# Patient Record
Sex: FEMALE | Race: WHITE | NOT HISPANIC OR LATINO | Employment: UNEMPLOYED | URBAN - METROPOLITAN AREA
[De-identification: names, ages, dates, MRNs, and addresses within clinical notes are randomized per-mention and may not be internally consistent; named-entity substitution may affect disease eponyms.]

---

## 2021-08-09 ENCOUNTER — HOSPITAL ENCOUNTER (OUTPATIENT)
Facility: CLINIC | Age: 31
End: 2021-08-09
Attending: INTERNAL MEDICINE | Admitting: INTERNAL MEDICINE
Payer: MEDICAID

## 2021-08-09 DIAGNOSIS — Z11.59 ENCOUNTER FOR SCREENING FOR OTHER VIRAL DISEASES: ICD-10-CM

## 2021-08-18 ENCOUNTER — LAB (OUTPATIENT)
Dept: LAB | Facility: CLINIC | Age: 31
End: 2021-08-18
Attending: INTERNAL MEDICINE
Payer: MEDICAID

## 2021-08-18 ENCOUNTER — ANESTHESIA EVENT (OUTPATIENT)
Dept: SURGERY | Facility: CLINIC | Age: 31
End: 2021-08-18

## 2021-08-18 DIAGNOSIS — Z11.59 ENCOUNTER FOR SCREENING FOR OTHER VIRAL DISEASES: ICD-10-CM

## 2021-08-18 LAB — SARS-COV-2 RNA RESP QL NAA+PROBE: NEGATIVE

## 2021-08-18 PROCEDURE — U0003 INFECTIOUS AGENT DETECTION BY NUCLEIC ACID (DNA OR RNA); SEVERE ACUTE RESPIRATORY SYNDROME CORONAVIRUS 2 (SARS-COV-2) (CORONAVIRUS DISEASE [COVID-19]), AMPLIFIED PROBE TECHNIQUE, MAKING USE OF HIGH THROUGHPUT TECHNOLOGIES AS DESCRIBED BY CMS-2020-01-R: HCPCS

## 2021-08-18 PROCEDURE — U0005 INFEC AGEN DETEC AMPLI PROBE: HCPCS

## 2021-08-18 RX ORDER — LIDOCAINE 40 MG/G
CREAM TOPICAL
Status: CANCELLED | OUTPATIENT
Start: 2021-08-18

## 2021-08-18 RX ORDER — DEXTROAMPHETAMINE SACCHARATE, AMPHETAMINE ASPARTATE MONOHYDRATE, DEXTROAMPHETAMINE SULFATE AND AMPHETAMINE SULFATE 6.25; 6.25; 6.25; 6.25 MG/1; MG/1; MG/1; MG/1
50 CAPSULE, EXTENDED RELEASE ORAL
COMMUNITY
Start: 2021-08-10 | End: 2021-08-19 | Stop reason: HOSPADM

## 2021-08-18 RX ORDER — ACETAMINOPHEN 325 MG/1
650 TABLET ORAL
COMMUNITY
Start: 2021-05-10 | End: 2021-08-19 | Stop reason: HOSPADM

## 2021-08-18 RX ORDER — GLUCOSA SU 2KCL/CHONDROITIN SU 500-400 MG
1 CAPSULE ORAL
COMMUNITY
Start: 2020-02-04 | End: 2021-08-19 | Stop reason: HOSPADM

## 2021-08-18 RX ORDER — FERROUS SULFATE 325(65) MG
325 TABLET ORAL
COMMUNITY
End: 2021-08-19 | Stop reason: HOSPADM

## 2021-08-18 RX ORDER — GABAPENTIN 300 MG/1
300 CAPSULE ORAL 2 TIMES DAILY
COMMUNITY
Start: 2021-08-10 | End: 2021-08-19 | Stop reason: HOSPADM

## 2021-08-18 RX ORDER — BUPRENORPHINE AND NALOXONE 8; 2 MG/1; MG/1
1 FILM, SOLUBLE BUCCAL; SUBLINGUAL 2 TIMES DAILY
COMMUNITY
End: 2021-08-19 | Stop reason: HOSPADM

## 2021-08-18 RX ORDER — DEXAMETHASONE 0.5 MG/5ML
ELIXIR ORAL
COMMUNITY
Start: 2020-02-19 | End: 2021-08-19 | Stop reason: HOSPADM

## 2021-08-18 RX ORDER — ESCITALOPRAM OXALATE 10 MG/1
TABLET ORAL
COMMUNITY
Start: 2021-05-21 | End: 2021-08-19 | Stop reason: HOSPADM

## 2021-08-18 RX ORDER — IBUPROFEN 600 MG/1
600 TABLET, FILM COATED ORAL
COMMUNITY
Start: 2021-05-10 | End: 2021-08-19 | Stop reason: HOSPADM

## 2021-08-18 RX ORDER — HYDROXYZINE HYDROCHLORIDE 25 MG/1
25-50 TABLET, FILM COATED ORAL EVERY 6 HOURS PRN
COMMUNITY
End: 2021-08-19 | Stop reason: HOSPADM

## 2021-08-18 NOTE — PLAN OF CARE
Reached patient at approximately 5 PM. Notified that we do not have an H&P for her procedure tomorrow. She will call [facility] to ask if the MD will do one prior to her procedure, and if he will not, try to schedule one herself. If those two things fail, patient stated she will try to reschedule her procedure .

## 2021-08-18 NOTE — PLAN OF CARE
"Left message at [facility] we have been unable to find patient's pre-op H&P. Pt stated she went to [facility] in [place] \"About 2 weeks ago\" Attempted to get that information from clinic but was only able to leave a message. Left patient another message that we have NOT been able to retrieve that information and we absolutely need it before surgery. Have not heard from patient as of 1700. Will continue to reach patient until shift is over.  "

## 2021-08-19 ENCOUNTER — ANESTHESIA (OUTPATIENT)
Dept: SURGERY | Facility: CLINIC | Age: 31
End: 2021-08-19

## 2021-10-07 ENCOUNTER — HOSPITAL ENCOUNTER (OUTPATIENT)
Facility: HOSPITAL | Age: 31
End: 2021-10-07
Attending: INTERNAL MEDICINE | Admitting: INTERNAL MEDICINE
Payer: COMMERCIAL

## 2021-10-07 DIAGNOSIS — Z11.59 ENCOUNTER FOR SCREENING FOR OTHER VIRAL DISEASES: ICD-10-CM

## 2021-11-05 ENCOUNTER — HOSPITAL ENCOUNTER (OUTPATIENT)
Facility: CLINIC | Age: 31
End: 2021-11-05
Attending: INTERNAL MEDICINE | Admitting: INTERNAL MEDICINE
Payer: COMMERCIAL

## 2021-11-05 DIAGNOSIS — Z11.59 ENCOUNTER FOR SCREENING FOR OTHER VIRAL DISEASES: ICD-10-CM

## 2021-11-09 ENCOUNTER — LAB (OUTPATIENT)
Dept: LAB | Facility: CLINIC | Age: 31
End: 2021-11-09
Attending: INTERNAL MEDICINE
Payer: COMMERCIAL

## 2021-11-09 DIAGNOSIS — Z11.59 ENCOUNTER FOR SCREENING FOR OTHER VIRAL DISEASES: ICD-10-CM

## 2021-11-09 PROCEDURE — U0005 INFEC AGEN DETEC AMPLI PROBE: HCPCS

## 2021-11-09 PROCEDURE — U0003 INFECTIOUS AGENT DETECTION BY NUCLEIC ACID (DNA OR RNA); SEVERE ACUTE RESPIRATORY SYNDROME CORONAVIRUS 2 (SARS-COV-2) (CORONAVIRUS DISEASE [COVID-19]), AMPLIFIED PROBE TECHNIQUE, MAKING USE OF HIGH THROUGHPUT TECHNOLOGIES AS DESCRIBED BY CMS-2020-01-R: HCPCS

## 2021-11-10 LAB — SARS-COV-2 RNA RESP QL NAA+PROBE: POSITIVE

## 2021-11-21 ENCOUNTER — HEALTH MAINTENANCE LETTER (OUTPATIENT)
Age: 31
End: 2021-11-21

## 2022-01-06 ENCOUNTER — VIRTUAL VISIT (OUTPATIENT)
Dept: FAMILY MEDICINE | Facility: CLINIC | Age: 32
End: 2022-01-06
Payer: COMMERCIAL

## 2022-01-06 DIAGNOSIS — Z20.822 EXPOSURE TO 2019 NOVEL CORONAVIRUS: Primary | ICD-10-CM

## 2022-01-06 DIAGNOSIS — Z11.59 NEED FOR HEPATITIS C SCREENING TEST: ICD-10-CM

## 2022-01-06 DIAGNOSIS — Z11.4 SCREENING FOR HIV (HUMAN IMMUNODEFICIENCY VIRUS): ICD-10-CM

## 2022-01-06 DIAGNOSIS — G35 MULTIPLE SCLEROSIS (H): ICD-10-CM

## 2022-01-06 PROCEDURE — 99203 OFFICE O/P NEW LOW 30 MIN: CPT | Mod: 95 | Performed by: INTERNAL MEDICINE

## 2022-01-06 RX ORDER — FERROUS SULFATE 325(65) MG
325 TABLET ORAL DAILY
COMMUNITY
Start: 2021-09-24

## 2022-01-06 RX ORDER — ESCITALOPRAM OXALATE 10 MG/1
10 TABLET ORAL DAILY
Status: ON HOLD | COMMUNITY
Start: 2021-12-26 | End: 2024-06-10

## 2022-01-06 RX ORDER — ONDANSETRON 4 MG/1
4 TABLET, ORALLY DISINTEGRATING ORAL
COMMUNITY
Start: 2021-11-02 | End: 2022-01-21

## 2022-01-06 RX ORDER — HYDROXYZINE HYDROCHLORIDE 50 MG/1
50 TABLET, FILM COATED ORAL 4 TIMES DAILY PRN
Status: ON HOLD | COMMUNITY
Start: 2021-12-30 | End: 2024-06-10

## 2022-01-06 RX ORDER — MULTIVITAMIN
1 TABLET ORAL DAILY
COMMUNITY
End: 2024-05-15

## 2022-01-06 RX ORDER — DEXTROAMPHETAMINE SACCHARATE, AMPHETAMINE ASPARTATE, DEXTROAMPHETAMINE SULFATE AND AMPHETAMINE SULFATE 3.75; 3.75; 3.75; 3.75 MG/1; MG/1; MG/1; MG/1
TABLET ORAL
COMMUNITY
Start: 2022-01-03 | End: 2024-05-15

## 2022-01-06 RX ORDER — GABAPENTIN 300 MG/1
600 CAPSULE ORAL 3 TIMES DAILY
Status: ON HOLD | COMMUNITY
Start: 2022-01-03 | End: 2024-06-10

## 2022-01-06 RX ORDER — BUPRENORPHINE HYDROCHLORIDE, NALOXONE HYDROCHLORIDE 8; 2 MG/1; MG/1
1 FILM, SOLUBLE BUCCAL; SUBLINGUAL 2 TIMES DAILY
COMMUNITY
Start: 2022-01-03 | End: 2024-05-15

## 2022-01-06 RX ORDER — LANOLIN ALCOHOL/MO/W.PET/CERES
400 CREAM (GRAM) TOPICAL DAILY
COMMUNITY
Start: 2021-12-27 | End: 2024-05-15

## 2022-01-06 RX ORDER — BUSPIRONE HYDROCHLORIDE 10 MG/1
10 TABLET ORAL 2 TIMES DAILY
Status: ON HOLD | COMMUNITY
Start: 2021-12-27 | End: 2024-06-10

## 2022-01-06 RX ORDER — LORAZEPAM 1 MG/1
1 TABLET ORAL 2 TIMES DAILY PRN
Status: ON HOLD | COMMUNITY
Start: 2021-12-31 | End: 2024-06-10

## 2022-01-06 NOTE — PROGRESS NOTES
Irma is a 31 year old who is being evaluated via a billable video visit.      How would you like to obtain your AVS? MyChart  If the video visit is dropped, the invitation should be resent by: Text to cell phone: [phone]  Will anyone else be joining your video visit? No    Video Start Time: 11:34 AM    Assessment & Plan   Problem List Items Addressed This Visit     None      Visit Diagnoses     Exposure to 2019 novel coronavirus    -  Primary    Screening for HIV (human immunodeficiency virus)        Need for hepatitis C screening test        Multiple sclerosis (H)        Relevant Orders    REVIEW OF HEALTH MAINTENANCE PROTOCOL ORDERS (Completed)    Asymptomatic COVID-19 Virus (Coronavirus) by PCR Nose                  Work on weight loss  Regular exercise    No follow-ups on file.    Cliff Hooks MD  [facility] MARIBEL Solis is a 31 year old who presents for the following health issues   Start: 01/06/2022 11:34 am  Stop: 01/06/2022 11:45 am  HPI     Covid Concerns  Congregated living situation and recent exposure to covid   Same household  Immune system compromised   immunosupp      Review of Systems   Constitutional, HEENT, cardiovascular, pulmonary, gi and gu systems are negative, except as otherwise noted.      Objective           Vitals:  No vitals were obtained today due to virtual visit.    Physical Exam   GENERAL: Healthy, alert and no distress  EYES: Eyes grossly normal to inspection.  No discharge or erythema, or obvious scleral/conjunctival abnormalities.  RESP: No audible wheeze, cough, or visible cyanosis.  No visible retractions or increased work of breathing.    SKIN: Visible skin clear. No significant rash, abnormal pigmentation or lesions.  NEURO: Cranial nerves grossly intact.  Mentation and speech appropriate for age.  PSYCH: Mentation appears normal, affect normal/bright, judgement and insight intact, normal speech and appearance well-groomed.    No  results found for any visits on 01/06/22.            Video-Visit Details    Type of service:  Video Visit    Video End Time:11:45 AM    Originating Location (pt. Location): Home    Distant Location (provider location):  [facility]     Platform used for Video Visit: Fippex

## 2022-01-07 ENCOUNTER — LAB (OUTPATIENT)
Dept: LAB | Facility: CLINIC | Age: 32
End: 2022-01-07
Attending: INTERNAL MEDICINE
Payer: COMMERCIAL

## 2022-01-07 DIAGNOSIS — G35 MULTIPLE SCLEROSIS (H): ICD-10-CM

## 2022-01-07 PROCEDURE — U0003 INFECTIOUS AGENT DETECTION BY NUCLEIC ACID (DNA OR RNA); SEVERE ACUTE RESPIRATORY SYNDROME CORONAVIRUS 2 (SARS-COV-2) (CORONAVIRUS DISEASE [COVID-19]), AMPLIFIED PROBE TECHNIQUE, MAKING USE OF HIGH THROUGHPUT TECHNOLOGIES AS DESCRIBED BY CMS-2020-01-R: HCPCS

## 2022-01-07 PROCEDURE — U0005 INFEC AGEN DETEC AMPLI PROBE: HCPCS

## 2022-01-08 LAB — SARS-COV-2 RNA RESP QL NAA+PROBE: NEGATIVE

## 2022-01-21 ENCOUNTER — TELEPHONE (OUTPATIENT)
Dept: INTERNAL MEDICINE | Facility: CLINIC | Age: 32
End: 2022-01-21
Payer: COMMERCIAL

## 2022-01-21 ENCOUNTER — LAB (OUTPATIENT)
Dept: LAB | Facility: CLINIC | Age: 32
End: 2022-01-21
Payer: COMMERCIAL

## 2022-01-21 DIAGNOSIS — Z01.818 PREOP GENERAL PHYSICAL EXAM: ICD-10-CM

## 2022-01-21 DIAGNOSIS — Z01.818 PREOP GENERAL PHYSICAL EXAM: Primary | ICD-10-CM

## 2022-01-21 PROCEDURE — U0005 INFEC AGEN DETEC AMPLI PROBE: HCPCS

## 2022-01-21 PROCEDURE — U0003 INFECTIOUS AGENT DETECTION BY NUCLEIC ACID (DNA OR RNA); SEVERE ACUTE RESPIRATORY SYNDROME CORONAVIRUS 2 (SARS-COV-2) (CORONAVIRUS DISEASE [COVID-19]), AMPLIFIED PROBE TECHNIQUE, MAKING USE OF HIGH THROUGHPUT TECHNOLOGIES AS DESCRIBED BY CMS-2020-01-R: HCPCS

## 2022-01-21 RX ORDER — IBUPROFEN 400 MG/1
400 TABLET, FILM COATED ORAL EVERY 6 HOURS PRN
COMMUNITY
End: 2024-05-15

## 2022-01-21 RX ORDER — DEXTROAMPHETAMINE SACCHARATE, AMPHETAMINE ASPARTATE MONOHYDRATE, DEXTROAMPHETAMINE SULFATE AND AMPHETAMINE SULFATE 6.25; 6.25; 6.25; 6.25 MG/1; MG/1; MG/1; MG/1
50 CAPSULE, EXTENDED RELEASE ORAL EVERY MORNING
Status: ON HOLD | COMMUNITY
End: 2024-06-10

## 2022-01-21 NOTE — TELEPHONE ENCOUNTER
ALPESH Montalvo at [facility] calling. Pt was scheduled with an OB provider today for a physical and needed a preop. Pt has Surgery scheduled through [facility]. Is needing a pre procedural covid test. [facility] will not order this. Lab collected a covid test on pt while in clinic. They are needing Dr. Hooks to place order for pre-procedural covid test. Katia can be reached at [phone]. Please advise.    Valerie Ahumada RN  [facility]

## 2022-01-21 NOTE — TELEPHONE ENCOUNTER
FUTURE VISIT INFORMATION      SURGERY INFORMATION:    Date: 22    Location:  or    Surgeon:  Jojo Moore MD    Anesthesia Type:  general    Procedure: ENDOSCOPIC RETROGRADE CHOLANGIOPANCREATOGRAPHY WITH STENT REMOVAL    RECORDS REQUESTED FROM:       Primary Care Provider: Sravan Breen MD  - [facility]    Most recent EKG+ Tracin20- [facility]

## 2022-01-21 NOTE — PHARMACY - PREOPERATIVE ASSESSMENT CENTER
Preoperative Assessment Center Medication History Note    Medication history completed on January 21, 2022 by this writer. See Epic admission navigator for prior to admission medications. Operating room staff will still need to confirm medications and last dose information on day of surgery.     Medication history interview sources  Patient interview: Yes  Care Everywhere records: Yes  Surescripts pharmacy refill records: Yes  Other (if applicable): None    Changes made to PTA medication list  Added:   -- ibuprofen  -- adderall XR     Deleted:   -- dexamethasone  -- zofran    Changed: None    Additional medication history information (including reliability of information, actions taken by pharmacist):    -- No recent (within 30 days) course of antibiotics  -- No recent (within 30 days) course of systemic steroids  -- Patient declines being on any other prescription or over-the-counter medications    Pain Medication Quantification - Patient is currently scheduled for a same day surgery. If this plan changes and patient is admitted, the inpatient pain management service could be consulted for specific pain management recommendations (available at pager [phone] from 8 AM - 3 PM Mon - Fri and available via phone answering service 24/7 at [phone]).     - OUTPATIENT MEDICATIONS (related to pain management):  -- Long-acting opioid: None  -- Short-acting opioid: None  -- Intrathecal pump: None  -- Other: Suboxone 8-1mg, 1 film SL twice daily - patient to continue preoperatively.       Prior to Admission medications    Medication Sig Last Dose Taking? Auth Provider   amphetamine-dextroamphetamine (ADDERALL XR) 25 MG 24 hr capsule Take 50 mg by mouth every morning Taking Yes Unknown, Entered By History   amphetamine-dextroamphetamine (ADDERALL) 15 MG tablet Take 1 tablet in afternoon (in addition to XR) Taking Yes Reported, Patient   busPIRone (BUSPAR) 10 MG tablet Take 10 mg by mouth 2 times daily  Taking Yes  Reported, Patient   cholecalciferol 50 MCG (2000 UT) tablet Take 1 tablet by mouth daily Taking Yes Reported, Patient   escitalopram (LEXAPRO) 10 MG tablet Take 10 mg by mouth daily  Taking Yes Reported, Patient   FEROSUL 325 (65 Fe) MG tablet Take 325 mg by mouth daily Taking Yes Reported, Patient   folic acid (FOLVITE) 400 MCG tablet Take 400 mcg by mouth daily  Taking Yes Reported, Patient   gabapentin (NEURONTIN) 300 MG capsule TAKE TWO CAPSULES BY MOUTH TWICE DAILY Taking Yes Reported, Patient   hydrOXYzine (ATARAX) 50 MG tablet Take 50 mg by mouth nightly as needed  Taking Yes Reported, Patient   ibuprofen (ADVIL/MOTRIN) 400 MG tablet Take 400 mg by mouth every 6 hours as needed for moderate pain Taking Yes Unknown, Entered By History   LORazepam (ATIVAN) 1 MG tablet TAKE 1 TABLET BY MOUTH EVERY DAY AS NEEDED FOR ANXIETY Taking Yes Reported, Patient   Specialty Vitamins Products (VITAMINS FOR HAIR) TABS Take 1 tablet by mouth daily Taking Yes Reported, Patient   SUBOXONE 8-2 MG per film Place 1 Film under the tongue 2 times daily  Taking Yes Reported, Patient   dexamethasone (DECADRON) 0.5 MG/5ML elixir For MS Flares   Reported, Patient       Medication history completed by: Samuel Buckley RPH

## 2022-01-22 LAB — SARS-COV-2 RNA RESP QL NAA+PROBE: NEGATIVE

## 2022-01-24 ENCOUNTER — PRE VISIT (OUTPATIENT)
Dept: SURGERY | Facility: CLINIC | Age: 32
End: 2022-01-24

## 2022-01-24 ENCOUNTER — ANESTHESIA EVENT (OUTPATIENT)
Dept: SURGERY | Facility: CLINIC | Age: 32
End: 2022-01-24
Payer: COMMERCIAL

## 2022-01-24 ENCOUNTER — OFFICE VISIT (OUTPATIENT)
Dept: SURGERY | Facility: CLINIC | Age: 32
End: 2022-01-24
Payer: COMMERCIAL

## 2022-01-24 VITALS
SYSTOLIC BLOOD PRESSURE: 114 MMHG | TEMPERATURE: 98.4 F | BODY MASS INDEX: 50.02 KG/M2 | HEIGHT: 64 IN | HEART RATE: 94 BPM | OXYGEN SATURATION: 97 % | DIASTOLIC BLOOD PRESSURE: 81 MMHG | WEIGHT: 293 LBS

## 2022-01-24 DIAGNOSIS — Z01.818 PREOP EXAMINATION: Primary | ICD-10-CM

## 2022-01-24 DIAGNOSIS — Q44.5 CHOLEDOCHOCELE: ICD-10-CM

## 2022-01-24 PROCEDURE — 99203 OFFICE O/P NEW LOW 30 MIN: CPT | Performed by: PHYSICIAN ASSISTANT

## 2022-01-24 ASSESSMENT — MIFFLIN-ST. JEOR: SCORE: 2205.01

## 2022-01-24 ASSESSMENT — ENCOUNTER SYMPTOMS: SEIZURES: 0

## 2022-01-24 ASSESSMENT — PAIN SCALES - GENERAL: PAINLEVEL: EXTREME PAIN (8)

## 2022-01-24 ASSESSMENT — LIFESTYLE VARIABLES: TOBACCO_USE: 1

## 2022-01-24 NOTE — PATIENT INSTRUCTIONS
You were seen today in the PAC Clinic   (Pre operative Anesthesia Assessment Center)  [address]  [address]   phone [phone]    You had a pre operative assessment done:    Anesthesia recommendations for medications:    Hold aspirin for 7 days before surgery  Hold multivitamins for 7 days before surgery  Hold herbal medications and supplements for 7 days before surgery  Hold ibuprofen for at least 24 hours before surgery   Hold Naproxen for at least 4 days before surgery      Special instructions for anticoagulation medications:        Please hold the following medications the day of surgery:  Adderall  Vitamin D  Iron supplement   Folate             Please take these medications the day of surgery:  Lorazepam if needed   Suboxone per your routine  Escitalopram  Gabapentin                  For information regarding Surgery/Procedure date, time, and fasting instructions, please contact the facility that you are scheduled     [facility]  Procedure time 2 pm    Most likely arrival time 12:00 noon    No solid foods after 6 am  Clear liquids are ok until 12:00 noon

## 2022-01-24 NOTE — H&P
Pre-Operative H & P     CC:  Preoperative exam to assess for increased cardiopulmonary risk while undergoing surgery and anesthesia.    Date of Encounter: 1/24/2022  Primary Care Physician:  No Ref-Primary, Physician     Reason for visit:   Encounter Diagnoses   Name Primary?     Preop examination Yes     Choledochocele        HPI  Irma Heller is a 31 year old female who presents for pre-operative H & P in preparation for ERCP with Dr. Moore on 1/25/22 at [facility].    This is a 31-year-old female patient with past medical history significant for positive Covid November 2021, former smoker, Chiari I malformation in childhood status post decompression and shunt, multiple sclerosis, GERD, depression, anxiety.  Patient is status post cholecystectomy.  In May 2021 patient underwent ERCP which revealed dilated intrahepatic ducts and stricture with stone.  Patient underwent successful sphincterotomy with balloon extraction of cystic duct stone and stent placement.  She is now scheduled for the above procedure to remove stent.    History is obtained from the patient and chart review      Hx of abnormal bleeding or anti-platelet use: denies    Menstrual history: No LMP recorded.:     Prior to Admission Medications  Current Outpatient Medications   Medication Sig Dispense Refill     amphetamine-dextroamphetamine (ADDERALL XR) 25 MG 24 hr capsule Take 50 mg by mouth every morning       amphetamine-dextroamphetamine (ADDERALL) 15 MG tablet Take 1 tablet in afternoon (in addition to XR)       busPIRone (BUSPAR) 10 MG tablet Take 10 mg by mouth 2 times daily        cholecalciferol 50 MCG (2000 UT) tablet Take 1 tablet by mouth daily       escitalopram (LEXAPRO) 10 MG tablet Take 10 mg by mouth daily        FEROSUL 325 (65 Fe) MG tablet Take 325 mg by mouth daily       folic acid (FOLVITE) 400 MCG tablet Take 400 mcg by mouth daily        gabapentin (NEURONTIN) 300 MG capsule TAKE TWO CAPSULES BY MOUTH  TWICE DAILY       hydrOXYzine (ATARAX) 50 MG tablet Take 50 mg by mouth nightly as needed        ibuprofen (ADVIL/MOTRIN) 400 MG tablet Take 400 mg by mouth every 6 hours as needed for moderate pain       LORazepam (ATIVAN) 1 MG tablet TAKE 1 TABLET BY MOUTH EVERY DAY AS NEEDED FOR ANXIETY       Specialty Vitamins Products (VITAMINS FOR HAIR) TABS Take 1 tablet by mouth daily       SUBOXONE 8-2 MG per film Place 1 Film under the tongue 2 times daily          Family History  Family History   Problem Relation Age of Onset     Heart Disease Brother            Anesthesia Pre-Procedure Evaluation    Patient: Irma Heller   MRN: [id number] : 1990        Preoperative Diagnosis: * No surgery found *    Procedure :   PAC EVALUATION       Past Medical History:   Diagnosis Date     Anxiety      Chiari I malformation (H)     Hx- was a child     Depression      Gastroesophageal reflux disease      Multiple sclerosis (H)       Past Surgical History:   Procedure Laterality Date     AS INSTALL SPINAL SHUNT,LAMINECTOMY      Related to Chiari Malformation surgery     CHOLECYSTECTOMY       DECOMPRESSION CHIARI        Allergies   Allergen Reactions     Penicillins Anaphylaxis and Hives     Shellfish Allergy Anaphylaxis and Hives     Copaxone [Glatiramer-Mannitol] Swelling      Social History     Tobacco Use     Smoking status: Former Smoker     Quit date: 2021     Years since quittin.0     Smokeless tobacco: Never Used   Substance Use Topics     Alcohol use: Not Currently      Wt Readings from Last 1 Encounters:   No data found for Wt        Anesthesia Evaluation   Pt has had prior anesthetic.     No history of anesthetic complications     The complete review of systems is negative other than noted in the HPI or here.     ROS/MED HX  ENT/Pulmonary:     (+) VALENTIN risk factors, snores loudly, obese, tobacco use, Past use,     Neurologic: Comment: H/o chiari 1 malformation s/p decompression/shunt    (+) Multiple  "Sclerosis,  (-) no seizures and no CVA   Cardiovascular:     (+) -----Previous cardiac testing   Echo: Date: Results:    Stress Test: Date: Results:    ECG Reviewed: Date: 4/14/2020 Results:  SR  Cath: Date: Results:      METS/Exercise Tolerance: >4 METS    Hematologic:  - neg hematologic  ROS  (-) history of blood clots and history of blood transfusion   Musculoskeletal:  - neg musculoskeletal ROS     GI/Hepatic:     (+) GERD,     Renal/Genitourinary:  - neg Renal ROS     Endo:     (+) Obesity,     Psychiatric/Substance Use:     (+) psychiatric history anxiety and depression     Infectious Disease:  - neg infectious disease ROS     Malignancy:  - neg malignancy ROS     Other:  - neg other ROS          Physical Exam    Airway        Mallampati: II   TM distance: > 3 FB   Neck ROM: full   Mouth opening: > 3 cm    Respiratory Devices and Support         Dental       (+) upper dentures      Cardiovascular   cardiovascular exam normal       Rhythm and rate: regular and normal     Pulmonary   pulmonary exam normal        breath sounds clear to auscultation         /81 (BP Location: Right arm, Patient Position: Sitting, Cuff Size: Thigh)   Pulse 94   Temp 98.4  F (36.9  C) (Oral)   Ht 1.626 m (5' 4\")   Wt (!) 151.5 kg (334 lb)   SpO2 97%   Breastfeeding No   BMI 57.33 kg/m           Physical Exam  Constitutional: Awake, alert, cooperative, no apparent distress, and appears stated age.  Eyes: Pupils equal, round and reactive to light, extra ocular muscles intact, sclera clear, conjunctiva normal.  HENT: Normocephalic, oral pharynx with moist mucus membranes, upper denture. No goiter appreciated.   Respiratory: Clear to auscultation bilaterally, no crackles or wheezing.  Cardiovascular: Regular rate and rhythm, normal S1 and S2, and no murmur noted.  Carotids +2, no bruits. No edema. Palpable pulses to radial and PT arteries.   GI: Normal bowel sounds, soft, obese abdomen.  Mildly tender to palpation of " RUQ.  Lymph/Hematologic: No cervical lymphadenopathy and no supraclavicular lymphadenopathy.  Genitourinary:  deferred  Skin: Warm and dry.    Musculoskeletal: Full ROM of neck. There is no redness, warmth, or swelling of the joints. Gross motor strength is normal.    Neurologic: Awake, alert, oriented to name, place and time. Cranial nerves II-XII are grossly intact. Gait is normal.   Neuropsychiatric: Calm, cooperative. Normal affect.     PRIOR LABS/DIAGNOSTIC STUDIES:   All labs and imaging personally reviewed     9/5/21  SODIUM 136 - 145 mmol/L 138    POTASSIUM 3.5 - 5.1 mmol/L 4.0    CHLORIDE 98 - 112 mmol/L 104    CARBON DIOXIDE 21 - 32 mmol/L 28    BUN (UREA NITRO) 7 - 24 mg/dL 14    CREATININE 0.55 - 1.02 mg/dL 0.71    EST GFR (CKD-EPI) >60 mL/min >60    EST GFR IF AFRICAN AM >60 mL/min >60    GLUCOSE 74 - 106 mg/dL 107 High     CALCIUM, SERUM 8.5 - 10.1 mg/dL 9.2    ANION GAP 0.0 - 15.0 mmol/L 6.0      WBC 4.3 - 10.8 K/uL 12.0 High     RBC 4.20 - 5.40 M/uL 4.70    HEMOGLOBIN 12.0 - 16.0 gm/dL 12.8    HEMATOCRIT 36.0 - 48.0 % 39.7    MCV 80 - 100 fl 85    MCH 27 - 33 pg 27    MCHC 33 - 36 gm/dL 32 Low     RDW 11.5 - 14.5 % 13.6    PLATELET COUNT 150 - 400 K/    MPV 6.5 - 12 10.3    PMN % % 54.0    IG% <=1.0 % 0.3    LYMPH % % 32.8    MONO % % 6.4    EOS % % 5.6    BASO % % 0.9    PMN ABSOLUTE 1.80 - 7.80 K/uL 6.44    IG ABSOLUTE K/uL 0.04    LYMPH ABSOLUTE 1.00 - 4.00 K/uL 3.92    MONO ABSOLUTE 0.00 - 1.00 K/uL 0.77    EOS ABSOLUTE 0.00 - 0.45 K/uL 0.67 High     BASO ABSOLUTE 0.00 - 0.20 K/uL 0.11    NUCL RBC % 0.0 - 0.0 /100 WBC 0.0    NUCL RBC ABSOLUTE 0.00 - 0.00 K/uL 0.00          EKG/ stress test - if available please see in ROS above     CT a/p w/o oral w IV con 9/5/21:  IMPRESSION:     1.  Mild fatty infiltration of the liver.   2.  Cholecystectomy.   3.  Common bile duct stent without biliary ductal dilatation.   4.  Small umbilical hernia containing fat.   5.  No acute findings.          Outside records reviewed from: care everywhere          ASSESSMENT and PLAN  Irma Heller is a 31 year old female scheduled for ERCP on 1/25/22 by Dr. Moore in treatment of choledochocele at [facility].  PAC referral for risk assessment and optimization for anesthesia with comorbid conditions of h/o chiari 1 malformation, MS, GERD, depression, anxiety:     Pre-operative considerations:   1.  Cardiac:  Functional status- METS >4.  Good activity tolerance. No exertional symptoms with walking, ascending stairs.  Low risk surgery with 0.4% (RCRI #) risk of major adverse cardiac event.   --denies chest pain, SOB, GREEN, palpitations, orthopnea  --no cardiac history personally but reports there is a family history of 'enlarged heart' and her brother passed away at 24 from this.     2.  Pulm:  Airway feasible.  VALENTIN risk: Low risk, 2/8 STOPBANG risk factors  --former smoker, quit 1/2021     3.  GI:  Risk of PONV score = 2.  If > 2, anti-emetic intervention recommended.   --h/o GERD   --h/o cholecystectomy   --s/p endoscopy/sphincterotomy 5/2021 with stent placement     4.  Endo:   --obese, BMI ~ 57    5.  Neuro:   --h/o MS.  Last flare October 2021 with photophobia and phonophobia.  Pt has been out of her daily betaseron injections for last 3 weeks.  Working on insurance coverage.   --h/o chiari 1 malformation as a child. S/p spinal shunt   --continue gabapentin  --hold Adderall DOS     6.  Psych/Pain   --depression and anxiety, continue Buspar, Lexapro, Atarax and Ativan as directed   --continue Suboxone perioperatively     7.  Pt asked for instructions regarding what to do with NPO as well as medications. I explained that [facility] nursing should give her that information but she hadn't received it yet.  PAC RN gave instructions to hold ibuprofen and Adderall DOS and no solids for 8 hours prior to surgery, clear liquids up until 2 hours prior.  I also explained that if she does receive instructions  from [facility] and they are different, she should follow their instructions.    VTE risk:  0.5%    Patient is optimized and is acceptable candidate for the proposed procedure.  No further diagnostic evaluation is needed.           On the day of service:     Prep time: 10 minutes  Visit time: 12 minutes  Documentation time: 15 minutes  ------------------------------------------  Total time: 37 minutes      Lisa Diaz PA-C  Preoperative Assessment Center  [facility]  Clinic and Surgery Center  Phone: [phone]  Fax: [phone]

## 2022-01-24 NOTE — ANESTHESIA PREPROCEDURE EVALUATION
Anesthesia Pre-Procedure Evaluation    Patient: Irma Heller   MRN: [id number] : 1990        Preoperative Diagnosis: * No surgery found *    Procedure :   PAC EVALUATION       Past Medical History:   Diagnosis Date     Anxiety      Chiari I malformation (H)     Hx- was a child     Depression      Gastroesophageal reflux disease      Multiple sclerosis (H)       Past Surgical History:   Procedure Laterality Date     AS INSTALL SPINAL SHUNT,LAMINECTOMY      Related to Chiari Malformation surgery     CHOLECYSTECTOMY       DECOMPRESSION CHIARI        Allergies   Allergen Reactions     Penicillins Anaphylaxis and Hives     Shellfish Allergy Anaphylaxis and Hives     Copaxone [Glatiramer-Mannitol] Swelling      Social History     Tobacco Use     Smoking status: Former Smoker     Quit date: 2021     Years since quittin.0     Smokeless tobacco: Never Used   Substance Use Topics     Alcohol use: Not Currently      Wt Readings from Last 1 Encounters:   No data found for Wt        Anesthesia Evaluation   Pt has had prior anesthetic.     No history of anesthetic complications       ROS/MED HX  ENT/Pulmonary:     (+) VALENTIN risk factors, snores loudly, obese, tobacco use, Past use,     Neurologic: Comment: H/o chiari 1 malformation s/p decompression/shunt    (+) Multiple Sclerosis,  (-) no seizures and no CVA   Cardiovascular:     (+) -----Previous cardiac testing   Echo: Date: Results:    Stress Test: Date: Results:    ECG Reviewed: Date: 2020 Results:  SR  Cath: Date: Results:      METS/Exercise Tolerance: >4 METS    Hematologic:  - neg hematologic  ROS  (-) history of blood clots and history of blood transfusion   Musculoskeletal:  - neg musculoskeletal ROS     GI/Hepatic:     (+) GERD,     Renal/Genitourinary:  - neg Renal ROS     Endo:     (+) Obesity,     Psychiatric/Substance Use:     (+) psychiatric history anxiety and depression     Infectious Disease:  - neg infectious disease ROS      Malignancy:  - neg malignancy ROS     Other:  - neg other ROS          Physical Exam    Airway        Mallampati: II   TM distance: > 3 FB   Neck ROM: full   Mouth opening: > 3 cm    Respiratory Devices and Support         Dental       (+) upper dentures      Cardiovascular   cardiovascular exam normal       Rhythm and rate: regular and normal     Pulmonary   pulmonary exam normal        breath sounds clear to auscultation           OUTSIDE LABS:  CBC: No results found for: WBC, HGB, HCT, PLT  BMP: No results found for: NA, POTASSIUM, CHLORIDE, CO2, BUN, CR, GLC  COAGS: No results found for: PTT, INR, FIBR  POC: No results found for: BGM, HCG, HCGS  HEPATIC: No results found for: ALBUMIN, PROTTOTAL, ALT, AST, GGT, ALKPHOS, BILITOTAL, BILIDIRECT, MARTI  OTHER: No results found for: PH, LACT, A1C, YUMIKO, PHOS, MAG, LIPASE, AMYLASE, TSH, T4, T3, CRP, SED    Anesthesia Plan    ASA Status:  2   NPO Status:  NPO Appropriate    Anesthesia Type: General.     - Airway: ETT   Induction: Intravenous.   Maintenance: Balanced.        Consents    Anesthesia Plan(s) and associated risks, benefits, and realistic alternatives discussed. Questions answered and patient/representative(s) expressed understanding.    - Discussed:     - Discussed with:  Patient         Postoperative Care    Pain management: IV analgesics, Oral pain medications.   PONV prophylaxis: Ondansetron (or other 5HT-3), Dexamethasone or Solumedrol, Background Propofol Infusion     Comments:    Other Comments: Pt notes new sore throat and possible covid exposure over the weekend. Tested negative last Friday. Will rapid re-test prior to surgery.           PAC Discussion and Assessment    ASA Classification: 2  ASC OK for Surgery: [place].  Anesthetic techniques and relevant risks discussed: GA  Invasive monitoring and risk discussed: No    Possibility and Risk of blood transfusion discussed: No            PAC Resident/NP Anesthesia Assessment: Irma white  a 31 year old female scheduled for ERCP on 1/25/22 by Dr. Moore in treatment of choledochocele at [facility].  PAC referral for risk assessment and optimization for anesthesia with comorbid conditions of h/o chiari 1 malformation, MS, GERD, depression, anxiety:     Pre-operative considerations:   1.  Cardiac:  Functional status- METS >4.  Good activity tolerance. No exertional symptoms with walking, ascending stairs.  Low risk surgery with 0.4% (RCRI #) risk of major adverse cardiac event.   --denies chest pain, SOB, GREEN, palpitations, orthopnea  --no cardiac history personally but reports there is a family history of 'enlarged heart' and her brother passed away at 24 from this.     2.  Pulm:  Airway feasible.  VALENTIN risk: Low risk, 2/8 STOPBANG risk factors  --former smoker, quit 1/2021     3.  GI:  Risk of PONV score = 2.  If > 2, anti-emetic intervention recommended.   --h/o GERD   --h/o cholecystectomy   --s/p endoscopy/sphincterotomy 5/2021 with stent placement     4.  Endo:   --obese, BMI ~ 57    5.  Neuro:   --h/o MS.  Last flare October 2021 with photophobia and phonophobia.  Pt has been out of her daily betaseron injections for last 3 weeks.  Working on insurance coverage.   --h/o chiari 1 malformation as a child. S/p spinal shunt   --continue gabapentin  --hold Adderall DOS     6.  Psych/Pain   --depression and anxiety, continue Buspar, Lexapro, Atarax and Ativan as directed   --continue Suboxone perioperatively     7.  Pt asked for instructions regarding what to do with NPO as well as medications. I explained that [facility] nursing should give her that information but she hadn't received it yet.  PAC RN gave instructions to hold ibuprofen and Adderall DOS and no solids for 8 hours prior to surgery, clear liquids up until 2 hours prior.  I also explained that if she does receive instructions from [facility] and they are different, she should follow their instructions.    VTE risk:  0.5%    Patient  is optimized and is acceptable candidate for the proposed procedure.  No further diagnostic evaluation is needed.         **For further details of assessment, testing, and physical exam please see H and P completed on same date.           Lisa Diaz PA-C, [facility]     Reviewed and Signed by PAC Mid-Level Provider/Resident  Mid-Level Provider/Resident: Lisa Diaz  Date: 1/24/22                                 Lisa Diaz PA-C

## 2022-01-24 NOTE — H&P (VIEW-ONLY)
Pre-Operative H & P     CC:  Preoperative exam to assess for increased cardiopulmonary risk while undergoing surgery and anesthesia.    Date of Encounter: 1/24/2022  Primary Care Physician:  No Ref-Primary, Physician     Reason for visit:   Encounter Diagnoses   Name Primary?     Preop examination Yes     Choledochocele        HPI  Irma Heller is a 31 year old female who presents for pre-operative H & P in preparation for ERCP with Dr. Moore on 1/25/22 at [facility].    This is a 31-year-old female patient with past medical history significant for positive Covid November 2021, former smoker, Chiari I malformation in childhood status post decompression and shunt, multiple sclerosis, GERD, depression, anxiety.  Patient is status post cholecystectomy.  In May 2021 patient underwent ERCP which revealed dilated intrahepatic ducts and stricture with stone.  Patient underwent successful sphincterotomy with balloon extraction of cystic duct stone and stent placement.  She is now scheduled for the above procedure to remove stent.    History is obtained from the patient and chart review      Hx of abnormal bleeding or anti-platelet use: denies    Menstrual history: No LMP recorded.:     Prior to Admission Medications  Current Outpatient Medications   Medication Sig Dispense Refill     amphetamine-dextroamphetamine (ADDERALL XR) 25 MG 24 hr capsule Take 50 mg by mouth every morning       amphetamine-dextroamphetamine (ADDERALL) 15 MG tablet Take 1 tablet in afternoon (in addition to XR)       busPIRone (BUSPAR) 10 MG tablet Take 10 mg by mouth 2 times daily        cholecalciferol 50 MCG (2000 UT) tablet Take 1 tablet by mouth daily       escitalopram (LEXAPRO) 10 MG tablet Take 10 mg by mouth daily        FEROSUL 325 (65 Fe) MG tablet Take 325 mg by mouth daily       folic acid (FOLVITE) 400 MCG tablet Take 400 mcg by mouth daily        gabapentin (NEURONTIN) 300 MG capsule TAKE TWO CAPSULES BY MOUTH  TWICE DAILY       hydrOXYzine (ATARAX) 50 MG tablet Take 50 mg by mouth nightly as needed        ibuprofen (ADVIL/MOTRIN) 400 MG tablet Take 400 mg by mouth every 6 hours as needed for moderate pain       LORazepam (ATIVAN) 1 MG tablet TAKE 1 TABLET BY MOUTH EVERY DAY AS NEEDED FOR ANXIETY       Specialty Vitamins Products (VITAMINS FOR HAIR) TABS Take 1 tablet by mouth daily       SUBOXONE 8-2 MG per film Place 1 Film under the tongue 2 times daily          Family History  Family History   Problem Relation Age of Onset     Heart Disease Brother            Anesthesia Pre-Procedure Evaluation    Patient: Irma Heller   MRN: [id number] : 1990        Preoperative Diagnosis: * No surgery found *    Procedure :   PAC EVALUATION       Past Medical History:   Diagnosis Date     Anxiety      Chiari I malformation (H)     Hx- was a child     Depression      Gastroesophageal reflux disease      Multiple sclerosis (H)       Past Surgical History:   Procedure Laterality Date     AS INSTALL SPINAL SHUNT,LAMINECTOMY      Related to Chiari Malformation surgery     CHOLECYSTECTOMY       DECOMPRESSION CHIARI        Allergies   Allergen Reactions     Penicillins Anaphylaxis and Hives     Shellfish Allergy Anaphylaxis and Hives     Copaxone [Glatiramer-Mannitol] Swelling      Social History     Tobacco Use     Smoking status: Former Smoker     Quit date: 2021     Years since quittin.0     Smokeless tobacco: Never Used   Substance Use Topics     Alcohol use: Not Currently      Wt Readings from Last 1 Encounters:   No data found for Wt        Anesthesia Evaluation   Pt has had prior anesthetic.     No history of anesthetic complications     The complete review of systems is negative other than noted in the HPI or here.     ROS/MED HX  ENT/Pulmonary:     (+) VALENTIN risk factors, snores loudly, obese, tobacco use, Past use,     Neurologic: Comment: H/o chiari 1 malformation s/p decompression/shunt    (+) Multiple  "Sclerosis,  (-) no seizures and no CVA   Cardiovascular:     (+) -----Previous cardiac testing   Echo: Date: Results:    Stress Test: Date: Results:    ECG Reviewed: Date: 4/14/2020 Results:  SR  Cath: Date: Results:      METS/Exercise Tolerance: >4 METS    Hematologic:  - neg hematologic  ROS  (-) history of blood clots and history of blood transfusion   Musculoskeletal:  - neg musculoskeletal ROS     GI/Hepatic:     (+) GERD,     Renal/Genitourinary:  - neg Renal ROS     Endo:     (+) Obesity,     Psychiatric/Substance Use:     (+) psychiatric history anxiety and depression     Infectious Disease:  - neg infectious disease ROS     Malignancy:  - neg malignancy ROS     Other:  - neg other ROS          Physical Exam    Airway        Mallampati: II   TM distance: > 3 FB   Neck ROM: full   Mouth opening: > 3 cm    Respiratory Devices and Support         Dental       (+) upper dentures      Cardiovascular   cardiovascular exam normal       Rhythm and rate: regular and normal     Pulmonary   pulmonary exam normal        breath sounds clear to auscultation         /81 (BP Location: Right arm, Patient Position: Sitting, Cuff Size: Thigh)   Pulse 94   Temp 98.4  F (36.9  C) (Oral)   Ht 1.626 m (5' 4\")   Wt (!) 151.5 kg (334 lb)   SpO2 97%   Breastfeeding No   BMI 57.33 kg/m           Physical Exam  Constitutional: Awake, alert, cooperative, no apparent distress, and appears stated age.  Eyes: Pupils equal, round and reactive to light, extra ocular muscles intact, sclera clear, conjunctiva normal.  HENT: Normocephalic, oral pharynx with moist mucus membranes, upper denture. No goiter appreciated.   Respiratory: Clear to auscultation bilaterally, no crackles or wheezing.  Cardiovascular: Regular rate and rhythm, normal S1 and S2, and no murmur noted.  Carotids +2, no bruits. No edema. Palpable pulses to radial and PT arteries.   GI: Normal bowel sounds, soft, obese abdomen.  Mildly tender to palpation of " RUQ.  Lymph/Hematologic: No cervical lymphadenopathy and no supraclavicular lymphadenopathy.  Genitourinary:  deferred  Skin: Warm and dry.    Musculoskeletal: Full ROM of neck. There is no redness, warmth, or swelling of the joints. Gross motor strength is normal.    Neurologic: Awake, alert, oriented to name, place and time. Cranial nerves II-XII are grossly intact. Gait is normal.   Neuropsychiatric: Calm, cooperative. Normal affect.     PRIOR LABS/DIAGNOSTIC STUDIES:   All labs and imaging personally reviewed     9/5/21  SODIUM 136 - 145 mmol/L 138    POTASSIUM 3.5 - 5.1 mmol/L 4.0    CHLORIDE 98 - 112 mmol/L 104    CARBON DIOXIDE 21 - 32 mmol/L 28    BUN (UREA NITRO) 7 - 24 mg/dL 14    CREATININE 0.55 - 1.02 mg/dL 0.71    EST GFR (CKD-EPI) >60 mL/min >60    EST GFR IF AFRICAN AM >60 mL/min >60    GLUCOSE 74 - 106 mg/dL 107 High     CALCIUM, SERUM 8.5 - 10.1 mg/dL 9.2    ANION GAP 0.0 - 15.0 mmol/L 6.0      WBC 4.3 - 10.8 K/uL 12.0 High     RBC 4.20 - 5.40 M/uL 4.70    HEMOGLOBIN 12.0 - 16.0 gm/dL 12.8    HEMATOCRIT 36.0 - 48.0 % 39.7    MCV 80 - 100 fl 85    MCH 27 - 33 pg 27    MCHC 33 - 36 gm/dL 32 Low     RDW 11.5 - 14.5 % 13.6    PLATELET COUNT 150 - 400 K/    MPV 6.5 - 12 10.3    PMN % % 54.0    IG% <=1.0 % 0.3    LYMPH % % 32.8    MONO % % 6.4    EOS % % 5.6    BASO % % 0.9    PMN ABSOLUTE 1.80 - 7.80 K/uL 6.44    IG ABSOLUTE K/uL 0.04    LYMPH ABSOLUTE 1.00 - 4.00 K/uL 3.92    MONO ABSOLUTE 0.00 - 1.00 K/uL 0.77    EOS ABSOLUTE 0.00 - 0.45 K/uL 0.67 High     BASO ABSOLUTE 0.00 - 0.20 K/uL 0.11    NUCL RBC % 0.0 - 0.0 /100 WBC 0.0    NUCL RBC ABSOLUTE 0.00 - 0.00 K/uL 0.00          EKG/ stress test - if available please see in ROS above     CT a/p w/o oral w IV con 9/5/21:  IMPRESSION:     1.  Mild fatty infiltration of the liver.   2.  Cholecystectomy.   3.  Common bile duct stent without biliary ductal dilatation.   4.  Small umbilical hernia containing fat.   5.  No acute findings.          Outside records reviewed from: care everywhere          ASSESSMENT and PLAN  Irma Heller is a 31 year old female scheduled for ERCP on 1/25/22 by Dr. Moore in treatment of choledochocele at [facility].  PAC referral for risk assessment and optimization for anesthesia with comorbid conditions of h/o chiari 1 malformation, MS, GERD, depression, anxiety:     Pre-operative considerations:   1.  Cardiac:  Functional status- METS >4.  Good activity tolerance. No exertional symptoms with walking, ascending stairs.  Low risk surgery with 0.4% (RCRI #) risk of major adverse cardiac event.   --denies chest pain, SOB, GREEN, palpitations, orthopnea  --no cardiac history personally but reports there is a family history of 'enlarged heart' and her brother passed away at 24 from this.     2.  Pulm:  Airway feasible.  VALENTIN risk: Low risk, 2/8 STOPBANG risk factors  --former smoker, quit 1/2021     3.  GI:  Risk of PONV score = 2.  If > 2, anti-emetic intervention recommended.   --h/o GERD   --h/o cholecystectomy   --s/p endoscopy/sphincterotomy 5/2021 with stent placement     4.  Endo:   --obese, BMI ~ 57    5.  Neuro:   --h/o MS.  Last flare October 2021 with photophobia and phonophobia.  Pt has been out of her daily betaseron injections for last 3 weeks.  Working on insurance coverage.   --h/o chiari 1 malformation as a child. S/p spinal shunt   --continue gabapentin  --hold Adderall DOS     6.  Psych/Pain   --depression and anxiety, continue Buspar, Lexapro, Atarax and Ativan as directed   --continue Suboxone perioperatively     7.  Pt asked for instructions regarding what to do with NPO as well as medications. I explained that [facility] nursing should give her that information but she hadn't received it yet.  PAC RN gave instructions to hold ibuprofen and Adderall DOS and no solids for 8 hours prior to surgery, clear liquids up until 2 hours prior.  I also explained that if she does receive instructions  from [facility] and they are different, she should follow their instructions.    VTE risk:  0.5%    Patient is optimized and is acceptable candidate for the proposed procedure.  No further diagnostic evaluation is needed.           On the day of service:     Prep time: 10 minutes  Visit time: 12 minutes  Documentation time: 15 minutes  ------------------------------------------  Total time: 37 minutes      Lisa Diaz PA-C  Preoperative Assessment Center  [facility]  [facility]  Phone: [phone]  Fax: [phone]

## 2022-01-25 ENCOUNTER — HOSPITAL ENCOUNTER (OUTPATIENT)
Facility: CLINIC | Age: 32
Discharge: HOME | End: 2022-01-25
Attending: INTERNAL MEDICINE | Admitting: INTERNAL MEDICINE
Payer: COMMERCIAL

## 2022-01-25 ENCOUNTER — ANESTHESIA (OUTPATIENT)
Dept: SURGERY | Facility: CLINIC | Age: 32
End: 2022-01-25
Payer: COMMERCIAL

## 2022-01-25 VITALS
DIASTOLIC BLOOD PRESSURE: 61 MMHG | TEMPERATURE: 98 F | BODY MASS INDEX: 50.02 KG/M2 | SYSTOLIC BLOOD PRESSURE: 131 MMHG | HEIGHT: 64 IN | RESPIRATION RATE: 16 BRPM | OXYGEN SATURATION: 94 % | WEIGHT: 293 LBS

## 2022-01-25 LAB
HCG UR QL: NEGATIVE
SARS-COV-2 RNA RESP QL NAA+PROBE: POSITIVE

## 2022-01-25 PROCEDURE — 87635 SARS-COV-2 COVID-19 AMP PRB: CPT | Performed by: ANESTHESIOLOGY

## 2022-01-25 PROCEDURE — 81025 URINE PREGNANCY TEST: CPT | Performed by: ANESTHESIOLOGY

## 2022-01-25 PROCEDURE — 999N000141 HC STATISTIC PRE-PROCEDURE NURSING ASSESSMENT: Performed by: INTERNAL MEDICINE

## 2022-01-25 RX ORDER — FLUMAZENIL 0.1 MG/ML
0.2 INJECTION, SOLUTION INTRAVENOUS
Status: CANCELLED | OUTPATIENT
Start: 2022-01-25 | End: 2022-01-25

## 2022-01-25 RX ORDER — ONDANSETRON 4 MG/1
4 TABLET, ORALLY DISINTEGRATING ORAL EVERY 6 HOURS PRN
Status: CANCELLED | OUTPATIENT
Start: 2022-01-25

## 2022-01-25 RX ORDER — NALOXONE HYDROCHLORIDE 0.4 MG/ML
0.2 INJECTION, SOLUTION INTRAMUSCULAR; INTRAVENOUS; SUBCUTANEOUS
Status: CANCELLED | OUTPATIENT
Start: 2022-01-25

## 2022-01-25 RX ORDER — SODIUM CHLORIDE, SODIUM LACTATE, POTASSIUM CHLORIDE, CALCIUM CHLORIDE 600; 310; 30; 20 MG/100ML; MG/100ML; MG/100ML; MG/100ML
INJECTION, SOLUTION INTRAVENOUS CONTINUOUS
Status: DISCONTINUED | OUTPATIENT
Start: 2022-01-25 | End: 2022-01-25 | Stop reason: HOSPADM

## 2022-01-25 RX ORDER — SODIUM CHLORIDE, SODIUM LACTATE, POTASSIUM CHLORIDE, CALCIUM CHLORIDE 600; 310; 30; 20 MG/100ML; MG/100ML; MG/100ML; MG/100ML
INJECTION, SOLUTION INTRAVENOUS CONTINUOUS
Status: CANCELLED | OUTPATIENT
Start: 2022-01-25

## 2022-01-25 RX ORDER — HYDROMORPHONE HCL IN WATER/PF 6 MG/30 ML
0.4 PATIENT CONTROLLED ANALGESIA SYRINGE INTRAVENOUS EVERY 5 MIN PRN
Status: CANCELLED | OUTPATIENT
Start: 2022-01-25

## 2022-01-25 RX ORDER — LIDOCAINE 40 MG/G
CREAM TOPICAL
Status: DISCONTINUED | OUTPATIENT
Start: 2022-01-25 | End: 2022-01-25 | Stop reason: HOSPADM

## 2022-01-25 RX ORDER — FENTANYL CITRATE 0.05 MG/ML
25 INJECTION, SOLUTION INTRAMUSCULAR; INTRAVENOUS
Status: CANCELLED | OUTPATIENT
Start: 2022-01-25

## 2022-01-25 RX ORDER — ONDANSETRON 2 MG/ML
4 INJECTION INTRAMUSCULAR; INTRAVENOUS EVERY 6 HOURS PRN
Status: CANCELLED | OUTPATIENT
Start: 2022-01-25

## 2022-01-25 RX ORDER — ONDANSETRON 2 MG/ML
4 INJECTION INTRAMUSCULAR; INTRAVENOUS EVERY 30 MIN PRN
Status: CANCELLED | OUTPATIENT
Start: 2022-01-25

## 2022-01-25 RX ORDER — OXYCODONE HYDROCHLORIDE 5 MG/1
5 TABLET ORAL EVERY 4 HOURS PRN
Status: CANCELLED | OUTPATIENT
Start: 2022-01-25

## 2022-01-25 RX ORDER — NALOXONE HYDROCHLORIDE 0.4 MG/ML
0.4 INJECTION, SOLUTION INTRAMUSCULAR; INTRAVENOUS; SUBCUTANEOUS
Status: CANCELLED | OUTPATIENT
Start: 2022-01-25

## 2022-01-25 RX ORDER — FENTANYL CITRATE 0.05 MG/ML
50 INJECTION, SOLUTION INTRAMUSCULAR; INTRAVENOUS EVERY 5 MIN PRN
Status: CANCELLED | OUTPATIENT
Start: 2022-01-25

## 2022-01-25 RX ORDER — ONDANSETRON 4 MG/1
4 TABLET, ORALLY DISINTEGRATING ORAL EVERY 30 MIN PRN
Status: CANCELLED | OUTPATIENT
Start: 2022-01-25

## 2022-01-25 RX ORDER — MEPERIDINE HYDROCHLORIDE 25 MG/ML
12.5 INJECTION INTRAMUSCULAR; INTRAVENOUS; SUBCUTANEOUS
Status: CANCELLED | OUTPATIENT
Start: 2022-01-25

## 2022-01-25 ASSESSMENT — MIFFLIN-ST. JEOR: SCORE: 2181.43

## 2022-02-21 ENCOUNTER — TELEPHONE (OUTPATIENT)
Dept: NEUROLOGY | Facility: CLINIC | Age: 32
End: 2022-02-21
Payer: COMMERCIAL

## 2022-02-21 NOTE — TELEPHONE ENCOUNTER
Called patient to reschedule appointment from 4-4-22.  We were able make a new patient appointment for tomorrow and the patient states she can't get transportation for tomorrow and has other appointments.    Patient not willing to move appointment into May.  The appointment for tomorrow is being held until physician or assistant can speak with patient as being requested.

## 2022-02-21 NOTE — TELEPHONE ENCOUNTER
LM to adv that Dr Laird will not be in clinic on 4/4 so we do have to reschedule adv I would like to do so soon so the appointment don't all get booked. Adv I could put her on the waiting list I keep of Sisi's patient and call her if we are able to fit her in earlier.

## 2022-02-21 NOTE — TELEPHONE ENCOUNTER
LM to please call back to discuss appointment. Dr Laird will not be in clinic on 4/4/2022     Patient recently saw Neurology through [employer] and maybe they can get her in faster or we could look at another provider

## 2022-02-22 NOTE — TELEPHONE ENCOUNTER
Left another message that due to Dr Laird not being in the clinic on 4/4 I would gene to help get her rescheduled and also put her on the cancellation list. Will also reach out to manager if still unable to reach patient tomorrow

## 2022-02-28 DIAGNOSIS — Z11.59 ENCOUNTER FOR SCREENING FOR OTHER VIRAL DISEASES: Primary | ICD-10-CM

## 2022-03-14 PROBLEM — K83.09 ACUTE CHOLANGITIS (H): Status: ACTIVE | Noted: 2021-05-13

## 2022-03-14 PROBLEM — K80.50 CALCULUS OF BILE DUCT WITHOUT OBSTRUCTION: Status: ACTIVE | Noted: 2021-05-14

## 2022-03-14 PROBLEM — R79.89 ABNORMAL LIVER FUNCTION TEST: Status: ACTIVE | Noted: 2021-05-12

## 2022-03-15 ENCOUNTER — OFFICE VISIT (OUTPATIENT)
Dept: FAMILY MEDICINE | Facility: CLINIC | Age: 32
End: 2022-03-15
Payer: COMMERCIAL

## 2022-03-15 VITALS
HEART RATE: 93 BPM | RESPIRATION RATE: 14 BRPM | DIASTOLIC BLOOD PRESSURE: 68 MMHG | TEMPERATURE: 98.4 F | HEIGHT: 64 IN | WEIGHT: 293 LBS | SYSTOLIC BLOOD PRESSURE: 126 MMHG | OXYGEN SATURATION: 98 % | BODY MASS INDEX: 50.02 KG/M2

## 2022-03-15 DIAGNOSIS — Q44.5 CHOLEDOCHOCELE: ICD-10-CM

## 2022-03-15 DIAGNOSIS — Z01.818 PREOP GENERAL PHYSICAL EXAM: Primary | ICD-10-CM

## 2022-03-15 DIAGNOSIS — G35 MULTIPLE SCLEROSIS (H): ICD-10-CM

## 2022-03-15 LAB
ANION GAP SERPL CALCULATED.3IONS-SCNC: 7 MMOL/L (ref 3–14)
BUN SERPL-MCNC: 14 MG/DL (ref 7–30)
CALCIUM SERPL-MCNC: 9.5 MG/DL (ref 8.5–10.1)
CHLORIDE BLD-SCNC: 101 MMOL/L (ref 94–109)
CO2 SERPL-SCNC: 28 MMOL/L (ref 20–32)
CREAT SERPL-MCNC: 0.68 MG/DL (ref 0.52–1.04)
ERYTHROCYTE [DISTWIDTH] IN BLOOD BY AUTOMATED COUNT: 12.4 % (ref 10–15)
GFR SERPL CREATININE-BSD FRML MDRD: >90 ML/MIN/1.73M2
GLUCOSE BLD-MCNC: 101 MG/DL (ref 70–99)
HCT VFR BLD AUTO: 38.7 % (ref 35–47)
HGB BLD-MCNC: 12.2 G/DL (ref 11.7–15.7)
MCH RBC QN AUTO: 26.5 PG (ref 26.5–33)
MCHC RBC AUTO-ENTMCNC: 31.5 G/DL (ref 31.5–36.5)
MCV RBC AUTO: 84 FL (ref 78–100)
PLATELET # BLD AUTO: 357 10E3/UL (ref 150–450)
POTASSIUM BLD-SCNC: 4 MMOL/L (ref 3.4–5.3)
RBC # BLD AUTO: 4.61 10E6/UL (ref 3.8–5.2)
SODIUM SERPL-SCNC: 136 MMOL/L (ref 133–144)
WBC # BLD AUTO: 10.2 10E3/UL (ref 4–11)

## 2022-03-15 PROCEDURE — 85027 COMPLETE CBC AUTOMATED: CPT | Performed by: PHYSICIAN ASSISTANT

## 2022-03-15 PROCEDURE — 99214 OFFICE O/P EST MOD 30 MIN: CPT | Performed by: PHYSICIAN ASSISTANT

## 2022-03-15 PROCEDURE — 80048 BASIC METABOLIC PNL TOTAL CA: CPT | Performed by: PHYSICIAN ASSISTANT

## 2022-03-15 PROCEDURE — 36415 COLL VENOUS BLD VENIPUNCTURE: CPT | Performed by: PHYSICIAN ASSISTANT

## 2022-03-15 ASSESSMENT — PAIN SCALES - GENERAL: PAINLEVEL: NO PAIN (0)

## 2022-03-15 NOTE — H&P (VIEW-ONLY)
[facility]  [address]  [place] [address]  Phone: [phone]  Primary Provider: Paul Franco  Pre-op Performing Provider: CAROLA GHOTRA      PREOPERATIVE EVALUATION:  Today's date: 3/15/2022    Irma Heller is a 31 year old female who presents for a preoperative evaluation.    Surgical Information:  Surgery/Procedure: Endoscopic retrograde cholangiopancreatography  Surgery Location: [facility]  Surgeon: Teresa  Surgery Date: 3/16/2022  Time of Surgery: 2:00PM  Where patient plans to recover: At home with family  Fax number for surgical facility: Note does not need to be faxed, will be available electronically in Epic.    Type of Anesthesia Anticipated: General    Assessment & Plan     The proposed surgical procedure is considered INTERMEDIATE risk.    Preop general physical exam  Choledochocele  Patient is a 31-year-old female who presents to clinic for preoperative evaluation.  Patient has scheduled Endoscopic retrograde cholangiopancreatography 3/16/22.  Vital signs normal.  Physical exam significant for right upper quadrant tenderness to palpation consistent with history of choledochocele.  -CBC  -BMP-pending    Multiple sclerosis (H)  Patient compliant with every other day Betaseron (interferon beta) for treatment.  Last injection was yesterday.      Risks and Recommendations:  The patient has the following additional risks and recommendations for perioperative complications:   - No identified additional risk factors other than previously addressed    Medication Instructions:  Patient is to take all scheduled medications on the day of surgery       RECOMMENDATION:  APPROVAL GIVEN to proceed with proposed procedure, without further diagnostic evaluation.  Note-due to timing of preoperative evaluation and procedure tomorrow, BMP will not be available for review prior to signing this note.    Subjective     HPI related to upcoming procedure: Patient is s/p  cholecystectomy.  Patient underwent ERCP in May 2021 which revealed dilated intrahepatic ducts and stricture with stone.  Patient then underwent sphincterotomy with balloon extraction of cystic duct stone and stent placement.  She now has scheduled Endoscopic retrograde cholangiopancreatography 3/16/22.         Preop Questions 3/15/2022   1. Have you ever had a heart attack or stroke? No   2. Have you ever had surgery on your heart or blood vessels, such as a stent placement, a coronary artery bypass, or surgery on an artery in your head, neck, heart, or legs? No   3. Do you have chest pain with activity? No   4. Do you have a history of  heart failure? No   5. Do you currently have a cold, bronchitis or symptoms of other infection? No   6. Do you have a cough, shortness of breath, or wheezing? No   7. Do you or anyone in your family have previous history of blood clots? No   8. Do you or does anyone in your family have a serious bleeding problem such as prolonged bleeding following surgeries or cuts? No   9. Have you ever had problems with anemia or been told to take iron pills? YES -history of anemia. Patient takes Iron for this.    10. Have you had any abnormal blood loss such as black, tarry or bloody stools, or abnormal vaginal bleeding? No   11. Have you ever had a blood transfusion? No   12. Are you willing to have a blood transfusion if it is medically needed before, during, or after your surgery? Yes   13. Have you or any of your relatives ever had problems with anesthesia? No   14. Do you have sleep apnea, excessive snoring or daytime drowsiness? No   15. Do you have any artifical heart valves or other implanted medical devices like a pacemaker, defibrillator, or continuous glucose monitor? No   16. Do you have artificial joints? No   17. Are you allergic to latex? No   18. Is there any chance that you may be pregnant? No     Health Care Directive:  Patient does not have a Health Care Directive or Living  Will: Discussed advance care planning with patient; however, patient declined at this time.    Preoperative Review of : Patient is compliant with Suboxone prescribed by PCP     Status of Chronic Conditions:  See problem list for active medical problems.  Problems all longstanding and stable, except as noted/documented.  See ROS for pertinent symptoms related to these conditions.    DEPRESSION - Patient has a long history of Depression of moderate severity requiring medication for control with recent symptoms being stable. Current symptoms of depression include none.     Multiple Sclerosis: Patient compliant with Betaseron (interferon beta)-Last injection yesterday. Typically injects E/O day.       Review of Systems  CONSTITUTIONAL: NEGATIVE for fever, chills, change in weight  INTEGUMENTARY/SKIN: NEGATIVE for worrisome rashes, moles or lesions  EYES: NEGATIVE for vision changes or irritation  ENT/MOUTH: NEGATIVE for ear, mouth and throat problems  RESP: NEGATIVE for significant cough or SOB  CV: NEGATIVE for chest pain, palpitations or peripheral edema  GI: POSITIVE for abdominal pain RUQ and NEGATIVE for diarrhea and vomiting  : NEGATIVE for frequency, dysuria, or hematuria  MUSCULOSKELETAL: NEGATIVE for significant arthralgias or myalgia  NEURO: NEGATIVE for weakness, dizziness or paresthesias  ENDOCRINE: NEGATIVE for temperature intolerance, skin/hair changes  HEME: NEGATIVE for bleeding problems  PSYCHIATRIC: NEGATIVE for changes in mood or affect    Patient Active Problem List    Diagnosis Date Noted     Calculus of bile duct without obstruction 05/14/2021     Priority: Medium     Acute cholangitis 05/13/2021     Priority: Medium     Abnormal liver function test 05/12/2021     Priority: Medium     Multiple sclerosis (H) 01/06/2016     Priority: Medium     Anxiety disorder 11/27/2012     Priority: Medium     Tobacco use disorder 07/12/2012     Priority: Medium     Arnold-Chiari malformation (H) 03/21/2012      Priority: Medium     Syringomyelia (H) 03/21/2012     Priority: Medium     Depression 05/11/2011     Priority: Medium      Past Medical History:   Diagnosis Date     Anxiety      Chiari I malformation (H)     Hx- was a child     Depression      Gastroesophageal reflux disease      Multiple sclerosis (H)      Obese      Past Surgical History:   Procedure Laterality Date     AS INSTALL SPINAL SHUNT,LAMINECTOMY      Related to Chiari Malformation surgery     BACK SURGERY       CHOLECYSTECTOMY       DECOMPRESSION CHIARI       Current Outpatient Medications   Medication Sig Dispense Refill     amphetamine-dextroamphetamine (ADDERALL XR) 25 MG 24 hr capsule Take 50 mg by mouth every morning       amphetamine-dextroamphetamine (ADDERALL) 15 MG tablet Take 1 tablet in afternoon (in addition to XR)       busPIRone (BUSPAR) 10 MG tablet Take 10 mg by mouth 2 times daily        cholecalciferol 50 MCG (2000 UT) tablet Take 1 tablet by mouth daily       escitalopram (LEXAPRO) 10 MG tablet Take 10 mg by mouth daily        FEROSUL 325 (65 Fe) MG tablet Take 325 mg by mouth daily       folic acid (FOLVITE) 400 MCG tablet Take 400 mcg by mouth daily        gabapentin (NEURONTIN) 300 MG capsule TAKE TWO CAPSULES BY MOUTH TWICE DAILY       hydrOXYzine (ATARAX) 50 MG tablet Take 50 mg by mouth nightly as needed        ibuprofen (ADVIL/MOTRIN) 400 MG tablet Take 400 mg by mouth every 6 hours as needed for moderate pain       LORazepam (ATIVAN) 1 MG tablet TAKE 1 TABLET BY MOUTH EVERY DAY AS NEEDED FOR ANXIETY       Specialty Vitamins Products (VITAMINS FOR HAIR) TABS Take 1 tablet by mouth daily       SUBOXONE 8-2 MG per film Place 1 Film under the tongue 2 times daily          Allergies   Allergen Reactions     Penicillins Anaphylaxis and Hives     Shellfish Allergy Anaphylaxis and Hives     Copaxone [Glatiramer-Mannitol] Swelling        Social History     Tobacco Use     Smoking status: Former Smoker     Quit date: 1/1/2021      "Years since quittin.2     Smokeless tobacco: Never Used   Substance Use Topics     Alcohol use: Not Currently     Family History   Problem Relation Age of Onset     Heart Disease Brother      History   Drug Use Unknown         Objective     /68   Pulse 93   Temp 98.4  F (36.9  C) (Tympanic)   Resp 14   Ht 1.626 m (5' 4\")   Wt (!) 150.6 kg (332 lb)   SpO2 98%   BMI 56.99 kg/m      Physical Exam    GENERAL APPEARANCE: healthy, alert and no distress     EYES: EOMI, PERRL     HENT: ear canals and TM's normal and nose and mouth without ulcers or lesions     NECK: no adenopathy, no asymmetry, masses, or scars and thyroid normal to palpation     RESP: lungs clear to auscultation - no rales, rhonchi or wheezes     CV: regular rates and rhythm, normal S1 S2, no S3 or S4 and no murmur, click or rub     ABDOMEN: RUQ tenderness to palpation-baseline for patient, soft, no HSM or masses and bowel sounds normal     MS: extremities normal- no gross deformities noted, no evidence of inflammation in joints, FROM in all extremities.     SKIN: no suspicious lesions or rashes     NEURO: Normal strength and tone, sensory exam grossly normal, mentation intact and speech normal     PSYCH: mentation appears normal. and affect normal/bright     LYMPHATICS: No cervical adenopathy    No results for input(s): HGB, PLT, INR, NA, POTASSIUM, CR, A1C in the last 38843 hours.     Diagnostics:  Labs pending at this time.  Results will be reviewed when available.  Recent Results (from the past 48 hour(s))   CBC with platelets    Collection Time: 03/15/22  3:34 PM   Result Value Ref Range    WBC Count 10.2 4.0 - 11.0 10e3/uL    RBC Count 4.61 3.80 - 5.20 10e6/uL    Hemoglobin 12.2 11.7 - 15.7 g/dL    Hematocrit 38.7 35.0 - 47.0 %    MCV 84 78 - 100 fL    MCH 26.5 26.5 - 33.0 pg    MCHC 31.5 31.5 - 36.5 g/dL    RDW 12.4 10.0 - 15.0 %    Platelet Count 357 150 - 450 10e3/uL      No EKG required, no history of coronary heart disease, " significant arrhythmia, peripheral arterial disease or other structural heart disease.    Revised Cardiac Risk Index (RCRI):  The patient has the following serious cardiovascular risks for perioperative complications:   - No serious cardiac risks = 0 points     RCRI Interpretation: 0 points: Class I (very low risk - 0.4% complication rate)         Signed Electronically by: Loretta Pineda PA-C  Copy of this evaluation report is provided to requesting physician.

## 2022-03-15 NOTE — PROGRESS NOTES
[facility]  [address]  [place] [address]  Phone: [phone]  Primary Provider: Paul Franco  Pre-op Performing Provider: CAROLA GHOTRA      PREOPERATIVE EVALUATION:  Today's date: 3/15/2022    Irma Heller is a 31 year old female who presents for a preoperative evaluation.    Surgical Information:  Surgery/Procedure: Endoscopic retrograde cholangiopancreatography  Surgery Location: [facility]  Surgeon: Teresa  Surgery Date: 3/16/2022  Time of Surgery: 2:00PM  Where patient plans to recover: At home with family  Fax number for surgical facility: Note does not need to be faxed, will be available electronically in Epic.    Type of Anesthesia Anticipated: General    Assessment & Plan     The proposed surgical procedure is considered INTERMEDIATE risk.    Preop general physical exam  Choledochocele  Patient is a 31-year-old female who presents to clinic for preoperative evaluation.  Patient has scheduled Endoscopic retrograde cholangiopancreatography 3/16/22.  Vital signs normal.  Physical exam significant for right upper quadrant tenderness to palpation consistent with history of choledochocele.  -CBC  -BMP-pending    Multiple sclerosis (H)  Patient compliant with every other day Betaseron (interferon beta) for treatment.  Last injection was yesterday.      Risks and Recommendations:  The patient has the following additional risks and recommendations for perioperative complications:   - No identified additional risk factors other than previously addressed    Medication Instructions:  Patient is to take all scheduled medications on the day of surgery       RECOMMENDATION:  APPROVAL GIVEN to proceed with proposed procedure, without further diagnostic evaluation.  Note-due to timing of preoperative evaluation and procedure tomorrow, BMP will not be available for review prior to signing this note.    Subjective     HPI related to upcoming procedure: Patient is s/p  cholecystectomy.  Patient underwent ERCP in May 2021 which revealed dilated intrahepatic ducts and stricture with stone.  Patient then underwent sphincterotomy with balloon extraction of cystic duct stone and stent placement.  She now has scheduled Endoscopic retrograde cholangiopancreatography 3/16/22.         Preop Questions 3/15/2022   1. Have you ever had a heart attack or stroke? No   2. Have you ever had surgery on your heart or blood vessels, such as a stent placement, a coronary artery bypass, or surgery on an artery in your head, neck, heart, or legs? No   3. Do you have chest pain with activity? No   4. Do you have a history of  heart failure? No   5. Do you currently have a cold, bronchitis or symptoms of other infection? No   6. Do you have a cough, shortness of breath, or wheezing? No   7. Do you or anyone in your family have previous history of blood clots? No   8. Do you or does anyone in your family have a serious bleeding problem such as prolonged bleeding following surgeries or cuts? No   9. Have you ever had problems with anemia or been told to take iron pills? YES -history of anemia. Patient takes Iron for this.    10. Have you had any abnormal blood loss such as black, tarry or bloody stools, or abnormal vaginal bleeding? No   11. Have you ever had a blood transfusion? No   12. Are you willing to have a blood transfusion if it is medically needed before, during, or after your surgery? Yes   13. Have you or any of your relatives ever had problems with anesthesia? No   14. Do you have sleep apnea, excessive snoring or daytime drowsiness? No   15. Do you have any artifical heart valves or other implanted medical devices like a pacemaker, defibrillator, or continuous glucose monitor? No   16. Do you have artificial joints? No   17. Are you allergic to latex? No   18. Is there any chance that you may be pregnant? No     Health Care Directive:  Patient does not have a Health Care Directive or Living  Will: Discussed advance care planning with patient; however, patient declined at this time.    Preoperative Review of : Patient is compliant with Suboxone prescribed by PCP     Status of Chronic Conditions:  See problem list for active medical problems.  Problems all longstanding and stable, except as noted/documented.  See ROS for pertinent symptoms related to these conditions.    DEPRESSION - Patient has a long history of Depression of moderate severity requiring medication for control with recent symptoms being stable. Current symptoms of depression include none.     Multiple Sclerosis: Patient compliant with Betaseron (interferon beta)-Last injection yesterday. Typically injects E/O day.       Review of Systems  CONSTITUTIONAL: NEGATIVE for fever, chills, change in weight  INTEGUMENTARY/SKIN: NEGATIVE for worrisome rashes, moles or lesions  EYES: NEGATIVE for vision changes or irritation  ENT/MOUTH: NEGATIVE for ear, mouth and throat problems  RESP: NEGATIVE for significant cough or SOB  CV: NEGATIVE for chest pain, palpitations or peripheral edema  GI: POSITIVE for abdominal pain RUQ and NEGATIVE for diarrhea and vomiting  : NEGATIVE for frequency, dysuria, or hematuria  MUSCULOSKELETAL: NEGATIVE for significant arthralgias or myalgia  NEURO: NEGATIVE for weakness, dizziness or paresthesias  ENDOCRINE: NEGATIVE for temperature intolerance, skin/hair changes  HEME: NEGATIVE for bleeding problems  PSYCHIATRIC: NEGATIVE for changes in mood or affect    Patient Active Problem List    Diagnosis Date Noted     Calculus of bile duct without obstruction 05/14/2021     Priority: Medium     Acute cholangitis 05/13/2021     Priority: Medium     Abnormal liver function test 05/12/2021     Priority: Medium     Multiple sclerosis (H) 01/06/2016     Priority: Medium     Anxiety disorder 11/27/2012     Priority: Medium     Tobacco use disorder 07/12/2012     Priority: Medium     Arnold-Chiari malformation (H) 03/21/2012      Priority: Medium     Syringomyelia (H) 03/21/2012     Priority: Medium     Depression 05/11/2011     Priority: Medium      Past Medical History:   Diagnosis Date     Anxiety      Chiari I malformation (H)     Hx- was a child     Depression      Gastroesophageal reflux disease      Multiple sclerosis (H)      Obese      Past Surgical History:   Procedure Laterality Date     AS INSTALL SPINAL SHUNT,LAMINECTOMY      Related to Chiari Malformation surgery     BACK SURGERY       CHOLECYSTECTOMY       DECOMPRESSION CHIARI       Current Outpatient Medications   Medication Sig Dispense Refill     amphetamine-dextroamphetamine (ADDERALL XR) 25 MG 24 hr capsule Take 50 mg by mouth every morning       amphetamine-dextroamphetamine (ADDERALL) 15 MG tablet Take 1 tablet in afternoon (in addition to XR)       busPIRone (BUSPAR) 10 MG tablet Take 10 mg by mouth 2 times daily        cholecalciferol 50 MCG (2000 UT) tablet Take 1 tablet by mouth daily       escitalopram (LEXAPRO) 10 MG tablet Take 10 mg by mouth daily        FEROSUL 325 (65 Fe) MG tablet Take 325 mg by mouth daily       folic acid (FOLVITE) 400 MCG tablet Take 400 mcg by mouth daily        gabapentin (NEURONTIN) 300 MG capsule TAKE TWO CAPSULES BY MOUTH TWICE DAILY       hydrOXYzine (ATARAX) 50 MG tablet Take 50 mg by mouth nightly as needed        ibuprofen (ADVIL/MOTRIN) 400 MG tablet Take 400 mg by mouth every 6 hours as needed for moderate pain       LORazepam (ATIVAN) 1 MG tablet TAKE 1 TABLET BY MOUTH EVERY DAY AS NEEDED FOR ANXIETY       Specialty Vitamins Products (VITAMINS FOR HAIR) TABS Take 1 tablet by mouth daily       SUBOXONE 8-2 MG per film Place 1 Film under the tongue 2 times daily          Allergies   Allergen Reactions     Penicillins Anaphylaxis and Hives     Shellfish Allergy Anaphylaxis and Hives     Copaxone [Glatiramer-Mannitol] Swelling        Social History     Tobacco Use     Smoking status: Former Smoker     Quit date: 1/1/2021      "Years since quittin.2     Smokeless tobacco: Never Used   Substance Use Topics     Alcohol use: Not Currently     Family History   Problem Relation Age of Onset     Heart Disease Brother      History   Drug Use Unknown         Objective     /68   Pulse 93   Temp 98.4  F (36.9  C) (Tympanic)   Resp 14   Ht 1.626 m (5' 4\")   Wt (!) 150.6 kg (332 lb)   SpO2 98%   BMI 56.99 kg/m      Physical Exam    GENERAL APPEARANCE: healthy, alert and no distress     EYES: EOMI, PERRL     HENT: ear canals and TM's normal and nose and mouth without ulcers or lesions     NECK: no adenopathy, no asymmetry, masses, or scars and thyroid normal to palpation     RESP: lungs clear to auscultation - no rales, rhonchi or wheezes     CV: regular rates and rhythm, normal S1 S2, no S3 or S4 and no murmur, click or rub     ABDOMEN: RUQ tenderness to palpation-baseline for patient, soft, no HSM or masses and bowel sounds normal     MS: extremities normal- no gross deformities noted, no evidence of inflammation in joints, FROM in all extremities.     SKIN: no suspicious lesions or rashes     NEURO: Normal strength and tone, sensory exam grossly normal, mentation intact and speech normal     PSYCH: mentation appears normal. and affect normal/bright     LYMPHATICS: No cervical adenopathy    No results for input(s): HGB, PLT, INR, NA, POTASSIUM, CR, A1C in the last 39730 hours.     Diagnostics:  Labs pending at this time.  Results will be reviewed when available.  Recent Results (from the past 48 hour(s))   CBC with platelets    Collection Time: 03/15/22  3:34 PM   Result Value Ref Range    WBC Count 10.2 4.0 - 11.0 10e3/uL    RBC Count 4.61 3.80 - 5.20 10e6/uL    Hemoglobin 12.2 11.7 - 15.7 g/dL    Hematocrit 38.7 35.0 - 47.0 %    MCV 84 78 - 100 fL    MCH 26.5 26.5 - 33.0 pg    MCHC 31.5 31.5 - 36.5 g/dL    RDW 12.4 10.0 - 15.0 %    Platelet Count 357 150 - 450 10e3/uL      No EKG required, no history of coronary heart disease, " significant arrhythmia, peripheral arterial disease or other structural heart disease.    Revised Cardiac Risk Index (RCRI):  The patient has the following serious cardiovascular risks for perioperative complications:   - No serious cardiac risks = 0 points     RCRI Interpretation: 0 points: Class I (very low risk - 0.4% complication rate)         Signed Electronically by: Loretta Pineda PA-C  Copy of this evaluation report is provided to requesting physician.

## 2022-03-15 NOTE — PATIENT INSTRUCTIONS
"You may continue with your procedure as scheduled.  We are completing lab work today we will contact you with results through [employer].  Continue all medications as prescribed.  Please reach out any questions or concerns.  Before Your Procedure or Hospital Admission  Testing for COVID-19 (Coronavirus)  Thank you for choosing [facility] for your health care needs. The COVID-19 pandemic is a very challenging time for everyone.   Our goal is to keep you and our team here at [facility] safe and healthy. We've taken many steps to make this happen. For example:    We test and screen our staff, care teams and patients for COVID-19.    Everyone at [facility] must wear a mask and stay 6 feet apart.    We are limiting hospital and clinic visitors.  Before you come in  If you test COVID-19 positive with any kind of test before your surgery date, call your surgeon's office. We need to know the date of your positive test. We may need to re-schedule your surgery.  Unless you've had a positive COVID-19 test within the past 90 days, you must get tested for COVID-19 even if you've been vaccinated. Your test needs to happen 2 to 4 days before you check in to the hospital or surgery site.  A clinic scheduler will call you about a week in advance to set up a testing time at one of our labs.  Note: If you have a test anywhere but [facility], be sure you get an RT-PCR or an NAAT (Nucleic Acid Amplification Test) test.  Do NOT get a \"rapid antigen\" test. Negative results from \"rapid antigen\" tests are NOT accepted before your surgery.  After the test, please stay at home and out of contact with other people. This will help prevent possible COVID-19 exposure before your treatment. Please follow all current safety guidelines, including:    Limit trips outside your home.    Limit the number of people you see.    Always wear a mask outside your home.    Use social distancing. Stay 6 feet away from others whenever " you can.    Wash your hands often.  If your test shows you have COVID-19  If your test is positive, we'll let you know. A positive test means that you have the virus.   We'll probably have to postpone your admission, surgery or procedure. Your doctor will discuss this with you. After that, we'll let you know what to do and when you can re-schedule.   We may need to cancel your treatment on short notice for other reasons, too.  If your test shows you DON'T have COVID-19  Even if your test is negative, you can still get COVID-19. It's rare but, sometimes, the test result is wrong. You could also catch the virus after taking the test.   There's a very small chance that you could catch COVID-19 in the hospital or surgery center. [facility] has taken many steps to prevent this from happening.   Day of your surgery or procedure    Please come wearing a face covering that covers both your nose and mouth.    When you arrive, we'll ask you some questions to find out if you've had any exposures to COVID-19, or have any signs of COVID-19.    Ask your care team if you can have visitors. All visitors must wear face coverings and will be screened for exposure to, or signs of, COVID-19.  ? Even if no visitors are allowed, you can still have with you:    Your legal guardian or legal decision maker    Someone to help you, if you are disabled    A parent and one other visitor, if you are younger than 18 years old    A partner and a , if you are in labor  ? We might need to teach you about taking care of yourself after surgery. If so, a visitor can come into the hospital to learn about it, too.  ? The rules for visitors change often, depending on how much the virus is spreading. To learn more, see Visiting a Loved One in the Hospital during the COVID-19 Outbreak.  Please call your care team, hospital or surgery center if you have any questions. We thank you for your understanding and for choosing [facility] for your  "care.   Possible surgery delay    Like you, we want your surgery to happen when it's scheduled. But sometimes the hospital is so full that it's not safe for you to have your surgery. This is especially true during the pandemic. Your surgery may need to be re-scheduled at a later date. If this happens, we will call and tell you.  Questions and answers  Does it matter where I get tested for COVID-19?  Yes. We urge you to get tested at one of our [facility] COVID-19 testing sites. These tests will be either RT-PCR or NAAT, and are accepted. We process these tests in our lab and can get the results quickly. Your [facility] care team needs to get your results before you check in.  What should I do if I can't get tested at [facility]?  You can get tested somewhere else, but you'll need to take these extra steps:   1. Contact your family doctor or clinic to arrange your test.  2. Take the test within 4 days of your surgery or procedure. We can't accept tests older than 4 days.  3. Make sure you're getting an RT-PCR test, or a NAAT. Some places use \"rapid antigen\" tests, but we do NOT accept negative results from those tests before your surgery.  4. Make sure your doctor or clinic faxes your results to [facility] at [phone]. Or take a photo of the results and upload it into [employer].  If we don't get your results in time, we may have to delay or cancel your treatment.  For informational purposes only. Not to replace the advice of your health care provider. Copyright   2020 [facility]. All rights reserved. Clinically reviewed by Infection Prevention and the [facility] COVID-19 Clinical Team. SMARTworks 961715 - Rev 02/01/22.    "

## 2022-03-16 ENCOUNTER — ANESTHESIA (OUTPATIENT)
Dept: SURGERY | Facility: CLINIC | Age: 32
End: 2022-03-16
Payer: COMMERCIAL

## 2022-03-16 ENCOUNTER — APPOINTMENT (OUTPATIENT)
Dept: GENERAL RADIOLOGY | Facility: CLINIC | Age: 32
End: 2022-03-16
Attending: INTERNAL MEDICINE
Payer: COMMERCIAL

## 2022-03-16 ENCOUNTER — ANESTHESIA EVENT (OUTPATIENT)
Dept: SURGERY | Facility: CLINIC | Age: 32
End: 2022-03-16
Payer: COMMERCIAL

## 2022-03-16 ENCOUNTER — HOSPITAL ENCOUNTER (OUTPATIENT)
Facility: CLINIC | Age: 32
Discharge: HOME | End: 2022-03-16
Attending: INTERNAL MEDICINE | Admitting: INTERNAL MEDICINE
Payer: COMMERCIAL

## 2022-03-16 VITALS
TEMPERATURE: 98 F | DIASTOLIC BLOOD PRESSURE: 60 MMHG | OXYGEN SATURATION: 95 % | HEART RATE: 80 BPM | RESPIRATION RATE: 14 BRPM | SYSTOLIC BLOOD PRESSURE: 110 MMHG | WEIGHT: 293 LBS | BODY MASS INDEX: 48.82 KG/M2 | HEIGHT: 65 IN

## 2022-03-16 LAB
ERCP: NORMAL
HCG UR QL: NEGATIVE

## 2022-03-16 PROCEDURE — 710N000009 HC RECOVERY PHASE 1, LEVEL 1, PER MIN: Performed by: INTERNAL MEDICINE

## 2022-03-16 PROCEDURE — C1726 CATH, BAL DIL, NON-VASCULAR: HCPCS | Performed by: INTERNAL MEDICINE

## 2022-03-16 PROCEDURE — 360N000083 HC SURGERY LEVEL 3 W/ FLUORO, PER MIN: Performed by: INTERNAL MEDICINE

## 2022-03-16 PROCEDURE — 710N000012 HC RECOVERY PHASE 2, PER MINUTE: Performed by: INTERNAL MEDICINE

## 2022-03-16 PROCEDURE — 74330 X-RAY BILE/PANC ENDOSCOPY: CPT

## 2022-03-16 PROCEDURE — 272N000001 HC OR GENERAL SUPPLY STERILE: Performed by: INTERNAL MEDICINE

## 2022-03-16 PROCEDURE — 258N000003 HC RX IP 258 OP 636: Performed by: NURSE ANESTHETIST, CERTIFIED REGISTERED

## 2022-03-16 PROCEDURE — 370N000017 HC ANESTHESIA TECHNICAL FEE, PER MIN: Performed by: INTERNAL MEDICINE

## 2022-03-16 PROCEDURE — 250N000011 HC RX IP 250 OP 636: Performed by: NURSE ANESTHETIST, CERTIFIED REGISTERED

## 2022-03-16 PROCEDURE — 81025 URINE PREGNANCY TEST: CPT | Performed by: ANESTHESIOLOGY

## 2022-03-16 PROCEDURE — 250N000009 HC RX 250: Performed by: INTERNAL MEDICINE

## 2022-03-16 PROCEDURE — C1769 GUIDE WIRE: HCPCS | Performed by: INTERNAL MEDICINE

## 2022-03-16 PROCEDURE — 999N000141 HC STATISTIC PRE-PROCEDURE NURSING ASSESSMENT: Performed by: INTERNAL MEDICINE

## 2022-03-16 RX ORDER — INDOMETHACIN 50 MG/1
SUPPOSITORY RECTAL PRN
Status: DISCONTINUED | OUTPATIENT
Start: 2022-03-16 | End: 2022-03-16 | Stop reason: HOSPADM

## 2022-03-16 RX ORDER — FLUMAZENIL 0.1 MG/ML
0.2 INJECTION, SOLUTION INTRAVENOUS
Status: DISCONTINUED | OUTPATIENT
Start: 2022-03-16 | End: 2022-03-16 | Stop reason: HOSPADM

## 2022-03-16 RX ORDER — SODIUM CHLORIDE, SODIUM LACTATE, POTASSIUM CHLORIDE, CALCIUM CHLORIDE 600; 310; 30; 20 MG/100ML; MG/100ML; MG/100ML; MG/100ML
INJECTION, SOLUTION INTRAVENOUS CONTINUOUS
Status: DISCONTINUED | OUTPATIENT
Start: 2022-03-16 | End: 2022-03-16 | Stop reason: HOSPADM

## 2022-03-16 RX ORDER — ONDANSETRON 2 MG/ML
INJECTION INTRAMUSCULAR; INTRAVENOUS PRN
Status: DISCONTINUED | OUTPATIENT
Start: 2022-03-16 | End: 2022-03-16

## 2022-03-16 RX ORDER — FENTANYL CITRATE 0.05 MG/ML
25 INJECTION, SOLUTION INTRAMUSCULAR; INTRAVENOUS
Status: DISCONTINUED | OUTPATIENT
Start: 2022-03-16 | End: 2022-03-16 | Stop reason: HOSPADM

## 2022-03-16 RX ORDER — SODIUM CHLORIDE, SODIUM LACTATE, POTASSIUM CHLORIDE, CALCIUM CHLORIDE 600; 310; 30; 20 MG/100ML; MG/100ML; MG/100ML; MG/100ML
INJECTION, SOLUTION INTRAVENOUS CONTINUOUS PRN
Status: DISCONTINUED | OUTPATIENT
Start: 2022-03-16 | End: 2022-03-16

## 2022-03-16 RX ORDER — NALOXONE HYDROCHLORIDE 0.4 MG/ML
0.4 INJECTION, SOLUTION INTRAMUSCULAR; INTRAVENOUS; SUBCUTANEOUS
Status: DISCONTINUED | OUTPATIENT
Start: 2022-03-16 | End: 2022-03-16 | Stop reason: HOSPADM

## 2022-03-16 RX ORDER — LIDOCAINE 40 MG/G
CREAM TOPICAL
Status: DISCONTINUED | OUTPATIENT
Start: 2022-03-16 | End: 2022-03-16 | Stop reason: HOSPADM

## 2022-03-16 RX ORDER — NALOXONE HYDROCHLORIDE 0.4 MG/ML
0.2 INJECTION, SOLUTION INTRAMUSCULAR; INTRAVENOUS; SUBCUTANEOUS
Status: DISCONTINUED | OUTPATIENT
Start: 2022-03-16 | End: 2022-03-16 | Stop reason: HOSPADM

## 2022-03-16 RX ORDER — ONDANSETRON 4 MG/1
4 TABLET, ORALLY DISINTEGRATING ORAL EVERY 6 HOURS PRN
Status: DISCONTINUED | OUTPATIENT
Start: 2022-03-16 | End: 2022-03-16 | Stop reason: HOSPADM

## 2022-03-16 RX ORDER — HYDROMORPHONE HCL IN WATER/PF 6 MG/30 ML
0.4 PATIENT CONTROLLED ANALGESIA SYRINGE INTRAVENOUS EVERY 5 MIN PRN
Status: DISCONTINUED | OUTPATIENT
Start: 2022-03-16 | End: 2022-03-16 | Stop reason: HOSPADM

## 2022-03-16 RX ORDER — FENTANYL CITRATE 0.05 MG/ML
50 INJECTION, SOLUTION INTRAMUSCULAR; INTRAVENOUS EVERY 5 MIN PRN
Status: DISCONTINUED | OUTPATIENT
Start: 2022-03-16 | End: 2022-03-16 | Stop reason: HOSPADM

## 2022-03-16 RX ORDER — ONDANSETRON 2 MG/ML
4 INJECTION INTRAMUSCULAR; INTRAVENOUS EVERY 6 HOURS PRN
Status: DISCONTINUED | OUTPATIENT
Start: 2022-03-16 | End: 2022-03-16 | Stop reason: HOSPADM

## 2022-03-16 RX ORDER — MEPERIDINE HYDROCHLORIDE 25 MG/ML
12.5 INJECTION INTRAMUSCULAR; INTRAVENOUS; SUBCUTANEOUS
Status: DISCONTINUED | OUTPATIENT
Start: 2022-03-16 | End: 2022-03-16 | Stop reason: HOSPADM

## 2022-03-16 RX ORDER — DEXAMETHASONE SODIUM PHOSPHATE 4 MG/ML
INJECTION, SOLUTION INTRA-ARTICULAR; INTRALESIONAL; INTRAMUSCULAR; INTRAVENOUS; SOFT TISSUE PRN
Status: DISCONTINUED | OUTPATIENT
Start: 2022-03-16 | End: 2022-03-16

## 2022-03-16 RX ORDER — PROPOFOL 10 MG/ML
INJECTION, EMULSION INTRAVENOUS PRN
Status: DISCONTINUED | OUTPATIENT
Start: 2022-03-16 | End: 2022-03-16

## 2022-03-16 RX ORDER — OXYCODONE HYDROCHLORIDE 5 MG/1
5 TABLET ORAL EVERY 4 HOURS PRN
Status: DISCONTINUED | OUTPATIENT
Start: 2022-03-16 | End: 2022-03-16 | Stop reason: HOSPADM

## 2022-03-16 RX ORDER — FENTANYL CITRATE 0.05 MG/ML
25 INJECTION, SOLUTION INTRAMUSCULAR; INTRAVENOUS EVERY 5 MIN PRN
Status: DISCONTINUED | OUTPATIENT
Start: 2022-03-16 | End: 2022-03-16 | Stop reason: HOSPADM

## 2022-03-16 RX ORDER — ONDANSETRON 2 MG/ML
4 INJECTION INTRAMUSCULAR; INTRAVENOUS EVERY 30 MIN PRN
Status: DISCONTINUED | OUTPATIENT
Start: 2022-03-16 | End: 2022-03-16 | Stop reason: HOSPADM

## 2022-03-16 RX ORDER — ONDANSETRON 4 MG/1
4 TABLET, ORALLY DISINTEGRATING ORAL EVERY 30 MIN PRN
Status: DISCONTINUED | OUTPATIENT
Start: 2022-03-16 | End: 2022-03-16 | Stop reason: HOSPADM

## 2022-03-16 RX ORDER — FENTANYL CITRATE 50 UG/ML
INJECTION, SOLUTION INTRAMUSCULAR; INTRAVENOUS PRN
Status: DISCONTINUED | OUTPATIENT
Start: 2022-03-16 | End: 2022-03-16

## 2022-03-16 RX ORDER — PROPOFOL 10 MG/ML
INJECTION, EMULSION INTRAVENOUS CONTINUOUS PRN
Status: DISCONTINUED | OUTPATIENT
Start: 2022-03-16 | End: 2022-03-16

## 2022-03-16 RX ORDER — HYDROMORPHONE HCL IN WATER/PF 6 MG/30 ML
0.2 PATIENT CONTROLLED ANALGESIA SYRINGE INTRAVENOUS EVERY 5 MIN PRN
Status: DISCONTINUED | OUTPATIENT
Start: 2022-03-16 | End: 2022-03-16 | Stop reason: HOSPADM

## 2022-03-16 RX ADMIN — SODIUM CHLORIDE, POTASSIUM CHLORIDE, SODIUM LACTATE AND CALCIUM CHLORIDE: 600; 310; 30; 20 INJECTION, SOLUTION INTRAVENOUS at 14:27

## 2022-03-16 RX ADMIN — PROPOFOL 150 MCG/KG/MIN: 10 INJECTION, EMULSION INTRAVENOUS at 14:34

## 2022-03-16 RX ADMIN — DEXAMETHASONE SODIUM PHOSPHATE 4 MG: 4 INJECTION, SOLUTION INTRA-ARTICULAR; INTRALESIONAL; INTRAMUSCULAR; INTRAVENOUS; SOFT TISSUE at 14:48

## 2022-03-16 RX ADMIN — MIDAZOLAM 2 MG: 1 INJECTION INTRAMUSCULAR; INTRAVENOUS at 14:29

## 2022-03-16 RX ADMIN — FENTANYL CITRATE 25 MCG: 50 INJECTION, SOLUTION INTRAMUSCULAR; INTRAVENOUS at 14:41

## 2022-03-16 RX ADMIN — ONDANSETRON 4 MG: 2 INJECTION INTRAMUSCULAR; INTRAVENOUS at 14:48

## 2022-03-16 RX ADMIN — PROPOFOL 20 MG: 10 INJECTION, EMULSION INTRAVENOUS at 14:32

## 2022-03-16 RX ADMIN — FENTANYL CITRATE 25 MCG: 50 INJECTION, SOLUTION INTRAMUSCULAR; INTRAVENOUS at 14:32

## 2022-03-16 NOTE — ANESTHESIA PREPROCEDURE EVALUATION
Anesthesia Pre-Procedure Evaluation    Patient: Irma Heller   MRN: [id number] : 1990        Procedure : Procedure(s):  ENDOSCOPIC RETROGRADE CHOLANGIOPANCREATOGRAPHY          Past Medical History:   Diagnosis Date     Anxiety      Chiari I malformation (H)     Hx- was a child     Depression      Gastroesophageal reflux disease      Multiple sclerosis (H)      Obese       Past Surgical History:   Procedure Laterality Date     AS INSTALL SPINAL SHUNT,LAMINECTOMY      Related to Chiari Malformation surgery     BACK SURGERY       CHOLECYSTECTOMY       DECOMPRESSION CHIARI        Allergies   Allergen Reactions     Penicillins Anaphylaxis and Hives     Shellfish Allergy Anaphylaxis and Hives     Copaxone [Glatiramer-Mannitol] Swelling      Social History     Tobacco Use     Smoking status: Former Smoker     Quit date: 2021     Years since quittin.2     Smokeless tobacco: Never Used   Substance Use Topics     Alcohol use: Not Currently      Wt Readings from Last 1 Encounters:   22 (!) 159.3 kg (351 lb 3.2 oz)        Anesthesia Evaluation            ROS/MED HX  ENT/Pulmonary:       Neurologic: Comment: Arnold chiari malformation -     (+) Multiple Sclerosis,     Cardiovascular:  - neg cardiovascular ROS     METS/Exercise Tolerance:     Hematologic:     (+) anemia,     Musculoskeletal:  - neg musculoskeletal ROS     GI/Hepatic: Comment: choledochocele    (+) GERD,     Renal/Genitourinary:  - neg Renal ROS     Endo:     (+) Obesity,     Psychiatric/Substance Use:     (+) psychiatric history anxiety and depression     Infectious Disease:       Malignancy:       Other:            Physical Exam    Airway        Mallampati: II   TM distance: > 3 FB   Neck ROM: full   Mouth opening: > 3 cm    Respiratory Devices and Support         Dental  no notable dental history         Cardiovascular   cardiovascular exam normal          Pulmonary   pulmonary exam normal                OUTSIDE LABS:  CBC:   Lab  Results   Component Value Date    WBC 10.2 03/15/2022    HGB 12.2 03/15/2022    HCT 38.7 03/15/2022     03/15/2022     BMP:   Lab Results   Component Value Date     03/15/2022    POTASSIUM 4.0 03/15/2022    CHLORIDE 101 03/15/2022    CO2 28 03/15/2022    BUN 14 03/15/2022    CR 0.68 03/15/2022     (H) 03/15/2022     COAGS: No results found for: PTT, INR, FIBR  POC:   Lab Results   Component Value Date    HCG Negative 03/16/2022     HEPATIC: No results found for: ALBUMIN, PROTTOTAL, ALT, AST, GGT, ALKPHOS, BILITOTAL, BILIDIRECT, MARTI  OTHER:   Lab Results   Component Value Date    YUMIKO 9.5 03/15/2022       Anesthesia Plan    ASA Status:  3   NPO Status:  NPO Appropriate    Anesthesia Type: MAC.     - Reason for MAC: straight local not clinically adequate              Consents    Anesthesia Plan(s) and associated risks, benefits, and realistic alternatives discussed. Questions answered and patient/representative(s) expressed understanding.    - Discussed:     - Discussed with:  Patient         Postoperative Care    Pain management: IV analgesics.   PONV prophylaxis: Ondansetron (or other 5HT-3), Dexamethasone or Solumedrol     Comments:                Anirudh Fernandes DO, DO

## 2022-03-16 NOTE — ANESTHESIA CARE TRANSFER NOTE
Patient: Irma Heller    Procedure: Procedure(s):  ENDOSCOPIC RETROGRADE CHOLANGIOPANCREATOGRAPHY       Diagnosis: Choledocholithiasis [K80.50]  Diagnosis Additional Information: No value filed.    Anesthesia Type:   MAC     Note:    Oropharynx: oropharynx clear of all foreign objects and ventilatory support  Level of Consciousness: awake  Oxygen Supplementation: nasal cannula  Level of Supplemental Oxygen (L/min / FiO2): 3  Independent Airway: airway patency satisfactory and stable  Dentition: dentition unchanged  Vital Signs Stable: post-procedure vital signs reviewed and stable  Report to RN Given: handoff report given  Patient transferred to: PACU  Comments:   At end of procedure, spontaneous respirations, patient alert to voice, able to follow commands. Oxygen via nasal cannula at 3 liters per minute to PACU. Oxygen tubing connected to wall O2 in PACU, SpO2, NiBP, and EKG monitors and alarms on and functioning, report on patient's clinical status given to PACU RN, RN questions answered.      Handoff Report: Identifed the Patient, Identified the Reponsible Provider, Reviewed the pertinent medical history, Discussed the surgical course, Reviewed Intra-OP anesthesia mangement and issues during anesthesia, Set expectations for post-procedure period and Allowed opportunity for questions and acknowledgement of understanding      Vitals:  Vitals Value Taken Time   /65 03/16/22 1501   Temp     Pulse 86 03/16/22 1503   Resp 25 03/16/22 1503   SpO2 94 % 03/16/22 1503   Vitals shown include unvalidated device data.    Electronically Signed By: LEELA Goel CRNA  March 16, 2022  3:04 PM

## 2022-03-16 NOTE — OR NURSING
Both GI MD Moore and TAMY Fernandes were talking to the patient about doing the procedure under general anesthesia.  Patient is very admanant that she does not want general anesthesia and expressed desire to cancel procedure if it is not done under sedation only.  MD Fernandes came back to room and explained that procedure will procedure as planned under monitored anesthesia care.  TAMY explained to patient that if further anesthesia will be need aside as from planned, that they not proceed to complete the procedure.

## 2022-03-16 NOTE — OR NURSING
Pt is refusing to sit up in bed.  Body is scrunched.    Pt is mouth breather.  O2 sats 89-91%.  Would like to get patient up to a recliner to evaluate O2 sats.  Pt refusing to get up at this time.  Telling me she will get up in 3 minutes

## 2022-03-16 NOTE — INTERVAL H&P NOTE
"I have reviewed the surgical (or preoperative) H&P that is linked to this encounter, and examined the patient. There are no significant changes    Clinical Conditions Present on Arrival:  SECTIONPRESENTONADMISSIONBEGIN@                   # Severe Obesity: Estimated body mass index is 58.44 kg/m  as calculated from the following:    Height as of this encounter: 1.651 m (5' 5\").    Weight as of this encounter: 159.3 kg (351 lb 3.2 oz).       "

## 2022-03-16 NOTE — OR NURSING
AOX3-VSS-O2 sats >92% RA- Good PO intake, Denies c/o- Discharge instructions by phone per Hospital COVID Protocol w Nurse Franco-  Pt and responsible adult verbalize understanding of discharge instructions, denies questions. Up in W/C - transported to door for discharge to home.

## 2022-03-16 NOTE — ANESTHESIA POSTPROCEDURE EVALUATION
Patient: Irma Heller    Procedure: Procedure(s):  ENDOSCOPIC RETROGRADE CHOLANGIOPANCREATOGRAPHY       Anesthesia Type:  MAC    Note:  Disposition: Outpatient   Postop Pain Control: Uneventful            Sign Out: Well controlled pain   PONV: No   Neuro/Psych: Uneventful            Sign Out: Acceptable/Baseline neuro status   Airway/Respiratory: Uneventful            Sign Out: Acceptable/Baseline resp. status   CV/Hemodynamics: Uneventful            Sign Out: Acceptable CV status; No obvious hypovolemia; No obvious fluid overload   Other NRE: NONE   DID A NON-ROUTINE EVENT OCCUR? No           Last vitals:  Vitals Value Taken Time   /65 03/16/22 1501   Temp     Pulse 79 03/16/22 1511   Resp 8 03/16/22 1511   SpO2 95 % 03/16/22 1511   Vitals shown include unvalidated device data.    Electronically Signed By: Anirudh Fernandes DO, DO  March 16, 2022  3:12 PM

## 2022-03-16 NOTE — PROGRESS NOTES
Discussed anesthetic plan with Dr. Fernandes, Dr. Moore, and patient prior to OR start time.     Patient was adamant she preferred monitored anesthesia care for her anesthetic plan, and stated that she preferred not to complete the ERCP procedure rather than be intubated to complete the procedure.

## 2022-03-16 NOTE — DISCHARGE INSTRUCTIONS
Same Day Surgery Discharge Instructions for  Sedation and General Anesthesia       It's not unusual to feel dizzy, light-headed or faint for up to 24 hours after surgery or while taking pain medication.  If you have these symptoms: sit for a few minutes before standing and have someone assist you when you get up to walk or use the bathroom.      You should rest and relax for the next 24 hours. We recommend you make arrangements to have an adult stay with you for at least 24 hours after your discharge.  Avoid hazardous and strenuous activity.      DO NOT DRIVE any vehicle or operate mechanical equipment for 24 hours following the end of your surgery.  Even though you may feel normal, your reactions may be affected by the medication you have received.      Do not drink alcoholic beverages for 24 hours following surgery.       Slowly progress to your regular diet as you feel able. It's not unusual to feel nauseated and/or vomit after receiving anesthesia.  If you develop these symptoms, drink clear liquids (apple juice, ginger ale, broth, 7-up, etc. ) until you feel better.  If your nausea and vomiting persists for 24 hours, please notify your surgeon.        All narcotic pain medications, along with inactivity and anesthesia, can cause constipation. Drinking plenty of liquids and increasing fiber intake will help.      For any questions of a medical nature, call your surgeon.      Do not make important decisions for 24 hours.      If you had general anesthesia, you may have a sore throat for a couple of days related to the breathing tube used during surgery.  You may use Cepacol lozenges to help with this discomfort.  If it worsens or if you develop a fever, contact your surgeon.       If you feel your pain is not well managed with the pain medications prescribed by your surgeon, please contact your surgeon's office to let them know so they can address your concerns.       CoVid 19 Information    We want to give  you information regarding Covid. Please consult your primary care provider with any questions you might have.     Patient who have symptoms (cough, fever, or shortness of breath), need to isolate for 7 days from when symptoms started OR 72 hours after fever resolves (without fever reducing medications) AND improvement of respiratory symptoms (whichever is longer).      Isolate yourself at home (in own room/own bathroom if possible)    Do Not allow any visitors    Do Not go to work or school    Do Not go to [employer],  centers, shopping, or other public places.    Do Not shake hands.    Avoid close and intimate contact with others (hugging, kissing).    Follow CDC recommendations for household cleaning of frequently touched services.     After the initial 7 days, continue to isolate yourself from household members as much as possible. To continue decrease the risk of community spread and exposure, you and any members of your household should limit activities in public for 14 days after starting home isolation.     You can reference the following CDC link for helpful home isolation/care tips:  https://www.cdc.gov/coronavirus/2019-ncov/downloads/10Things.pdf    Protect Others:    Cover Your Mouth and Nose with a mask, disposable tissue or wash cloth to avoid spreading germs to others.    Wash your hands and face frequently with soap and water    Call Your Primary Doctor If: Breathing difficulty develops or you become worse.    For more information about COVID19 and options for caring for yourself at home, please visit the CDC website at https://www.cdc.gov/coronavirus/2019-ncov/about/steps-when-sick.html  For more options for care at [facility], please visit our website at [email or web address]    **If you have questions or concerns about your procedure,   call Dr. Moore at [phone]**

## 2022-05-25 ENCOUNTER — OFFICE VISIT (OUTPATIENT)
Dept: NEUROLOGY | Facility: CLINIC | Age: 32
End: 2022-05-25
Payer: COMMERCIAL

## 2022-05-25 VITALS
WEIGHT: 293 LBS | BODY MASS INDEX: 48.82 KG/M2 | HEIGHT: 65 IN | DIASTOLIC BLOOD PRESSURE: 86 MMHG | HEART RATE: 87 BPM | OXYGEN SATURATION: 99 % | SYSTOLIC BLOOD PRESSURE: 131 MMHG

## 2022-05-25 DIAGNOSIS — G35 MULTIPLE SCLEROSIS (H): Primary | ICD-10-CM

## 2022-05-25 PROCEDURE — 99205 OFFICE O/P NEW HI 60 MIN: CPT | Performed by: PSYCHIATRY & NEUROLOGY

## 2022-05-25 RX ORDER — MULTIPLE VITAMINS W/ MINERALS TAB 9MG-400MCG
1 TAB ORAL DAILY
COMMUNITY
End: 2024-05-15

## 2022-05-25 RX ORDER — DOCUSATE SODIUM 100 MG/1
100 CAPSULE, LIQUID FILLED ORAL DAILY
Status: ON HOLD | COMMUNITY
End: 2024-06-10

## 2022-05-25 RX ORDER — CLOTRIMAZOLE 1 %
CREAM (GRAM) TOPICAL 2 TIMES DAILY
COMMUNITY
End: 2024-05-15

## 2022-05-25 RX ORDER — POLYETHYLENE GLYCOL 3350 17 G/17G
1 POWDER, FOR SOLUTION ORAL DAILY PRN
COMMUNITY
End: 2024-05-15

## 2022-05-25 RX ORDER — ACETAMINOPHEN 325 MG/1
325 TABLET ORAL EVERY 4 HOURS PRN
COMMUNITY
End: 2024-05-15

## 2022-05-25 NOTE — PROGRESS NOTES
"Irma Heller is a 31 year old female who presents for:  Chief Complaint   Patient presents with     MS     Patient has been diagnosed with MS, was seen by [facility] and then [facility], was referred to us by her old provider at [facility]           Initial Vitals:  /86   Pulse 87   Ht 1.651 m (5' 5\")   Wt (!) 156.6 kg (345 lb 3.2 oz)   SpO2 99%   BMI 57.44 kg/m   Estimated body mass index is 57.44 kg/m  as calculated from the following:    Height as of this encounter: 1.651 m (5' 5\").    Weight as of this encounter: 156.6 kg (345 lb 3.2 oz).. Body surface area is 2.68 meters squared. BP completed using cuff size: large    Nursing Comments: Patient will pass on contact info to her group home staff so they can reach out if they have questions about MRI     Baylee Mejía  "

## 2022-05-25 NOTE — Clinical Note
5/25/2022         RE: Irma Heller  [id number]  [address] [id number]  [place] [address]        Dear Colleague,    Thank you for referring your patient, Irma Heller, to the [facility]UROLOG[facility]. Please see a copy of my visit note below.    INITIAL NEUROLOGY CONSULTATION    DATE OF VISIT: 5/25/2022  CLINIC LOCATION: [facility]  MRN: [id number]  PATIENT NAME: Irma Heller  YOB: 1990    REASON FOR VISIT:   Chief Complaint   Patient presents with     MS     Patient has been diagnosed with MS, was seen by [facility] and then [facility], was referred to us by her old provider at [facility]        HISTORY OF PRESENT ILLNESS:                                                    Ms. Irma Heller is 31 year old right handed female patient with past medical history of Arnold-Chiari malformation, depression, syringomyelia, and multiple sclerosis, who was seen today for multiple sclerosis management.  She was followed by Dr. Peck ([facility], last visit on 5/21/2021), but would like to switch her care to [facility].    Per patient's report and review of past medical records, she was diagnosed with multiple sclerosis in 2016 after an episode of left optic neuritis and left-sided weakness.  Her brain/cervical spine MRI and spinal tap (28 oligoclonal bands) felt consistent with multiple sclerosis.  She was treated with IV steroids and eventually started on Copaxone, but was not able to tolerate it (currently listed as an allergy).  Adderall was prescribed for attention/concentration and memory.  Gabapentin was added for pain.  After her pregnancy was started on Betaseron.  No notable side effects.    Laboratory evaluation from March 2022 includes negative beta hCG, unremarkable BMP (except slightly elevated glucose of 101) and normal CBC.    According to Care Everywhere, brain MRI without contrast  "from 8/11/2016 demonstrated stable findings following Chiari decompression along the posterior aspect of foramen magnum with several small nonspecific areas of T2 hyperintensities in subcortical white matter, most notably in the posterior left frontal lobe that were not seen on previous exam from 2013.  Cervical spine MRI from the same date demonstrated interval development of intramedullary T2 hyperintense lesions with minimal cord swelling felt to represent demyelination.    Brain MRI with and without contrast from 8/18/2015 ([facility]) demonstrated 8 mm nonenhancing subcortical white matter lesion in posterior right temporal lobe.  Head MRA was unrevealing.    At the present time, the patient reports ongoing cognitive difficulties along with diffuse pain and numbness of the left lower extremity.  She states that in November she had relapse in form of left-sided weakness and visual changes.  Now feels better.  Compliant with Betaseron and gabapentin.  She is not on vitamin D replacement currently, although used to take it in the past.  PAST MEDICAL/SURGICAL HISTORY:                                                    I personally reviewed patient's past medical and surgical history with the patient at today's visit.  MEDICATIONS:                                                    I personally reviewed patient's medications and allergies with the patient at today's visit.  ALLERGIES:                                                      Allergies   Allergen Reactions     Glatiramer Hives     Penicillins Anaphylaxis and Hives     Shellfish Allergy Anaphylaxis and Hives     Copaxone [Glatiramer-Mannitol] Swelling     EXAM:                                                    VITAL SIGNS:   /86   Pulse 87   Ht 1.651 m (5' 5\")   Wt (!) 156.6 kg (345 lb 3.2 oz)   SpO2 99%   BMI 57.44 kg/m    Mini-Cog Assessment:  Mini Cog Assessment  Clock Draw Score: 0 Abnormal  3 Item Recall: 2 objects " recalled  Mini Cog Total Score: 2  Administered by: : Baylee KAPOOR    General: pt is in NAD, cooperative.  Skin: normal turgor, moist mucous membranes, no lesions/rashes noticed.  HEENT: ATNC, EOMI, PERRL, white sclera, normal conjunctiva, no nystagmus or ptosis. No carotid bruits bilaterally.  Respiratory: lung sounds clear to auscultation bilaterally, no crackles, wheezes, rhonchi. Symmetric lung excursion, no accessory respiratory muscle use.  Cardiovascular: normal S1/S2, no murmurs/rubs/gallops.   Abdomen: Not distended.  : deferred.    Neurological:  Mental: alert, follows commands, Mini Cog Total Score: 2/5 with 2/3 on memory recall, no aphasia or dysarthria. Fund of knowledge is diminished for age.  Cranial Nerves:  CN II: visual acuity - able to accurately count fingers with each eye. Visual fields intact, fundi: discs sharp, no papilledema and normal vessels bilaterally.  CN III, IV, VI: EOM intact, pupils equal and reactive  CN V: facial sensation nl  CN VII: face symmetric, no facial droop  CN VIII: hearing normal  CN IX: palate elevation symmetric, uvula at midline  CN XI SCM normal, shoulder shrug nl  CN XII: tongue midline  Motor: Strength: 5/5 in all major groups of all extremities with encouragement, though initially has left lower extremity giveaway weakness. Normal tone. No abnormal movements. No pronator drift b/l.  Reflexes: Triceps, biceps, brachioradialis, patellar, and achilles reflexes normal and symmetric. No clonus noted. Toes are down-going b/l.   Sensory: light touch, pinprick, and vibration intact, except small area of reduced pinprick sensation on the medial surface of the right ankle. Romberg: negative.  Coordination: FNF and heel-shin tests intact b/l.   Gait:  Normal, able to tandem walk with mild difficulty.  DATA:   LABS/EEG/IMAGING/OTHER STUDIES: I reviewed pertinent medical records, as detailed in the history of present illness.  ASSESSMENT AND PLAN:      ASSESSMENT: Irma JOYA  Arron is a 31 year old female patient with listed above past medical history, who presents with multiple sclerosis to establish care.    We had a detailed discussion with the patient regarding her presenting complaints.  The neurological exam today is noticeable for reduced pinprick sensation in left foot.  It appears that patient's last MRI's were from 2016.  Given her reported recent relapse and the fact that she was switched to a different DMT after her pregnancy, I would like to obtain repeat brain, cervical, and thoracic spine MRI with and without contrast.  I would like to also check her vitamin D level.  She should stay on Betaseron at the present time, but we might consider switching her to a different DMT.  We will discuss options based on results.    DIAGNOSES:    ICD-10-CM    1. Multiple sclerosis (H)  G35      PLAN: At today's visit we thoroughly discussed current symptoms, necessary evaluation, and the plan, which includes:  Orders Placed This Encounter   Procedures     MR Brain w/o & w Contrast     MR Cervical Spine w/o & w Contrast     MR Thoracic Spine w/o & w Contrast     Vitamin D Deficiency     No medication changes for now.     Additional recommendations after the work-up.    Next follow-up appointment is in the next 6 weeks or earlier if needed.    Please do not hesitate to call me with any questions or concerns.    Total Time: 62 minutes spent on the date of the encounter doing chart review, history and exam, documentation and further activities per the note.    Alberto Chahal MD  [facility] Neurology  (Chart documentation was completed in part with Dragon voice-recognition software. Even though reviewed, some grammatical, spelling, and word errors may remain.)              Again, thank you for allowing me to participate in the care of your patient.        Sincerely,        Alberto Chahal MD

## 2022-05-25 NOTE — PROGRESS NOTES
INITIAL NEUROLOGY CONSULTATION    DATE OF VISIT: 5/25/2022  CLINIC LOCATION: [facility]  MRN: [id number]  PATIENT NAME: Irma Heller  YOB: 1990    REASON FOR VISIT:   Chief Complaint   Patient presents with     MS     Patient has been diagnosed with MS, was seen by [facility] and then formerly [facility], was referred to us by her old provider at [facility]        HISTORY OF PRESENT ILLNESS:                                                    Ms. Irma Heller is 31 year old right handed female patient with past medical history of Arnold-Chiari malformation, depression, syringomyelia, and multiple sclerosis, who was seen today for multiple sclerosis management.  She was followed by Dr. Peck (formerly [facility], last visit on 5/21/2021), but would like to switch her care to [facility].    Per patient's report and review of past medical records, she was diagnosed with multiple sclerosis in 2016 after an episode of left optic neuritis and left-sided weakness.  Her brain/cervical spine MRI and spinal tap (28 oligoclonal bands) felt consistent with multiple sclerosis.  She was treated with IV steroids and eventually started on Copaxone, but was not able to tolerate it (currently listed as an allergy).  Adderall was prescribed for attention/concentration and memory.  Gabapentin was added for pain.  After her pregnancy was started on Betaseron.  No notable side effects.    Laboratory evaluation from March 2022 includes negative beta hCG, unremarkable BMP (except slightly elevated glucose of 101) and normal CBC.    According to Care Everywhere, brain MRI without contrast from 8/11/2016 demonstrated stable findings following Chiari decompression along the posterior aspect of foramen magnum with several small nonspecific areas of T2 hyperintensities in subcortical white matter, most notably in the posterior left frontal lobe that were not seen  "on previous exam from 2013.  Cervical spine MRI from the same date demonstrated interval development of intramedullary T2 hyperintense lesions with minimal cord swelling felt to represent demyelination.    Brain MRI with and without contrast from 8/18/2015 ([facility]) demonstrated 8 mm nonenhancing subcortical white matter lesion in posterior right temporal lobe.  Head MRA was unrevealing.    At the present time, the patient reports ongoing cognitive difficulties along with diffuse pain and numbness of the left lower extremity.  She states that in November she had relapse in form of left-sided weakness and visual changes.  Now feels better.  Compliant with Betaseron and gabapentin.  She is not on vitamin D replacement currently, although used to take it in the past.  PAST MEDICAL/SURGICAL HISTORY:                                                    I personally reviewed patient's past medical and surgical history with the patient at today's visit.  MEDICATIONS:                                                    I personally reviewed patient's medications and allergies with the patient at today's visit.  ALLERGIES:                                                      Allergies   Allergen Reactions     Glatiramer Hives     Penicillins Anaphylaxis and Hives     Shellfish Allergy Anaphylaxis and Hives     Copaxone [Glatiramer-Mannitol] Swelling     EXAM:                                                    VITAL SIGNS:   /86   Pulse 87   Ht 1.651 m (5' 5\")   Wt (!) 156.6 kg (345 lb 3.2 oz)   SpO2 99%   BMI 57.44 kg/m    Mini-Cog Assessment:  Mini Cog Assessment  Clock Draw Score: 0 Abnormal  3 Item Recall: 2 objects recalled  Mini Cog Total Score: 2  Administered by: : Baylee KAPOOR    General: pt is in NAD, cooperative.  Skin: normal turgor, moist mucous membranes, no lesions/rashes noticed.  HEENT: ATNC, EOMI, PERRL, white sclera, normal conjunctiva, no nystagmus or ptosis. No carotid bruits " bilaterally.  Respiratory: lung sounds clear to auscultation bilaterally, no crackles, wheezes, rhonchi. Symmetric lung excursion, no accessory respiratory muscle use.  Cardiovascular: normal S1/S2, no murmurs/rubs/gallops.   Abdomen: Not distended.  : deferred.    Neurological:  Mental: alert, follows commands, Mini Cog Total Score: 2/5 with 2/3 on memory recall, no aphasia or dysarthria. Fund of knowledge is diminished for age.  Cranial Nerves:  CN II: visual acuity - able to accurately count fingers with each eye. Visual fields intact, fundi: discs sharp, no papilledema and normal vessels bilaterally.  CN III, IV, VI: EOM intact, pupils equal and reactive  CN V: facial sensation nl  CN VII: face symmetric, no facial droop  CN VIII: hearing normal  CN IX: palate elevation symmetric, uvula at midline  CN XI SCM normal, shoulder shrug nl  CN XII: tongue midline  Motor: Strength: 5/5 in all major groups of all extremities with encouragement, though initially has left lower extremity giveaway weakness. Normal tone. No abnormal movements. No pronator drift b/l.  Reflexes: Triceps, biceps, brachioradialis, patellar, and achilles reflexes normal and symmetric. No clonus noted. Toes are down-going b/l.   Sensory: light touch, pinprick, and vibration intact, except small area of reduced pinprick sensation on the medial surface of the right ankle. Romberg: negative.  Coordination: FNF and heel-shin tests intact b/l.   Gait:  Normal, able to tandem walk with mild difficulty.  DATA:   LABS/EEG/IMAGING/OTHER STUDIES: I reviewed pertinent medical records, as detailed in the history of present illness.  ASSESSMENT AND PLAN:      ASSESSMENT: Irma Heller is a 31 year old female patient with listed above past medical history, who presents with multiple sclerosis to establish care.    We had a detailed discussion with the patient regarding her presenting complaints.  The neurological exam today is noticeable for reduced  pinprick sensation in left foot.  It appears that patient's last MRI's were from 2016.  Given her reported recent relapse and the fact that she was switched to a different DMT after her pregnancy, I would like to obtain repeat brain, cervical, and thoracic spine MRI with and without contrast.  I would like to also check her vitamin D level.  She should stay on Betaseron at the present time, but we might consider switching her to a different DMT.  We will discuss options based on results.    DIAGNOSES:    ICD-10-CM    1. Multiple sclerosis (H)  G35      PLAN: At today's visit we thoroughly discussed current symptoms, necessary evaluation, and the plan, which includes:  Orders Placed This Encounter   Procedures     MR Brain w/o & w Contrast     MR Cervical Spine w/o & w Contrast     MR Thoracic Spine w/o & w Contrast     Vitamin D Deficiency     No medication changes for now.     Additional recommendations after the work-up.    Next follow-up appointment is in the next 6 weeks or earlier if needed.    Please do not hesitate to call me with any questions or concerns.    Total Time: 62 minutes spent on the date of the encounter doing chart review, history and exam, documentation and further activities per the note.    Alberto Chahal MD  [facility] Neurology  (Chart documentation was completed in part with Dragon voice-recognition software. Even though reviewed, some grammatical, spelling, and word errors may remain.)

## 2022-05-25 NOTE — PATIENT INSTRUCTIONS
AFTER VISIT SUMMARY (AVS):    At today's visit we thoroughly discussed current symptoms, necessary evaluation, and the plan, which includes:  Orders Placed This Encounter   Procedures    MR Brain w/o & w Contrast    MR Cervical Spine w/o & w Contrast    MR Thoracic Spine w/o & w Contrast    Vitamin D Deficiency     No medication changes for now.     Additional recommendations after the work-up.    Next follow-up appointment is in the next 6 weeks or earlier if needed.    Please do not hesitate to call me with any questions or concerns.    Thanks.

## 2022-06-27 ENCOUNTER — TELEPHONE (OUTPATIENT)
Dept: NEUROLOGY | Facility: CLINIC | Age: 32
End: 2022-06-27

## 2022-06-30 ENCOUNTER — TELEPHONE (OUTPATIENT)
Dept: NEUROLOGY | Facility: CLINIC | Age: 32
End: 2022-06-30

## 2022-06-30 NOTE — TELEPHONE ENCOUNTER
Patient returned call from message left to change her appointment to 1pm on 7/14 to clear out  Part of Dr Chahal's lunch scheduled as he is booked for his whole lunch out. She is also concerned as she needs refills on her MS injection. She did not know the name of it off the top of her head and said she would call the pharmacy and have a request sent to us.I advised patient I would call them back as my computer crashed and I could not access the schedule at that time. It appears the spot has been filled on 7/14 but I will look to see if we have any other options. It appears that the 12p slot is open on Friday 7/15 and I will follow up to see if she is willing to move to that spot as well as follow up on her injectable meds as well.    Outbound call to Irma [phone].  Left message asking her to call back so I can talk with her about the MS meds and that the 14th 1 pm spot was actually scheduled but I do have some other options to offer (could be over booked on 7/15 at 12p 0r 12:30p

## 2022-07-12 ENCOUNTER — TELEPHONE (OUTPATIENT)
Dept: NEUROLOGY | Facility: CLINIC | Age: 32
End: 2022-07-12

## 2022-07-12 NOTE — TELEPHONE ENCOUNTER
Received voice mail from Irma saying she would like to have MRI orders sent to [facility] in [place] and wonder if she should change her 7/14 appointment with Dr Chahal.    Outbound call to Irma. Advised her I will fax orders over now and once she has the appointment schedule with them she can contact us and we will reschedule her F/U with Dr Chahal to after her imaging is completed

## 2022-07-12 NOTE — TELEPHONE ENCOUNTER
Received voice mail from Irma that she had changed her MRI to [facility] but the MRI machine was too small asked for a call as she needs to have orders sent somewhere else.    Received a message today to please reach out to patient and see where they would like the order sent,    Outbound call to Irma [phone]. Left message to call me back so I can find out where she would like the order sent to so we can get her set up (also will need ot reschedule f/u that is set for this Friday July 14th at noon

## 2022-07-15 NOTE — TELEPHONE ENCOUNTER
Outbound call to Irma [phone]. She is getting her MRI at 6pm on Monday August 1st, I was able to  schedule her for follow up on Monday August 8th at 12:30p

## 2022-08-03 ENCOUNTER — TELEPHONE (OUTPATIENT)
Dept: NEUROLOGY | Facility: CLINIC | Age: 32
End: 2022-08-03

## 2022-08-03 NOTE — TELEPHONE ENCOUNTER
Inbound call from Irma and she is have increased migraines she is concerned as she has been taking Excedrine and it helps but she is worried about an MS Flair, she also wanted to to make sure that her MRI were received and I do not see they were told her I will follow up also moved her f/u to Next Thursday 8/11 at 12:30 as her [facility] worker made appointment for dentist

## 2022-08-04 NOTE — TELEPHONE ENCOUNTER
Received a voicemail from Irma to report she had forgotten to let me know she had a new phone number of [phone] update phone number and looked to see if Imaging reports had been received yet. Images are in PACS and will need to be saved but I did not see imaging report

## 2022-08-10 ENCOUNTER — MYC MEDICAL ADVICE (OUTPATIENT)
Dept: NEUROLOGY | Facility: CLINIC | Age: 32
End: 2022-08-10

## 2022-08-11 ENCOUNTER — OFFICE VISIT (OUTPATIENT)
Dept: NEUROLOGY | Facility: CLINIC | Age: 32
End: 2022-08-11
Payer: COMMERCIAL

## 2022-08-11 VITALS — HEART RATE: 102 BPM | SYSTOLIC BLOOD PRESSURE: 134 MMHG | DIASTOLIC BLOOD PRESSURE: 83 MMHG | OXYGEN SATURATION: 97 %

## 2022-08-11 DIAGNOSIS — G35 MULTIPLE SCLEROSIS (H): Primary | ICD-10-CM

## 2022-08-11 DIAGNOSIS — G43.009 MIGRAINE WITHOUT AURA AND WITHOUT STATUS MIGRAINOSUS, NOT INTRACTABLE: ICD-10-CM

## 2022-08-11 DIAGNOSIS — R39.15 URINARY URGENCY: ICD-10-CM

## 2022-08-11 PROCEDURE — 99215 OFFICE O/P EST HI 40 MIN: CPT | Performed by: PSYCHIATRY & NEUROLOGY

## 2022-08-11 RX ORDER — SOLIFENACIN SUCCINATE 5 MG/1
5 TABLET, FILM COATED ORAL DAILY
Qty: 31 TABLET | Refills: 5 | Status: SHIPPED | OUTPATIENT
Start: 2022-08-11 | End: 2023-01-26

## 2022-08-11 RX ORDER — SUMATRIPTAN 50 MG/1
50 TABLET, FILM COATED ORAL
Qty: 9 TABLET | Refills: 3 | Status: SHIPPED | OUTPATIENT
Start: 2022-08-11 | End: 2023-08-28

## 2022-08-11 RX ORDER — INCONTINENCE PAD,LINER,DISP
1 EACH MISCELLANEOUS 3 TIMES DAILY PRN
Qty: 136 EACH | Refills: 11 | Status: SHIPPED | OUTPATIENT
Start: 2022-08-11 | End: 2023-08-01

## 2022-08-11 NOTE — PROGRESS NOTES
ESTABLISHED PATIENT NEUROLOGY NOTE    DATE OF VISIT: 8/11/2022  CLINIC LOCATION: [facility]  MRN: [id number]  PATIENT NAME: Irma Heller  YOB: 1990    REASON FOR VISIT:   Chief Complaint   Patient presents with     Follow Up     Review Imaging- MS and Migraine      SUBJECTIVE:                                                      HISTORY OF PRESENT ILLNESS: Patient is here to follow up regarding multiple sclerosis.  She also reports worsening headaches.  Please refer to my initial note 5/25/2022 for further information.    Since the last visit, the patient reports that her MS symptoms are stable, but she experiences a flare of her migraines.  She used to take sumatriptan and would like a refill.  She also reports ongoing urinary urgency/incontinence.  Uses incontinence pads and also asks for Vesicare that used to work well in the past.  She denies interval development of new focal neurological symptoms.    She did not complete recommended vitamin D level.    Brain MRI with and without contrast from 8/2/2022 demonstrated mild burden of intracranial white matter lesions without evidence of active demyelination, increased compared to 2016.  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  Cervical spine MRI demonstrated small 2 x 6 mm syrinx along with old spinal cord lesion without evidence of active demyelination.  Thoracic spine MRI demonstrated old presumed demyelinating changes at T11-T12 level without evidence of active demyelination.  All images were personally reviewed and independently interpreted.  EXAM:                                                    Physical Exam:   Vitals: /83 (BP Location: Left arm, Patient Position: Sitting, Cuff Size: Adult Large)   Pulse 102   SpO2 97%     General: pt is in NAD, cooperative.  Skin: normal turgor, moist mucous membranes, no lesions/rashes noticed.  HEENT: ATNC, white sclera, normal  conjunctiva.  Respiratory: Symmetric lung excursion, no accessory respiratory muscle use.  Abdomen: Non distended.  Neurological: awake, cooperative, follows commands, no exam changes compared to the initial visit.  ASSESSMENT AND PLAN:                                                    Assessment: 31 year old female patient presents for follow-up of multiple sclerosis after the completion of recommended work-up.  Her brain, cervical, and thoracic spine MRI did not demonstrate evidence of active demyelination.  She should stay on Betaseron for now.  She did not complete vitamin D level, as recommended.  I reminded her to do so along with CBC/CMP.  I provided prescription for sumatriptan and Vesicare.  No other new recommendations.  We will plan to repeat her imaging in 1 to 2 years unless she has a flare prior to it.    Diagnoses:    ICD-10-CM    1. Multiple sclerosis (H)  G35      Plan: At today's visit we thoroughly discussed current symptoms, evaluation results (stable), and the plan.    We decided to continue Betaseron, but I also added Sumatriptan for migraines and Vesicare for urinary urgency.    Encouraged the patient to obtain vitamin D level along with CBC/CMP labs at her earliest convenience.    Next follow-up appointment is in the next 6 months or earlier if needed.    Total Time: 43 minutes spent on the date of the encounter doing chart review, history and exam, documentation and further activities per the note.    Alberto Chahal MD  [facility] Neurology  (Chart documentation was completed in part with Dragon voice-recognition software. Even though reviewed, some grammatical, spelling, and word errors may remain.)

## 2022-08-11 NOTE — PATIENT INSTRUCTIONS
AFTER VISIT SUMMARY (AVS):    At today's visit we thoroughly discussed current symptoms, evaluation results (stable), and the plan.    We decided to continue Betaseron, but I also added Sumatriptan for migraines and Vesicare for urinary urgency.    Please complete the recommended labs, including vitamin D level along with CBC/CMP labs at your earliest convenience.    Next follow-up appointment is in the next 6 months or earlier if needed.    Please do not hesitate to call me with any questions or concerns.    Thanks.

## 2022-08-11 NOTE — PROGRESS NOTES
"Irma Heller is a 31 year old female who presents for:  Chief Complaint   Patient presents with     Follow Up     Review Imaging- MS and Migraine         Initial Vitals:  /83 (BP Location: Left arm, Patient Position: Sitting, Cuff Size: Adult Large)   Pulse 102   SpO2 97%  Estimated body mass index is 57.44 kg/m  as calculated from the following:    Height as of 5/25/22: 1.651 m (5' 5\").    Weight as of 5/25/22: 156.6 kg (345 lb 3.2 oz).. There is no height or weight on file to calculate BSA. BP completed using cuff size: ramiro Mejía  "

## 2022-08-11 NOTE — LETTER
8/11/2022         RE: Irma Heller  [id number]  [address] [address]  [place] [address]        Dear Colleague,    Thank you for referring your patient, Irma Hellre, to the [facility] NEUROLOGY CLINICS [facility]. Please see a copy of my visit note below.    ESTABLISHED PATIENT NEUROLOGY NOTE    DATE OF VISIT: 8/11/2022  CLINIC LOCATION: [facility]  MRN: [id number]  PATIENT NAME: Irma Heller  YOB: 1990    REASON FOR VISIT:   Chief Complaint   Patient presents with     Follow Up     Review Imaging- MS and Migraine      SUBJECTIVE:                                                      HISTORY OF PRESENT ILLNESS: Patient is here to follow up regarding multiple sclerosis.  She also reports worsening headaches.  Please refer to my initial note 5/25/2022 for further information.    Since the last visit, the patient reports that her MS symptoms are stable, but she experiences a flare of her migraines.  She used to take sumatriptan and would like a refill.  She also reports ongoing urinary urgency/incontinence.  Uses incontinence pads and also asks for Vesicare that used to work well in the past.  She denies interval development of new focal neurological symptoms.    She did not complete recommended vitamin D level.    Brain MRI with and without contrast from 8/2/2022 demonstrated mild burden of intracranial white matter lesions without evidence of active demyelination, increased compared to 2016.  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  Cervical spine MRI demonstrated small 2 x 6 mm syrinx along with old spinal cord lesion without evidence of active demyelination.  Thoracic spine MRI demonstrated old presumed demyelinating changes at T11-T12 level without evidence of active demyelination.  All images were personally reviewed and independently interpreted.  EXAM:                                                    Physical Exam:   Vitals: BP  134/83 (BP Location: Left arm, Patient Position: Sitting, Cuff Size: Adult Large)   Pulse 102   SpO2 97%     General: pt is in NAD, cooperative.  Skin: normal turgor, moist mucous membranes, no lesions/rashes noticed.  HEENT: ATNC, white sclera, normal conjunctiva.  Respiratory: Symmetric lung excursion, no accessory respiratory muscle use.  Abdomen: Non distended.  Neurological: awake, cooperative, follows commands, no exam changes compared to the initial visit.  ASSESSMENT AND PLAN:                                                    Assessment: 31 year old female patient presents for follow-up of multiple sclerosis after the completion of recommended work-up.  Her brain, cervical, and thoracic spine MRI did not demonstrate evidence of active demyelination.  She should stay on Betaseron for now.  She did not complete vitamin D level, as recommended.  I reminded her to do so along with CBC/CMP.  I provided prescription for sumatriptan and Vesicare.  No other new recommendations.  We will plan to repeat her imaging in 1 to 2 years unless she has a flare prior to it.    Diagnoses:    ICD-10-CM    1. Multiple sclerosis (H)  G35      Plan: At today's visit we thoroughly discussed current symptoms, evaluation results (stable), and the plan.    We decided to continue Betaseron, but I also added Sumatriptan for migraines and Vesicare for urinary urgency.    Encouraged the patient to obtain vitamin D level along with CBC/CMP labs at her earliest convenience.    Next follow-up appointment is in the next 6 months or earlier if needed.    Total Time: 43 minutes spent on the date of the encounter doing chart review, history and exam, documentation and further activities per the note.    Alberto Chahal MD  [facility] Neurology  (Chart documentation was completed in part with Dragon voice-recognition software. Even though reviewed, some grammatical, spelling, and word errors may remain.)      Irma JOYA  "Arron is a 31 year old female who presents for:  Chief Complaint   Patient presents with     Follow Up     Review Imaging- MS and Migraine         Initial Vitals:  /83 (BP Location: Left arm, Patient Position: Sitting, Cuff Size: Adult Large)   Pulse 102   SpO2 97%  Estimated body mass index is 57.44 kg/m  as calculated from the following:    Height as of 5/25/22: 1.651 m (5' 5\").    Weight as of 5/25/22: 156.6 kg (345 lb 3.2 oz).. There is no height or weight on file to calculate BSA. BP completed using cuff size: large        Baylee Mejía      Again, thank you for allowing me to participate in the care of your patient.        Sincerely,        Alberto Chahal MD    "

## 2022-09-23 ENCOUNTER — TELEPHONE (OUTPATIENT)
Dept: NEUROLOGY | Facility: CLINIC | Age: 32
End: 2022-09-23

## 2022-09-23 NOTE — TELEPHONE ENCOUNTER
Prior Authorization Approval    Authorization Effective Date: 8/24/2022  Authorization Expiration Date: 9/23/2023  Medication: MEDICATION- BETASERON- APPROVED  Approved Dose/Quantity: 14 PER 28 DAYS  Reference #: [id number]   Insurance Company: [employer] - Phone [phone] Fax [phone]  Expected CoPay:       CoPay Card Available:      Foundation Assistance Needed:    Which Pharmacy is filling the prescription (Not needed for infusion/clinic administered): [facility] - [place], [place] - [address]  Pharmacy Notified: Yes  Patient Notified: Yes

## 2023-01-26 DIAGNOSIS — R39.15 URINARY URGENCY: ICD-10-CM

## 2023-01-26 RX ORDER — SOLIFENACIN SUCCINATE 5 MG/1
5 TABLET, FILM COATED ORAL DAILY
Qty: 30 TABLET | Refills: 0 | Status: SHIPPED | OUTPATIENT
Start: 2023-01-26 | End: 2023-02-22

## 2023-01-26 NOTE — TELEPHONE ENCOUNTER
Prescription approved per [facility] Refill Protocol.  Sending in 30 day, pt has appt 2/16/23.     Shahla BLACKWOOD RN, BSN  [facility]

## 2023-01-26 NOTE — TELEPHONE ENCOUNTER
[facility] Call Center    Phone Message    May a detailed message be left on voicemail: yes     Reason for Call: Medication Refill Request    Has the patient contacted the pharmacy for the refill? Yes   Name of medication being requested: Solifenacin 5 mg.   Provider who prescribed the medication: DAVID Chahal  Pharmacy: [facility]    Date medication is needed: ASAP         Action Taken: Message routed to:  [facility]    Travel Screening: Not Applicable

## 2023-02-21 DIAGNOSIS — R39.15 URINARY URGENCY: ICD-10-CM

## 2023-02-22 RX ORDER — SOLIFENACIN SUCCINATE 5 MG/1
5 TABLET, FILM COATED ORAL DAILY
Qty: 30 TABLET | Refills: 0 | Status: SHIPPED | OUTPATIENT
Start: 2023-02-22 | End: 2024-05-15

## 2023-02-22 NOTE — TELEPHONE ENCOUNTER
Medication is being filled for 1 time refill only due to:  Patient needs to be seen because Overdue for 6 month follow up..

## 2023-04-21 DIAGNOSIS — R39.15 URINARY URGENCY: ICD-10-CM

## 2023-04-21 RX ORDER — SOLIFENACIN SUCCINATE 5 MG/1
5 TABLET, FILM COATED ORAL DAILY
Qty: 30 TABLET | Refills: 4 | OUTPATIENT
Start: 2023-04-21

## 2023-04-21 NOTE — TELEPHONE ENCOUNTER
Provider requests pt have appt before further refills.   Shahla BLACKWOOD RN, BSN  [facility] Neurology [facility]

## 2023-04-23 ENCOUNTER — HEALTH MAINTENANCE LETTER (OUTPATIENT)
Age: 33
End: 2023-04-23

## 2023-07-27 ENCOUNTER — TELEPHONE (OUTPATIENT)
Dept: NEUROLOGY | Facility: CLINIC | Age: 33
End: 2023-07-27
Payer: COMMERCIAL

## 2023-07-27 NOTE — TELEPHONE ENCOUNTER
Received refill request from [facility] to refill Betaseron 0.3 mg kit- inject 0.3 mg under the skin every other day. Patients last appt was in Aug 2022 and she no showed for appt in Feb. She was provided with a one month refill in March and advised she will need to scheduled a follow up with Dr Chahal prior to further refills. It appears there have been several attempts to contact patient unsuccessfully.   Called the phone number on file for patient and it is disconnected. Will r\try to reach out to patients emergency contact and advise we are trying to reach he.    Called emergency contact on file Elisa, and left message with my phone number and advised the neurology clinic is trying to reach her if they can have her call us.

## 2023-07-31 ENCOUNTER — TELEPHONE (OUTPATIENT)
Dept: NEUROLOGY | Facility: CLINIC | Age: 33
End: 2023-07-31
Payer: COMMERCIAL

## 2023-07-31 DIAGNOSIS — G35 MULTIPLE SCLEROSIS (H): Primary | ICD-10-CM

## 2023-07-31 DIAGNOSIS — R39.15 URINARY URGENCY: ICD-10-CM

## 2023-07-31 NOTE — TELEPHONE ENCOUNTER
Spoke with fco KAPOOR. She advised to send out a message on this patient. Patient was calling to get a refill on her medication, but was told she needed to follow up first. Dr. Chahal's first office visit is not until December 18th. She did mention that someone can help get her in one of my hold spots. Also, to possibly get her refill submitted. Patient is wanting a return call.    FYI: I updated patients number on file.

## 2023-08-01 ENCOUNTER — TELEPHONE (OUTPATIENT)
Dept: NEUROLOGY | Facility: CLINIC | Age: 33
End: 2023-08-01
Payer: COMMERCIAL

## 2023-08-01 RX ORDER — INCONTINENCE PAD,LINER,DISP
1 EACH MISCELLANEOUS 3 TIMES DAILY PRN
Qty: 136 EACH | Refills: 11 | Status: SHIPPED | OUTPATIENT
Start: 2023-08-01 | End: 2024-05-15

## 2023-08-01 NOTE — TELEPHONE ENCOUNTER
06/22/2023 BETASERON 0.3 MG KIT [facility] - [place], [place] - [facility] Henrik Patrick -[phone] 14 kit     Days Supply: 28Source: Surescripts (Claim History, Ambulatory)Unit strength: 0.3 MGPrior auth: [id number] by: ZAIRE WAGNER        Called pt back to verify what medication she needed. RN pended betaseron and incontinence supply.    Per med rec pt last picked up betaseron 6/22 from [facility].     Pt has appt 8/28/23. Will route to provider to review and refill if appropriate.     Shahla BLACKWOOD RN, BSN  [facility] [place]

## 2023-08-01 NOTE — TELEPHONE ENCOUNTER
Left message for patient to call scheduling. There are held slots with dr. Chahal during the Month of August.  Schedule patient and then send neurology nursing a message so that patient can receive medication refills.

## 2023-08-01 NOTE — TELEPHONE ENCOUNTER
Please see 7/31 encounter.     Will close this encounter to reduce confusion.     Shahla BLACKWOOD RN, BSN  [facility] Neurology [facility]

## 2023-08-28 ENCOUNTER — LAB (OUTPATIENT)
Dept: LAB | Facility: CLINIC | Age: 33
End: 2023-08-28
Payer: COMMERCIAL

## 2023-08-28 ENCOUNTER — OFFICE VISIT (OUTPATIENT)
Dept: NEUROLOGY | Facility: CLINIC | Age: 33
End: 2023-08-28
Payer: COMMERCIAL

## 2023-08-28 VITALS — SYSTOLIC BLOOD PRESSURE: 117 MMHG | OXYGEN SATURATION: 92 % | HEART RATE: 64 BPM | DIASTOLIC BLOOD PRESSURE: 71 MMHG

## 2023-08-28 DIAGNOSIS — G35 MULTIPLE SCLEROSIS (H): Primary | ICD-10-CM

## 2023-08-28 DIAGNOSIS — G43.009 MIGRAINE WITHOUT AURA AND WITHOUT STATUS MIGRAINOSUS, NOT INTRACTABLE: ICD-10-CM

## 2023-08-28 DIAGNOSIS — G35 MULTIPLE SCLEROSIS (H): ICD-10-CM

## 2023-08-28 DIAGNOSIS — F40.240 CLAUSTROPHOBIA: ICD-10-CM

## 2023-08-28 LAB
ALBUMIN SERPL BCG-MCNC: 4.2 G/DL (ref 3.5–5.2)
ALP SERPL-CCNC: 114 U/L (ref 35–104)
ALT SERPL W P-5'-P-CCNC: 31 U/L (ref 0–50)
ANION GAP SERPL CALCULATED.3IONS-SCNC: 14 MMOL/L (ref 7–15)
AST SERPL W P-5'-P-CCNC: 30 U/L (ref 0–45)
BASOPHILS # BLD AUTO: 0.1 10E3/UL (ref 0–0.2)
BASOPHILS NFR BLD AUTO: 0 %
BILIRUB SERPL-MCNC: 0.4 MG/DL
BUN SERPL-MCNC: 13.5 MG/DL (ref 6–20)
CALCIUM SERPL-MCNC: 9.6 MG/DL (ref 8.6–10)
CHLORIDE SERPL-SCNC: 103 MMOL/L (ref 98–107)
CREAT SERPL-MCNC: 0.86 MG/DL (ref 0.51–0.95)
DEPRECATED HCO3 PLAS-SCNC: 23 MMOL/L (ref 22–29)
EOSINOPHIL # BLD AUTO: 0.2 10E3/UL (ref 0–0.7)
EOSINOPHIL NFR BLD AUTO: 2 %
ERYTHROCYTE [DISTWIDTH] IN BLOOD BY AUTOMATED COUNT: 12.8 % (ref 10–15)
GFR SERPL CREATININE-BSD FRML MDRD: 90 ML/MIN/1.73M2
GLUCOSE SERPL-MCNC: 104 MG/DL (ref 70–99)
HCT VFR BLD AUTO: 43.5 % (ref 35–47)
HGB BLD-MCNC: 13.8 G/DL (ref 11.7–15.7)
IMM GRANULOCYTES # BLD: 0 10E3/UL
IMM GRANULOCYTES NFR BLD: 0 %
LYMPHOCYTES # BLD AUTO: 4.1 10E3/UL (ref 0.8–5.3)
LYMPHOCYTES NFR BLD AUTO: 34 %
MCH RBC QN AUTO: 26.5 PG (ref 26.5–33)
MCHC RBC AUTO-ENTMCNC: 31.7 G/DL (ref 31.5–36.5)
MCV RBC AUTO: 84 FL (ref 78–100)
MONOCYTES # BLD AUTO: 0.6 10E3/UL (ref 0–1.3)
MONOCYTES NFR BLD AUTO: 5 %
NEUTROPHILS # BLD AUTO: 6.9 10E3/UL (ref 1.6–8.3)
NEUTROPHILS NFR BLD AUTO: 58 %
PLATELET # BLD AUTO: 373 10E3/UL (ref 150–450)
POTASSIUM SERPL-SCNC: 4.2 MMOL/L (ref 3.4–5.3)
PROT SERPL-MCNC: 8.1 G/DL (ref 6.4–8.3)
RBC # BLD AUTO: 5.21 10E6/UL (ref 3.8–5.2)
SODIUM SERPL-SCNC: 140 MMOL/L (ref 136–145)
WBC # BLD AUTO: 11.9 10E3/UL (ref 4–11)

## 2023-08-28 PROCEDURE — 36415 COLL VENOUS BLD VENIPUNCTURE: CPT

## 2023-08-28 PROCEDURE — 85025 COMPLETE CBC W/AUTO DIFF WBC: CPT

## 2023-08-28 PROCEDURE — 80053 COMPREHEN METABOLIC PANEL: CPT

## 2023-08-28 PROCEDURE — 82306 VITAMIN D 25 HYDROXY: CPT

## 2023-08-28 PROCEDURE — 99214 OFFICE O/P EST MOD 30 MIN: CPT | Performed by: PSYCHIATRY & NEUROLOGY

## 2023-08-28 RX ORDER — LORAZEPAM 1 MG/1
1 TABLET ORAL PRN
Qty: 1 TABLET | Refills: 0 | Status: SHIPPED | OUTPATIENT
Start: 2023-08-28 | End: 2024-05-15

## 2023-08-28 RX ORDER — SUMATRIPTAN 50 MG/1
50 TABLET, FILM COATED ORAL
Qty: 9 TABLET | Refills: 5 | Status: SHIPPED | OUTPATIENT
Start: 2023-08-28

## 2023-08-28 RX ORDER — SUMATRIPTAN 50 MG/1
50 TABLET, FILM COATED ORAL
Qty: 9 TABLET | Refills: 11 | Status: CANCELLED | OUTPATIENT
Start: 2023-08-28

## 2023-08-28 NOTE — LETTER
8/28/2023         RE: Irma Heller  [id number]  [address] [address]  [place] [address]        Dear Colleague,    Thank you for referring your patient, Irma Heller, to the [facility] NEUROLOGY CLINICS [facility]. Please see a copy of my visit note below.    ESTABLISHED PATIENT NEUROLOGY NOTE    DATE OF VISIT: 8/28/2023  CLINIC LOCATION: [facility]  MRN: [id number]  PATIENT NAME: Irma Heller  YOB: 1990    REASON FOR VISIT:   Chief Complaint   Patient presents with     RECHECK     Migraine     SUBJECTIVE:                                                      HISTORY OF PRESENT ILLNESS: Patient is here to follow up regarding multiple sclerosis and headaches.  The last visit was on 8/11/2022.  The plan was to follow-up in 6 months, but the patient did not come until now.  Please refer to my initial/other prior notes for further information.    Since the last visit, the patient reports that she had possible flare of MS in July 2023.  For 2 to 3 weeks she experienced right upper eyelid droop and numbness on the left side of her body.  She denies interval development of new focal neurological symptoms.  Now she feels that her symptoms resolved.  She reports that Excedrin Migraine does not help for acute therapy, but Imitrex worked better.  She needs a refill.  She did not complete the recommended labs.  EXAM:                                                    Physical Exam:   Vitals: /71   Pulse 64   SpO2 92%     General: pt is in NAD, cooperative.  Skin: normal turgor, moist mucous membranes, no lesions/rashes noticed.  HEENT: ATNC, white sclera, normal conjunctiva.  Respiratory: Symmetric lung excursion, no accessory respiratory muscle use.  Abdomen: Non distended.  Neurological: awake, cooperative, follows commands, no aphasia or dysarthria noted, cranial nerves II-XII: no ptosis, face is symmetric, equally moves all extremities, no dysmetria  bilaterally, casual gait is normal.  ASSESSMENT AND PLAN:                                                    Assessment: 32 year old female patient presents for follow-up of multiple sclerosis and headache.  She is on Betaseron, which is tolerated well.  She is past due for her lab work, including CBC, CMP, and vitamin D level.  Last set of MRI's from August 2022 was stable, but she reports possible clinical flare last July.  For that reason I would like to repeat her brain, cervical and thoracic spine MRI's now.  I also reminded her about necessary lab work that she is willing to do today after this appointment.  I refilled Betaseron for another year.  I will see her after she completes her work-up.    Her headaches seem to respond to Imitrex.  I will refill her prescription.    Diagnoses:    ICD-10-CM    1. Multiple sclerosis (H)  G35 interferon beta-1b (BETASERON) 0.3 MG injection     CBC with Platelets & Differential     Comprehensive metabolic panel     Vitamin D Deficiency     MR Thoracic Spine w/o & w Contrast     MR Cervical Spine w/o & w Contrast     MR Brain w/o & w Contrast      2. Migraine without aura and without status migrainosus, not intractable  G43.009 SUMAtriptan (IMITREX) 50 MG tablet      3. Claustrophobia  F40.240 LORazepam (ATIVAN) 1 MG tablet        Plan: At today's visit we thoroughly discussed various current symptoms, necessary evaluation, and the plan, which includes:  Orders Placed This Encounter   Procedures     MR Thoracic Spine w/o & w Contrast     MR Cervical Spine w/o & w Contrast     MR Brain w/o & w Contrast     Comprehensive metabolic panel     Vitamin D Deficiency     CBC with Platelets & Differential     I refilled prescriptions for Imitrex (sumatriptan, migraine treatment) and Betaseron (MS).    For claustrophobia, I gave a prescription for lorazepam.    Next follow-up appointment is in the next 2 to 4 weeks or earlier if needed.    Total Time: 31 minutes spent on the date of  the encounter doing chart review, history and exam, documentation and further activities per the note.    Alberto Chahal MD  [facility] Neurology  (Chart documentation was completed in part with Dragon voice-recognition software. Even though reviewed, some grammatical, spelling, and word errors may remain.)      Again, thank you for allowing me to participate in the care of your patient.        Sincerely,        Alberto Chahal MD

## 2023-08-28 NOTE — PROGRESS NOTES
ESTABLISHED PATIENT NEUROLOGY NOTE    DATE OF VISIT: 8/28/2023  CLINIC LOCATION: [facility]  MRN: [id number]  PATIENT NAME: Irma Heller  YOB: 1990    REASON FOR VISIT:   Chief Complaint   Patient presents with    RECHECK     Migraine     SUBJECTIVE:                                                      HISTORY OF PRESENT ILLNESS: Patient is here to follow up regarding multiple sclerosis and headaches.  The last visit was on 8/11/2022.  The plan was to follow-up in 6 months, but the patient did not come until now.  Please refer to my initial/other prior notes for further information.    Since the last visit, the patient reports that she had possible flare of MS in July 2023.  For 2 to 3 weeks she experienced right upper eyelid droop and numbness on the left side of her body.  She denies interval development of new focal neurological symptoms.  Now she feels that her symptoms resolved.  She reports that Excedrin Migraine does not help for acute therapy, but Imitrex worked better.  She needs a refill.  She did not complete the recommended labs.  EXAM:                                                    Physical Exam:   Vitals: /71   Pulse 64   SpO2 92%     General: pt is in NAD, cooperative.  Skin: normal turgor, moist mucous membranes, no lesions/rashes noticed.  HEENT: ATNC, white sclera, normal conjunctiva.  Respiratory: Symmetric lung excursion, no accessory respiratory muscle use.  Abdomen: Non distended.  Neurological: awake, cooperative, follows commands, no aphasia or dysarthria noted, cranial nerves II-XII: no ptosis, face is symmetric, equally moves all extremities, no dysmetria bilaterally, casual gait is normal.  ASSESSMENT AND PLAN:                                                    Assessment: 32 year old female patient presents for follow-up of multiple sclerosis and headache.  She is on Betaseron, which is tolerated well.  She is past due for her lab  work, including CBC, CMP, and vitamin D level.  Last set of MRI's from August 2022 was stable, but she reports possible clinical flare last July.  For that reason I would like to repeat her brain, cervical and thoracic spine MRI's now.  I also reminded her about necessary lab work that she is willing to do today after this appointment.  I refilled Betaseron for another year.  I will see her after she completes her work-up.    Her headaches seem to respond to Imitrex.  I will refill her prescription.    Diagnoses:    ICD-10-CM    1. Multiple sclerosis (H)  G35 interferon beta-1b (BETASERON) 0.3 MG injection     CBC with Platelets & Differential     Comprehensive metabolic panel     Vitamin D Deficiency     MR Thoracic Spine w/o & w Contrast     MR Cervical Spine w/o & w Contrast     MR Brain w/o & w Contrast      2. Migraine without aura and without status migrainosus, not intractable  G43.009 SUMAtriptan (IMITREX) 50 MG tablet      3. Claustrophobia  F40.240 LORazepam (ATIVAN) 1 MG tablet        Plan: At today's visit we thoroughly discussed various current symptoms, necessary evaluation, and the plan, which includes:  Orders Placed This Encounter   Procedures    MR Thoracic Spine w/o & w Contrast    MR Cervical Spine w/o & w Contrast    MR Brain w/o & w Contrast    Comprehensive metabolic panel    Vitamin D Deficiency    CBC with Platelets & Differential     I refilled prescriptions for Imitrex (sumatriptan, migraine treatment) and Betaseron (MS).    For claustrophobia, I gave a prescription for lorazepam.    Next follow-up appointment is in the next 2 to 4 weeks or earlier if needed.    Total Time: 31 minutes spent on the date of the encounter doing chart review, history and exam, documentation and further activities per the note.    Alberto Chahal MD  [facility] Neurology  (Chart documentation was completed in part with Dragon voice-recognition software. Even though reviewed, some grammatical,  spelling, and word errors may remain.)

## 2023-08-28 NOTE — NURSING NOTE
"Irma Heller is a 32 year old female who presents for:  Chief Complaint   Patient presents with    RECHECK     Migraine        Initial Vitals:  /71   Pulse 64   SpO2 92%  Estimated body mass index is 57.44 kg/m  as calculated from the following:    Height as of 5/25/22: 1.651 m (5' 5\").    Weight as of 5/25/22: 156.6 kg (345 lb 3.2 oz).. There is no height or weight on file to calculate BSA. BP completed using cuff size: wrist cuff    Rob Daley    "

## 2023-08-28 NOTE — PATIENT INSTRUCTIONS
AFTER VISIT SUMMARY (AVS):    At today's visit we thoroughly discussed various current symptoms, necessary evaluation, and the plan, which includes:  Orders Placed This Encounter   Procedures    MR Thoracic Spine w/o & w Contrast    MR Cervical Spine w/o & w Contrast    MR Brain w/o & w Contrast    Comprehensive metabolic panel    Vitamin D Deficiency    CBC with Platelets & Differential     I refilled your prescriptions for Imitrex (sumatriptan, migraine treatment) and Betaseron (MS).    For claustrophobia, I gave a prescription for lorazepam.    Next follow-up appointment is in the next 2 to 4 weeks or earlier if needed.    Please do not hesitate to call me with any questions or concerns.    Thanks.

## 2023-08-29 LAB — DEPRECATED CALCIDIOL+CALCIFEROL SERPL-MC: 27 UG/L (ref 20–75)

## 2023-09-25 ENCOUNTER — TELEPHONE (OUTPATIENT)
Dept: NEUROLOGY | Facility: CLINIC | Age: 33
End: 2023-09-25
Payer: COMMERCIAL

## 2023-09-28 ENCOUNTER — TELEPHONE (OUTPATIENT)
Dept: NEUROLOGY | Facility: CLINIC | Age: 33
End: 2023-09-28
Payer: COMMERCIAL

## 2023-10-24 ENCOUNTER — TELEPHONE (OUTPATIENT)
Dept: NEUROLOGY | Facility: CLINIC | Age: 33
End: 2023-10-24
Payer: COMMERCIAL

## 2023-10-24 NOTE — TELEPHONE ENCOUNTER
PA Needed    Medication: Betaseron PA Renewal  QTY/DS: 14 per 28 days  NEW INS: no  Insurance Company: [facility]    Pharmacy Filling the Rx:  [facility]  PA :  23  Date of last fill: 23

## 2023-10-24 NOTE — TELEPHONE ENCOUNTER
Prior Authorization Approval    Medication: BETASERON 0.3 MG SC KIT  Authorization Effective Date: 9/24/2023  Authorization Expiration Date: 10/23/2024  Approved Dose/Quantity: 28 days  Reference #:     Insurance Company: [employer] - Phone [phone] Fax [phone]  Expected CoPay: $    CoPay Card Available: No    Financial Assistance Needed: N/A  Which Pharmacy is filling the prescription: [facility] - [place], [place] - [address]  Pharmacy Notified: Yes  Patient Notified: Yes            Thank you,    Jackie Willis h-T  Specialty Pharmacy Clinic Liaison - [facility] [address]  [address] [place] [address]  Ph: [phone] Fax: [phone]  [email or web address]

## 2024-05-14 ENCOUNTER — HOSPITAL ENCOUNTER (EMERGENCY)
Facility: CLINIC | Age: 34
Discharge: OTHER INSTITUTION | End: 2024-05-16
Attending: EMERGENCY MEDICINE | Admitting: EMERGENCY MEDICINE
Payer: COMMERCIAL

## 2024-05-14 DIAGNOSIS — F23 ACUTE PSYCHOSIS (H): ICD-10-CM

## 2024-05-14 DIAGNOSIS — F30.9 MANIA (H): ICD-10-CM

## 2024-05-14 PROCEDURE — 99285 EMERGENCY DEPT VISIT HI MDM: CPT

## 2024-05-14 PROCEDURE — 99291 CRITICAL CARE FIRST HOUR: CPT

## 2024-05-15 ENCOUNTER — TELEPHONE (OUTPATIENT)
Dept: BEHAVIORAL HEALTH | Facility: CLINIC | Age: 34
End: 2024-05-15
Payer: COMMERCIAL

## 2024-05-15 PROBLEM — F29 SCHIZOPHRENIA SPECTRUM DISORDER WITH PSYCHOTIC DISORDER TYPE NOT YET DETERMINED (H): Status: ACTIVE | Noted: 2024-05-15

## 2024-05-15 LAB
AMPHETAMINES UR QL SCN: ABNORMAL
BARBITURATES UR QL SCN: ABNORMAL
BENZODIAZ UR QL SCN: ABNORMAL
BZE UR QL SCN: ABNORMAL
CANNABINOIDS UR QL SCN: ABNORMAL
FENTANYL UR QL: ABNORMAL
HCG UR QL: NEGATIVE
OPIATES UR QL SCN: ABNORMAL
PCP QUAL URINE (ROCHE): ABNORMAL

## 2024-05-15 PROCEDURE — 80307 DRUG TEST PRSMV CHEM ANLYZR: CPT | Performed by: EMERGENCY MEDICINE

## 2024-05-15 PROCEDURE — 81025 URINE PREGNANCY TEST: CPT | Performed by: EMERGENCY MEDICINE

## 2024-05-15 PROCEDURE — 250N000013 HC RX MED GY IP 250 OP 250 PS 637: Performed by: EMERGENCY MEDICINE

## 2024-05-15 PROCEDURE — 250N000011 HC RX IP 250 OP 636: Mod: JZ | Performed by: EMERGENCY MEDICINE

## 2024-05-15 PROCEDURE — 96372 THER/PROPH/DIAG INJ SC/IM: CPT | Performed by: EMERGENCY MEDICINE

## 2024-05-15 RX ORDER — GABAPENTIN 600 MG/1
600 TABLET ORAL 3 TIMES DAILY
Status: DISCONTINUED | OUTPATIENT
Start: 2024-05-15 | End: 2024-05-16 | Stop reason: HOSPADM

## 2024-05-15 RX ORDER — OLANZAPINE 5 MG/1
10 TABLET, ORALLY DISINTEGRATING ORAL 2 TIMES DAILY PRN
Status: DISCONTINUED | OUTPATIENT
Start: 2024-05-15 | End: 2024-05-16 | Stop reason: HOSPADM

## 2024-05-15 RX ORDER — ACETAMINOPHEN 325 MG/1
650 TABLET ORAL EVERY 4 HOURS PRN
Status: DISCONTINUED | OUTPATIENT
Start: 2024-05-15 | End: 2024-05-16 | Stop reason: HOSPADM

## 2024-05-15 RX ORDER — ZOLPIDEM TARTRATE 5 MG/1
10 TABLET ORAL AT BEDTIME
Status: DISCONTINUED | OUTPATIENT
Start: 2024-05-15 | End: 2024-05-16 | Stop reason: HOSPADM

## 2024-05-15 RX ORDER — HYDROXYZINE HYDROCHLORIDE 25 MG/1
50 TABLET, FILM COATED ORAL EVERY 6 HOURS PRN
Status: DISCONTINUED | OUTPATIENT
Start: 2024-05-15 | End: 2024-05-16 | Stop reason: HOSPADM

## 2024-05-15 RX ORDER — DEXTROAMPHETAMINE SACCHARATE, AMPHETAMINE ASPARTATE, DEXTROAMPHETAMINE SULFATE AND AMPHETAMINE SULFATE 5; 5; 5; 5 MG/1; MG/1; MG/1; MG/1
40 TABLET ORAL
COMMUNITY
Start: 2024-05-09

## 2024-05-15 RX ORDER — HALOPERIDOL 5 MG/ML
5 INJECTION INTRAMUSCULAR ONCE
Status: COMPLETED | OUTPATIENT
Start: 2024-05-15 | End: 2024-05-15

## 2024-05-15 RX ORDER — ESCITALOPRAM OXALATE 5 MG/1
10 TABLET ORAL DAILY
Status: DISCONTINUED | OUTPATIENT
Start: 2024-05-15 | End: 2024-05-16 | Stop reason: HOSPADM

## 2024-05-15 RX ORDER — HYDROXYZINE HYDROCHLORIDE 25 MG/1
50 TABLET, FILM COATED ORAL EVERY 6 HOURS PRN
Status: DISCONTINUED | OUTPATIENT
Start: 2024-05-15 | End: 2024-05-15

## 2024-05-15 RX ORDER — BUPRENORPHINE 8 MG/1
8 TABLET SUBLINGUAL 3 TIMES DAILY
Status: DISCONTINUED | OUTPATIENT
Start: 2024-05-15 | End: 2024-05-16 | Stop reason: HOSPADM

## 2024-05-15 RX ORDER — BUSPIRONE HYDROCHLORIDE 10 MG/1
10 TABLET ORAL 2 TIMES DAILY
Status: DISCONTINUED | OUTPATIENT
Start: 2024-05-15 | End: 2024-05-16 | Stop reason: HOSPADM

## 2024-05-15 RX ORDER — MULTIVITAMIN WITH FOLIC ACID 400 MCG
1 TABLET ORAL DAILY
COMMUNITY

## 2024-05-15 RX ORDER — BUPRENORPHINE 8 MG/1
8 TABLET SUBLINGUAL 3 TIMES DAILY
Status: ON HOLD | COMMUNITY
Start: 2024-03-26 | End: 2024-06-10

## 2024-05-15 RX ORDER — IBUPROFEN 600 MG/1
600 TABLET, FILM COATED ORAL EVERY 8 HOURS PRN
Status: ON HOLD | COMMUNITY
Start: 2024-04-24 | End: 2024-06-10

## 2024-05-15 RX ORDER — ZOLPIDEM TARTRATE 10 MG/1
10 TABLET ORAL
Status: ON HOLD | COMMUNITY
Start: 2024-04-24 | End: 2024-06-10

## 2024-05-15 RX ORDER — LORAZEPAM 1 MG/1
1 TABLET ORAL EVERY 8 HOURS PRN
Status: DISCONTINUED | OUTPATIENT
Start: 2024-05-15 | End: 2024-05-16 | Stop reason: HOSPADM

## 2024-05-15 RX ORDER — OLANZAPINE 5 MG/1
10 TABLET, ORALLY DISINTEGRATING ORAL AT BEDTIME
Status: DISCONTINUED | OUTPATIENT
Start: 2024-05-15 | End: 2024-05-16 | Stop reason: HOSPADM

## 2024-05-15 RX ORDER — IBUPROFEN 600 MG/1
600 TABLET, FILM COATED ORAL EVERY 6 HOURS PRN
Status: DISCONTINUED | OUTPATIENT
Start: 2024-05-15 | End: 2024-05-16 | Stop reason: HOSPADM

## 2024-05-15 RX ADMIN — ZOLPIDEM TARTRATE 10 MG: 5 TABLET ORAL at 20:59

## 2024-05-15 RX ADMIN — OLANZAPINE 10 MG: 5 TABLET, ORALLY DISINTEGRATING ORAL at 20:59

## 2024-05-15 RX ADMIN — HALOPERIDOL LACTATE 5 MG: 5 INJECTION, SOLUTION INTRAMUSCULAR at 00:58

## 2024-05-15 RX ADMIN — BUPRENORPHINE 8 MG: 8 TABLET SUBLINGUAL at 13:20

## 2024-05-15 RX ADMIN — ACETAMINOPHEN 650 MG: 325 TABLET, FILM COATED ORAL at 13:04

## 2024-05-15 RX ADMIN — HYDROXYZINE HYDROCHLORIDE 50 MG: 25 TABLET, FILM COATED ORAL at 13:05

## 2024-05-15 RX ADMIN — BUPRENORPHINE 8 MG: 8 TABLET SUBLINGUAL at 20:58

## 2024-05-15 RX ADMIN — BUSPIRONE HYDROCHLORIDE 10 MG: 10 TABLET ORAL at 21:00

## 2024-05-15 RX ADMIN — OLANZAPINE 10 MG: 5 TABLET, ORALLY DISINTEGRATING ORAL at 00:31

## 2024-05-15 RX ADMIN — LORAZEPAM 1 MG: 1 TABLET ORAL at 08:09

## 2024-05-15 RX ADMIN — IBUPROFEN 600 MG: 600 TABLET, FILM COATED ORAL at 13:20

## 2024-05-15 RX ADMIN — GABAPENTIN 600 MG: 600 TABLET, FILM COATED ORAL at 13:05

## 2024-05-15 RX ADMIN — BUSPIRONE HYDROCHLORIDE 10 MG: 10 TABLET ORAL at 13:05

## 2024-05-15 RX ADMIN — ESCITALOPRAM 10 MG: 5 TABLET, FILM COATED ORAL at 13:05

## 2024-05-15 RX ADMIN — MIDAZOLAM 2 MG: 1 INJECTION INTRAMUSCULAR; INTRAVENOUS at 00:58

## 2024-05-15 RX ADMIN — GABAPENTIN 600 MG: 600 TABLET, FILM COATED ORAL at 21:00

## 2024-05-15 ASSESSMENT — COLUMBIA-SUICIDE SEVERITY RATING SCALE - C-SSRS
2. HAVE YOU ACTUALLY HAD ANY THOUGHTS OF KILLING YOURSELF IN THE PAST MONTH?: NO
6. HAVE YOU EVER DONE ANYTHING, STARTED TO DO ANYTHING, OR PREPARED TO DO ANYTHING TO END YOUR LIFE?: NO
1. IN THE PAST MONTH, HAVE YOU WISHED YOU WERE DEAD OR WISHED YOU COULD GO TO SLEEP AND NOT WAKE UP?: NO

## 2024-05-15 NOTE — ED NOTES
RN ED Mental Health Handoff Note    MATTEO    Does patient require 1:1? No    Hold and rights been given and documented for patient: Yes    Is the patient in BH scrubs? No -pt own clothes    Has the patient been searched? Yes    Is the 15 minute observation tool up to date? Yes    Was patient issued a welcome folder? Yes    Room check completed this shift: Yes    PSS3 and [place] Assessment/Reassessment this shift:    C-SSRS ([place])      Date and Time Q1 Wished to be Dead (Past Month) Q2 Suicidal Thoughts (Past Month) Q3 Suicidal Thought Method Q4 Suicidal Intent without Specific Plan Q5 Suicide Intent with Specific Plan Q6 Suicide Behavior (Lifetime) Within the Past 3 Months? Level of Risk per Screen Level of Risk per Screen User   05/15/24 0229 -- -- -- -- -- 0-->no -- -- -- [place]   05/15/24 0009 0-->no 0-->no -- -- -- 0-->no -- -- no risks indicated CRB            Behavioral status of patient: Green    Code 21 called this shift? No    Use of restraints/seclusion this shift? No    Most recent vital signs:  Temp: 99.7  F (37.6  C) Temp src: Oral BP: (!) 148/128 Pulse: 100   Resp: 30 SpO2: 93 % O2 Device: None (Room air)      Medications:  Scheduled medication compliance? N/A    PRN Meds administered this shift? No    Medications   OLANZapine zydis (zyPREXA) ODT tab 10 mg (10 mg Oral $Given 5/15/24 0031)   acetaminophen (TYLENOL) tablet 650 mg (has no administration in time range)   ibuprofen (ADVIL/MOTRIN) tablet 600 mg (has no administration in time range)   LORazepam (ATIVAN) tablet 1 mg (has no administration in time range)   OLANZapine zydis (zyPREXA) ODT tab 10 mg (has no administration in time range)   haloperidol lactate (HALDOL) injection 5 mg (5 mg Intramuscular $Given 5/15/24 0058)   midazolam (VERSED) injection 2 mg (2 mg Intramuscular $Given 5/15/24 0058)         ADLs    Meal Provided this shift? Yes    Hygiene items provided? Yes    ADLs completed? Yes    Date of last shower: PTA    Any significant  events this shift? No    Any information that would be helpful in caring for this patient?  Pt requests to keep the room open    Family present/updated? No    Location of patient's belongings: with pt    Critical Care Minutes:  Does the patient need critical care minutes documented? No

## 2024-05-15 NOTE — TELEPHONE ENCOUNTER
S: [facility] ED , DEC  Kiera  calling at 3:10 AM about a 33 year old/Female presenting with acute psychosis.      B: Pt arrived via EMS. Presenting problem, stressors: Pt is presenting with psychosis. Pt was found at a gas station exhibiting bizzare behaviors and delusions. The client reports feeling things burning her, increased anxiety, and AH/VH.     Pt affect in ED: Cooperative   Pt Dx: Major Depressive Disorder, Generalized Anxiety Disorder, ADHD, and Substance Use Disorder: opioid use (hx of opioid induced psychosis  Previous IPMH hx? No  Pt denies SI   Hx of suicide attempt? No  Pt denies SIB  Pt denies HI   Pt endorses auditory hallucinations , endorses visual hallucination , and endorses tactile hallucinations.   Pt RARS Score: 4    Hx of aggression/violence, sexual offenses, legal concerns, Epic care plan? describe: No  Current concerns for aggression this visit? No  Does pt have a history of Civil Commitment? No  Is Pt their own guardian? Yes    Pt is prescribed medication. Is patient medication compliant? Yes  Pt endorses OP services: Psychiatrist, Therapist, and affordable living apartment that has staff  CD concerns: Pt is in recovery with a hx of opioid use   Acute or chronic medical concerns: No  Does Pt present with specific needs, assistive devices, or exclusionary criteria? None      Pt is ambulatory  Pt is able to perform ADLs independently      A: Pt to be reviewed for [facility] admission. Pt is voluntary at this time, if status changes provider has confirmed that this Pt is holdable.   Preferred placement: [facility]    COVID Symptoms: No  If yes, COVID test required   Utox: Ordered, not yet collected   CMP: N/A  CBC: N/A  HCG: Ordered, not yet collected    R: Patient cleared and ready for behavioral bed placement: Yes  Pt placed on [facility] worklist? Yes    Does Patient need a Transfer Center request created? Yes, writer completed Transfer Center request at:  3:18 AM
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
## 2024-05-15 NOTE — TELEPHONE ENCOUNTER
R:  [place]  Access Inpatient Bed Call Log 5/14/2024  @9:00 AM:   Intake has called facilities that have not updated their bed status within the last 12 hours. ([place])  ADULTS:  [facility] is posting 0 beds.              [facility] is posting 0 beds. [phone]   [facility] is posting 0 beds. [phone]              [facility] is posting 0 beds. [phone].  [facility] is posting 0 beds. [phone]   [facility] (These are Young Adult beds, 18-28) is posting 1 bed. Negative COVID test required, no recent or significant aggression, violence, or sexual assault. [phone] Pt not appropriate d/t unit age restrictions.  [facility] is posting 0 beds. [phone]            [facility] is posting 0 beds. [phone]     [facility] through [facility] is posting 0 beds. [phone]       4:14 PM Paged Dr. Cartagena.     Pt remains on work list pending appropriate bed availability.

## 2024-05-15 NOTE — TELEPHONE ENCOUNTER
[place]: [place]  Access Inpatient Bed Call Log  5/15/2024 4:15PM  Intake has called facilities that have not updated their bed status within the last 12 hours.        ADULTS:    *[place]  [place] -- [facility]: @ cap per website.  [place] -- [facility]:  @ cap per website.   [place] -- [facility]: @ Cap per website.  [place] -- [facility]: @ cap per website.   [place] -- [facility]: @ Cap per website.  [facility] -- [facility]: @ cap per website.   [place] -- [facility] beds Posting 2 Beds Ages 18-28, Voluntary only, COVID test req'd, No aggression, physical or sexual assault, violence hx or drug abuse, or psychosis.    [place] -- [facility]: @ Cap per website.  [place] -- [facility]: @ cap per website.  [place] -- [facility]:  @ cap per website.. Low acuity.    5:44 PM Paged Resident Damian.  6:49 PM Writer received call from resident Damian that she received page and is currently reviewing.  7:21 PM Patient accepted via Teams to unit 20/[facility] by Damian.  7:29 PM Per call to unit 20 patient will admit on nights. Intake provided [facility] ED # for report.  7:33 PM [facility] ED notified.    Pt remains on waitlist pending appropriate placement availability.

## 2024-05-15 NOTE — PROGRESS NOTES
IP MH Referral Acuity Rating Score (RARS)    LMHP complete at referral to IP MH, with DEC; and, daily while awaiting IP MH placement. Call score to PPS.  CRITERIA SCORING   New 72 HH and Involuntary for IP MH (not adolescent) 1/1   Boarding over 24 hours 0/1   Vulnerable adult at least 55+ with multiple co morbidities; or, Patient age 11 or under 0/1   Suicide ideation without relief of precipitating factors 0/1   Current plan for suicide 0/1   Current plan for homicide 0/1   Imminent risk or actual attempt to seriously harm another without relief of factors precipitating the attempt 0/1   Severe dysfunction in daily living (ex: complete neglect for self care, extreme disruption in vegetative function, extreme deterioration in social interactions) 1/1   Recent (last 2 weeks) or current physical aggression in the ED 0/1   Restraints or seclusion episode in ED 0/1   Verbal aggression, agitation, yelling, etc., while in the ED 1/1   Active psychosis with psychomotor agitation or catatonia 1/1   Need for constant or near constant redirection (from leaving, from others, etc).  0/1   Intrusive or disruptive behaviors 0/1   TOTAL Acuity Total Score: 4

## 2024-05-15 NOTE — PHARMACY-ADMISSION MEDICATION HISTORY
Pharmacist Admission Medication History    Admission medication history is complete. The information provided in this note is only as accurate as the sources available at the time of the update.    Information Source(s): Patient and CareEverywhere/SureScripts via in-person    Pertinent Information:     Interferon Beta-1b: pt reports she still takes every other day but based on refill hx has not been filled since Jan 2024.     Changes made to PTA medication list:  Added: Subutex, zolpidem   Deleted: APAP, Excedrin migraine, Vitamin D, Folic acid, Miralax, Suboxone   Changed: Adderall dosing    Medication History Completed By:   Samaria Rodriguez, PharmD, [facility]   Emergency Medicine Pharmacist  [phone] or Eliz  May 15, 2024    PTA Med List   Medication Sig Last Dose    amphetamine-dextroamphetamine (ADDERALL XR) 25 MG 24 hr capsule Take 50 mg by mouth every morning 5/14/2024 at am    amphetamine-dextroamphetamine (ADDERALL) 20 MG tablet Take 40 mg by mouth daily at 2 pm 5/14/2024 at -    buprenorphine (SUBUTEX) 8 MG SUBL sublingual tablet Place 8 mg under the tongue 3 times daily 5/14/2024 at -    busPIRone (BUSPAR) 10 MG tablet Take 10 mg by mouth 2 times daily 5/14/2024 at -    docusate sodium (COLACE) 100 MG capsule Take 100 mg by mouth daily 5/14/2024 at am    escitalopram (LEXAPRO) 10 MG tablet Take 10 mg by mouth daily  5/14/2024 at am    FEROSUL 325 (65 Fe) MG tablet Take 325 mg by mouth daily 5/14/2024 at am    gabapentin (NEURONTIN) 300 MG capsule Take 600 mg by mouth 3 times daily 5/14/2024 at -    hydrOXYzine (ATARAX) 50 MG tablet Take 50 mg by mouth 4 times daily as needed for anxiety 5/14/2024 at -    ibuprofen (ADVIL/MOTRIN) 600 MG tablet Take 600 mg by mouth every 8 hours as needed for mild pain Past Week at -    interferon beta-1b (BETASERON) 0.3 MG injection Inject 300 mcg (0.3 mg) Subcutaneous every other day Inject 0.3 mg subcutaneous every other day Past Week at -    LORazepam (ATIVAN) 1 MG  tablet Take 1 mg by mouth 2 times daily as needed for anxiety Past Week at -    melatonin 5 MG tablet Take 5 mg by mouth at bedtime Past Week at -    Multiple Vitamin (TAB-A-AMBROSE) TABS Take 1 tablet by mouth daily 5/14/2024 at -    zolpidem (AMBIEN) 10 MG tablet Take 10 mg by mouth nightly as needed for sleep prn at prn

## 2024-05-15 NOTE — ED NOTES
"VSS. Patient ambulated to bathroom independently. Femine pad, tooth brush/paste and washcloth provided per pt request. Pt verbalizes anxiety. Pt states, \"I'm safe right? I need to be here right? I don't want any visitors. Can you stay here with me?\" PRN medication offered, Pt tolerated 1mg PO ativan. Pt requesting home medications. Med rec pending. Utox/HCG sent. Breakfast and water provided. Pt resting in bed after PRN medication given.   "
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
## 2024-05-15 NOTE — PROGRESS NOTES
"Triage & Transition Services, Extended Care     Therapy Progress Note    Patient: Irma goes by \"Irma,\" uses she/her pronouns  Date of Service: May 15, 2024  Site of Service: [facility] EMERGENCY DEPT                             ED06  Patient was seen yes  Mode of Assessment: Virtual: AmWell    Presentation Summary: Pt is seen by extended care for therapeutic check-in and reassessment. Exchanged greeting, introduced self and role. Pt presents as anxious, in distress, with continued delusional paranoia. She does not currently seem to be experiencing internal stimuli. She denies SI or HI. Pt believes that someone is out to get her and trying to kill her. She believes someone has taken control of her phone and has planted some type of devices around her home. She is unable to describe what type of devices these are. She became extremely labile when talking about this. She kept talking about not wanting to go back to her apartment and not wanting anything to do with [facility]. She reports she has been compliant with her meds. Pt is agreeable to continue with  behavioral health. She became fearful and frantic when talking about having to leave [facility] and transition to a different hospital. Writer provided patient with support of being safe. Pt indicates she will let staff know if she is feeling unsafe or has thoughts for self harm.    Therapeutic Intervention(s) Provided: Engaged in cognitive restructuring/ reframing, looked at common cognitive distortions and challenged negative thoughts., Engaged in guided discovery, explored patient's perspectives and helped expand them through socratic dialogue.    Current Symptoms: anxious sense of doom, crying or feels like crying, impaired decision making sweating, flushing, shaking, shortness of breath or racing heart, excessive worry, racing thoughts, anxious impulsive, agitation, distractability, high risk behavior      Mental Status Exam "   Affect: Blunted  Appearance: Appropriate  Attention Span/Concentration: Attentive  Eye Contact: Engaged    Fund of Knowledge: Delayed   Language /Speech Content: Fluent  Language /Speech Volume: Normal  Language /Speech Rate/Productions: Normal  Recent Memory: Intact  Remote Memory: Intact  Mood: Normal  Orientation to Person: Yes   Orientation to Place: Yes  Orientation to Time of Day: Yes  Orientation to Date: Yes     Situation (Do they understand why they are here?): Yes  Psychomotor Behavior: Normal  Thought Content: Hallucinations, Delusions, Paranoia  Thought Form: Intact    Treatment Objective(s) Addressed: rapport building, processing feelings, assessing safety, identifying treatment goals    Patient Response to Interventions: acceptance expressed, verbalizes understanding    Progress Towards Goals: Patient Reports Symptoms Are: ongoing  Patient Progress Toward Goals: is not making progress  Comment: Pt continues to exhibit increased symptoms of delusional paranoia and feeling unsafe. Pt continues to think somone is trying to kill her.    Next Step to Work Toward Discharge: symptom stabilization  Symptom Stabilization Comment: Pt demonstrates impairied ability to insight/judgement and is unable to safely care for herself in the community.    Case Management:      Plan: inpatient mental health  yes provider, RN Continue with inpatient disposition  yes    Clinical Substantiation: Pt continues to present with increased psychosis of auditory and visual hallucinations with delusional paranoia. She is experiencing mental health distress. Exhibits impairment to insight/judgement and inability to care for herself in the community. After therapeutic assessment, intervention and aftercare planning by ED care team and LM and in consultation with the attending provider, the patient's circumstances and mental state were appropriate for inpatient behavioral health. Patient is recommended by this clinician to admit IP   for safety and stabilization.    Legal Status: Legal Status at Admission: 72 Hour Hold  72 Hour Hold - Date/Time Initiated: 5/15/2024  12:10pm  72 Hour Hold - Date/Time Ends: 5/19/24   12:10pm    Session Status: Time session started: 1336  Time session ended: 1355  Session Duration (minutes): 19 minutes  Session Number: 1  Anticipated number of sessions or this episode of care: 4    Time Spent: 19 minutes    CPT Code: CPT Codes: 67013 - Psychotherapy (with patient) - 30 (16-37*) min    Diagnosis:   Patient Active Problem List   Diagnosis Code    Abnormal liver function test R79.89    Acute cholangitis (H28) K83.09    Anxiety disorder F41.9    Arnold-Chiari malformation (H) Q07.00    Calculus of bile duct without obstruction K80.50    Depression F32.A    Multiple sclerosis (H) G35    Syringomyelia (H) G95.0    Tobacco use disorder F17.200    Schizophrenia spectrum disorder with psychotic disorder type not yet determined (H) F29       Primary Problem This Admission: Active Hospital Problems    *Schizophrenia spectrum disorder with psychotic disorder type not yet determined (H)    F29.0 Schizophrenia Spectrum Disorder      James Serrano, [facility]   Licensed Mental Health Professional (LMHP), [facility]  [phone]

## 2024-05-15 NOTE — PLAN OF CARE
"Irma Heller  May 15, 2024  Plan of Care Hand-off Note     Patient Care Path: inpatient mental health    Plan for Care:   Patient presents with psychosis; auditory, visual, and tactile hallucinations as well as paranoid delusions. She reports treatment adherence and denies substance abuse. Patient is on a hold. She would benefit from inpatient level of care for medication management, safety, and stabilization.    Identified Goals and Safety Issues:  Psychosis    Overview: Patient left her apartment tonight because she thought it was bugged. Things were out of place and she believes someone had been in the apartment while she was gone. She saw small pieces of paper \"all over\". Patient saw small pieces of thread or hairs that were moving on their own. She worried that everything was going to hurt her. At the gas station, patient told employees she felt gas.  On admission to the ED, patient said the nurses hair tie was burning her. She complained of \"vibrations\" and \"energy\" that hurt her. Patient denies thoughts of harm to self, others, or property. She denied hallucinations and lacks insight into her paranoid delusions. Patient denied use of alcohol, marijuana, or illicit drugs.        Legal Status: Legal Status at Admission: 72 Hour Hold    Psychiatry Consult: Yes. In November 2023, patient was diagnosed as having Opioid Dependence with opioid-induced psychosis. She does not appear to be experiencing severe depression with psychosis, nor is she likely to be experiencing new onset of Schizophrenia at age 33. Urine drug screen has not yet resulted at the time of this report.        Updated Dr. Armas regarding plan of care.           Kiera Gallegos LP        "

## 2024-05-15 NOTE — ED NOTES
Patient okay with getting IM injection after inspecting medication bottle. Patient refused bandaid. Patient continues to say that many materials/objects in and around room as well as RN pen are hurting and burning her. Patient requesting something to help keep her mind off of the anxiety, patient refused TV. Given pencil and paper sodoku, word find and maze puzzles.

## 2024-05-15 NOTE — ED NOTES
Patient out of room, insisting that room is being pumped with gas, that the metal in the room and on the door are burning and hurting her. Patient adamant that she must stand on a certain colored square tile in the hallway. MD came to assess.

## 2024-05-16 ENCOUNTER — HOSPITAL ENCOUNTER (INPATIENT)
Facility: CLINIC | Age: 34
LOS: 26 days | Discharge: NURSING, REHAB OR LONG-TERM CARE | End: 2024-06-11
Attending: PSYCHIATRY & NEUROLOGY | Admitting: PSYCHIATRY & NEUROLOGY
Payer: COMMERCIAL

## 2024-05-16 ENCOUNTER — TELEPHONE (OUTPATIENT)
Dept: BEHAVIORAL HEALTH | Facility: CLINIC | Age: 34
End: 2024-05-16
Payer: COMMERCIAL

## 2024-05-16 VITALS
OXYGEN SATURATION: 97 % | SYSTOLIC BLOOD PRESSURE: 106 MMHG | HEART RATE: 94 BPM | DIASTOLIC BLOOD PRESSURE: 64 MMHG | RESPIRATION RATE: 18 BRPM | TEMPERATURE: 99.7 F

## 2024-05-16 DIAGNOSIS — F17.200 TOBACCO USE DISORDER: ICD-10-CM

## 2024-05-16 DIAGNOSIS — G35 MULTIPLE SCLEROSIS (H): ICD-10-CM

## 2024-05-16 DIAGNOSIS — F41.9 ANXIETY DISORDER, UNSPECIFIED TYPE: ICD-10-CM

## 2024-05-16 DIAGNOSIS — K59.00 CONSTIPATION, UNSPECIFIED CONSTIPATION TYPE: ICD-10-CM

## 2024-05-16 DIAGNOSIS — G47.9 SLEEP DISORDER: Primary | ICD-10-CM

## 2024-05-16 DIAGNOSIS — Z71.6 ENCOUNTER FOR TOBACCO USE CESSATION COUNSELING: ICD-10-CM

## 2024-05-16 DIAGNOSIS — F32.A DEPRESSION, UNSPECIFIED DEPRESSION TYPE: ICD-10-CM

## 2024-05-16 DIAGNOSIS — G95.0 SYRINGOMYELIA (H): ICD-10-CM

## 2024-05-16 DIAGNOSIS — F25.0 SCHIZOAFFECTIVE DISORDER, BIPOLAR TYPE (H): ICD-10-CM

## 2024-05-16 DIAGNOSIS — G89.29 OTHER CHRONIC PAIN: ICD-10-CM

## 2024-05-16 DIAGNOSIS — K83.09 ACUTE CHOLANGITIS (H): ICD-10-CM

## 2024-05-16 DIAGNOSIS — G47.00 INSOMNIA, UNSPECIFIED TYPE: ICD-10-CM

## 2024-05-16 DIAGNOSIS — F11.90 OPIOID USE DISORDER: ICD-10-CM

## 2024-05-16 DIAGNOSIS — Q07.00 ARNOLD-CHIARI MALFORMATION (H): ICD-10-CM

## 2024-05-16 DIAGNOSIS — F29 SCHIZOPHRENIA SPECTRUM DISORDER WITH PSYCHOTIC DISORDER TYPE NOT YET DETERMINED (H): ICD-10-CM

## 2024-05-16 DIAGNOSIS — R79.89 ABNORMAL LIVER FUNCTION TEST: ICD-10-CM

## 2024-05-16 LAB
ACANTHOCYTES BLD QL SMEAR: NORMAL
ATRIAL RATE - MUSE: 93 BPM
AUER BODIES BLD QL SMEAR: NORMAL
BASO STIPL BLD QL SMEAR: NORMAL
BASOPHILS # BLD AUTO: 0.2 10E3/UL (ref 0–0.2)
BASOPHILS NFR BLD AUTO: 1 %
BITE CELLS BLD QL SMEAR: NORMAL
BLISTER CELLS BLD QL SMEAR: NORMAL
BURR CELLS BLD QL SMEAR: NORMAL
DACRYOCYTES BLD QL SMEAR: NORMAL
DIASTOLIC BLOOD PRESSURE - MUSE: NORMAL MMHG
ELLIPTOCYTES BLD QL SMEAR: NORMAL
EOSINOPHIL # BLD AUTO: 0.5 10E3/UL (ref 0–0.7)
EOSINOPHIL NFR BLD AUTO: 4 %
ERYTHROCYTE [DISTWIDTH] IN BLOOD BY AUTOMATED COUNT: 13 % (ref 10–15)
FOLATE SERPL-MCNC: 30.7 NG/ML (ref 4.6–34.8)
FRAGMENTS BLD QL SMEAR: NORMAL
HBA1C MFR BLD: 7 %
HCT VFR BLD AUTO: 41.3 % (ref 35–47)
HGB BLD-MCNC: 13.3 G/DL (ref 11.7–15.7)
HGB C CRYSTALS: NORMAL
HOWELL-JOLLY BOD BLD QL SMEAR: NORMAL
IMM GRANULOCYTES # BLD: 0 10E3/UL
IMM GRANULOCYTES NFR BLD: 0 %
INTERPRETATION ECG - MUSE: NORMAL
LYMPHOCYTES # BLD AUTO: 5 10E3/UL (ref 0.8–5.3)
LYMPHOCYTES NFR BLD AUTO: 39 %
MCH RBC QN AUTO: 27.4 PG (ref 26.5–33)
MCHC RBC AUTO-ENTMCNC: 32.2 G/DL (ref 31.5–36.5)
MCV RBC AUTO: 85 FL (ref 78–100)
MONOCYTES # BLD AUTO: 0.9 10E3/UL (ref 0–1.3)
MONOCYTES NFR BLD AUTO: 7 %
NEUTROPHILS # BLD AUTO: 6.4 10E3/UL (ref 1.6–8.3)
NEUTROPHILS NFR BLD AUTO: 49 %
NEUTS HYPERSEG BLD QL SMEAR: NORMAL
NRBC # BLD AUTO: 0 10E3/UL
NRBC BLD AUTO-RTO: 0 /100
P AXIS - MUSE: 57 DEGREES
PLAT MORPH BLD: NORMAL
PLATELET # BLD AUTO: 415 10E3/UL (ref 150–450)
POLYCHROMASIA BLD QL SMEAR: NORMAL
PR INTERVAL - MUSE: 164 MS
QRS DURATION - MUSE: 98 MS
QT - MUSE: 362 MS
QTC - MUSE: 450 MS
R AXIS - MUSE: 37 DEGREES
RBC # BLD AUTO: 4.85 10E6/UL (ref 3.8–5.2)
RBC AGGLUT BLD QL: NORMAL
RBC MORPH BLD: NORMAL
ROULEAUX BLD QL SMEAR: NORMAL
SICKLE CELLS BLD QL SMEAR: NORMAL
SMUDGE CELLS BLD QL SMEAR: NORMAL
SPHEROCYTES BLD QL SMEAR: NORMAL
STOMATOCYTES BLD QL SMEAR: NORMAL
SYSTOLIC BLOOD PRESSURE - MUSE: NORMAL MMHG
T AXIS - MUSE: 38 DEGREES
TARGETS BLD QL SMEAR: NORMAL
TOXIC GRANULES BLD QL SMEAR: NORMAL
TSH SERPL DL<=0.005 MIU/L-ACNC: 0.65 UIU/ML (ref 0.3–4.2)
VARIANT LYMPHS BLD QL SMEAR: NORMAL
VENTRICULAR RATE- MUSE: 93 BPM
VIT B12 SERPL-MCNC: 928 PG/ML (ref 232–1245)
VIT D+METAB SERPL-MCNC: 27 NG/ML (ref 20–50)
WBC # BLD AUTO: 13 10E3/UL (ref 4–11)

## 2024-05-16 PROCEDURE — 85025 COMPLETE CBC W/AUTO DIFF WBC: CPT

## 2024-05-16 PROCEDURE — 82306 VITAMIN D 25 HYDROXY: CPT

## 2024-05-16 PROCEDURE — 250N000013 HC RX MED GY IP 250 OP 250 PS 637

## 2024-05-16 PROCEDURE — 93005 ELECTROCARDIOGRAM TRACING: CPT

## 2024-05-16 PROCEDURE — 82746 ASSAY OF FOLIC ACID SERUM: CPT

## 2024-05-16 PROCEDURE — 83036 HEMOGLOBIN GLYCOSYLATED A1C: CPT

## 2024-05-16 PROCEDURE — 82607 VITAMIN B-12: CPT

## 2024-05-16 PROCEDURE — 124N000002 HC R&B MH UMMC

## 2024-05-16 PROCEDURE — 93010 ELECTROCARDIOGRAM REPORT: CPT | Performed by: INTERNAL MEDICINE

## 2024-05-16 PROCEDURE — 250N000013 HC RX MED GY IP 250 OP 250 PS 637: Performed by: EMERGENCY MEDICINE

## 2024-05-16 PROCEDURE — 84443 ASSAY THYROID STIM HORMONE: CPT

## 2024-05-16 PROCEDURE — 99223 1ST HOSP IP/OBS HIGH 75: CPT | Mod: AI | Performed by: PSYCHIATRY & NEUROLOGY

## 2024-05-16 PROCEDURE — 36415 COLL VENOUS BLD VENIPUNCTURE: CPT

## 2024-05-16 RX ORDER — BUSPIRONE HYDROCHLORIDE 10 MG/1
10 TABLET ORAL 2 TIMES DAILY
Status: DISCONTINUED | OUTPATIENT
Start: 2024-05-16 | End: 2024-06-11 | Stop reason: HOSPADM

## 2024-05-16 RX ORDER — LANOLIN ALCOHOL/MO/W.PET/CERES
3 CREAM (GRAM) TOPICAL
Status: DISCONTINUED | OUTPATIENT
Start: 2024-05-16 | End: 2024-05-19

## 2024-05-16 RX ORDER — SUMATRIPTAN 25 MG/1
50 TABLET, FILM COATED ORAL
Status: DISCONTINUED | OUTPATIENT
Start: 2024-05-16 | End: 2024-06-11 | Stop reason: HOSPADM

## 2024-05-16 RX ORDER — DOCUSATE SODIUM 100 MG/1
100 CAPSULE, LIQUID FILLED ORAL DAILY
Status: DISCONTINUED | OUTPATIENT
Start: 2024-05-16 | End: 2024-05-23

## 2024-05-16 RX ORDER — NALOXONE HYDROCHLORIDE 0.4 MG/ML
0.2 INJECTION, SOLUTION INTRAMUSCULAR; INTRAVENOUS; SUBCUTANEOUS
Status: DISCONTINUED | OUTPATIENT
Start: 2024-05-16 | End: 2024-06-11 | Stop reason: HOSPADM

## 2024-05-16 RX ORDER — OLANZAPINE 10 MG/1
10 TABLET, ORALLY DISINTEGRATING ORAL AT BEDTIME
Status: DISCONTINUED | OUTPATIENT
Start: 2024-05-16 | End: 2024-05-17

## 2024-05-16 RX ORDER — DEXTROAMPHETAMINE SACCHARATE, AMPHETAMINE ASPARTATE, DEXTROAMPHETAMINE SULFATE AND AMPHETAMINE SULFATE 2.5; 2.5; 2.5; 2.5 MG/1; MG/1; MG/1; MG/1
40 TABLET ORAL DAILY
Status: DISCONTINUED | OUTPATIENT
Start: 2024-05-16 | End: 2024-06-03

## 2024-05-16 RX ORDER — LORAZEPAM 1 MG/1
1 TABLET ORAL 2 TIMES DAILY PRN
Status: DISCONTINUED | OUTPATIENT
Start: 2024-05-16 | End: 2024-06-11 | Stop reason: HOSPADM

## 2024-05-16 RX ORDER — MAGNESIUM HYDROXIDE/ALUMINUM HYDROXICE/SIMETHICONE 120; 1200; 1200 MG/30ML; MG/30ML; MG/30ML
30 SUSPENSION ORAL EVERY 4 HOURS PRN
Status: DISCONTINUED | OUTPATIENT
Start: 2024-05-16 | End: 2024-06-11 | Stop reason: HOSPADM

## 2024-05-16 RX ORDER — GABAPENTIN 300 MG/1
600 CAPSULE ORAL 3 TIMES DAILY
Status: DISCONTINUED | OUTPATIENT
Start: 2024-05-16 | End: 2024-06-11 | Stop reason: HOSPADM

## 2024-05-16 RX ORDER — OLANZAPINE 10 MG/1
10 TABLET ORAL 3 TIMES DAILY PRN
Status: DISCONTINUED | OUTPATIENT
Start: 2024-05-16 | End: 2024-05-17

## 2024-05-16 RX ORDER — NALOXONE HYDROCHLORIDE 0.4 MG/ML
0.4 INJECTION, SOLUTION INTRAMUSCULAR; INTRAVENOUS; SUBCUTANEOUS
Status: DISCONTINUED | OUTPATIENT
Start: 2024-05-16 | End: 2024-06-11 | Stop reason: HOSPADM

## 2024-05-16 RX ORDER — HYDROXYZINE HYDROCHLORIDE 25 MG/1
25 TABLET, FILM COATED ORAL EVERY 4 HOURS PRN
Status: DISCONTINUED | OUTPATIENT
Start: 2024-05-16 | End: 2024-05-23

## 2024-05-16 RX ORDER — IBUPROFEN 600 MG/1
600 TABLET, FILM COATED ORAL EVERY 8 HOURS PRN
Status: DISCONTINUED | OUTPATIENT
Start: 2024-05-16 | End: 2024-06-11 | Stop reason: HOSPADM

## 2024-05-16 RX ORDER — DEXTROAMPHETAMINE SACCHARATE, AMPHETAMINE ASPARTATE MONOHYDRATE, DEXTROAMPHETAMINE SULFATE AND AMPHETAMINE SULFATE 6.25; 6.25; 6.25; 6.25 MG/1; MG/1; MG/1; MG/1
50 CAPSULE, EXTENDED RELEASE ORAL EVERY MORNING
Status: CANCELLED | OUTPATIENT
Start: 2024-05-16

## 2024-05-16 RX ORDER — ACETAMINOPHEN 325 MG/1
650 TABLET ORAL EVERY 4 HOURS PRN
Status: DISCONTINUED | OUTPATIENT
Start: 2024-05-16 | End: 2024-06-11 | Stop reason: HOSPADM

## 2024-05-16 RX ORDER — BUPRENORPHINE 8 MG/1
8 TABLET SUBLINGUAL 3 TIMES DAILY
Status: DISCONTINUED | OUTPATIENT
Start: 2024-05-16 | End: 2024-05-20

## 2024-05-16 RX ORDER — DEXTROAMPHETAMINE SACCHARATE, AMPHETAMINE ASPARTATE, DEXTROAMPHETAMINE SULFATE AND AMPHETAMINE SULFATE 5; 5; 5; 5 MG/1; MG/1; MG/1; MG/1
40 TABLET ORAL DAILY
Status: CANCELLED | OUTPATIENT
Start: 2024-05-16

## 2024-05-16 RX ORDER — ZOLPIDEM TARTRATE 5 MG/1
10 TABLET ORAL
Status: DISCONTINUED | OUTPATIENT
Start: 2024-05-16 | End: 2024-05-20

## 2024-05-16 RX ORDER — ESCITALOPRAM OXALATE 10 MG/1
10 TABLET ORAL DAILY
Status: DISCONTINUED | OUTPATIENT
Start: 2024-05-16 | End: 2024-06-11 | Stop reason: HOSPADM

## 2024-05-16 RX ORDER — OLANZAPINE 10 MG/2ML
10 INJECTION, POWDER, FOR SOLUTION INTRAMUSCULAR 3 TIMES DAILY PRN
Status: DISCONTINUED | OUTPATIENT
Start: 2024-05-16 | End: 2024-05-17

## 2024-05-16 RX ORDER — DOCUSATE SODIUM 100 MG/1
100 CAPSULE, LIQUID FILLED ORAL DAILY
Status: CANCELLED | OUTPATIENT
Start: 2024-05-16

## 2024-05-16 RX ORDER — POLYETHYLENE GLYCOL 3350 17 G/17G
17 POWDER, FOR SOLUTION ORAL DAILY PRN
Status: DISCONTINUED | OUTPATIENT
Start: 2024-05-16 | End: 2024-06-11 | Stop reason: HOSPADM

## 2024-05-16 RX ADMIN — HYDROXYZINE HYDROCHLORIDE 25 MG: 25 TABLET ORAL at 17:30

## 2024-05-16 RX ADMIN — IBUPROFEN 600 MG: 600 TABLET ORAL at 17:29

## 2024-05-16 RX ADMIN — ZOLPIDEM TARTRATE 10 MG: 5 TABLET, COATED ORAL at 19:45

## 2024-05-16 RX ADMIN — BUPRENORPHINE 8 MG: 8 TABLET SUBLINGUAL at 19:32

## 2024-05-16 RX ADMIN — LORAZEPAM 1 MG: 1 TABLET ORAL at 11:45

## 2024-05-16 RX ADMIN — OLANZAPINE 10 MG: 10 TABLET, ORALLY DISINTEGRATING ORAL at 21:05

## 2024-05-16 RX ADMIN — HYDROXYZINE HYDROCHLORIDE 25 MG: 25 TABLET ORAL at 11:45

## 2024-05-16 RX ADMIN — DOCUSATE SODIUM 100 MG: 100 CAPSULE, LIQUID FILLED ORAL at 10:16

## 2024-05-16 RX ADMIN — GABAPENTIN 600 MG: 300 CAPSULE ORAL at 19:33

## 2024-05-16 RX ADMIN — BUPRENORPHINE 8 MG: 8 TABLET SUBLINGUAL at 15:10

## 2024-05-16 RX ADMIN — BUSPIRONE HYDROCHLORIDE 10 MG: 10 TABLET ORAL at 10:16

## 2024-05-16 RX ADMIN — GABAPENTIN 600 MG: 300 CAPSULE ORAL at 10:15

## 2024-05-16 RX ADMIN — BUPRENORPHINE 8 MG: 8 TABLET SUBLINGUAL at 10:15

## 2024-05-16 RX ADMIN — LORAZEPAM 1 MG: 1 TABLET ORAL at 03:46

## 2024-05-16 RX ADMIN — GABAPENTIN 600 MG: 300 CAPSULE ORAL at 15:10

## 2024-05-16 RX ADMIN — ESCITALOPRAM OXALATE 10 MG: 10 TABLET ORAL at 10:16

## 2024-05-16 RX ADMIN — BUSPIRONE HYDROCHLORIDE 10 MG: 10 TABLET ORAL at 19:34

## 2024-05-16 ASSESSMENT — ACTIVITIES OF DAILY LIVING (ADL)
ORAL_HYGIENE: INDEPENDENT;PROMPTS
ADLS_ACUITY_SCORE: 28
ORAL_HYGIENE: INDEPENDENT
ADLS_ACUITY_SCORE: 38
ADLS_ACUITY_SCORE: 28
ADLS_ACUITY_SCORE: 28
CONCENTRATING,_REMEMBERING_OR_MAKING_DECISIONS_DIFFICULTY: NO
ADLS_ACUITY_SCORE: 28
HYGIENE/GROOMING: INDEPENDENT
ADLS_ACUITY_SCORE: 35
DIFFICULTY_COMMUNICATING: NO
WALKING_OR_CLIMBING_STAIRS_DIFFICULTY: NO
ADLS_ACUITY_SCORE: 28
CHANGE_IN_FUNCTIONAL_STATUS_SINCE_ONSET_OF_CURRENT_ILLNESS/INJURY: NO
ADLS_ACUITY_SCORE: 35
ADLS_ACUITY_SCORE: 38
DOING_ERRANDS_INDEPENDENTLY_DIFFICULTY: NO
ADLS_ACUITY_SCORE: 35
HEARING_DIFFICULTY_OR_DEAF: NO
ADLS_ACUITY_SCORE: 38
DRESSING/BATHING_DIFFICULTY: NO
ADLS_ACUITY_SCORE: 28
ADLS_ACUITY_SCORE: 28
TOILETING_ISSUES: NO
DIFFICULTY_EATING/SWALLOWING: NO
ADLS_ACUITY_SCORE: 43
ADLS_ACUITY_SCORE: 28
ADLS_ACUITY_SCORE: 28
LAUNDRY: UNABLE TO COMPLETE
LAUNDRY: WITH SUPERVISION
ADLS_ACUITY_SCORE: 35
ADLS_ACUITY_SCORE: 38
ADLS_ACUITY_SCORE: 28
DRESS: SCRUBS (BEHAVIORAL HEALTH);INDEPENDENT;PROMPTS
FALL_HISTORY_WITHIN_LAST_SIX_MONTHS: NO
HYGIENE/GROOMING: HANDWASHING;SHOWER;INDEPENDENT
DRESS: INDEPENDENT
ADLS_ACUITY_SCORE: 38
ADLS_ACUITY_SCORE: 38

## 2024-05-16 NOTE — ED NOTES
Informed patient that she would be transferring to [facility] for inpatient care. Patient was anxious and tearful about leaving. Writer provided anticipatory guidance and offered patient PRN medication administration prior to transporting over there. Patient continues to be anxious but is understanding and cooperative.

## 2024-05-16 NOTE — TELEPHONE ENCOUNTER
2:12 AM Intake called [facility] and spoke with RN, Leon. Per RN, they are waiting for transportation to bring the pt. Their is currently no unit or staffing barriers to admitting this pt.

## 2024-05-16 NOTE — PROVIDER NOTIFICATION
05/16/24 0630   Patient Belongings   Did you bring any home meds/supplements to the hospital?  No   Patient Belongings locker   Patient Belongings Put in Hospital Secure Location (Security or Locker, etc.) clothing   Belongings Search Yes   Clothing Search Yes   Second Staff Erika RN       LOCKER  One pair of purple sandals  Grey T- Shirt  [place] underwear  Black inner wear (boxer)  Black hair cover   Three pieces of sanitary pads  A silver neck pain with a cross    Added to Locker 5/31:  (6) pairs of underwear  (1) black t shirt  (1) pair of red shorts with string    Brought in by staff 6/7:  -Cell phone  -Wallet containing: [place], [employer] card, [employer] card, [employer] insurance card; Items from wallet sent to security: [employer] gift cards x2, Mastercard ending in x2121, Visa debit card ending in x6921, Visa gift card ending in x7390, Venmo mastercard ending in x1343, Visa ending in x7739, EBT card ending in x5282, $21 cash    A               Admission:  I am responsible for any personal items that are not sent to the safe or pharmacy.  [facility] is not responsible for loss, theft or damage of any property in my possession.    Signature:  _________________________________ Date: _______  Time: _____                                              Staff Signature:  ____________________________ Date: ________  Time: _____      2nd Staff person, if patient is unable/unwilling to sign:    Signature: ________________________________ Date: ________  Time: _____         Discharge:  [facility] has returned all of my personal belongings:    Signature: _________________________________ Date: ________  Time: _____                                          Staff Signature:  ____________________________ Date: ________  Time: _____

## 2024-05-16 NOTE — PLAN OF CARE
BEH IP Unit Acuity Rating Score (UARS)  Patient is given one point for every criteria they meet.    CRITERIA SCORING   On a 72 hour hold, court hold, committed, stay of commitment, or revocation. 1    Patient LOS on BEH unit exceeds 20 days. 0  LOS: 0   Patient under guardianship, 55+, otherwise medically complex, or under age 11. 0   Suicide ideation without relief of precipitating factors. 0   Current plan for suicide. 0   Current plan for homicide. 0   Imminent risk or actual attempt to seriously harm another without relief of factors precipitating the attempt. 0   Severe dysfunction in daily living (ex: complete neglect for self care, extreme disruption in vegetative function, extreme deterioration in social interactions). 1   Recent (last 7 days) or current physical aggression in the ED or on unit. 0   Restraints or seclusion episode in past 72 hours. 0   Recent (last 7 days) or current verbal aggression, agitation, yelling, etc., while in the ED or unit. 1   Active psychosis. 1   Need for constant or near constant redirection (from leaving, from others, etc).  0   Intrusive or disruptive behaviors. 0   Patient requires 3 or more hours of individualized nursing care per 8-hour shift (i.e. for ADLs, meds, therapeutic interventions). 0   TOTAL 4

## 2024-05-16 NOTE — PROVIDER NOTIFICATION
05/16/24 1131   Individualization/Patient Specific Goals   Patient Personal Strengths expressive of emotions;expressive of needs;independent living skills   Patient Vulnerabilities lacks insight into illness;limited support system;poor physical health   Interprofessional Rounds   Summary New patient. Admitted due to concern for psychosis. 72HH was started in the ED. Pt agreed to sign in voluntarily today.   Participants nursing;psychiatrist;CTC;other (see comments)   Behavioral Team Discussion   Participants Dr. Mitzi MD; Nena CONTI; Sarahi CARPENTER [facility]; medical resident, medical students   Progress N/A - new admit   Anticipated length of stay 3-5 days   Continued Stay Criteria/Rationale Symptom stabilization, medication management, care coordination   Medical/Physical See H&P   Precautions See below   Plan Psychiatric assessment/Medication management. Therapeutic Milieu. Individual care planning and after care planning. Patient to participate in unit groups and activities. Individual and group support on unit.   Safety Plan Per unit protocol   Anticipated Discharge Disposition home or self-care     PRECAUTIONS AND SAFETY    Behavioral Orders   Procedures    Code 1 - Restrict to Unit    Routine Programming     As clinically indicated    Status 15     Every 15 minutes.

## 2024-05-16 NOTE — PLAN OF CARE
" INITIAL PSYCHOSOCIAL ASSESSMENT AND NOTE    Information for assessment was obtained from:       [x]Patient     []Parent     []Community provider    [x]Hospital records   []Other     []Guardian       Presenting Problem:  Patient is a 33 year old female who uses she/her. Patient was admitted to [facility] on 5/16/2024 [facility] on a 72 hour hold placed on 5/15/24 at 12:10pm . Her 72HH was discontinued on 5/16 and patient signed in voluntarily.    Presenting issues and presentation for admit: Patient reported paranoia started about two weeks ago and prior to coming in, she saw bugs (listening devices) in her apartment and thought she was being poisoned. She also reported feeling as though many things were burning her skin. She said she needed to leave her apartment to stay alive.    Per DEC assessment completed on 5/15/24: \"Patient is presenting to the ED for the following concerns: Paranoia (Auditory and visual hallucinations, paranoia, psychosis.). Factors that make the mental health crisis life threatening or complex are:  Patient left her apartment tonight because she thought it was bugged. Things were out of place and she believes someone had been in the apartment while she was gone. She saw small pieces of paper \"all over\". Patient saw small pieces of thread or hairs that were moving on their own. She worried that everything was going to hurt her. At the gas station, patient told employees she felt gas.  On admission to the ED, patient said the nurses hair tie was burning her. She complained of \"vibrations\" and \"energy\" that hurt her. Patient denies thoughts of harm to self, others, or property. She denied hallucinations and lacks insight into her paranoid delusions. Patient denied use of alcohol, marijuana, or illicit drugs.\"    The following areas have been assessed:    History of Mental Health and Chemical Dependency:  Mental Health History:  Patient has a " "historical diagnosis of ADHD, Anxiety, and Depression. Opioid-induced psychosis is noted in addiction medicine note on 11/14/23.  The patient does not have a history of suicide attempts.   Patient does not have a history of engaging in non-suicidal self-injury.     Previous psychiatric hospitalizations and treatments (including outpatient, residential, and inpatient care:  Patient denies a history of psychiatric hospitalizations, PHP, or IOP/OP. She is currently engaged in outpatient therapy and medication management.    Substance Use History  Per chart, patient has a history of Opioid Dependence with opioid-induced psychosis (noted 11/14/23, states \"sustained remission for many years\"). She denies any history of CD treatment.  UDS upon admission was positive for amphetamines (pt is prescribed Adderall) and benzos (pt is prescribed Ativan).    Patient's current relationship status is   single.   Patient reported having a 3 year old daughter. Her daughter is currently staying with her aunt.    Family Description (Constellation, significant information and events, Family Psychiatric History):  Per chart, patient's brother passed away at age 24 from an enlarged heart.    Family psychiatric history: anxiety and depression. No family history of suicide.    Significant Medical issues, Life events or Trauma history:   Pt has been diagnosed with multiple sclerosis and type II diabetes.    Living Situation:  Patient's current living/housing situation is in her own apartment through [employer] supportive living apartments. They report that housing is stable and they are able to return upon discharge.     Educational Background:    Patient's highest education level was high school graduate. Patient reports they are able to understand written materials.     Occupational and Financial Status:   Patient is currently disabled.  Patient reports  income is obtained through SSDI disability.  Patient does not identify finances as a " "current stressor. They are insured under [facility]. Restrictions (No/Yes): No    Occupational history: [place]    Legal Concerns (current or past history):     Current Concerns: None reported  Past History: None reported    Legal Status:  Voluntary    Commitment History: None     Service History: None    Ethnic/Cultural/Spiritual considerations:   The patient describes their cultural background as Black/, heterosexual, female. Patient identified their preferred language to be English. Patient reported they do not need the assistance of an .  Spiritual considerations include: none reported    Social Functioning (organizations, interests, support system):   In their free time, patient reports they like to \"be with my daughter.\"      Patient identified  \"grandmother, aunt, and facility residents/staff\"  as part of their support system.  Patient identified the quality of these relationships as good.       Current Treatment Providers are:  Primary Care Provider:  Name/Clinic: Dr. Cliff Hooks MD - [facility]  Number: [phone]    Medication Management/Psychiatry:  Name/Clinic: Unknown   Number:     Therapist:   Name/Clinic: Unknown  Number:     Other contact information (family, friends, SO) and NORA status:   Elisa oGmez, grandmother, [phone].    Kalie Villegas, [phone], Aunt, Emergency Contact.     GOALS FOR HOSPITALIZATION:  What do patient want to accomplish during this hospitalization to make things better for the patient.?   Patient priorities:  They identified \"getting better\" as a goal of this hospitalization.    Social Service Assessment/Plan:  Patient view:   Unsure of discharge plan/needs at this time.        Patient will have psychiatric assessment and medication management by the psychiatrist. Medications will be reviewed and adjusted per /MD/APRN CNP as indicated. The treatment team will continue to assess and stabilize the patient's " mental health symptoms with the use of medications and therapeutic programming. Hospital staff will provide a safe environment and a therapeutic milieu. Staff will continue to assess patient as needed. Patient will participate in unit groups and activities. Patient will receive individual and group support on the unit.      CTC will do individual inpatient treatment planning and after care planning. CTC will discuss options for increasing community supports with the patient. CTC will coordinate with outpatient providers and will place referrals to ensure appropriate follow up care is in place.

## 2024-05-16 NOTE — PLAN OF CARE
Team Note Due:  Thursday    Assessment/Intervention/Current Symtoms and Care Coordination:  Chart review and met with team, discussed pt progress, symptomology, and response to treatment.  Discussed the discharge plan and any potential impediments to discharge.    Met with pt to introduce self and explain role. Pt denied having any questions or concerns at this time, stating she needed to rest as she came to the unit early this morning and hasn't had much time to sleep. Writer encouraged pt to get rest as needed and we can touch base again tomorrow.     Discharge Plan or Goal:  Pending stabilization of symptoms and safe disposition planning.     Barriers to Discharge:  Patient requires further psychiatric stabilization due to current symptomology, medication management with changes subject to provider, coordination with outside supports, and aftercare planning.     Referral Status:  None at this time     Legal Status:  Voluntary    Contacts:  Elisa Gomez, grandmother, [phone].    Kalie Diazuels, [phone], Aunt, Emergency Contact      Upcoming Meetings and Dates/Important Information and next steps:  Coordinate discharge planning
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
## 2024-05-16 NOTE — PHARMACY-ADMISSION MEDICATION HISTORY
Medication history completed on 5/15/2024 at [facility]. Please refer to this note for prior to admission medication information.    LUIS ARMANDO MERCEDES, [facility]  [phone] and available on [employer]

## 2024-05-16 NOTE — ED NOTES
Writer administered night time medications, patient self ambulated to bathroom, and returned to room for bed.

## 2024-05-16 NOTE — PLAN OF CARE
Initial meeting note:    Therapist introduced self to patient and discussed psychotherapy service available to patient.     Pt response: Pt expressed interest in meeting 1:1 tomorrow after she has time to settling in to unit.    Plan: Made plan to meet with Pt tomorrow morning.      Clau Cheema [facility]  Psychotherapist

## 2024-05-16 NOTE — PLAN OF CARE
"Goal Outcome Evaluation:    Plan of Care Reviewed With: patient Plan of Care Reviewed With: patient    Overall Patient Progress: improvingOverall Patient Progress: improving       Problem: Adult Behavioral Health Plan of Care  Goal: Plan of Care Review  Outcome: Progressing  Flowsheets  Taken 5/16/2024 1204  Plan of Care Reviewed With: patient  Overall Patient Progress: improving  Patient Agreement with Plan of Care: agrees  Taken 5/16/2024 1100  Patient Agreement with Plan of Care: agrees     Behavioral  Pt slept in this morning. Pt is a new admit arrived here at 0500 from [facility] ED after pt found at gas station with bizarre and delusional behavior. Upon assessment, pt was very paranoid, frustrated, tearful and fearful. Pt requested to eat breakfast in her room. Pt was in tears, covering the vents with blankets, stated, \"I am scared they will cause fire.\" Pt feelings were acknowledged and given re assurance. Pt appetite is great. She is eating and hydrating adequately. Ate 100% for both breakfast and lunch. Compliant with medications. Attending to ADL's independently.  Pt speech is pressured; Pt mood is labile, anxious depressed and suspicious. Endorsed anxiety 10, depression 4/10. Affect is flat blunted. Pt has no interactions with peers. Pt denied SI, SIB, HI. Endorsed chronic generalized body aches 4/5. Scheduled medications were administered.    Medical    No complaints of physical pain/discomfort this shift. Vital signs stable.   - Labs done  - EKG 12-lead  - MRI  - Neuro consult     PRNS:  - Ativan  - Hydroxyzine     Plan    "

## 2024-05-17 LAB
ALBUMIN SERPL BCG-MCNC: 3.7 G/DL (ref 3.5–5.2)
ALP SERPL-CCNC: 118 U/L (ref 40–150)
ALT SERPL W P-5'-P-CCNC: 56 U/L (ref 0–50)
ANION GAP SERPL CALCULATED.3IONS-SCNC: 8 MMOL/L (ref 7–15)
AST SERPL W P-5'-P-CCNC: 37 U/L (ref 0–45)
BILIRUB SERPL-MCNC: 0.2 MG/DL
BUN SERPL-MCNC: 11.8 MG/DL (ref 6–20)
CALCIUM SERPL-MCNC: 9 MG/DL (ref 8.6–10)
CHLORIDE SERPL-SCNC: 101 MMOL/L (ref 98–107)
CHOLEST SERPL-MCNC: 138 MG/DL
CREAT SERPL-MCNC: 0.7 MG/DL (ref 0.51–0.95)
DEPRECATED HCO3 PLAS-SCNC: 31 MMOL/L (ref 22–29)
EGFRCR SERPLBLD CKD-EPI 2021: >90 ML/MIN/1.73M2
GLUCOSE SERPL-MCNC: 141 MG/DL (ref 70–99)
HDLC SERPL-MCNC: 31 MG/DL
HOLD SPECIMEN: NORMAL
LDLC SERPL CALC-MCNC: 87 MG/DL
NONHDLC SERPL-MCNC: 107 MG/DL
POTASSIUM SERPL-SCNC: 4.3 MMOL/L (ref 3.4–5.3)
PROT SERPL-MCNC: 7.2 G/DL (ref 6.4–8.3)
SODIUM SERPL-SCNC: 140 MMOL/L (ref 135–145)
TRIGL SERPL-MCNC: 102 MG/DL

## 2024-05-17 PROCEDURE — 250N000013 HC RX MED GY IP 250 OP 250 PS 637

## 2024-05-17 PROCEDURE — 36415 COLL VENOUS BLD VENIPUNCTURE: CPT | Performed by: PSYCHIATRY & NEUROLOGY

## 2024-05-17 PROCEDURE — 250N000013 HC RX MED GY IP 250 OP 250 PS 637: Performed by: STUDENT IN AN ORGANIZED HEALTH CARE EDUCATION/TRAINING PROGRAM

## 2024-05-17 PROCEDURE — 124N000002 HC R&B MH UMMC

## 2024-05-17 PROCEDURE — 82040 ASSAY OF SERUM ALBUMIN: CPT | Performed by: PSYCHIATRY & NEUROLOGY

## 2024-05-17 PROCEDURE — 82465 ASSAY BLD/SERUM CHOLESTEROL: CPT | Performed by: PSYCHIATRY & NEUROLOGY

## 2024-05-17 PROCEDURE — 99207 PR NO CHARGE LOS: CPT | Performed by: STUDENT IN AN ORGANIZED HEALTH CARE EDUCATION/TRAINING PROGRAM

## 2024-05-17 PROCEDURE — 99232 SBSQ HOSP IP/OBS MODERATE 35: CPT | Mod: GC | Performed by: PSYCHIATRY & NEUROLOGY

## 2024-05-17 PROCEDURE — 84478 ASSAY OF TRIGLYCERIDES: CPT | Performed by: PSYCHIATRY & NEUROLOGY

## 2024-05-17 RX ORDER — OLANZAPINE 10 MG/1
10 TABLET ORAL 2 TIMES DAILY PRN
Status: DISCONTINUED | OUTPATIENT
Start: 2024-05-17 | End: 2024-06-11 | Stop reason: HOSPADM

## 2024-05-17 RX ORDER — POLYETHYLENE GLYCOL 3350 17 G
2 POWDER IN PACKET (EA) ORAL
Status: DISCONTINUED | OUTPATIENT
Start: 2024-05-17 | End: 2024-06-11 | Stop reason: HOSPADM

## 2024-05-17 RX ORDER — OLANZAPINE 5 MG/1
5 TABLET ORAL 3 TIMES DAILY PRN
Status: DISCONTINUED | OUTPATIENT
Start: 2024-05-17 | End: 2024-05-17

## 2024-05-17 RX ORDER — OLANZAPINE 5 MG/1
5 TABLET ORAL 3 TIMES DAILY PRN
Status: CANCELLED | OUTPATIENT
Start: 2024-05-17

## 2024-05-17 RX ORDER — OLANZAPINE 10 MG/2ML
5 INJECTION, POWDER, FOR SOLUTION INTRAMUSCULAR 3 TIMES DAILY PRN
Status: DISCONTINUED | OUTPATIENT
Start: 2024-05-17 | End: 2024-05-17

## 2024-05-17 RX ORDER — OLANZAPINE 15 MG/1
15 TABLET, ORALLY DISINTEGRATING ORAL AT BEDTIME
Status: DISCONTINUED | OUTPATIENT
Start: 2024-05-17 | End: 2024-05-22

## 2024-05-17 RX ORDER — OLANZAPINE 5 MG/1
5 TABLET ORAL 3 TIMES DAILY PRN
Status: DISCONTINUED | OUTPATIENT
Start: 2024-05-17 | End: 2024-06-11 | Stop reason: HOSPADM

## 2024-05-17 RX ORDER — OLANZAPINE 10 MG/2ML
10 INJECTION, POWDER, FOR SOLUTION INTRAMUSCULAR 3 TIMES DAILY PRN
Status: CANCELLED | OUTPATIENT
Start: 2024-05-17

## 2024-05-17 RX ORDER — OLANZAPINE 10 MG/2ML
10 INJECTION, POWDER, FOR SOLUTION INTRAMUSCULAR 3 TIMES DAILY PRN
Status: DISCONTINUED | OUTPATIENT
Start: 2024-05-17 | End: 2024-06-11 | Stop reason: HOSPADM

## 2024-05-17 RX ADMIN — BUSPIRONE HYDROCHLORIDE 10 MG: 10 TABLET ORAL at 08:10

## 2024-05-17 RX ADMIN — BUPRENORPHINE 8 MG: 8 TABLET SUBLINGUAL at 14:38

## 2024-05-17 RX ADMIN — BUSPIRONE HYDROCHLORIDE 10 MG: 10 TABLET ORAL at 20:06

## 2024-05-17 RX ADMIN — ESCITALOPRAM OXALATE 10 MG: 10 TABLET ORAL at 08:10

## 2024-05-17 RX ADMIN — NICOTINE POLACRILEX 2 MG: 2 LOZENGE ORAL at 19:50

## 2024-05-17 RX ADMIN — LORAZEPAM 1 MG: 1 TABLET ORAL at 05:10

## 2024-05-17 RX ADMIN — GABAPENTIN 600 MG: 300 CAPSULE ORAL at 20:06

## 2024-05-17 RX ADMIN — OLANZAPINE 15 MG: 15 TABLET, ORALLY DISINTEGRATING ORAL at 20:08

## 2024-05-17 RX ADMIN — GABAPENTIN 600 MG: 300 CAPSULE ORAL at 14:38

## 2024-05-17 RX ADMIN — LORAZEPAM 1 MG: 1 TABLET ORAL at 18:20

## 2024-05-17 RX ADMIN — DOCUSATE SODIUM 100 MG: 100 CAPSULE, LIQUID FILLED ORAL at 08:10

## 2024-05-17 RX ADMIN — IBUPROFEN 600 MG: 600 TABLET ORAL at 20:10

## 2024-05-17 RX ADMIN — BUPRENORPHINE 8 MG: 8 TABLET SUBLINGUAL at 08:10

## 2024-05-17 RX ADMIN — IBUPROFEN 600 MG: 600 TABLET ORAL at 11:28

## 2024-05-17 RX ADMIN — GABAPENTIN 600 MG: 300 CAPSULE ORAL at 08:10

## 2024-05-17 RX ADMIN — OLANZAPINE 10 MG: 10 TABLET, FILM COATED ORAL at 11:28

## 2024-05-17 RX ADMIN — NICOTINE POLACRILEX 2 MG: 2 LOZENGE ORAL at 12:12

## 2024-05-17 RX ADMIN — HYDROXYZINE HYDROCHLORIDE 25 MG: 25 TABLET ORAL at 08:14

## 2024-05-17 RX ADMIN — BUPRENORPHINE 8 MG: 8 TABLET SUBLINGUAL at 20:06

## 2024-05-17 ASSESSMENT — ACTIVITIES OF DAILY LIVING (ADL)
ADLS_ACUITY_SCORE: 41
ADLS_ACUITY_SCORE: 41
ORAL_HYGIENE: INDEPENDENT
ADLS_ACUITY_SCORE: 41
LAUNDRY: WITH SUPERVISION
ADLS_ACUITY_SCORE: 41
ADLS_ACUITY_SCORE: 28
ORAL_HYGIENE: INDEPENDENT;PROMPTS
ADLS_ACUITY_SCORE: 28
HYGIENE/GROOMING: INDEPENDENT
ADLS_ACUITY_SCORE: 28
ADLS_ACUITY_SCORE: 41
ADLS_ACUITY_SCORE: 41
ADLS_ACUITY_SCORE: 28
ADLS_ACUITY_SCORE: 41
ADLS_ACUITY_SCORE: 41
DRESS: SCRUBS (BEHAVIORAL HEALTH);INDEPENDENT;STREET CLOTHES
ADLS_ACUITY_SCORE: 28
ADLS_ACUITY_SCORE: 41
LAUNDRY: UNABLE TO COMPLETE
DRESS: INDEPENDENT
ADLS_ACUITY_SCORE: 41
ADLS_ACUITY_SCORE: 28
ADLS_ACUITY_SCORE: 28
ADLS_ACUITY_SCORE: 41
HYGIENE/GROOMING: HANDWASHING;SHOWER;INDEPENDENT
ADLS_ACUITY_SCORE: 28
ADLS_ACUITY_SCORE: 41

## 2024-05-17 NOTE — CONSULTS
Note entered in error, please see progress notes from earlier in the day    Charlie Phillips MD

## 2024-05-17 NOTE — PLAN OF CARE
Brief Individual Therapy Note    Attempted to meet with Pt twice today. Pt was sleeping both times and did not wake. Will check in with Pt early next week.

## 2024-05-17 NOTE — PLAN OF CARE
Problem: Adult Behavioral Health Plan of Care  Goal: Plan of Care Review  Outcome: Progressing  Flowsheets (Taken 5/16/2024 1729)  Patient Agreement with Plan of Care: agrees   Goal Outcome Evaluation:    Plan of Care Reviewed With: patient          Pt was sleeping beginning of the shift and woke up at about 1700 requesting for snacks or dinner. She was offered snacks and was told dinner will be later at about 6 pm. She ate her snacks in the dinning room but requested to eat her dinner in her room because she is still trying to get used to the environment and scared to eat out with others. Her affect was flat and blunted. She was able to make her needs known. She was eating and drinking okay Ate 100% of her dinner. Hygiene poor. Pt encouraged to take a shower but she said, she will take one tomorrow in the morning. She ate her dinner in her room and later she came out for her medications and joined the other peers to watch Basket ball game. This was just for a short while.     She complained of pain on her lower back and legs beginning of the shift. She rated pain 9/10 and was given prn Ibuprofen 600 mg which she said was helpful. She endorsed anxiety 9/10 and was given prn Hydroxyzine 25 mg which she said was also helpful. She denied SI/SIB/HI/AH/VH. She contracted for safety. She was medication compliant. Prn Ambien 10 mg was given to patient for insomnia per her request. No other concern noted at this time. Pt is now voluntary. 72 HH was discontinued and she signed the  consent for tx. Will continue to monitor and will offer therapeutic support if need arise.

## 2024-05-17 NOTE — PLAN OF CARE
Pt did endorse anxiety 6/10, depression 5/10.  Denied pain at this time. Requested and given Ativan 1 mg as ordered for anxiety- was helpful. Pt was able to fall asleep thereafter. Safety checks completed every 15 minutes ; no safety concerns noted. Pt appears to have slept for 6.50 hours; will continue to monitor and offer support.     Problem: Sleep Disturbance  Goal: Adequate Sleep/Rest  Outcome: Progressing   Goal Outcome Evaluation:

## 2024-05-17 NOTE — PROGRESS NOTES
I attempted to interview and examine Mr. Darden, though she had just gotten breakfast and declined to meet at this time.  The neurology consultation will be delayed for this reason.   Please page me to discuss the goal of the neurology consult.    Charlie Phillips MD  [phone]

## 2024-05-17 NOTE — PLAN OF CARE
BEH IP Unit Acuity Rating Score (UARS)  Patient is given one point for every criteria they meet.    CRITERIA SCORING   On a 72 hour hold, court hold, committed, stay of commitment, or revocation. 0   Patient LOS on BEH unit exceeds 20 days. 0  LOS: 1   Patient under guardianship, 55+, otherwise medically complex, or under age 11. 0   Suicide ideation without relief of precipitating factors. 0   Current plan for suicide. 0   Current plan for homicide. 0   Imminent risk or actual attempt to seriously harm another without relief of factors precipitating the attempt. 0   Severe dysfunction in daily living (ex: complete neglect for self care, extreme disruption in vegetative function, extreme deterioration in social interactions). 1   Recent (last 7 days) or current physical aggression in the ED or on unit. 0   Restraints or seclusion episode in past 72 hours. 0   Recent (last 7 days) or current verbal aggression, agitation, yelling, etc., while in the ED or unit. 1   Active psychosis. 1   Need for constant or near constant redirection (from leaving, from others, etc).  0   Intrusive or disruptive behaviors. 0   Patient requires 3 or more hours of individualized nursing care per 8-hour shift (i.e. for ADLs, meds, therapeutic interventions). 0   TOTAL 3

## 2024-05-17 NOTE — PLAN OF CARE
"Goal Outcome Evaluation:    Plan of Care Reviewed With: patient Plan of Care Reviewed With: patient    Overall Patient Progress: improvingOverall Patient Progress: improving         Problem: Adult Behavioral Health Plan of Care  Goal: Plan of Care Review  Outcome: Progressing  Flowsheets  Taken 5/17/2024 1206  Plan of Care Reviewed With: patient  Overall Patient Progress: improving  Patient Agreement with Plan of Care: agrees  Taken 5/17/2024 0909  Patient Agreement with Plan of Care: agrees        Behavioral  Pt was up and visible intermittently this shift. She was presented with delusional statement, stated, \"I am scared about people around here, I need to get used to them.\" Pt also exhibited delusional statements that she believes someone put a gas tank where she lives and has no desire going back to the place. Pt is frustrated, tearful and fearful. Pt feelings were acknowledged and given re assurance. Pt mood is labile. Pt had periods of smiling/laughing and periods of sadness and tearful. Pt needs constant re assurance. Pt seeking for staff and medications frequently. Appeared guarded, suspicious and avoids social contacts. Pt ate both breakfast and lunch in the dining room. Pt appetite is great. She is eating and hydrating adequately. Ate 100% for both breakfast and lunch. Compliant with medications. Attending to ADL's independently. However, pt is unkempt. Encouraged to shower but she say she has her own plans, lunch, nap, then shower!  Pt speech is pressured; Endorsed anxiety 10, depression 5/10. Pt denied SI, SIB, HI. Pt is contract for safety.     Medical  Pt complained of generalized pain/discomfort this shift. Endorsed 8/10. Scheduled and PRN medications were administered.  Pt declined the interview with the Neurologist. Blood pressure 139/78, pulse 98, temperature 97.8  F (36.6  C), temperature source Oral, resp. rate 16, height 1.651 m (5' 5\"), weight (!) 155.9 kg (343 lb 12.8 oz), SpO2 92%, not " currently breastfeeding.       PRNS:  - Ativan  - Hydroxyzine   - Nicotine lozenge  - Ibuprofen

## 2024-05-17 NOTE — PROGRESS NOTES
I discussed this case further with the psychiatry/primary team.  Their question was whether her interferon beta injection for multiple sclerosis may be leading to psychosis.  Temporarily, it appears that she has been receiving this medication since 2021, so there is a low likelihood that this is contributing since her psychiatric symptoms are much more recent.  I recommended that her interferon beta injections should continue uninterrupted to prevent an MS flare.  The question of potentially changing the MS medication is one that I would defer to the outpatient setting in conjunction with her outpatient neurologist, Dr. Chahal, though at this point I see no indication for changing this medication.    Dr. Cartagena confirmed that this answered their question, so the neurology consultation will be canceled at this time.    Charlie Phillips MD

## 2024-05-17 NOTE — DISCHARGE INSTRUCTIONS
Behavioral Discharge Planning and Instructions    Summary: You were admitted on 5/16/2024  due to concern for psychosis.  You were treated by Wilmer Cartagena MD and discharged on 6/11/24 from [facility] with a scheduled admission to [facility] ([address] [place] [address]) on Wednesday June 12, 2024 at 10:00 AM.    Main Diagnosis:   Psychosis  Anxiety/Depression    Health Care Follow-up:   Primary Care Follow-up: Wednesday June 19, 2024 at 11:10 AM - IN PERSON    Provider: MARK Ferrer     Location: [place] [address]     Phone Number: [phone]      Psychiatry Follow-up: Monday June 24, 2025 at 4:00 PM - PHONE APPT     Psychiatrist: Dr. Mohan Duarte MD with [facility]   Phone Number: [phone]      Subutex Follow-up: Tuesday July 2, 2024 at 11:20 AM - IN PERSON  Provider: Laly Tatum PA-C  Location: [place] [address]  To reschedule or cancel this appointment, please call [phone]     Information will be faxed to your outpatient providers to ensure a healthy continuity of care for you.     Attend all scheduled appointments with your outpatient providers. Call at least 24 hours in advance if you need to reschedule an appointment to ensure continued access to your outpatient providers.     Adult Sleep Evaluation and Management:  You are recommended to follow up for an outpatient sleep study due to suspected obstructive sleep apnea. A referral was placed while you were in the hospital and can follow up to schedule this evaluation after you discharge by calling [phone].     Dental Follow-up:  RECOMMENDATION:  For current urgent needs, call [facility] to schedule an appointment for limited exam, dental radiographs and definite treatment.  [facility]  [facility] [place] - [address] [place] [address]  [place], [place], [address]  [phone]  Also recommend to establish a dental home with either [facility] school or community dentist of her choice.   Recommend orajel and tylenol/ pain control per primary team.   Recommend Magic Mouth wash for PRN use. Remove dentures when she is not eating or functioning to reduce the denture sore.   Continue DH recommendation: DENTURE CARE:  Remove dentures at least once daily (ideally overnight) and soak in denture case with cleaning tabs. Before seating back in mouth, brush both dentures with the denture brush provided during today's assessment. Use MINIMAL amounts of denture adhesive before seating back in mouth. This same process should be repeated following each meal.      Gently brush underlying gum tissue at least BID with soft-bristled manual toothbrush to help massage/stimulate gum tissue. Recommend removal of dentures if at all possible until sores heal.      Soft food diet - avoid anything hard, crunchy, or overly acidic/spicy.     Continue Orajel and Tylenol prn for pain management. Recommend rinsing with Magic Mouthwash, alcohol-free antiseptic mouthrinse (hospital-provided is sufficient) or warm salt water.     Pt should establish outpatient dental home upon discharge to resume comprehensive dental care, including fabrication of new dentures.        Major Treatments, Procedures and Findings:  You were provided with: a psychiatric assessment, assessed for medical stability, medication evaluation and/or management, group therapy, individual therapy, milieu management, and medical interventions    Symptoms to Report: feeling more aggressive, increased confusion, losing more sleep, mood getting worse, or thoughts of suicide    Early warning signs can include: increased depression or anxiety sleep disturbances increased thoughts or behaviors of suicide or self-harm  increased unusual thinking, such as paranoia or hearing voices    Safety and Wellness:  Take all  medicines as directed.  Make no changes unless your doctor suggests them.      Follow treatment recommendations.  Refrain from alcohol and non-prescribed drugs.  If there is a concern for safety, call 911.    Resources:   Mental Health Crisis Resources  Throughout [place]: call **CRISIS (**281304)  Crisis Text Line: is available for free, 24/7 by texting [place] to 990549  Suicide Awareness Voices of Education (SAVE) (www.save.org): 675-793-PGKG (9859)  The National Suicide Prevention Lifeline is now: 988 Suicide and Crisis Lifeline. Call 988 anytime.  National [place] on Mental Illness (www.[place].julien.org): 284.468.5600 or 704-153-1584.  Rkht4lxvv: text the word LIFE to 42065 for immediate support and crisis intervention  Mental Health Consumer/Survivor Network of [place] (www.mhcsn.net): 265.517.8412 or 334-885-7989  Mental Health Association of [place] (www.mentalhealth.org): 941.922.4936 or 621-853-6219  Peer Support Connection [place] [facility] (PSC) 1-497.854.6013 Available from 5pm - 9am (7 days a week/365 days a year)  [facility] [phone], [facility] [phone], [facility] 1-893.730.3207 Community Outreach for Psych Emergencies, or [facility] 1-696.174.6387 [facility]      General Medication Instructions:   See your medication sheet(s) for instructions.   Take all medicines as directed.  Make no changes unless your doctor suggests them.   Go to all your doctor visits.  Be sure to have all your required lab tests. This way, your medicines can be refilled on time.  Do not use any drugs not prescribed by your doctor.  Avoid alcohol.    Advance Directives:   Scanned document on file with [email or web address]? No scanned doc  Is document scanned? Pt unable to confirm  Honoring Choices Your Rights Handout: Informed and given  Was more information offered? Materials given    The Treatment team has appreciated the opportunity to work with you. If you have any questions or concerns about your recent  admission, you can contact the unit which can receive your call 24 hours a day, 7 days a week. They will be able to get in touch with a Provider if needed. The unit number is [phone] .

## 2024-05-17 NOTE — PROGRESS NOTES
"  ----------------------------------------------------------------------------------------------------------  [facility]  Psychiatry Progress Note  Hospital Day #1     Interim History:     The patient's care was discussed with the treatment team and chart notes were reviewed.    Vitals: /78 (BP Location: Right arm, Patient Position: Semi-Villanueva's, Cuff Size: Adult Regular)   Pulse 98   Temp 98.5  F (36.9  C) (Oral)   Resp 16   Ht 1.651 m (5' 5\")   Wt (!) 155.9 kg (343 lb 12.8 oz)   SpO2 96%   BMI 57.21 kg/m    Sleep: 6.5 hours (05/17/24 0600)  Scheduled medications: Took all scheduled medications as prescribed  PRN medications:   - Ativan 1 mg x2 (one given this morning, 05/17)  - Hydroxyzine 25 mg x3 (one dose given this morning, 05/17)  - Ibuprofen 600 mg  - Ambien 10 mg    Staff Report:   Yesterday afternoon and evening, Irma was adjusting to the unit. She ate her lunch in the milieu, stating she was trying to get used to being around other patients. She took dinner in her own room due to anxiety but later watched the [employer] game in the milieu. Patient received Ativan x2 and hydroxyzine x3 for anxiety throughout the day yesterday and this morning which were effective for anxiety. She was able to sleep after getting the PRNs. Also got ibuprofen for back pain and Ambien for sleep. Sleep was ~6.5 hours last night. Patient declined to meet with neuro this morning, consult was delayed.     This morning, staff reported Irma was more anxious and fearful, specifically was worried about being around people she doesn't know. Ate breakfast in the milieu then immediately returned to room.     Subjective:     Patient Interview:  Irma was more upset during the interview today compared to yesterday. She endorses feeling \"sad\" and is more depressed/anxious today. She feels safe in her room on the unit but is concerned about anyone else knowing she is here " "(such as group home staff or family). She repeated the importance of \"finding out what happened to me\" and requested proof. She currently denies SI/HI and has no hallucinations.     Had a discussion regarding the neuro consult and plan going forward. Patient was amenable to getting a MRI of the head but states she doesn't want it today. Also discussed her interferon. She reports taking it on and off for a nonspecific amount of time. She is prescribed the interferon by injection every other day. Told her the formulary is not available in the hospital and we would need to use her supply. She reiterated she does not want anyone to know she is here, including group home staff. Patient also requested nicotine lozenges as she typically smokes a vape at home.     Patient became more upset when talking about how her symptoms started, requests PRN meds for anxiety.    Collateral:  Attempted to call the patient's family members, however the aunt's number was out of service and grandmother didn't answer. Will track down group home information and contact them when possible.    ROS:  Patient has  none  Patient denies acute concerns     Objective:     Vitals:  /78 (BP Location: Right arm, Patient Position: Semi-Villanueva's, Cuff Size: Adult Regular)   Pulse 98   Temp 98.5  F (36.9  C) (Oral)   Resp 16   Ht 1.651 m (5' 5\")   Wt (!) 155.9 kg (343 lb 12.8 oz)   SpO2 96%   BMI 57.21 kg/m      Allergies:  Allergies   Allergen Reactions    Glatiramer Hives    Penicillins Anaphylaxis and Hives    Shellfish Allergy Anaphylaxis and Hives    Copaxone [Glatiramer Acetate] Swelling       Current Medications:  Scheduled:  Current Facility-Administered Medications   Medication Dose Route Frequency Provider Last Rate Last Admin    acetaminophen (TYLENOL) tablet 650 mg  650 mg Oral Q4H PRN Juju Salgado MD        alum & mag hydroxide-simethicone (MAALOX) suspension 30 mL  30 mL Oral Q4H PRN Juju Salgado MD        [Held by " provider] amphetamine-dextroamphetamine (ADDERALL XR) 10 mg, amphetamine-dextroamphetamine (ADDERALL XR) 40 mg  50 mg Oral QAM Juju Salgado MD        [Held by provider] amphetamine-dextroamphetamine (ADDERALL) per tablet 40 mg  40 mg Oral Daily Juju Salgado MD        buprenorphine (SUBUTEX) sublingual tablet 8 mg  8 mg Sublingual TID Juju Salgado MD   8 mg at 05/17/24 0810    busPIRone (BUSPAR) tablet 10 mg  10 mg Oral BID Juju Salgado MD   10 mg at 05/17/24 0810    docusate sodium (COLACE) capsule 100 mg  100 mg Oral Daily Juju Salgado MD   100 mg at 05/17/24 0810    escitalopram (LEXAPRO) tablet 10 mg  10 mg Oral Daily Juju Salgado MD   10 mg at 05/17/24 0810    gabapentin (NEURONTIN) capsule 600 mg  600 mg Oral TID Juju Salgado MD   600 mg at 05/17/24 0810    hydrOXYzine HCl (ATARAX) tablet 25 mg  25 mg Oral Q4H PRN Juju Salgado MD   25 mg at 05/17/24 0814    ibuprofen (ADVIL/MOTRIN) tablet 600 mg  600 mg Oral Q8H PRN Juju Salgado MD   600 mg at 05/16/24 1729    interferon beta-1b (BETASERON) injection 0.3 mg  0.3 mg Subcutaneous Every Other Day Shanti Waters MD        LORazepam (ATIVAN) tablet 1 mg  1 mg Oral BID PRN Shanti Waters MD   1 mg at 05/17/24 0510    melatonin tablet 3 mg  3 mg Oral At Bedtime PRN Juju Salgado MD        naloxone (NARCAN) injection 0.2 mg  0.2 mg Intravenous Q2 Min PRN Juju Salgado MD        Or    naloxone (NARCAN) injection 0.4 mg  0.4 mg Intravenous Q2 Min PRN Juju Salgado MD        Or    naloxone (NARCAN) injection 0.2 mg  0.2 mg Intramuscular Q2 Min PRN Juju Salgado MD        Or    naloxone (NARCAN) injection 0.4 mg  0.4 mg Intramuscular Q2 Min PRN Juju Salgado MD        OLANZapine (zyPREXA) tablet 10 mg  10 mg Oral TID PRN Juju Salgado MD        Or    OLANZapine (zyPREXA) injection 10 mg  10 mg Intramuscular TID PRN Juju Salgado MD        OLANZapine zydis (zyPREXA) ODT tab 10 mg  10 mg Oral At Bedtime  Juju Salgado MD   10 mg at 05/16/24 2105    polyethylene glycol (MIRALAX) Packet 17 g  17 g Oral Daily PRN Juju Salgado MD        [Held by provider] SUMAtriptan (IMITREX) tablet 50 mg  50 mg Oral at onset of headache Juju Salgado MD        zolpidem (AMBIEN) tablet 10 mg  10 mg Oral At Bedtime PRN Juju Salgado MD   10 mg at 05/16/24 1945       PRN:  Current Facility-Administered Medications   Medication Dose Route Frequency Provider Last Rate Last Admin    acetaminophen (TYLENOL) tablet 650 mg  650 mg Oral Q4H PRN Juju Salgado MD        alum & mag hydroxide-simethicone (MAALOX) suspension 30 mL  30 mL Oral Q4H PRN Juju Salgado MD        [Held by provider] amphetamine-dextroamphetamine (ADDERALL XR) 10 mg, amphetamine-dextroamphetamine (ADDERALL XR) 40 mg  50 mg Oral QAM Juju Salgado MD        [Held by provider] amphetamine-dextroamphetamine (ADDERALL) per tablet 40 mg  40 mg Oral Daily Juju Salgado MD        buprenorphine (SUBUTEX) sublingual tablet 8 mg  8 mg Sublingual TID Juju Salgado MD   8 mg at 05/17/24 0810    busPIRone (BUSPAR) tablet 10 mg  10 mg Oral BID Juju Salgado MD   10 mg at 05/17/24 0810    docusate sodium (COLACE) capsule 100 mg  100 mg Oral Daily Juju Salgado MD   100 mg at 05/17/24 0810    escitalopram (LEXAPRO) tablet 10 mg  10 mg Oral Daily Juju Salgado MD   10 mg at 05/17/24 0810    gabapentin (NEURONTIN) capsule 600 mg  600 mg Oral TID Juju Salgado MD   600 mg at 05/17/24 0810    hydrOXYzine HCl (ATARAX) tablet 25 mg  25 mg Oral Q4H PRN Juju Salgado MD   25 mg at 05/17/24 0814    ibuprofen (ADVIL/MOTRIN) tablet 600 mg  600 mg Oral Q8H PRN Juju Salgado MD   600 mg at 05/16/24 1729    interferon beta-1b (BETASERON) injection 0.3 mg  0.3 mg Subcutaneous Every Other Day Shanti Waters MD        LORazepam (ATIVAN) tablet 1 mg  1 mg Oral BID PRN Shanti Waters MD   1 mg at 05/17/24 0510    melatonin tablet 3 mg  3 mg Oral At  Bedtime PRN Juju Salgado MD        naloxone (NARCAN) injection 0.2 mg  0.2 mg Intravenous Q2 Min PRN Juju Salgado MD        Or    naloxone (NARCAN) injection 0.4 mg  0.4 mg Intravenous Q2 Min PRN Juju Salgado MD        Or    naloxone (NARCAN) injection 0.2 mg  0.2 mg Intramuscular Q2 Min PRN Juju Salgado MD        Or    naloxone (NARCAN) injection 0.4 mg  0.4 mg Intramuscular Q2 Min PRN Juju Salgado MD        OLANZapine (zyPREXA) tablet 10 mg  10 mg Oral TID PRN Juju Salgado MD        Or    OLANZapine (zyPREXA) injection 10 mg  10 mg Intramuscular TID PRN Juju Salgado MD        OLANZapine zydis (zyPREXA) ODT tab 10 mg  10 mg Oral At Bedtime Juju Salgado MD   10 mg at 05/16/24 2105    polyethylene glycol (MIRALAX) Packet 17 g  17 g Oral Daily PRN Juju Salgado MD        [Held by provider] SUMAtriptan (IMITREX) tablet 50 mg  50 mg Oral at onset of headache Juju Salgado MD        zolpidem (AMBIEN) tablet 10 mg  10 mg Oral At Bedtime PRN Juju Salgado MD   10 mg at 05/16/24 1945       Labs and Imaging:  New results:   Recent Results (from the past 24 hour(s))   Hemoglobin A1c    Collection Time: 05/16/24 11:02 AM   Result Value Ref Range    Hemoglobin A1C 7.0 (H) <5.7 %   TSH with free T4 reflex and/or T3 as indicated    Collection Time: 05/16/24 11:02 AM   Result Value Ref Range    TSH 0.65 0.30 - 4.20 uIU/mL   Vitamin B12    Collection Time: 05/16/24 11:02 AM   Result Value Ref Range    Vitamin B12 928 232 - 1,245 pg/mL   Folate    Collection Time: 05/16/24 11:02 AM   Result Value Ref Range    Folic Acid 30.7 4.6 - 34.8 ng/mL   Vitamin D Deficiency    Collection Time: 05/16/24 11:02 AM   Result Value Ref Range    Vitamin D, Total (25-Hydroxy) 27 20 - 50 ng/mL   CBC with platelets and differential    Collection Time: 05/16/24 11:02 AM   Result Value Ref Range    WBC Count 13.0 (H) 4.0 - 11.0 10e3/uL    RBC Count 4.85 3.80 - 5.20 10e6/uL    Hemoglobin 13.3 11.7 - 15.7 g/dL     Hematocrit 41.3 35.0 - 47.0 %    MCV 85 78 - 100 fL    MCH 27.4 26.5 - 33.0 pg    MCHC 32.2 31.5 - 36.5 g/dL    RDW 13.0 10.0 - 15.0 %    Platelet Count 415 150 - 450 10e3/uL    % Neutrophils 49 %    % Lymphocytes 39 %    % Monocytes 7 %    % Eosinophils 4 %    % Basophils 1 %    % Immature Granulocytes 0 %    NRBCs per 100 WBC 0 <1 /100    Absolute Neutrophils 6.4 1.6 - 8.3 10e3/uL    Absolute Lymphocytes 5.0 0.8 - 5.3 10e3/uL    Absolute Monocytes 0.9 0.0 - 1.3 10e3/uL    Absolute Eosinophils 0.5 0.0 - 0.7 10e3/uL    Absolute Basophils 0.2 0.0 - 0.2 10e3/uL    Absolute Immature Granulocytes 0.0 <=0.4 10e3/uL    Absolute NRBCs 0.0 10e3/uL   RBC and Platelet Morphology    Collection Time: 05/16/24 11:02 AM   Result Value Ref Range    Platelet Assessment  Automated Count Confirmed. Platelet morphology is normal.     Automated Count Confirmed. Platelet morphology is normal.    Acanthocytes      Cici Rods      Basophilic Stippling      Bite Cells      Blister Cells      Gause Cells      Elliptocytes      Hgb C Crystals      Hurtado-Jolly Bodies      Hypersegmented Neutrophils      Polychromasia      RBC agglutination      RBC Fragments      Reactive Lymphocytes      Rouleaux      Sickle Cells      Smudge Cells      Spherocytes      Stomatocytes      Target Cells      Teardrop Cells      Toxic Neutrophils      RBC Morphology Confirmed RBC Indices    EKG 12-lead, complete    Collection Time: 05/16/24 12:20 PM   Result Value Ref Range    Systolic Blood Pressure  mmHg    Diastolic Blood Pressure  mmHg    Ventricular Rate 93 BPM    Atrial Rate 93 BPM    WY Interval 164 ms    QRS Duration 98 ms     ms    QTc 450 ms    P Axis 57 degrees    R AXIS 37 degrees    T Axis 38 degrees    Interpretation ECG       Sinus rhythm  No previous ECGs available  Confirmed by MD MADRIGAL JANE ([id number]) on 5/16/2024 3:47:04 PM         Data this admission:  - CBC unremarkable, WBC 13  - UDS positive for amphetamines, benzos (both prescribed)  -  "TSH normal  - HbA1c 7.0  - UPT negative  - Vit D normal  - Vit B12 normal  - Folate normal  - EKG with NSR, QTc 450 on 05/16     Mental Status Exam:     Oriented to:  Grossly Oriented  General:  Awake and Alert; visibly upset lying on bed in hospital scrubs  Appearance:  appears stated age and Grooming is adequate; tearful intermittently  Behavior/Attitude:  cooperative and suspicious;  anxious  Eye Contact:  appropriate  Psychomotor: restless no catatonia present  Speech:  appropriate volume/tone; urgency of speech  Language: Fluent in English with appropriate syntax and vocabulary.  Mood:  \"Sad\"  Affect:  congruent with mood, blunted, sad, and tearful  Thought Process:  linear and coherent  Thought Content:  No SI/HI/AH/VH, does not appear to be responding to internal stimuli, and delusions; delusions of paranoia  Associations:  intact  Insight:  impaired; wants to feel better, but emphasizes wanting \"proof of what happened to me\"  Judgment:  impaired due to acute psychosis  Impulse control: impaired  Attention Span:  grossly intact  Concentration:  grossly intact  Recent and Remote Memory:  not formally assessed  Fund of Knowledge:  estimated below average  Muscle Strength and Tone: normal  Gait and Station: Normal     Psychiatric Assessment     Irma Heller is a 33 year old female previously diagnosed with anxiety, depression, and opioid dependence in remission who presented to the ED by EMS and admitted with psychosis in the context of likely first-episode psychosis. No prior psychiatric hospitalizations. Significant symptoms on admission include delusions of paranoia, visual and tactile hallucinations, and increased anxiety. The MSE on admission was pertinent for paranoia, anxiety. Biological contributions to mental health presentation include hx of opioid use disorder (though this has been stable) as well as medical diagnosis of MS, Chiari malformation which are reportedly. Psychological contributions " to mental health presentation include anxiety, depression. Social factors contributing to mental health presentation include poor social support outside of group home staff. Protective factors include good medication adherence and group home staff.      Patient's definitive diagnosis is still in evolution, differential for psychosis includes primary psychotic disorder (ie, schizophrenia, schizoaffective) or other psychiatric etiology vs. drug-induced psychosis vs. medical etiology of psychosis. Feel that this is likely a primary psychiatric cause in the context of UDS positive only for prescribed medication and no recent drug use.  However, consultation with neurology to evaluate medical etiology, further neuroimaging and further history from collateral would be indicated to get a better understand of patient's symptom timeline and course. Can also consider the role of her interferon in her psychosis. She will likely benefit from antipsychotics this admission.     Given that she currently has psychosis, patient warrants inpatient psychiatric hospitalization to maintain her safety       Psychiatric Plan by Diagnosis      Today's changes:  - Adjusted olanzapine to include bedtime dose of 15 mg, PRN dose of 5 mg TID prn for anxiety, paranoia related to psychosis, , and emergency dose for agitation of 10 mg  -Nicotine Lozenges for supplementation-pt reports vaping tobacco  -Per neuro, risk of holding interferon for too long may increase risk of worsening MS/MS flares. Unfortunately, this is not available on formulary and we were not able to get ahold of pt's family to bring it in. Will try to re-connect with family early next week.   - Obtain MRI of the head this weekend or early next week    # Psychosis  Patient was given olanzapine as well as IM Haldol/Versed in the ED which seemed to be effective. Plan is to continue olanzapine for stabilization of acute psychosis and monitoring for improvement.     Medications:  -  Olanzapine 15 mg PO at bedtime  - Olanzapine 5 mg TID PRN  - Olanzapine 10 mg PRN for emergency agitation/aggression related to psychosis      # Anxiety/Depression  Patient has a history of anxiety which she states has been well managed by her home meds prior to this point. She has had an acute increase in her anxiety due to being overwhelmed with this new symptoms and fear for her safety. Will plan to continue her home medication regimen in tandem with the olanzapine.      Medications:  - Buspirone 10 mg bid  - Lexapro 10 mg daily  - Hydroxyzine 25 mg PRN  - Ativan 1 mg PRN     Pertinent Labs/Monitoring:   - CBC unremarkable   - UDS positive for amphetamines, benzos (both prescribed)  - HbA1c 7.0  - UPT negative  - EKG with NSR, QTc 450 on 05/16     Additional Plans:  - Patient will be treated in therapeutic milieu with appropriate individual and group therapies as described  - Plan to obtain collateral information from group home or [facility] Hospital Course:      Irma Heller was admitted to [facility] on a 72 hour hold. Hold was discontinued shortly upon admission as pt agreed to stay voluntary. PTA buspirone, Lexapro, PRN Ativan and hydroxyzine were continued.  PTA Adderall was held to minimize any increased anxiety pending mood stabilization. New medications started at the time of admission include olanzapine. 10mg at night which pt has reported as being helpful for sleep and anxiety to date.       Since her admission to the unit, Irma reported feeling safer in the new room. She stated she was able to eat the food and had no paranoia surrounding this. She had ongoing anxiety which was reduced with PRN Ativan/hydroxyzine. Patient was able to eat lunch in the milieu with the other patients following this. She denied SI/HI and hallucinations at that time. Given the reduction in symptoms in the context of ongoing paranoia and anxiety, plan is to continue olanzapine and monitor for  improvement.     Neuro was consulted regarding the patient's history of MS, Chiari malformation and interferon beta-1b use. There were some cases reported of interferon causing psychosis, though this was discussed with neuro who had low suspician for this etiology due to being on the med for at least 2 years. They recommended we continue patient's interferon to prevent MS flares. However, interferon formulary is not available in our pharmacy so will have to use patient's supply. We will plan to reach out to patient's group home, however we do not have their name or contact information. Though patient adamantly requested us not to inform anyone she is on the unit, we feel she does not currently have capacity and there is a need for collateral information. She also has a daughter that was staying with her aunt per the ED note, and would like to check on her. Attempted to call the aunt at the listed number but it was out of service. Patient's grandmother did not answer. Plan to reassess patient and obtain group home information at a later time.     The risks, benefits, alternatives, and side effects were discussed and understood by the patient.     Medical Assessment and Plan     Medical diagnoses to be addressed this admission:      #Elevated A1c  -A1c elevated to 7.0 on admission  -Will consider consulting medicine     #MS  -Given the history of MS and Chiari malformation in the context of new psychosis.  -Plan is to obtain an MRI to rule out medical CNS cause of psychosis, but will wait for neuro.   -No acute concerns per neuro.   -Neuro ok with PTA interferon beta-1b and recommend continuing to minimize risk of MS flare.   -Plan to hold Adderall and sumatriptan until mood and anxiety are stabilized.      #Chiari Malformation  -Neuro consulted , no acute concerns at this time   -MRI pending     #Hx of iron deficiency anemia  -On Ferosul held at time of admission. Will clarify with pt and pharmacy about use       Medical course:   Patient was physically examined by the ED prior to being transferred to the unit and was found to be medically stable and appropriate for admission. Please see ED/Hospital course above for more information.      Medications:  - Melatonin and Ambien PRN for sleep  - Colace 100 mg daily, Miralax/Maalox PRN for constipation  - Subutex 8 mg TID for opioid dependence  - Tylenol, ibuprofen PRN for pain     Consults:  Neurology for history of MS, Chiari I malformation in context of psychiatric symptoms     Checklist     Legal Status: Voluntary     Safety Assessment:   Behavioral Orders   Procedures    Code 1 - Restrict to Unit    Routine Programming     As clinically indicated    Status 15     Every 15 minutes.       Risk Assessment:  Risk for harm is moderate-high.  Risk factors: psychosis, hx of substance use  Protective factors: group home staff, no identified history of attempts, doesn't live alone, family support, children     SIO: none    Disposition: TBD. Disposition pending stabilization, medication optimization, & development of a safe discharge plan.     Attestations     This patient was seen and discussed with my attending physician.  Robel Cho, MS3  [employer]    I was present with the medical student who participated in the service and in the documentation of the note. I have verified the history and personally performed the physical exam and medical decision making. I agree with the assessment and plan of care as documented in the note and have made changes to the note as appropriate.     Shanti Waters, PGY-1 Psychiatry      Attestation:  This patient has been seen and evaluated by me, Wilmer Cartagena MD.  I have discussed this patient with the house staff team including the resident and medical student and I agree with the findings and plan in this note.    I have reviewed today's vital signs, medications, labs and imaging. Wilmer Cartagena MD , PhD.

## 2024-05-17 NOTE — PLAN OF CARE
Team Note Due:  Thursday    Assessment/Intervention/Current Symtoms and Care Coordination:  Chart review and met with team, discussed pt progress, symptomology, and response to treatment.  Discussed the discharge plan and any potential impediments to discharge.    Met with pt in her room to check in. Pt states she's feeling very anxious being here as she's fearful around other patients, stating she feels more comfortable in her room right now. Validated feelings with being in the hospital and encouraged she interact with peers/staff and attend groups as she feels comfortable. Denied any other questions/concerns at this time.     Discharge Plan or Goal:  Pending stabilization of symptoms and safe disposition planning.     Barriers to Discharge:  Patient requires further psychiatric stabilization due to current symptomology, medication management with changes subject to provider, coordination with outside supports, and aftercare planning.     Referral Status:  None at this time     Legal Status:  Voluntary    Contacts:  Elisa Gomez, grandmother, [phone].    Kalie Ordoñezs, [phone], Aunt, Emergency Contact      Upcoming Meetings and Dates/Important Information and next steps:  Coordinate discharge planning

## 2024-05-18 ENCOUNTER — APPOINTMENT (OUTPATIENT)
Dept: MRI IMAGING | Facility: CLINIC | Age: 34
End: 2024-05-18
Payer: COMMERCIAL

## 2024-05-18 PROCEDURE — 250N000013 HC RX MED GY IP 250 OP 250 PS 637

## 2024-05-18 PROCEDURE — 70551 MRI BRAIN STEM W/O DYE: CPT | Mod: 26 | Performed by: RADIOLOGY

## 2024-05-18 PROCEDURE — 70551 MRI BRAIN STEM W/O DYE: CPT | Mod: 52

## 2024-05-18 PROCEDURE — 124N000002 HC R&B MH UMMC

## 2024-05-18 PROCEDURE — 250N000013 HC RX MED GY IP 250 OP 250 PS 637: Performed by: STUDENT IN AN ORGANIZED HEALTH CARE EDUCATION/TRAINING PROGRAM

## 2024-05-18 RX ADMIN — ESCITALOPRAM OXALATE 10 MG: 10 TABLET ORAL at 08:40

## 2024-05-18 RX ADMIN — NICOTINE POLACRILEX 2 MG: 2 LOZENGE ORAL at 06:37

## 2024-05-18 RX ADMIN — LORAZEPAM 1 MG: 1 TABLET ORAL at 07:05

## 2024-05-18 RX ADMIN — OLANZAPINE 15 MG: 15 TABLET, ORALLY DISINTEGRATING ORAL at 20:15

## 2024-05-18 RX ADMIN — IBUPROFEN 600 MG: 600 TABLET ORAL at 08:51

## 2024-05-18 RX ADMIN — GABAPENTIN 600 MG: 300 CAPSULE ORAL at 08:40

## 2024-05-18 RX ADMIN — GABAPENTIN 600 MG: 300 CAPSULE ORAL at 20:14

## 2024-05-18 RX ADMIN — GABAPENTIN 600 MG: 300 CAPSULE ORAL at 14:10

## 2024-05-18 RX ADMIN — BUPRENORPHINE 8 MG: 8 TABLET SUBLINGUAL at 20:15

## 2024-05-18 RX ADMIN — ACETAMINOPHEN 650 MG: 325 TABLET, FILM COATED ORAL at 14:10

## 2024-05-18 RX ADMIN — BUPRENORPHINE 8 MG: 8 TABLET SUBLINGUAL at 08:40

## 2024-05-18 RX ADMIN — ACETAMINOPHEN 650 MG: 325 TABLET, FILM COATED ORAL at 20:15

## 2024-05-18 RX ADMIN — BUSPIRONE HYDROCHLORIDE 10 MG: 10 TABLET ORAL at 08:40

## 2024-05-18 RX ADMIN — OLANZAPINE 5 MG: 5 TABLET, FILM COATED ORAL at 08:51

## 2024-05-18 RX ADMIN — BUPRENORPHINE 8 MG: 8 TABLET SUBLINGUAL at 14:10

## 2024-05-18 RX ADMIN — LORAZEPAM 1 MG: 1 TABLET ORAL at 16:29

## 2024-05-18 RX ADMIN — HYDROXYZINE HYDROCHLORIDE 25 MG: 25 TABLET ORAL at 14:10

## 2024-05-18 RX ADMIN — DOCUSATE SODIUM 100 MG: 100 CAPSULE, LIQUID FILLED ORAL at 08:40

## 2024-05-18 RX ADMIN — BUSPIRONE HYDROCHLORIDE 10 MG: 10 TABLET ORAL at 20:15

## 2024-05-18 RX ADMIN — IBUPROFEN 600 MG: 600 TABLET ORAL at 18:23

## 2024-05-18 ASSESSMENT — ACTIVITIES OF DAILY LIVING (ADL)
ADLS_ACUITY_SCORE: 41
HYGIENE/GROOMING: HANDWASHING;SHOWER;INDEPENDENT
ORAL_HYGIENE: INDEPENDENT
DRESS: SCRUBS (BEHAVIORAL HEALTH);INDEPENDENT;STREET CLOTHES
ADLS_ACUITY_SCORE: 41
ORAL_HYGIENE: INDEPENDENT;PROMPTS
LAUNDRY: WITH SUPERVISION
ADLS_ACUITY_SCORE: 41
DRESS: INDEPENDENT
ADLS_ACUITY_SCORE: 41
LAUNDRY: WITH SUPERVISION
ADLS_ACUITY_SCORE: 41
HYGIENE/GROOMING: INDEPENDENT

## 2024-05-18 NOTE — PLAN OF CARE
"Goal Outcome Evaluation:    Plan of Care Reviewed With: patient Plan of Care Reviewed With: patient    Overall Patient Progress: improvingOverall Patient Progress: improving       Problem: Adult Behavioral Health Plan of Care  Goal: Plan of Care Review  Outcome: Progressing  Flowsheets  Taken 5/18/2024 1419  Plan of Care Reviewed With: patient  Overall Patient Progress: improving  Patient Agreement with Plan of Care: agrees  Taken 5/18/2024 1100  Patient Agreement with Plan of Care: agrees       Behavioral  Pt was up and more visible on the unit this shift. She was presented with brighter affect. Pt mood remains labile with no acute event this shift. Pt seeking for staff and medications frequently. Appeared guarded, suspicious. Pt ate both breakfast and lunch in the dining room. Pt appetite is great. She is eating and hydrating adequately. Ate 100% for both breakfast and lunch. Compliant with medications. Attending to ADL's independently. Pt speech is pressured; Endorsed anxiety 9, depression 7/10. Pt denied SI, SIB, HI. Denied hallucinations. Pt is contract for safety.      Medical  Pt complained of generalized pain/discomfort this shift. Endorsed 6/10. Scheduled and PRN medications were administered and were somewhat effective. Pain re assessments 4/10. Blood pressure 130/76, pulse 88, temperature 98.2  F (36.8  C), resp. rate 16, height 1.651 m (5' 5\"), weight (!) 155.9 kg (343 lb 12.8 oz), SpO2 95%, not currently breastfeeding.    PRNS:  - Ativan  - Hydroxyzine   - Ibuprofen   - Tylenol  - Zyprexa    Pt is suppose to have MRI. Check list is completed and code 2 is done. Pt would like to have Ativan before the MRI. Pt had PRN Ativan this morning and it is scheduled BID PRN. The provider ok for pt to have PRN Ativan not less than 4 pm. MRI dept will call after 4 pm.        "

## 2024-05-18 NOTE — PLAN OF CARE
Rehab Group    Start time: 13:15  End time: 14:00  Patient time total: 25 minutes    attended partial group     #3 attended   Group Type: occupational therapy and recreation   Group Topic Covered: coping skills, emotional regulation, social skills, and healthy leisure time     Group Session Detail:  Pt actively participated in a structured occupational therapy group with a focus on facilitating social and leisure engagement via Skip-Lucas interactive activity. This occupational therapy group was facilitated in order to: foster relaxation, explore leisure opportunities, cope with stress, decrease isolation/improve social skills, and exercise overall cognitive skills.    Patient Response/Contribution:  Cooperative with task, Confronted peers appropriately , Listened actively, Attentive, and Actively engaged     Patient Detail:  Prior to start of group, patient sought out writer/staff with multiple inquires (can I get a book to read, can I bring food into group, what is this group/can I come, can I get a fidget, etc). Patient was receptive and did appear grateful for the relay of information provided. Patient entered group late and somewhat abruptly inserted self within the group dynamic. Patient exhibited moments of hyper-verbality, asking questions and clarification when other's were speaking. Patient did listen actively to instructions of task and her understanding of instructions did improve with repetition. However, patient still required consistent verbal cueing as game progressed and reassurance of appropriate game moves. Patient did offer occasional positive feedback to peers on successful game moves; otherwise, minimal social engagement. Patient appeared somewhat restless and was unable to remain in group until cessation of game. Patient was overall pleasant and cooperative; no safety or behavioral concerns identified in group.        Patient Active Problem List   Diagnosis    Abnormal liver function test     Acute cholangitis (H28)    Anxiety disorder    Arnold-Chiari malformation (H)    Calculus of bile duct without obstruction    Depression    Multiple sclerosis (H)    Syringomyelia (H)    Tobacco use disorder    Schizophrenia spectrum disorder with psychotic disorder type not yet determined (H)

## 2024-05-18 NOTE — PLAN OF CARE
Patient was observed sleeping at the beginning of the shift, she was able to independently request to take a shower and changed her brigette, she did laundry without prompts, she reported generalized pain level of 8/10, Ibuprofen 600 mg was administered, she reported high anxiety and requested 1 mg prn Ativan, she presented with tearful sad affect, however she was able to communicate effectively, speech was hyper-verbal however clear and coherent, physically she appeared well kept and clean, she was medication complaint and adherent to treatment plan, denied all other psych symptoms, she presented with paranoia and suspicion that others are out to get her.   Problem: Adult Behavioral Health Plan of Care  Goal: Adheres to Safety Considerations for Self and Others  Outcome: Progressing  Flowsheets (Taken 5/17/2024 2020)  Adheres to Safety Considerations for Self and Others: making progress toward outcome  Intervention: Develop and Maintain Individualized Safety Plan  Recent Flowsheet Documentation  Taken 5/17/2024 2015 by Kay Jarrell RN  Safety Measures:   environmental rounds completed   safety rounds completed     Problem: Adult Behavioral Health Plan of Care  Goal: Optimized Coping Skills in Response to Life Stressors  Outcome: Progressing  Flowsheets (Taken 5/17/2024 2020)  Optimized Coping Skills in Response to Life Stressors: making progress toward outcome   Goal Outcome Evaluation:    Plan of Care Reviewed With: patient

## 2024-05-18 NOTE — PLAN OF CARE
Towards the end of this shift at around 0645 am , pt did request and given Ativan 1 mg as ordered. Pt endorsed anxiety 6/10 and depression 4/10. Denied pain. Denied MH sx at this time. Pt appeared calm and sleepy as she also requested and given Lozenges. Pt currently sited at the dinning area taking her morning coffee. No concerns noted while doing 15 minutes safety checks this shift.  Pt appears to have slept for 6 hours; will continue to monitor and offer support.     Problem: Sleep Disturbance  Goal: Adequate Sleep/Rest  Outcome: Progressing   Goal Outcome Evaluation:

## 2024-05-19 PROCEDURE — 250N000013 HC RX MED GY IP 250 OP 250 PS 637

## 2024-05-19 PROCEDURE — 124N000002 HC R&B MH UMMC

## 2024-05-19 PROCEDURE — 250N000013 HC RX MED GY IP 250 OP 250 PS 637: Performed by: STUDENT IN AN ORGANIZED HEALTH CARE EDUCATION/TRAINING PROGRAM

## 2024-05-19 RX ORDER — LANOLIN ALCOHOL/MO/W.PET/CERES
3 CREAM (GRAM) TOPICAL
Status: DISCONTINUED | OUTPATIENT
Start: 2024-05-20 | End: 2024-06-11 | Stop reason: HOSPADM

## 2024-05-19 RX ORDER — LANOLIN ALCOHOL/MO/W.PET/CERES
6 CREAM (GRAM) TOPICAL
Status: DISCONTINUED | OUTPATIENT
Start: 2024-05-19 | End: 2024-05-19

## 2024-05-19 RX ORDER — LOPERAMIDE HCL 2 MG
2 CAPSULE ORAL 4 TIMES DAILY PRN
Status: DISCONTINUED | OUTPATIENT
Start: 2024-05-19 | End: 2024-06-11 | Stop reason: HOSPADM

## 2024-05-19 RX ADMIN — BUPRENORPHINE 8 MG: 8 TABLET SUBLINGUAL at 09:11

## 2024-05-19 RX ADMIN — BUPRENORPHINE 8 MG: 8 TABLET SUBLINGUAL at 20:13

## 2024-05-19 RX ADMIN — GABAPENTIN 600 MG: 300 CAPSULE ORAL at 20:13

## 2024-05-19 RX ADMIN — OLANZAPINE 15 MG: 15 TABLET, ORALLY DISINTEGRATING ORAL at 20:13

## 2024-05-19 RX ADMIN — LORAZEPAM 1 MG: 1 TABLET ORAL at 10:39

## 2024-05-19 RX ADMIN — BUSPIRONE HYDROCHLORIDE 10 MG: 10 TABLET ORAL at 20:13

## 2024-05-19 RX ADMIN — ZOLPIDEM TARTRATE 10 MG: 5 TABLET, COATED ORAL at 22:18

## 2024-05-19 RX ADMIN — ESCITALOPRAM OXALATE 10 MG: 10 TABLET ORAL at 09:11

## 2024-05-19 RX ADMIN — BUSPIRONE HYDROCHLORIDE 10 MG: 10 TABLET ORAL at 09:13

## 2024-05-19 RX ADMIN — GABAPENTIN 600 MG: 300 CAPSULE ORAL at 09:11

## 2024-05-19 RX ADMIN — IBUPROFEN 600 MG: 600 TABLET ORAL at 17:20

## 2024-05-19 RX ADMIN — HYDROXYZINE HYDROCHLORIDE 25 MG: 25 TABLET ORAL at 05:17

## 2024-05-19 RX ADMIN — BUPRENORPHINE 8 MG: 8 TABLET SUBLINGUAL at 14:09

## 2024-05-19 RX ADMIN — LOPERAMIDE HYDROCHLORIDE 2 MG: 2 CAPSULE ORAL at 20:13

## 2024-05-19 RX ADMIN — OLANZAPINE 5 MG: 5 TABLET, FILM COATED ORAL at 09:24

## 2024-05-19 RX ADMIN — Medication 6 MG: at 21:07

## 2024-05-19 RX ADMIN — GABAPENTIN 600 MG: 300 CAPSULE ORAL at 14:08

## 2024-05-19 RX ADMIN — DOCUSATE SODIUM 100 MG: 100 CAPSULE, LIQUID FILLED ORAL at 09:11

## 2024-05-19 RX ADMIN — MELATONIN TAB 3 MG 3 MG: 3 TAB at 01:06

## 2024-05-19 RX ADMIN — NICOTINE POLACRILEX 2 MG: 2 LOZENGE ORAL at 14:10

## 2024-05-19 RX ADMIN — NICOTINE POLACRILEX 2 MG: 2 LOZENGE ORAL at 12:38

## 2024-05-19 RX ADMIN — HYDROXYZINE HYDROCHLORIDE 25 MG: 25 TABLET ORAL at 01:06

## 2024-05-19 ASSESSMENT — ACTIVITIES OF DAILY LIVING (ADL)
ORAL_HYGIENE: INDEPENDENT
DRESS: SCRUBS (BEHAVIORAL HEALTH);INDEPENDENT
ADLS_ACUITY_SCORE: 41
ADLS_ACUITY_SCORE: 41
DRESS: SCRUBS (BEHAVIORAL HEALTH);INDEPENDENT
ADLS_ACUITY_SCORE: 41
ORAL_HYGIENE: INDEPENDENT
ADLS_ACUITY_SCORE: 41
HYGIENE/GROOMING: INDEPENDENT
ADLS_ACUITY_SCORE: 41
DRESS: INDEPENDENT
ADLS_ACUITY_SCORE: 41
HYGIENE/GROOMING: INDEPENDENT
ADLS_ACUITY_SCORE: 41
LAUNDRY: WITH SUPERVISION
ADLS_ACUITY_SCORE: 41
ADLS_ACUITY_SCORE: 41
HYGIENE/GROOMING: INDEPENDENT
ORAL_HYGIENE: INDEPENDENT
ADLS_ACUITY_SCORE: 41

## 2024-05-19 NOTE — PLAN OF CARE
Patient was out visible constantly seeking out staff requesting snacks and medications, she complained of pain level of 9/10 on lower back and lower bilateral extremities that was somewhat relieved with medication management , she was sent down to MRI post PRN ativan 1 mg administration however she was restless and they sent her back to the unit, she ate dinner and snacked a lot reporting that the dinner she ordered was not satisfactory, writer was able to educate patient that she can request double portion and she was receptive, she continues to endorse high anxiety and moderate depressive symptoms, affect was bright consistent with mood, she denied other psych symptoms, she was medication compliant and adherent with treatment regimen, hygiene was adequate.     Problem: Adult Behavioral Health Plan of Care  Goal: Adheres to Safety Considerations for Self and Others  Outcome: Progressing  Flowsheets (Taken 5/18/2024 2133)  Adheres to Safety Considerations for Self and Others: making progress toward outcome     Problem: Adult Behavioral Health Plan of Care  Goal: Optimized Coping Skills in Response to Life Stressors  Outcome: Progressing  Flowsheets (Taken 5/18/2024 2133)  Optimized Coping Skills in Response to Life Stressors: making progress toward outcome   Goal Outcome Evaluation:    Plan of Care Reviewed With: patient

## 2024-05-19 NOTE — PLAN OF CARE
At the beginning of the shift, pt endorsed anxiety 6/10, denied depression and pain. PRN Atarax was given for anxiety; requested and given Melatonin to promote sleep. PRNs given were helpful as pt was able to fall asleep thereafter. Pt appears to have slept for 4.75 hours; will continue to monitor and offer support.      Problem: Sleep Disturbance  Goal: Adequate Sleep/Rest  Outcome: Progressing   Goal Outcome Evaluation:

## 2024-05-19 NOTE — PLAN OF CARE
"  Problem: Adult Behavioral Health Plan of Care  Goal: Optimized Coping Skills in Response to Life Stressors  Outcome: Progressing     Pt presented as alert and oriented to place and self throughout shift, however seems somewhat disoriented to their situation..  She was intermittently visible in the milieu during the day, watching TV in lounge or talking to select staff.  She was dressed appropriately and appeared adequately hygenic.  Pt is eating and drinking adequately.  She was compliant with her scheduled medications  Pt received 5 mg Zyprexa and 1 mg ativan PO PRN today around 0925 (Zyprexa) and then 1040 (ativan) for anxiety that was not controlled by other means, such as talking to staff, distraction and music.  She also stated that she has not had a BM \"since I got here\" and requested prune juice.  No report back on the effectiveness at this time.  Pt endorsed \"very high\" anxiety but no depression.  She did not endorse any symptoms of psychosis.  Pt states she is having some pain in he feet and knees, however declined interventions, mentioning that it could be \"just because I'm tire or how I slept.\"  No side effects to medications noted this shift.    "

## 2024-05-19 NOTE — PLAN OF CARE
"Assumed care for pt at 11 AM.    Speech is linear and logical. Endorses anxiety at a 6-7/10, down from 10/10 earlier this morning, with patient stating that she had a panic attack and that the PRN ativan that she received was effective at reducing anxiety. Pt reports that she thinks that this panic was caused by Zyprexa, not reporting any other symptoms caused by this medication and none observed.    Denies A/VH, having any thoughts of harming self or others. Denies depression. States mood is \"chelsea\".    Pt endorsing foot pain that she thinks may be connected with unit's hard floor. No other pain reported. Denies having any acute physical concerns at this time.     AM vital signs, hypertensive, 156/87    Pt preemptively requesting for HS PRN Ambien, she reports that she had difficultly sleeping last night and is worried that she will forget to ask for it tonight.      BP (!) 156/87   Pulse 101   Temp 97.4  F (36.3  C) (Oral)   Resp 16   Ht 1.651 m (5' 5\")   Wt (!) 155.9 kg (343 lb 12.8 oz)   SpO2 96%   BMI 57.21 kg/m      "

## 2024-05-20 PROCEDURE — 250N000013 HC RX MED GY IP 250 OP 250 PS 637

## 2024-05-20 PROCEDURE — 250N000013 HC RX MED GY IP 250 OP 250 PS 637: Performed by: STUDENT IN AN ORGANIZED HEALTH CARE EDUCATION/TRAINING PROGRAM

## 2024-05-20 PROCEDURE — G0177 OPPS/PHP; TRAIN & EDUC SERV: HCPCS

## 2024-05-20 PROCEDURE — 90832 PSYTX W PT 30 MINUTES: CPT

## 2024-05-20 PROCEDURE — 124N000002 HC R&B MH UMMC

## 2024-05-20 PROCEDURE — 99232 SBSQ HOSP IP/OBS MODERATE 35: CPT | Mod: GC | Performed by: PSYCHIATRY & NEUROLOGY

## 2024-05-20 RX ORDER — ZOLPIDEM TARTRATE 5 MG/1
10 TABLET ORAL AT BEDTIME
Status: DISCONTINUED | OUTPATIENT
Start: 2024-05-20 | End: 2024-05-22

## 2024-05-20 RX ORDER — BUPRENORPHINE 8 MG/1
8 TABLET SUBLINGUAL 3 TIMES DAILY
Status: DISCONTINUED | OUTPATIENT
Start: 2024-05-20 | End: 2024-05-28

## 2024-05-20 RX ADMIN — NICOTINE POLACRILEX 2 MG: 2 LOZENGE ORAL at 18:36

## 2024-05-20 RX ADMIN — OLANZAPINE 10 MG: 10 TABLET, FILM COATED ORAL at 10:20

## 2024-05-20 RX ADMIN — GABAPENTIN 600 MG: 300 CAPSULE ORAL at 21:24

## 2024-05-20 RX ADMIN — BUPRENORPHINE 8 MG: 8 TABLET SUBLINGUAL at 18:32

## 2024-05-20 RX ADMIN — IBUPROFEN 600 MG: 600 TABLET ORAL at 15:20

## 2024-05-20 RX ADMIN — HYDROXYZINE HYDROCHLORIDE 25 MG: 25 TABLET ORAL at 09:04

## 2024-05-20 RX ADMIN — BUSPIRONE HYDROCHLORIDE 10 MG: 10 TABLET ORAL at 21:24

## 2024-05-20 RX ADMIN — GABAPENTIN 600 MG: 300 CAPSULE ORAL at 08:03

## 2024-05-20 RX ADMIN — NICOTINE POLACRILEX 2 MG: 2 LOZENGE ORAL at 11:27

## 2024-05-20 RX ADMIN — OLANZAPINE 15 MG: 15 TABLET, ORALLY DISINTEGRATING ORAL at 21:24

## 2024-05-20 RX ADMIN — ZOLPIDEM TARTRATE 10 MG: 5 TABLET, COATED ORAL at 21:24

## 2024-05-20 RX ADMIN — BUPRENORPHINE 8 MG: 8 TABLET SUBLINGUAL at 15:20

## 2024-05-20 RX ADMIN — LORAZEPAM 1 MG: 1 TABLET ORAL at 02:55

## 2024-05-20 RX ADMIN — LORAZEPAM 1 MG: 1 TABLET ORAL at 17:20

## 2024-05-20 RX ADMIN — BUPRENORPHINE 8 MG: 8 TABLET SUBLINGUAL at 08:03

## 2024-05-20 RX ADMIN — BUSPIRONE HYDROCHLORIDE 10 MG: 10 TABLET ORAL at 08:03

## 2024-05-20 RX ADMIN — ESCITALOPRAM OXALATE 10 MG: 10 TABLET ORAL at 08:03

## 2024-05-20 RX ADMIN — GABAPENTIN 600 MG: 300 CAPSULE ORAL at 15:20

## 2024-05-20 ASSESSMENT — ACTIVITIES OF DAILY LIVING (ADL)
ADLS_ACUITY_SCORE: 41
LAUNDRY: WITH SUPERVISION
ADLS_ACUITY_SCORE: 41
DRESS: INDEPENDENT
ADLS_ACUITY_SCORE: 41
ORAL_HYGIENE: INDEPENDENT
ADLS_ACUITY_SCORE: 41
ADLS_ACUITY_SCORE: 41
LAUNDRY: WITH SUPERVISION
ADLS_ACUITY_SCORE: 41
DRESS: INDEPENDENT
ADLS_ACUITY_SCORE: 41
HYGIENE/GROOMING: INDEPENDENT
ADLS_ACUITY_SCORE: 41
ORAL_HYGIENE: INDEPENDENT
ADLS_ACUITY_SCORE: 41
HYGIENE/GROOMING: INDEPENDENT

## 2024-05-20 NOTE — PLAN OF CARE
BEH IP Unit Acuity Rating Score (UARS)  Patient is given one point for every criteria they meet.    CRITERIA SCORING   On a 72 hour hold, court hold, committed, stay of commitment, or revocation. 0   Patient LOS on BEH unit exceeds 20 days. 0  LOS: 4   Patient under guardianship, 55+, otherwise medically complex, or under age 11. 0   Suicide ideation without relief of precipitating factors. 0   Current plan for suicide. 0   Current plan for homicide. 0   Imminent risk or actual attempt to seriously harm another without relief of factors precipitating the attempt. 0   Severe dysfunction in daily living (ex: complete neglect for self care, extreme disruption in vegetative function, extreme deterioration in social interactions). 1   Recent (last 7 days) or current physical aggression in the ED or on unit. 0   Restraints or seclusion episode in past 72 hours. 0   Recent (last 7 days) or current verbal aggression, agitation, yelling, etc., while in the ED or unit. 1   Active psychosis. 1   Need for constant or near constant redirection (from leaving, from others, etc).  0   Intrusive or disruptive behaviors. 0   Patient requires 3 or more hours of individualized nursing care per 8-hour shift (i.e. for ADLs, meds, therapeutic interventions). 0   TOTAL 3

## 2024-05-20 NOTE — PLAN OF CARE
Pt has been visible in the milieu, she attends groups that are offered and is social with peers. Pt presents with full range affect and is pleasant upon approach. She still voices paranoid thoughts at times, tells writer that she can't go back to her apartment because it's bugged. Pt endorses high anxiety and depression, denies other mental health symptoms. She is med compliant, received PRN hydroxyzine and zyprexa for anxiety/agitation. She took a shower this shift and appetite has been adequate. Pt had 1x incident of urinary incontinence this morning.    Problem: Psychotic Signs/Symptoms  Goal: Increased Participation and Engagement (Psychotic Signs/Symptoms)  Outcome: Progressing   Goal Outcome Evaluation:    Plan of Care Reviewed With: patient

## 2024-05-20 NOTE — PROGRESS NOTES
Conversation with assisted living: Sergio Dunn ([facility] residential ): [phone]:    Can you tell us what was going on at the group home?    For how long?    Have you seen behavior like this before?       What is she like at baseline?     Taking Medications?    MS Medication?   Can someone bring it in?     Daughter?     Contact information for Aunt and grandmother?

## 2024-05-20 NOTE — PLAN OF CARE
Pt endorsed anxiety 7/10, denied depression and pain. PRN Ativan 1 mg  was given for anxiety. PRNs given was helpful. Pt appears to have slept for 6.50 hours; will continue to monitor and offer support.      Problem: Sleep Disturbance  Goal: Adequate Sleep/Rest  Outcome: Progressing   Goal Outcome Evaluation:

## 2024-05-20 NOTE — PLAN OF CARE
Problem: Adult Behavioral Health Plan of Care  Goal: Plan of Care Review  Outcome: Progressing  Flowsheets (Taken 5/19/2024 1645)  Patient Agreement with Plan of Care: agrees   Goal Outcome Evaluation:    Plan of Care Reviewed With: patient          Pt was out in the milieu beginning of the shift. Affect was bright and pleasant. She socialized and engaged with selected peers. She was observed coloring and reading a book this shift. She did not show any signs of anxiety or depression. Her mood was very calm. At that time, pt stated she was doing okay. She denied pain and all psych symptoms. She contracted for safety. Her Vitals were WNL. She was able to make her needs known. Hygiene was good. Intake was adequate. She was out for dinner and snacks and had good food and fluids intake.    Later, in the shift, pt complained of having pain on her back. She rated pain 7/10. She was given prn Ibuprofen 600 mg which she said was helpful. She endorsed anxiety and depression 8/10. Pt was offer anxiety medication but she refused stating that she will take it at bed time. Pt complained of not sleeping well last night and will like to have her prn sleeping medication. This was given to her at bed time. Pt also complained of having loose stools. The residence on call, Dr. Lozano was notified and he started patient on prn Imodium 2 mg 4 times daily prn for diarrhea. This was given to pt and it was helpful. Her scheduled Colace was put on hold. Her melatonin was increased from 3 to 6 mg. Pt was given prn Melatonin 6 mg for insomnia. Later, it was decreased back to 3 mg. She also later had prn Ambien 10 mg for sleep. Pt is requesting to have her scheduled Subutex at dinner time and not at bed time. Pt said, it makes her very hungry at night so she has to get up and eats lots of snacks all night. Pt was told to talk to the team in the morning. No other concern or outburst behavior noted at this time. Will continue to monitor and will  give therapeutic support if need arise.

## 2024-05-20 NOTE — PROGRESS NOTES
Rehab Group    Start time: 1015  End time: 1200  Patient time total: 120 minutes    attended full group    #7 attended   Group Type: occupational therapy and OT Clinic   Group Topic Covered: balanced lifestyle, coping skills, problem solving, healthy leisure time, and activity therapy       Group Session Detail:  OT Clinic     Patient Response/Contribution:  Safe use of materials/group supplies and Actively engaged       Patient Detail:    Pt actively participated in occupational therapy clinic to facilitate coping skill exploration, creative expression within personally meaningful activities, and clinical observation of social, cognitive, and kinesthetic performance skills. Pt response: Needed assistance to initiate, gather materials, sequence, and adjust to workspace demands as needed. Demonstrated fair focus on task, however required moderate assistance planning, and problem solving for selected bracelet task. Asked for assistance and schedule frequently, and social with peers and staff.  Pt will continue to be encouraged to attend groups for further asssesssment and to address goals identified on plan of care.         Train & Education Service Per Session 45 + Minutes () OT Group code    Patient Active Problem List   Diagnosis    Abnormal liver function test    Acute cholangitis (H28)    Anxiety disorder    Arnold-Chiari malformation (H)    Calculus of bile duct without obstruction    Depression    Multiple sclerosis (H)    Syringomyelia (H)    Tobacco use disorder    Schizophrenia spectrum disorder with psychotic disorder type not yet determined (H)
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
## 2024-05-21 PROCEDURE — 250N000013 HC RX MED GY IP 250 OP 250 PS 637: Performed by: PSYCHIATRY & NEUROLOGY

## 2024-05-21 PROCEDURE — 250N000013 HC RX MED GY IP 250 OP 250 PS 637: Performed by: STUDENT IN AN ORGANIZED HEALTH CARE EDUCATION/TRAINING PROGRAM

## 2024-05-21 PROCEDURE — 99232 SBSQ HOSP IP/OBS MODERATE 35: CPT | Mod: GC | Performed by: PSYCHIATRY & NEUROLOGY

## 2024-05-21 PROCEDURE — 250N000013 HC RX MED GY IP 250 OP 250 PS 637

## 2024-05-21 PROCEDURE — 124N000002 HC R&B MH UMMC

## 2024-05-21 PROCEDURE — G0177 OPPS/PHP; TRAIN & EDUC SERV: HCPCS

## 2024-05-21 RX ORDER — HYDROXYZINE HYDROCHLORIDE 25 MG/1
25 TABLET, FILM COATED ORAL 3 TIMES DAILY
Status: DISCONTINUED | OUTPATIENT
Start: 2024-05-21 | End: 2024-05-23

## 2024-05-21 RX ADMIN — BUSPIRONE HYDROCHLORIDE 10 MG: 10 TABLET ORAL at 07:42

## 2024-05-21 RX ADMIN — BUPRENORPHINE 8 MG: 8 TABLET SUBLINGUAL at 18:43

## 2024-05-21 RX ADMIN — BUSPIRONE HYDROCHLORIDE 10 MG: 10 TABLET ORAL at 21:04

## 2024-05-21 RX ADMIN — ESCITALOPRAM OXALATE 10 MG: 10 TABLET ORAL at 07:42

## 2024-05-21 RX ADMIN — HYDROXYZINE HYDROCHLORIDE 25 MG: 25 TABLET, FILM COATED ORAL at 14:49

## 2024-05-21 RX ADMIN — HYDROXYZINE HYDROCHLORIDE 25 MG: 25 TABLET ORAL at 02:12

## 2024-05-21 RX ADMIN — HYDROXYZINE HYDROCHLORIDE 25 MG: 25 TABLET, FILM COATED ORAL at 21:04

## 2024-05-21 RX ADMIN — OLANZAPINE 15 MG: 15 TABLET, ORALLY DISINTEGRATING ORAL at 21:05

## 2024-05-21 RX ADMIN — HYDROXYZINE HYDROCHLORIDE 25 MG: 25 TABLET ORAL at 11:20

## 2024-05-21 RX ADMIN — OLANZAPINE 10 MG: 10 TABLET, FILM COATED ORAL at 12:40

## 2024-05-21 RX ADMIN — BUPRENORPHINE 8 MG: 8 TABLET SUBLINGUAL at 14:49

## 2024-05-21 RX ADMIN — LORAZEPAM 1 MG: 1 TABLET ORAL at 16:54

## 2024-05-21 RX ADMIN — NICOTINE POLACRILEX 2 MG: 2 LOZENGE ORAL at 20:48

## 2024-05-21 RX ADMIN — GABAPENTIN 600 MG: 300 CAPSULE ORAL at 21:04

## 2024-05-21 RX ADMIN — Medication 3 MG: at 21:04

## 2024-05-21 RX ADMIN — LORAZEPAM 1 MG: 1 TABLET ORAL at 04:50

## 2024-05-21 RX ADMIN — GABAPENTIN 600 MG: 300 CAPSULE ORAL at 14:49

## 2024-05-21 RX ADMIN — IBUPROFEN 600 MG: 600 TABLET ORAL at 07:43

## 2024-05-21 RX ADMIN — ZOLPIDEM TARTRATE 10 MG: 5 TABLET, COATED ORAL at 21:04

## 2024-05-21 RX ADMIN — GABAPENTIN 600 MG: 300 CAPSULE ORAL at 07:42

## 2024-05-21 RX ADMIN — BUPRENORPHINE 8 MG: 8 TABLET SUBLINGUAL at 07:42

## 2024-05-21 ASSESSMENT — ACTIVITIES OF DAILY LIVING (ADL)
ADLS_ACUITY_SCORE: 41
DRESS: INDEPENDENT
ORAL_HYGIENE: INDEPENDENT
ADLS_ACUITY_SCORE: 41
ORAL_HYGIENE: INDEPENDENT
ADLS_ACUITY_SCORE: 41
LAUNDRY: WITH SUPERVISION
ADLS_ACUITY_SCORE: 40
HYGIENE/GROOMING: SHOWER
ADLS_ACUITY_SCORE: 41
HYGIENE/GROOMING: INDEPENDENT
ADLS_ACUITY_SCORE: 41
LAUNDRY: WITH SUPERVISION
ADLS_ACUITY_SCORE: 41
HYGIENE/GROOMING: INDEPENDENT
DRESS: INDEPENDENT
ADLS_ACUITY_SCORE: 41

## 2024-05-21 NOTE — PLAN OF CARE
"Problem: Adult Behavioral Health Plan of Care  Goal: Plan of Care Review  Outcome: Progressing  Flowsheets (Taken 5/20/2024 1720)  Patient Agreement with Plan of Care: agrees   Goal Outcome Evaluation:    Plan of Care Reviewed With: patient          Pt was visible in the milieu most of the shift. She was observed socializing and interacting with selected peers. Her affect was flat but pleasant upon approach. She was observed coloring and reading. She put some of her pictures in her room and said they are good coping skills for her by just looking at the pictures. She also spent some time listening to music with head phone. Pt said it helps her with her anxiety. She was able to make her needs known. Hygiene was good. Intake adequate. She was out for dinner and snacks and ate 100%. She is very demanding and seeking out for staff all shift. She attended Community meeting at 4 pm but refused to attend psycho therapy group at 8 pm. Vitals were WNL.     She was cooperative with assessment. She denied pain all shift. She endorsed anxiety 5/10 and depression 8/10. She was given prn Ativan 1 mg which she said was helpful. She denied SI/SIB/HI. She endorsed auditory hallucination stating that, the voices are telling her, \"why are you here.\" Pt said that she will not act on those voices. She will let staff know if it gets worse. She contracted for safety saying that she feels more safer here than in her apartment. She was medication compliant. Prn Nicotine Lozenge was given x 1 this shift. No other concern noted this time. No safety concern noted at this time and no medication side effect reported or noted this shift. Will continue to monitor and will assist if need arise.          "

## 2024-05-21 NOTE — PLAN OF CARE
Pt endorsed anxiety 6/10, denied depression and pain. Denied any other mental Sx.  PRN Ativan 1 mg  was given @ 0200 and Atarax 25 mg at 0450 for anxiety. PRNs given was helpful. Pt appears to have slept for  5.75 hours; will continue to monitor and offer support.      Problem: Sleep Disturbance  Goal: Adequate Sleep/Rest  Outcome: Progressing   Goal Outcome Evaluation:

## 2024-05-21 NOTE — PROGRESS NOTES
"CLINICAL NUTRITION SERVICES - ASSESSMENT NOTE     Nutrition Prescription    RECOMMENDATIONS FOR MDs/PROVIDERS TO ORDER:  None at this time    Malnutrition Status:    Patient does not meet two of the established criteria necessary for diagnosing malnutrition    Recommendations already ordered by Registered Dietitian (RD):  -Cottage cheese w/ pineapple @ 10am, pineapple/grapes and turkey + cheese sandwich @ 2pm  -Double portions    Future/Additional Recommendations:  RD to sign off at this time, but will remain available by consult if new nutrition problem arises.       REASON FOR ASSESSMENT  Irma Heller is a/an 33 year old female assessed by the dietitian for Provider Order - Meal supplements - Patient interested in additional meals, snacks. Increased appetite with new Zyprexa medication. Please evaluate and supplement accordingly.     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 Admitted from ED 5/16/24 due to concern for psychosis.     NUTRITION HISTORY  RD met with Irma in the milieu who reports an increased appetite since admission, she reports that she doesn't typically eat 3 meals per day at home and eating this frequently has made her hungrier. Note pt has new prescription for Zyprexa which is likely contributing to appetite. Pt is agreeable to scheduled snacks BID and continuing order for double portions. No concerns reported at this time.     GI: Pt denied N/V/D/C     CURRENT NUTRITION ORDERS  Diet: Regular (double portions)  Supplement: none  Intake/Tolerance: eating and drinking adequately per RN notes    LABS  Hgb A1c 7H  ALT 56H    MEDICATIONS  Adderall XR (held) adderall (held), colace (held), lexapro, zyprexa, ambien, atarax prn    ANTHROPOMETRICS  Height: 165.1 cm (5' 5\")  Most Recent Weight: 155.9 kg (343 lb 12.8 oz)  IBW: 56.8 kg   BMI: Obesity Grade III BMI >40  Weight History:   Wt " Readings from Last 15 Encounters:   05/16/24 155.9 kg (343 lb 12.8 oz)   05/25/22 156.6 kg (345 lb 3.2 oz)   03/16/22 159.3 kg (351 lb 3.2 oz)   03/15/22 150.6 kg (332 lb)   03/14/22 150.8 kg (332 lb 6 oz)   01/25/22 149.1 kg (328 lb 12.8 oz)   01/24/22 151.5 kg (334 lb)   Limited wt hx available, no wt changes noted    Dosing Weight: 81 kg (adjusted)    ASSESSED NUTRITION NEEDS  Estimated Energy Needs: 1705 - 2170 kcals/day (11 - 14 kcals/kg)  Justification: Obese  Estimated Protein Needs: 124 - 155 grams protein/day (0.8 - 1 grams of pro/kg)  Justification: Obesity guidelines  Estimated Fluid Needs: 1 mL/kcal  Justification: Maintenance or Per provider    PHYSICAL FINDINGS  See malnutrition section below.  Anurag: 23  No abnormal nutrition-related physical findings observed.     MALNUTRITION  % Intake: No decreased intake noted  % Weight Loss: None noted  Subcutaneous Fat Loss: None observed  Muscle Loss: None observed  Fluid Accumulation/Edema: None noted  Malnutrition Diagnosis: Patient does not meet two of the established criteria necessary for diagnosing malnutrition    NUTRITION DIAGNOSIS  No nutrition diagnosis at this time     INTERVENTIONS  Implementation  Nutrition Education: RD role in care   Modify composition of meals/snacks     Goals  Patient to consume % of nutritionally adequate meal trays TID, or the equivalent with supplements/snacks.     Monitoring/Evaluation  RD to sign off at this time, but will remain available by consult if new nutrition problem arises.      Nia Martinez, MPH, RDN, LD  Behavioral Health Adult & Pediatric Dietitian  Contact via [employer] or [employer] Phone: [phone]

## 2024-05-21 NOTE — PLAN OF CARE
"Pt has been visible in the milieu, presents with a full range affect. She is social with peers and attends OT groups. Endorses anxiety and depression rated 8/10, endorses AH of \"people telling her she doesn't need to be here;\" denies VH and SI/SIB. Pt is med compliant without issue; PRNs received this shift: hydroxyzine, zyprexa, ibuprofen for generalized pain rated 10/10. Pt has had slightly less requests from staff this shift. Intake and hygiene are appropriate, no other concerns at this time.    Problem: Psychotic Signs/Symptoms  Goal: Increased Participation and Engagement (Psychotic Signs/Symptoms)  Outcome: Progressing   Goal Outcome Evaluation:    Plan of Care Reviewed With: patient                   "

## 2024-05-21 NOTE — PROGRESS NOTES
"  ----------------------------------------------------------------------------------------------------------  [facility]  Psychiatry Progress Note  Hospital Day #5     Interim History:     The patient's care was discussed with the treatment team and chart notes were reviewed.    Vitals: /77   Pulse 99   Temp 97.6  F (36.4  C)   Resp 16   Ht 1.651 m (5' 5\")   Wt (!) 155.9 kg (343 lb 12.8 oz)   SpO2 95%   BMI 57.21 kg/m    Sleep: 5.75 hours (05/21/24 0600)  Scheduled medications: Took all scheduled medications as prescribed  PRN medications:   - Hydroxyzine 25 mg x2 (yesterday morn and today early morn)  - Ativan 1 mg x3 (including yesterday early morn)  - Zyprexa 10 mg  - ibuprofen 600 mg x2  - Nicotine lozenges     Staff Report/Interval Events  No acute behavioral events.     Patient continued to have paranoia throughout the day (reiterating that her apartment is bugged and she can't return there) but was visible in the milieu, interacting with peers, and participating in groups. She endorsed high levels of anxiety/depression but had no other mental health symptoms. In the afternoon, the patient required much reassurance from staff. She had auditory hallucinations which say \"why are you here?\" She states she will not act on the voices and let staff know if they worsen. Appetite was adequate throughout the day and she did shower in the morning. She was compliant with meds and received PRN as above for anxiety/depression and pain. Patient appeared to sleep 5.75 hrs last night but woke up several times, requiring her PRNs.    Of note patient, had an episode of urinary incontinence yesterday morning.     Subjective:     Patient Interview:  Irma reports that her anxiety continues to be high today, and further endorses having intrusive thoughts which started yesterday. She describes them as thoughts or voices telling her to leave or asking her \"why are you in " "here?\" These thoughts are intermittent and she is not currently having them. Her anxiety is increased due to being on the unit without her typical coping mechanisms. She states she likes to cook at home but is struggling to find things to do to keep her distracted. Recommended continuing current strategies from OT and meeting with therapy when able.     Irma has tried using various coping mechanisms since being on the unit including drawing/decorating her room, reading, and using techniques from OT. She was able to change her linen and shower. He sleep and appetite have been good as well. She did meet with nutrition who was able to coordinate some snack options.     Patient did have multiple questions for us which were discussed. She requested her hydroxyzine be scheduled as this is how she takes it at home. Discussed changing it to scheduled but keeping the PRN dose. Patient also asked about Adderall as she takes it for MS-related fatigue. We reiterated this could increase her anxiety and she was amenable to holding the dose. Lastly, patient was concerned about going days without her interferon. Updated her that it should be delivered tomorrow. Irma also mentioned being amenable to speaking to her , Riana tomorrow.     ROS:  Patient denies acute concerns     Objective:     Vitals:  /77   Pulse 99   Temp 97.6  F (36.4  C)   Resp 16   Ht 1.651 m (5' 5\")   Wt (!) 155.9 kg (343 lb 12.8 oz)   SpO2 95%   BMI 57.21 kg/m      Allergies:  Allergies   Allergen Reactions    Glatiramer Hives    Penicillins Anaphylaxis and Hives    Shellfish Allergy Anaphylaxis and Hives    Copaxone [Glatiramer Acetate] Swelling       Current Medications:  Scheduled:  Current Facility-Administered Medications   Medication Dose Route Frequency Provider Last Rate Last Admin    acetaminophen (TYLENOL) tablet 650 mg  650 mg Oral Q4H PRN Juju Salgado MD   650 mg at 05/18/24 2015    alum & mag " hydroxide-simethicone (MAALOX) suspension 30 mL  30 mL Oral Q4H PRN Juju Salgado MD        [Held by provider] amphetamine-dextroamphetamine (ADDERALL XR) 10 mg, amphetamine-dextroamphetamine (ADDERALL XR) 40 mg  50 mg Oral QAM Juju Salgado MD        [Held by provider] amphetamine-dextroamphetamine (ADDERALL) per tablet 40 mg  40 mg Oral Daily Juju Salgado MD        buprenorphine (SUBUTEX) sublingual tablet 8 mg  8 mg Sublingual TID Shanti Waters MD   8 mg at 05/21/24 0742    busPIRone (BUSPAR) tablet 10 mg  10 mg Oral BID Juju Salgado MD   10 mg at 05/21/24 0742    [Held by provider] docusate sodium (COLACE) capsule 100 mg  100 mg Oral Daily Juju Salgado MD   100 mg at 05/19/24 0911    escitalopram (LEXAPRO) tablet 10 mg  10 mg Oral Daily Juju Salgado MD   10 mg at 05/21/24 0742    gabapentin (NEURONTIN) capsule 600 mg  600 mg Oral TID Juju Salgado MD   600 mg at 05/21/24 0742    hydrOXYzine HCl (ATARAX) tablet 25 mg  25 mg Oral Q4H PRN Juju Salgado MD   25 mg at 05/21/24 0212    ibuprofen (ADVIL/MOTRIN) tablet 600 mg  600 mg Oral Q8H PRN Juju Salgado MD   600 mg at 05/21/24 0743    [Held by provider] interferon beta-1b (BETASERON) injection 0.3 mg  0.3 mg Subcutaneous Every Other Day Shanti Waters MD        loperamide (IMODIUM) capsule 2 mg  2 mg Oral 4x Daily PRN Ludmila Lozano MD   2 mg at 05/19/24 2013    LORazepam (ATIVAN) tablet 1 mg  1 mg Oral BID PRN Shanti Waters MD   1 mg at 05/21/24 0450    melatonin tablet 3 mg  3 mg Oral At Bedtime PRN Ludmila Lozano MD        naloxone (NARCAN) injection 0.2 mg  0.2 mg Intravenous Q2 Min PRN Juju Salgado MD        Or    naloxone (NARCAN) injection 0.4 mg  0.4 mg Intravenous Q2 Min PRN Juju Salgado MD        Or    naloxone (NARCAN) injection 0.2 mg  0.2 mg Intramuscular Q2 Min PRN Juju Salgado MD        Or    naloxone (NARCAN) injection 0.4 mg  0.4 mg Intramuscular Q2 Min PRN Juju Salgado,  MD        nicotine (COMMIT) lozenge 2 mg  2 mg Buccal Q2H PRN Jaren Zaman MD   2 mg at 05/20/24 1836    OLANZapine (zyPREXA) tablet 10 mg  10 mg Oral BID PRN Shanti Waters MD   10 mg at 05/20/24 1020    Or    OLANZapine (zyPREXA) injection 10 mg  10 mg Intramuscular TID PRN Shanti Waters MD        OLANZapine (zyPREXA) tablet 5 mg  5 mg Oral TID PRN Shanti Waters MD   5 mg at 05/19/24 0924    OLANZapine zydis (zyPREXA) ODT tab 15 mg  15 mg Oral At Bedtime Shanti Waters MD   15 mg at 05/20/24 2124    polyethylene glycol (MIRALAX) Packet 17 g  17 g Oral Daily PRN Juju Salgado MD        [Held by provider] SUMAtriptan (IMITREX) tablet 50 mg  50 mg Oral at onset of headache Juju Salgado MD        zolpidem (AMBIEN) tablet 10 mg  10 mg Oral At Bedtime Shanti Waters MD   10 mg at 05/20/24 2124       PRN:  Current Facility-Administered Medications   Medication Dose Route Frequency Provider Last Rate Last Admin    acetaminophen (TYLENOL) tablet 650 mg  650 mg Oral Q4H PRN Juju Salgado MD   650 mg at 05/18/24 2015    alum & mag hydroxide-simethicone (MAALOX) suspension 30 mL  30 mL Oral Q4H PRN Juju Salgado MD        [Held by provider] amphetamine-dextroamphetamine (ADDERALL XR) 10 mg, amphetamine-dextroamphetamine (ADDERALL XR) 40 mg  50 mg Oral QAM Juju Salgado MD        [Held by provider] amphetamine-dextroamphetamine (ADDERALL) per tablet 40 mg  40 mg Oral Daily Juju Salgado MD        buprenorphine (SUBUTEX) sublingual tablet 8 mg  8 mg Sublingual TID Shanti Waters MD   8 mg at 05/21/24 0742    busPIRone (BUSPAR) tablet 10 mg  10 mg Oral BID Juju Salgado MD   10 mg at 05/21/24 0742    [Held by provider] docusate sodium (COLACE) capsule 100 mg  100 mg Oral Daily Juju Salgado MD   100 mg at 05/19/24 0911    escitalopram (LEXAPRO) tablet 10 mg  10 mg Oral Daily Juju Salgado MD   10 mg at 05/21/24 0742    gabapentin (NEURONTIN) capsule  600 mg  600 mg Oral TID Juju Salgado MD   600 mg at 05/21/24 0742    hydrOXYzine HCl (ATARAX) tablet 25 mg  25 mg Oral Q4H PRN Juju Salgado MD   25 mg at 05/21/24 0212    ibuprofen (ADVIL/MOTRIN) tablet 600 mg  600 mg Oral Q8H PRN Juju Salgado MD   600 mg at 05/21/24 0743    [Held by provider] interferon beta-1b (BETASERON) injection 0.3 mg  0.3 mg Subcutaneous Every Other Day Shanti Waters MD        loperamide (IMODIUM) capsule 2 mg  2 mg Oral 4x Daily PRN Ludmila Lozano MD   2 mg at 05/19/24 2013    LORazepam (ATIVAN) tablet 1 mg  1 mg Oral BID PRN Shanti Waters MD   1 mg at 05/21/24 0450    melatonin tablet 3 mg  3 mg Oral At Bedtime PRN Ludmila Lozano MD        naloxone (NARCAN) injection 0.2 mg  0.2 mg Intravenous Q2 Min PRN Juju Salgado MD        Or    naloxone (NARCAN) injection 0.4 mg  0.4 mg Intravenous Q2 Min PRN Juju Salgado MD        Or    naloxone (NARCAN) injection 0.2 mg  0.2 mg Intramuscular Q2 Min PRN Juju Salgado MD        Or    naloxone (NARCAN) injection 0.4 mg  0.4 mg Intramuscular Q2 Min PRN Juju Salgado MD        nicotine (COMMIT) lozenge 2 mg  2 mg Buccal Q2H PRN Jaren Zaman MD   2 mg at 05/20/24 1836    OLANZapine (zyPREXA) tablet 10 mg  10 mg Oral BID PRN Shanti Waters MD   10 mg at 05/20/24 1020    Or    OLANZapine (zyPREXA) injection 10 mg  10 mg Intramuscular TID PRN Shanti Waters MD        OLANZapine (zyPREXA) tablet 5 mg  5 mg Oral TID PRN Shanti Waters MD   5 mg at 05/19/24 0924    OLANZapine zydis (zyPREXA) ODT tab 15 mg  15 mg Oral At Bedtime Shanti Waters MD   15 mg at 05/20/24 2124    polyethylene glycol (MIRALAX) Packet 17 g  17 g Oral Daily PRN Juju Salgado MD        [Held by provider] SUMAtriptan (IMITREX) tablet 50 mg  50 mg Oral at onset of headache Juju Salgado MD        zolpidem (AMBIEN) tablet 10 mg  10 mg Oral At Bedtime Shanti Waters MD   10 mg at 05/20/24 2124  "      Labs and Imaging:  New results:   No results found for this or any previous visit (from the past 24 hour(s)).    Data this admission:  - CBC unremarkable, WBC 13  - UDS positive for amphetamines, benzos (both prescribed)  - TSH normal  - HbA1c 7.0  - UPT negative  - Vit D normal  - Vit B12 normal  - Folate normal  - EKG with NSR, QTc 450 on 05/16  - MRI (5/18/24) showed stable MS plaques unchanged from previous on 8/1/22     Mental Status Exam:     Oriented to:  Grossly Oriented  General:  Awake and Alert; sitting on edge of bed in hospital scrubs  Appearance:  appears stated age and Grooming is adequate; tearful intermittently  Behavior/Attitude:  cooperative and suspicious; anxious  Eye Contact:  appropriate  Psychomotor: restless no catatonia present  Speech:  appropriate volume/tone; urgency of speech  Language: Fluent in English with appropriate syntax and vocabulary.  Mood:  \"Anxious\"  Affect:  somewhat labile, changes from calm demeanor to: congruent with mood, blunted, sad, and tearful; less labile than yesterday  Thought Process:  linear and coherent  Thought Content:  does not appear to be responding to internal stimuli, No VH, auditory hallucinations of intrusive thoughts saying \"why are you here?\", and delusions; delusions of paranoia; AH are intermittent, currently denies having the thoughts.   Associations:  intact  Insight:  impaired; wants to feel better, but feels that family \"did something to me\"  Judgment:  impaired due to acute psychosis  Impulse control: impaired  Attention Span:  grossly intact  Concentration:  grossly intact  Recent and Remote Memory:  not formally assessed  Fund of Knowledge:  delayed; not formally diagnosed  Muscle Strength and Tone: normal  Gait and Station: Normal     Psychiatric Assessment     Irma Heller is a 33 year old female previously diagnosed with anxiety, depression, and opioid dependence in remission who presented to the ED by EMS and admitted with " psychosis in the context of likely first-episode psychosis. No prior psychiatric hospitalizations. Significant symptoms on admission include delusions of paranoia, visual and tactile hallucinations, and increased anxiety. The MSE on admission was pertinent for paranoia, anxiety. Biological contributions to mental health presentation include hx of opioid use disorder (though this has been stable) as well as medical diagnosis of MS, Chiari malformation which are reportedly. Psychological contributions to mental health presentation include anxiety, depression. Social factors contributing to mental health presentation include poor social support outside of group home staff. Protective factors include good medication adherence and group home staff.      Patient's definitive diagnosis is still in evolution, differential for psychosis includes primary psychotic disorder (ie, schizophrenia, schizoaffective) or other psychiatric etiology vs. drug-induced psychosis vs. medical etiology of psychosis. Feel that this is likely a primary psychiatric cause in the context of UDS positive only for prescribed medication and no recent drug use.  However, consultation with neurology to evaluate medical etiology, further neuroimaging and further history from collateral would be indicated to get a better understand of patient's symptom timeline and course. Can also consider the role of her interferon in her psychosis. She will likely benefit from antipsychotics this admission.     Given that she currently has psychosis, patient warrants inpatient psychiatric hospitalization to maintain her safety     Psychiatric Plan by Diagnosis      Today's changes:  - Hydroxyzine regimen changed to: 25 mg TID + 25 mg PRN as-needed for anxiety.  - Will resume Betaseron injection 0.3 mg every other day  - CTC was able to get ahold of residential facility and also find [facility] . Patient is amenable to speaking with CM tomorrow and will plan to  call them. Also will plan to keep in touch with patient's residence once disposition is determined.   - Sergio Dunn ([facility] ): [phone]  - [facility] [phone]    # Psychosis  Patient was given olanzapine as well as IM Haldol/Versed in the ED which seemed to be effective. Plan is to continue olanzapine for stabilization of acute psychosis and monitoring for improvement.     Medications:  - Olanzapine 15 mg PO at bedtime  - Olanzapine 5 mg TID PRN  - Olanzapine 10 mg PRN for emergency agitation/aggression related to psychosis      # Anxiety/Depression  Patient has a history of anxiety which she states has been well managed by her home meds prior to this point. She has had an acute increase in her anxiety due to being overwhelmed with this new symptoms and fear for her safety. Will plan to continue her home medication regimen in tandem with the olanzapine.      Medications:  - Buspirone 10 mg bid  - Lexapro 10 mg daily  - Hydroxyzine 25 mg TID, 25 mg PRN  - Ativan 1 mg PRN     Pertinent Labs/Monitoring:   - CBC unremarkable   - UDS positive for amphetamines, benzos (both prescribed)  - HbA1c 7.0  - UPT negative  - EKG with NSR, QTc 450 on 05/16     Additional Plans:  - Patient will be treated in therapeutic milieu with appropriate individual and group therapies as described  - Plan to obtain collateral information from group home or family  - Update inpatient pharmacy when approximate discharge date is known     Psychiatric Hospital Course:      Irma Heller was admitted to [facility] on a 72 hour hold. Hold was discontinued shortly upon admission as pt agreed to stay voluntary. PTA buspirone, Lexapro, PRN Ativan and hydroxyzine were continued.  PTA Adderall was held to minimize any increased anxiety pending mood stabilization. New medications started at the time of admission include olanzapine. 10mg at night which pt has reported as being helpful  for sleep and anxiety to date.       Since her admission to the unit, Irma reported feeling safer in the new room. She stated she was able to eat the food and had no paranoia surrounding this. She had ongoing anxiety which was reduced with PRN Ativan/hydroxyzine. Patient was able to eat lunch in the milieu with the other patients following this. She denied SI/HI and hallucinations at that time. Given the reduction in symptoms in the context of ongoing paranoia and anxiety, plan is to continue olanzapine and monitor for improvement.     Neuro was consulted regarding the patient's history of MS, Chiari malformation and interferon beta-1b use. There were some cases reported of interferon causing psychosis, though this was discussed with neuro who had low suspician for this etiology due to being on the med for at least 2 years. They recommended we continue patient's interferon to prevent MS flares. However, interferon formulary is not available in our pharmacy so will have to use patient's supply. We will plan to reach out to patient's group home, however we do not have their name or contact information. Though patient adamantly requested us not to inform anyone she is on the unit, we feel she does not currently have capacity and there is a need for collateral information. She also has a daughter that was staying with her aunt per the ED note, and would like to check on her. Attempted to call the aunt at the listed number but it was out of service. Patient's grandmother did not answer. Plan to reassess patient and obtain group home information at a later time.    Our CTC was able to reach the patient's living facility ([facility]) and spoke with the , Sergio. She confirmed Irma resides in an independent living environment and is responsible for taking her own medications. Sergio states the patient is welcome back when ready but would like to see her first. She will be out of  office from 05/23 - 05/27 and requested an update prior to that. [facility] also spoke to the patient herself who again emphasized not wanting to return there. Patient continues to have paranoia surrounding her apartment, the staff, and her family. [facility] discussed with the patient that her CM could be a helpful resource to find discharge options if desired, however at that point the patient expressed not wanting the CM to know she was here. [facility] did reach out to [facility] and received contact info for Riana Galindo and [facility]. Going forward, will plan to obtain collateral from these sources and organize getting patient's interferon.     On 05/21, called [facility] to organize delivery of patient's interferon. [facility] mentioned the patient has not filled her interferon in 4 months. The initial plan was to have [facility] deliver to the hospital, however, while coordinating with behavioral inpt pharmacy, it turns out they are able to have it ordered for the patient here. Will start the interferon as soon as it is available. Patient also requested to have her hydroxyzine changed to scheduled as this is how she has it at home. Hydroxyzine changed to scheduled but will keep the PRN option.    The risks, benefits, alternatives, and side effects were discussed and understood by the patient.     Medical Assessment and Plan     Medical diagnoses to be addressed this admission:      #Elevated A1c  - A1c elevated to 7.0 on admission  - Will discuss with patient and provide outpatient follow-up    #MS  - Given the history of MS and Chiari malformation in the context of new psychosis.  - Plan is to obtain an MRI to rule out medical CNS cause of psychosis, but will wait for neuro.   - No acute concerns per neuro.   - Neuro ok with PTA interferon beta-1b and recommend continuing to minimize risk of MS flare. Interferon is ordered and will be delivered soon.   - Plan to hold Adderall and sumatriptan until mood and anxiety are  stabilized.      #Chiari Malformation  - Neuro consulted, no acute concerns at this time   - MRI pending     #Hx of iron deficiency anemia  - On Ferosul held at time of admission. Will clarify with pt and pharmacy about use      Medical course:   Patient was physically examined by the ED prior to being transferred to the unit and was found to be medically stable and appropriate for admission. Please see ED/Hospital course above for more information.      Medications:  - Melatonin and Ambien PRN for sleep  - Colace 100 mg daily, Miralax/Maalox PRN for constipation  - Subutex 8 mg TID for opioid dependence  - Tylenol, ibuprofen PRN for pain  - Betaceron 0.3 mg subcutaneous every other day     Consults:  Neurology for history of MS, Chiari I malformation in context of psychiatric symptoms     Checklist     Legal Status: Voluntary     Safety Assessment:   Behavioral Orders   Procedures    Code 1 - Restrict to Unit    Code 2    Routine Programming     As clinically indicated    Status 15     Every 15 minutes.       Risk Assessment:  Risk for harm is moderate-high.  Risk factors: psychosis, hx of substance use  Protective factors: group home staff, no identified history of attempts, doesn't live alone, family support, children     SIO: none    Disposition: TBD. Disposition pending stabilization, medication optimization, & development of a safe discharge plan.     Attestations     This patient was seen and discussed with my attending physician.  Robel Cho, MS3  [employer]    I was present with the medical student who participated in the service and in the documentation of the note. I have verified the history and personally performed the physical exam and medical decision making. I agree with the assessment and plan of care as documented in the note and have made changes to the note as appropriate.     I was present with the medical student who participated in the service and in the documentation of the note. I  have verified the history and personally performed the exam and medical decision making. I agree with the assessment and plan of care as documented in the note. Wilmer Cartagena M.D., Ph.D.

## 2024-05-21 NOTE — PLAN OF CARE
BEH IP Unit Acuity Rating Score (UARS)  Patient is given one point for every criteria they meet.    CRITERIA SCORING   On a 72 hour hold, court hold, committed, stay of commitment, or revocation. 0   Patient LOS on BEH unit exceeds 20 days. 0  LOS: 5   Patient under guardianship, 55+, otherwise medically complex, or under age 11. 0   Suicide ideation without relief of precipitating factors. 0   Current plan for suicide. 0   Current plan for homicide. 0   Imminent risk or actual attempt to seriously harm another without relief of factors precipitating the attempt. 0   Severe dysfunction in daily living (ex: complete neglect for self care, extreme disruption in vegetative function, extreme deterioration in social interactions). 1   Recent (last 7 days) or current physical aggression in the ED or on unit. 0   Restraints or seclusion episode in past 72 hours. 0   Recent (last 7 days) or current verbal aggression, agitation, yelling, etc., while in the ED or unit. 1   Active psychosis. 1   Need for constant or near constant redirection (from leaving, from others, etc).  0   Intrusive or disruptive behaviors. 0   Patient requires 3 or more hours of individualized nursing care per 8-hour shift (i.e. for ADLs, meds, therapeutic interventions). 0   TOTAL 3
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
## 2024-05-21 NOTE — PROGRESS NOTES
Rehab Group    Start time: 1015  End time: 1115  Patient time total: 60 minutes    attended full group    #6 attended   Group Type: occupational therapy   Group Topic Covered: coping skills     Group Session Detail:  OT clinic     Patient Response/Contribution:  Cooperative with task, Attentive, and Actively engaged     Patient Detail:    Pt actively participated in occupational therapy clinic to facilitate coping skill exploration, creative expression within personally meaningful activities, and clinical observation of social, cognitive, and kinesthetic performance skills. Pt response: Independent to initiate, gather materials, sequence, and adjust to workspace demands as needed. Demonstrated fair focus, planning, and attention to detail for selected structured creative expression task. Requested assistance in planning out the details for her task. Able to ask for assistance as needed, and frequently socialized with peers and staff. Affect appeared bright in interactions.        Train & Education Service Per Session 45 + Minutes () OT Group code    Patient Active Problem List   Diagnosis    Abnormal liver function test    Acute cholangitis (H28)    Anxiety disorder    Arnold-Chiari malformation (H)    Calculus of bile duct without obstruction    Depression    Multiple sclerosis (H)    Syringomyelia (H)    Tobacco use disorder    Schizophrenia spectrum disorder with psychotic disorder type not yet determined (H)

## 2024-05-22 PROCEDURE — 250N000013 HC RX MED GY IP 250 OP 250 PS 637

## 2024-05-22 PROCEDURE — 250N000012 HC RX MED GY IP 250 OP 636 PS 637

## 2024-05-22 PROCEDURE — 99232 SBSQ HOSP IP/OBS MODERATE 35: CPT | Mod: GC | Performed by: PSYCHIATRY & NEUROLOGY

## 2024-05-22 PROCEDURE — 124N000002 HC R&B MH UMMC

## 2024-05-22 PROCEDURE — 250N000013 HC RX MED GY IP 250 OP 250 PS 637: Performed by: STUDENT IN AN ORGANIZED HEALTH CARE EDUCATION/TRAINING PROGRAM

## 2024-05-22 PROCEDURE — 250N000013 HC RX MED GY IP 250 OP 250 PS 637: Performed by: PSYCHIATRY & NEUROLOGY

## 2024-05-22 PROCEDURE — 90834 PSYTX W PT 45 MINUTES: CPT

## 2024-05-22 RX ORDER — OLANZAPINE 15 MG/1
15 TABLET, ORALLY DISINTEGRATING ORAL AT BEDTIME
Status: DISCONTINUED | OUTPATIENT
Start: 2024-05-22 | End: 2024-06-03

## 2024-05-22 RX ORDER — ZOLPIDEM TARTRATE 5 MG/1
10 TABLET ORAL AT BEDTIME
Status: DISCONTINUED | OUTPATIENT
Start: 2024-05-22 | End: 2024-06-11 | Stop reason: HOSPADM

## 2024-05-22 RX ADMIN — GABAPENTIN 600 MG: 300 CAPSULE ORAL at 14:08

## 2024-05-22 RX ADMIN — GABAPENTIN 600 MG: 300 CAPSULE ORAL at 09:17

## 2024-05-22 RX ADMIN — HYDROXYZINE HYDROCHLORIDE 25 MG: 25 TABLET, FILM COATED ORAL at 14:08

## 2024-05-22 RX ADMIN — IBUPROFEN 600 MG: 600 TABLET ORAL at 12:30

## 2024-05-22 RX ADMIN — BUPRENORPHINE 8 MG: 8 TABLET SUBLINGUAL at 14:08

## 2024-05-22 RX ADMIN — ZOLPIDEM TARTRATE 10 MG: 5 TABLET, COATED ORAL at 20:10

## 2024-05-22 RX ADMIN — OLANZAPINE 15 MG: 15 TABLET, ORALLY DISINTEGRATING ORAL at 20:11

## 2024-05-22 RX ADMIN — BUSPIRONE HYDROCHLORIDE 10 MG: 10 TABLET ORAL at 20:11

## 2024-05-22 RX ADMIN — Medication 3 MG: at 21:28

## 2024-05-22 RX ADMIN — BUPRENORPHINE 8 MG: 8 TABLET SUBLINGUAL at 09:18

## 2024-05-22 RX ADMIN — INTERFERON BETA-1B 0.3 MG: KIT at 12:54

## 2024-05-22 RX ADMIN — BUPRENORPHINE 8 MG: 8 TABLET SUBLINGUAL at 17:02

## 2024-05-22 RX ADMIN — BUSPIRONE HYDROCHLORIDE 10 MG: 10 TABLET ORAL at 09:17

## 2024-05-22 RX ADMIN — ACETAMINOPHEN 650 MG: 325 TABLET, FILM COATED ORAL at 05:38

## 2024-05-22 RX ADMIN — OLANZAPINE 10 MG: 10 TABLET, FILM COATED ORAL at 09:28

## 2024-05-22 RX ADMIN — HYDROXYZINE HYDROCHLORIDE 25 MG: 25 TABLET, FILM COATED ORAL at 09:18

## 2024-05-22 RX ADMIN — NICOTINE POLACRILEX 2 MG: 2 LOZENGE ORAL at 11:20

## 2024-05-22 RX ADMIN — IBUPROFEN 600 MG: 600 TABLET ORAL at 20:34

## 2024-05-22 RX ADMIN — HYDROXYZINE HYDROCHLORIDE 25 MG: 25 TABLET ORAL at 06:57

## 2024-05-22 RX ADMIN — NICOTINE POLACRILEX 2 MG: 2 LOZENGE ORAL at 15:55

## 2024-05-22 RX ADMIN — GABAPENTIN 600 MG: 300 CAPSULE ORAL at 20:11

## 2024-05-22 RX ADMIN — HYDROXYZINE HYDROCHLORIDE 25 MG: 25 TABLET ORAL at 18:47

## 2024-05-22 RX ADMIN — HYDROXYZINE HYDROCHLORIDE 25 MG: 25 TABLET, FILM COATED ORAL at 20:00

## 2024-05-22 RX ADMIN — LORAZEPAM 1 MG: 1 TABLET ORAL at 12:30

## 2024-05-22 RX ADMIN — ESCITALOPRAM OXALATE 10 MG: 10 TABLET ORAL at 09:17

## 2024-05-22 RX ADMIN — ACETAMINOPHEN 650 MG: 325 TABLET, FILM COATED ORAL at 17:02

## 2024-05-22 ASSESSMENT — ACTIVITIES OF DAILY LIVING (ADL)
ADLS_ACUITY_SCORE: 40
HYGIENE/GROOMING: HANDWASHING;SHOWER;INDEPENDENT
ADLS_ACUITY_SCORE: 40
DRESS: STREET CLOTHES;SCRUBS (BEHAVIORAL HEALTH);INDEPENDENT
ADLS_ACUITY_SCORE: 40
ORAL_HYGIENE: INDEPENDENT;PROMPTS
LAUNDRY: WITH SUPERVISION
ADLS_ACUITY_SCORE: 40

## 2024-05-22 NOTE — PROGRESS NOTES
"  ----------------------------------------------------------------------------------------------------------  [facility]  Psychiatry Progress Note  Hospital Day #6     Interim History:     The patient's care was discussed with the treatment team and chart notes were reviewed.    Vitals: BP (!) 149/86 (BP Location: Other (Comment), Patient Position: Sitting)   Pulse 100   Temp 98.8  F (37.1  C) (Oral)   Resp 16   Ht 1.651 m (5' 5\")   Wt (!) 155.9 kg (343 lb 12.8 oz)   SpO2 98%   BMI 57.21 kg/m    Sleep: 6 hours (05/22/24 0600)  Scheduled medications: Took all scheduled medications as prescribed  PRN medications:   - Hydroxyzine 25 mg x3 (yesterday morn x2 and today early morn)  - Ativan 1 mg x2   - Zyprexa 10 mg  - ibuprofen 600 mg (yesterday morn)  - Tylenol 650 mg (05/22 early morn)  - Melatonin 3 mg  - Nicotine lozenges     Staff Report/Interval Events  No acute behavioral events.     Irma was visible around the milieu today and was socializing with peers as well as participating in groups. Hygiene and and intake were appropriate. She did endorse high anxiety/depression and continuation of AH saying she doesn't need to be here. She denied SI/SIB/VH. Patient did receive multiple PRNs including Zyprexa, ibuprofen, hydroxyzine for anxiety and pain during the afternoon.    In the evening, patient was frequently seeking out staff to make requests and make her needs known. She spent much of the shift coloring and decorating her room. Community meeting in the evening went well though she had minimal interaction with patients. Anxiety was high in the evening, patient was concerned about the AH and if she is going to get better. Sleep was about 6 hrs, required PRN hydroxyzine, melatonin, and Tylenol.      Subjective:     Patient Interview:  Irma is cooperative and brightens up somewhat during interview, especially when discussing her room decor.     Upon entering " "the room, the AC vents were covered with towels. Patient asked what comes through the vents and if gas could get in, as this is what happened to her apartment. She is also worried that metal/poison from her apartment is affecting her body. We reiterated she is safe here and the AC only brings fresh air into the room. Patient felt it was okay to remove one of the towels. She asked about some dirt/rust on the vent, again reassured it was safe. Patient also expressed concern with the security of her belongings at her group home. Discussed belongings are safe and will not be moved. She expressed relief. Patient further endorses having AH of voices/intrusive thoughts asking \"why are you here\" over the past couple days. She is not having the AH now. Patient recognized that she feels safe but needs reassurance.    Irma states she is \"feeling okay\" today but anxiety and depression are high. She didn't sleep well due to be woken up by other patients on the unit, which increased her anxiety. Patient has learned some coping mechanisms including drawing, reading, decorating her room, and redirection strategies. These seem to be effective and recommended she continue to use these. She did utilize her PRNs and took scheduled meds accordingly. She requests to move her bedtime meds to earlier in the evening as she likes to go to bed early. Patient otherwise denies SI/SIB.     Patient reports generalized soreness and fatigue she attributes to MS. Discussed restarting interferon today and she requests administering it herself. She further states her constipation has improved and she now has loose stools. Patient also expressed concern for urinary incontinence .     ROS:  Patient endorses diarrhea/loose stools, generalized pain, fatigue  denies acute concerns and constipation (last bowel movement? today)     Objective:     Vitals:  BP (!) 149/86 (BP Location: Other (Comment), Patient Position: Sitting)   Pulse 100   Temp 98.8  F " "(37.1  C) (Oral)   Resp 16   Ht 1.651 m (5' 5\")   Wt (!) 155.9 kg (343 lb 12.8 oz)   SpO2 98%   BMI 57.21 kg/m      Allergies:  Allergies   Allergen Reactions    Glatiramer Hives    Penicillins Anaphylaxis and Hives    Shellfish Allergy Anaphylaxis and Hives    Copaxone [Glatiramer Acetate] Swelling       Current Medications:  Scheduled:  Current Facility-Administered Medications   Medication Dose Route Frequency Provider Last Rate Last Admin    acetaminophen (TYLENOL) tablet 650 mg  650 mg Oral Q4H PRN Juju Salgado MD   650 mg at 05/22/24 0538    alum & mag hydroxide-simethicone (MAALOX) suspension 30 mL  30 mL Oral Q4H PRN Juju Salgado MD        [Held by provider] amphetamine-dextroamphetamine (ADDERALL XR) 10 mg, amphetamine-dextroamphetamine (ADDERALL XR) 40 mg  50 mg Oral QAM Juju Salgado MD        [Held by provider] amphetamine-dextroamphetamine (ADDERALL) per tablet 40 mg  40 mg Oral Daily Juju Salgado MD        buprenorphine (SUBUTEX) sublingual tablet 8 mg  8 mg Sublingual TID Shanti Waters MD   8 mg at 05/21/24 1843    busPIRone (BUSPAR) tablet 10 mg  10 mg Oral BID Juju Salgado MD   10 mg at 05/21/24 2104    [Held by provider] docusate sodium (COLACE) capsule 100 mg  100 mg Oral Daily Juju Salgado MD   100 mg at 05/19/24 0911    escitalopram (LEXAPRO) tablet 10 mg  10 mg Oral Daily Juju Salgado MD   10 mg at 05/21/24 0742    gabapentin (NEURONTIN) capsule 600 mg  600 mg Oral TID Juju Salgado MD   600 mg at 05/21/24 2104    hydrOXYzine HCl (ATARAX) tablet 25 mg  25 mg Oral TID Wilmer Cartagena MD   25 mg at 05/21/24 2104    hydrOXYzine HCl (ATARAX) tablet 25 mg  25 mg Oral Q4H PRN Wilmer Cartagena MD   25 mg at 05/22/24 0657    ibuprofen (ADVIL/MOTRIN) tablet 600 mg  600 mg Oral Q8H PRN Juju Salgado MD   600 mg at 05/21/24 0743    [Held by provider] interferon beta-1b (BETASERON) injection 0.3 mg  0.3 mg Subcutaneous Every Other Day Shanti Waters MD    "     loperamide (IMODIUM) capsule 2 mg  2 mg Oral 4x Daily PRN Ludmila Lozano MD   2 mg at 05/19/24 2013    LORazepam (ATIVAN) tablet 1 mg  1 mg Oral BID PRN Shanti Waters MD   1 mg at 05/21/24 1654    melatonin tablet 3 mg  3 mg Oral At Bedtime PRN Ludmila Lozano MD   3 mg at 05/21/24 2104    naloxone (NARCAN) injection 0.2 mg  0.2 mg Intravenous Q2 Min PRN Juju Salgado MD        Or    naloxone (NARCAN) injection 0.4 mg  0.4 mg Intravenous Q2 Min PRN Juju Salgado MD        Or    naloxone (NARCAN) injection 0.2 mg  0.2 mg Intramuscular Q2 Min PRN Juju Salgado MD        Or    naloxone (NARCAN) injection 0.4 mg  0.4 mg Intramuscular Q2 Min PRN Juju Salgado MD        nicotine (COMMIT) lozenge 2 mg  2 mg Buccal Q2H PRN Jaren Zaman MD   2 mg at 05/21/24 2048    OLANZapine (zyPREXA) tablet 10 mg  10 mg Oral BID PRN Shanti Waters MD   10 mg at 05/21/24 1240    Or    OLANZapine (zyPREXA) injection 10 mg  10 mg Intramuscular TID PRN Shanti Waters MD        OLANZapine (zyPREXA) tablet 5 mg  5 mg Oral TID PRN Shanti Waters MD   5 mg at 05/19/24 0924    OLANZapine zydis (zyPREXA) ODT tab 15 mg  15 mg Oral At Bedtime Shanti Waters MD   15 mg at 05/21/24 2105    polyethylene glycol (MIRALAX) Packet 17 g  17 g Oral Daily PRN Juju Salgado MD        [Held by provider] SUMAtriptan (IMITREX) tablet 50 mg  50 mg Oral at onset of headache Juju Salgado MD        zolpidem (AMBIEN) tablet 10 mg  10 mg Oral At Bedtime Shanti Waters MD   10 mg at 05/21/24 2104       PRN:  Current Facility-Administered Medications   Medication Dose Route Frequency Provider Last Rate Last Admin    acetaminophen (TYLENOL) tablet 650 mg  650 mg Oral Q4H PRN Juju Salgado MD   650 mg at 05/22/24 0538    alum & mag hydroxide-simethicone (MAALOX) suspension 30 mL  30 mL Oral Q4H PRN Juju Salgado MD        [Held by provider] amphetamine-dextroamphetamine (ADDERALL XR) 10 mg,  amphetamine-dextroamphetamine (ADDERALL XR) 40 mg  50 mg Oral QAM Juju Salgado MD        [Held by provider] amphetamine-dextroamphetamine (ADDERALL) per tablet 40 mg  40 mg Oral Daily Juju Salgado MD        buprenorphine (SUBUTEX) sublingual tablet 8 mg  8 mg Sublingual TID Shanti Waters MD   8 mg at 05/21/24 1843    busPIRone (BUSPAR) tablet 10 mg  10 mg Oral BID Juju Salgado MD   10 mg at 05/21/24 2104    [Held by provider] docusate sodium (COLACE) capsule 100 mg  100 mg Oral Daily Jjuu Salgado MD   100 mg at 05/19/24 0911    escitalopram (LEXAPRO) tablet 10 mg  10 mg Oral Daily Juju Salgado MD   10 mg at 05/21/24 0742    gabapentin (NEURONTIN) capsule 600 mg  600 mg Oral TID Juju Salgado MD   600 mg at 05/21/24 2104    hydrOXYzine HCl (ATARAX) tablet 25 mg  25 mg Oral TID Wilmer Cartagena MD   25 mg at 05/21/24 2104    hydrOXYzine HCl (ATARAX) tablet 25 mg  25 mg Oral Q4H PRN Wilmer Cartagena MD   25 mg at 05/22/24 0657    ibuprofen (ADVIL/MOTRIN) tablet 600 mg  600 mg Oral Q8H PRN Juju Salgado MD   600 mg at 05/21/24 0743    [Held by provider] interferon beta-1b (BETASERON) injection 0.3 mg  0.3 mg Subcutaneous Every Other Day Shanti Waters MD        loperamide (IMODIUM) capsule 2 mg  2 mg Oral 4x Daily PRN Ludmila Lozano MD   2 mg at 05/19/24 2013    LORazepam (ATIVAN) tablet 1 mg  1 mg Oral BID PRN Shanti Waters MD   1 mg at 05/21/24 1654    melatonin tablet 3 mg  3 mg Oral At Bedtime PRN Ludmila Lozano MD   3 mg at 05/21/24 2104    naloxone (NARCAN) injection 0.2 mg  0.2 mg Intravenous Q2 Min PRN Juju Salgado MD        Or    naloxone (NARCAN) injection 0.4 mg  0.4 mg Intravenous Q2 Min PRN Juju Salgado MD        Or    naloxone (NARCAN) injection 0.2 mg  0.2 mg Intramuscular Q2 Min PRN Juju Salgado MD        Or    naloxone (NARCAN) injection 0.4 mg  0.4 mg Intramuscular Q2 Min PRN Juju Salgado MD        nicotine (COMMIT) lozenge 2 mg  2 mg  "Buccal Q2H PRN Jaren Zaman MD   2 mg at 05/21/24 2048    OLANZapine (zyPREXA) tablet 10 mg  10 mg Oral BID PRN Shanti Waters MD   10 mg at 05/21/24 1240    Or    OLANZapine (zyPREXA) injection 10 mg  10 mg Intramuscular TID PRN Shanti Waters MD        OLANZapine (zyPREXA) tablet 5 mg  5 mg Oral TID PRN Shanti Watesr MD   5 mg at 05/19/24 0924    OLANZapine zydis (zyPREXA) ODT tab 15 mg  15 mg Oral At Bedtime Shanti Waters MD   15 mg at 05/21/24 2105    polyethylene glycol (MIRALAX) Packet 17 g  17 g Oral Daily PRN Juju Salgado MD        [Held by provider] SUMAtriptan (IMITREX) tablet 50 mg  50 mg Oral at onset of headache Juju Salgado MD        zolpidem (AMBIEN) tablet 10 mg  10 mg Oral At Bedtime Shanti Waters MD   10 mg at 05/21/24 2104       Labs and Imaging:  New results:   No results found for this or any previous visit (from the past 24 hour(s)).    Data this admission:  - CBC unremarkable, WBC 13  - UDS positive for amphetamines, benzos (both prescribed)  - TSH normal  - HbA1c 7.0  - UPT negative  - Vit D normal  - Vit B12 normal  - Folate normal  - EKG with NSR, QTc 450 on 05/16  - MRI (5/18/24) showed stable MS plaques unchanged from previous on 8/1/22     Mental Status Exam:     Oriented to:  Grossly Oriented  General:  Awake and Alert; sitting on edge of bed in shorts and a t-shirt, hair bonnet on   Appearance:  appears stated age and Grooming is adequate;   Behavior/Attitude:  cooperative and suspicious; anxious  Eye Contact:  appropriate  Psychomotor: restless no catatonia present  Speech:  appropriate volume/tone; urgency of speech  Language: Fluent in English with appropriate syntax and vocabulary.  Mood:  \"Feeling okay\"  Affect:  somewhat labile, changes from calm demeanor to: congruent with mood, blunted, and sad; unchanged from yesterday-overall lability improved compared to when patient came in   Thought Process:  linear and coherent; will " "sometimes be side-tracked by questions   Thought Content:  does not appear to be responding to internal stimuli, No VH, auditory hallucinations of intrusive thoughts saying \"why are you here?\", and delusions; delusions of paranoia; AH are intermittent, currently denies having the thoughts.   Associations:  intact  Insight:  impaired; wants to feel better, but feels that family \"did something to me\"  Judgment:  limited-impaired-patient notably paranoid, suspicious , recent inability to care for self PTA though has been adherent with treatment team recommendations, engaged in groups  Impulse control: impaired  Attention Span:  grossly intact  Concentration:  grossly intact  Recent and Remote Memory:  not formally assessed  Fund of Knowledge:  delayed; not formally diagnosed  Muscle Strength and Tone: normal  Gait and Station: Normal     Psychiatric Assessment     Imra Heller is a 33 year old female previously diagnosed with anxiety, depression, and opioid dependence in remission who presented to the ED by EMS and admitted with psychosis in the context of likely first-episode psychosis. No prior psychiatric hospitalizations. Significant symptoms on admission include delusions of paranoia, visual and tactile hallucinations, and increased anxiety. The MSE on admission was pertinent for paranoia, anxiety. Biological contributions to mental health presentation include hx of opioid use disorder (though this has been stable) as well as medical diagnosis of MS, Chiari malformation which are reportedly. Psychological contributions to mental health presentation include anxiety, depression. Social factors contributing to mental health presentation include poor social support outside of group home staff. Protective factors include good medication adherence and group home staff.      Patient's definitive diagnosis is still in evolution, differential for psychosis includes primary psychotic disorder (ie, schizophrenia, " schizoaffective) or other psychiatric etiology vs. drug-induced psychosis vs. medical etiology of psychosis. Feel that this is likely a primary psychiatric cause in the context of UDS positive only for prescribed medication and no recent drug use.  However, consultation with neurology to evaluate medical etiology, further neuroimaging and further history from collateral would be indicated to get a better understand of patient's symptom timeline and course. Can also consider the role of her interferon in her psychosis. She will likely benefit from antipsychotics this admission.     Given that she currently has psychosis, patient warrants inpatient psychiatric hospitalization to maintain her safety     Psychiatric Plan by Diagnosis      Today's changes:  - Betaseron injection 0.3 mg every other day (administered herself)  - Move evening meds to 2000  - Have patient call CM (with CTC present if possible)  - Essential oils for patient's room    # Psychosis  Patient was given olanzapine as well as IM Haldol/Versed in the ED which seemed to be effective. Plan is to continue olanzapine for stabilization of acute psychosis and monitoring for improvement.     Medications:  - Olanzapine 15 mg PO at bedtime  - Olanzapine 5 mg TID PRN  - Olanzapine 10 mg PRN for emergency agitation/aggression related to psychosis      # Anxiety/Depression  Patient has a history of anxiety which she states has been well managed by her home meds prior to this point. She has had an acute increase in her anxiety due to being overwhelmed with this new symptoms and fear for her safety. Will plan to continue her home medication regimen in tandem with the olanzapine.      Medications:  - Buspirone 10 mg bid  - Lexapro 10 mg daily  - Hydroxyzine 25 mg TID, 25 mg PRN  - Ativan 1 mg PRN     Pertinent Labs/Monitoring:   - CBC unremarkable   - UDS positive for amphetamines, benzos (both prescribed)  - HbA1c 7.0  - UPT negative  - EKG with NSR, QTc 450 on  05/16     Additional Plans:  - Patient will be treated in therapeutic milieu with appropriate individual and group therapies as described  - Keep in touch with patient's residence and CM once disposition is determined. Call CM with patient once amenable.   - Sergio Dunn ([facility] residential ): [phone]  - Riana Moen ([facility] CM): [phone]  - Plan to obtain collateral information from group home or family  - Update inpatient pharmacy when approximate discharge date is known     Psychiatric Hospital Course:      Irma Heller was admitted to [facility] on a 72 hour hold. Hold was discontinued shortly upon admission as pt agreed to stay voluntary. PTA buspirone, Lexapro, PRN Ativan and hydroxyzine were continued.  PTA Adderall was held to minimize any increased anxiety pending mood stabilization. New medications started at the time of admission include olanzapine. 10mg at night which pt has reported as being helpful for sleep and anxiety to date.       Since her admission to the unit, Irma reported feeling safer in the new room. She stated she was able to eat the food and had no paranoia surrounding this. She had ongoing anxiety which was reduced with PRN Ativan/hydroxyzine. Patient was able to eat lunch in the milieu with the other patients following this. She denied SI/HI and hallucinations at that time. Given the reduction in symptoms in the context of ongoing paranoia and anxiety, plan is to continue olanzapine and monitor for improvement.     Neuro was consulted regarding the patient's history of MS, Chiari malformation and interferon beta-1b use. There were some cases reported of interferon causing psychosis, though this was discussed with neuro who had low suspician for this etiology due to being on the med for at least 2 years. They recommended we continue patient's interferon to prevent MS flares. However, interferon formulary is not available in our pharmacy so  will have to use patient's supply. We will plan to reach out to patient's group home, however we do not have their name or contact information. Though patient adamantly requested us not to inform anyone she is on the unit, we feel she does not currently have capacity and there is a need for collateral information. She also has a daughter that was staying with her aunt per the ED note, and would like to check on her. Attempted to call the aunt at the listed number but it was out of service. Patient's grandmother did not answer. Plan to reassess patient and obtain group home information at a later time.    Our CTC was able to reach the patient's living facility ([facility]) and spoke with the , Sergio. She confirmed Irma resides in an independent living environment and is responsible for taking her own medications. Sergio states the patient is welcome back when ready but would like to see her first. She will be out of office from 05/23 - 05/27 and requested an update prior to that. [facility] also spoke to the patient herself who again emphasized not wanting to return there. Patient continues to have paranoia surrounding her apartment, the staff, and her family. [facility] discussed with the patient that her CM could be a helpful resource to find discharge options if desired, however at that point the patient expressed not wanting the CM to know she was here. [facility] did reach out to [facility] and received contact info for Riana Galindo and [facility]. Going forward, will plan to obtain collateral from these sources and organize getting patient's interferon.     On 05/21, called [facility] to organize delivery of patient's interferon. [facility] mentioned the patient has not filled her interferon in 4 months. The initial plan was to have [facility] deliver to the hospital, however, while coordinating with behavioral in pharmacy, it turns out they are able to have it ordered for the patient here.  Will start the interferon as soon as it is available. Patient also requested to have her hydroxyzine changed to scheduled as this is how she has it at home. Hydroxyzine changed to scheduled but will keep the PRN option.    The risks, benefits, alternatives, and side effects were discussed and understood by the patient.     Medical Assessment and Plan     Medical diagnoses to be addressed this admission:      #Elevated A1c  - A1c elevated to 7.0 on admission  - Will discuss with patient and provide outpatient follow-up    #MS  - Given the history of MS and Chiari malformation in the context of new psychosis.  - Plan is to obtain an MRI to rule out medical CNS cause of psychosis, but will wait for neuro.   - No acute concerns per neuro.   - Neuro ok with PTA interferon beta-1b and recommend continuing to minimize risk of MS flare. Interferon is ordered and will be delivered soon.   - Plan to hold Adderall and sumatriptan until mood and anxiety are stabilized.      #Chiari Malformation  - Neuro consulted, no acute concerns at this time   - MRI pending     #Hx of iron deficiency anemia  - On Ferosul held at time of admission. Will clarify with pt and pharmacy about use      Medical course:   Patient was physically examined by the ED prior to being transferred to the unit and was found to be medically stable and appropriate for admission. Please see ED/Hospital course above for more information.      Medications:  - Melatonin and Ambien PRN for sleep  - Colace 100 mg daily, Miralax/Maalox PRN for constipation  - Subutex 8 mg TID for opioid dependence  - Tylenol, ibuprofen PRN for pain  - Betaceron 0.3 mg subcutaneous every other day     Consults:  Neurology for history of MS, Chiari I malformation in context of psychiatric symptoms     Checklist     Legal Status: Voluntary     Safety Assessment:   Behavioral Orders   Procedures    Code 1 - Restrict to Unit    Code 2    Routine Programming     As clinically indicated     Status 15     Every 15 minutes.       Risk Assessment:  Risk for harm is moderate-high.  Risk factors: psychosis, hx of substance use  Protective factors: group home staff, no identified history of attempts, doesn't live alone, family support, children     SIO: none    Disposition: TBD. Disposition pending stabilization, medication optimization, & development of a safe discharge plan.     Attestations     This patient was seen and discussed with my attending physician.  Robel Cho, MS3  [employer] [employer]    I was present with the medical student who participated in the service and in the documentation of the note. I have verified the history and personally performed the physical exam and medical decision making. I agree with the assessment and plan of care as documented in the note and have made changes to the note as appropriate.     Shanti Waters, PGY-2 Psychiatry     Attestation:  This patient has been seen and evaluated by me, Wilmer Cartagena MD.  I have discussed this patient with the house staff team including the resident and medical student and I agree with the findings and plan in this note.    I have reviewed today's vital signs, medications, labs and imaging. Wilmer Cartagena MD , PhD.

## 2024-05-22 NOTE — PLAN OF CARE
"Problem: Adult Behavioral Health Plan of Care  Goal: Plan of Care Review  Outcome: Progressing  Flowsheets (Taken 5/21/2024 1650)  Patient Agreement with Plan of Care: agrees   Goal Outcome Evaluation:    Plan of Care Reviewed With: patient          Pt was visible in the dinning room coloring some pictures most of the shift. Her affect was flat and blunted but pleasant upon approach. She was also observed reading a book. Pt said the coloring and reading helps her with her anxiety. She has some of the colored pictures on the wall in her room. She also spent some time listening to music with head phone. She was very demanding and seeking out for staff all shift requesting for something. She was able to make her needs known. Hygiene was good. She had a shower this evening.  Intake adequate. She was out for dinner and snacks and ate 100%. She attended Community meeting at 4 pm and said it all went well. She had minimal interaction with peers this shift. She interacts more with staff because she was always out at the nursing station asking for something. If it continues, she will probably need to be put on hourly request. Vitals were WNL.      She was cooperative with assessment. She was tearful during assessment and wandering if she is going to get better. She was encouraged to follow her treatment plan and with that things will get better. Pt agreed and said that she wants to get better and will follow the plan the doctor has for her. She endorsed body fatigue and said the pain was 9/10 but she declined any intervention. She endorsed anxiety 10/10 and denied having any depression. She was given prn Ativan 1 mg which she said was helpful. She denied SI/SIB/HI. She continued to endorse auditory hallucination but said it was not much today and said \"I always tell them to stop talking to me and I speak aloud.\" She contracted for safety. She was medication compliant. Prn Nicotine Lozenge was given x 1 this shift. No other " concern noted this time. No safety concern noted at this time and no medication side effect reported or noted this shift. Will continue to monitor and will assist if need arise.

## 2024-05-22 NOTE — PLAN OF CARE
"Individual Therapy Note      Date of Service: May 22, 2024    Patient: Irma goes by \"Irma,\" uses she/her pronouns    Individuals Present: Kenyatta Cheema [facility]    Session start: 0950  Session end: 1030  Session duration in minutes: 40      Modality Used: CBT, Person Centered, and Solution Focused    Goals: Develop strategies for utilizing and reminding Pt of her coping skills    Patient Description of current symptoms: Unsteady on my feet, feels heard     Mental Status Exam:   Attitude: cooperative  Eye Contact: good  Mood: better  Affect: appropriate and in normal range and labile  Speech: clear, coherent  Psychomotor Behavior: no evidence of tardive dyskinesia, dystonia, or tics  Thought Process:  logical and linear  Associations: no loose associations  Thought Content: no evidence of suicidal ideation or homicidal ideation  Insight: fair  Judgement: intact  Attention Span and Concentration: intact    Pt progress: Met with Pt to brainstorm and make lists of coping skills to hang on her wall to remind her to use when she is feeling anxious. Educated Pt on a grounding skill that she reported she wants to use more (finding a specific color around me). Discussed how asserting healthy boundaries is a coping skills, Pt reports this one is new for her but has been helpful with her family. Pt asked for permission many times (asking to change marker color), writer encouraged Pt to decide on her own and reassured her she is an adult who can make those decisions. Pt reports her family often made her ask for permission. Pt was very engaged and delighted doing activity, appeared more upbeat afterward, still labile and emotional at times.    Treatment Objective(s) Addressed:   The focus of this session was on identifying and practicing coping strategies, building self-esteem, and identifying additional supports     Progress Towards Goals and Assessment of Patient:   Patient is making progress towards " treatment goals as evidenced by improved mood, decreased anxiety.       Therapeutic Intervention(s):   Provided active listening, unconditional positive regard, and validation.   Coached on coping techniques/relaxation skills to help improve distress tolerance and managing intense emotions. , Identified and practiced coping skills, and Explored strategies for self-soothing    Plan/next step: Writer will continue to check in with Pt, encouraged Pt to attend group sessions.      13749 - Psychotherapy (with patient) - 45 (38-52*) min    Patient Active Problem List   Diagnosis    Abnormal liver function test    Acute cholangitis (H28)    Anxiety disorder    Arnold-Chiari malformation (H)    Calculus of bile duct without obstruction    Depression    Multiple sclerosis (H)    Syringomyelia (H)    Tobacco use disorder    Schizophrenia spectrum disorder with psychotic disorder type not yet determined (H)

## 2024-05-22 NOTE — PLAN OF CARE
Pt did endorse anxiety 5/10 and pain to lower extremities 5/10. Denied MH sx at this time.  Atarax 25 mg for anxiety ; and Tylenol 650 mg for pain - was helpful. Safety checks completed every 15 minutes; no safety concerns noted. Pt appears to have slept  6 hours. Will continue to monitor and offer support.     Problem: Sleep Disturbance  Goal: Adequate Sleep/Rest  Outcome: Progressing   Goal Outcome Evaluation:

## 2024-05-22 NOTE — PLAN OF CARE
BEH IP Unit Acuity Rating Score (UARS)  Patient is given one point for every criteria they meet.    CRITERIA SCORING   On a 72 hour hold, court hold, committed, stay of commitment, or revocation. 0   Patient LOS on BEH unit exceeds 20 days. 0  LOS: 6   Patient under guardianship, 55+, otherwise medically complex, or under age 11. 0   Suicide ideation without relief of precipitating factors. 0   Current plan for suicide. 0   Current plan for homicide. 0   Imminent risk or actual attempt to seriously harm another without relief of factors precipitating the attempt. 0   Severe dysfunction in daily living (ex: complete neglect for self care, extreme disruption in vegetative function, extreme deterioration in social interactions). 1   Recent (last 7 days) or current physical aggression in the ED or on unit. 0   Restraints or seclusion episode in past 72 hours. 0   Recent (last 7 days) or current verbal aggression, agitation, yelling, etc., while in the ED or unit. 0   Active psychosis. 1   Need for constant or near constant redirection (from leaving, from others, etc).  1   Intrusive or disruptive behaviors. 0   Patient requires 3 or more hours of individualized nursing care per 8-hour shift (i.e. for ADLs, meds, therapeutic interventions). 0   TOTAL 3

## 2024-05-23 ENCOUNTER — APPOINTMENT (OUTPATIENT)
Dept: ULTRASOUND IMAGING | Facility: CLINIC | Age: 34
End: 2024-05-23
Attending: PHYSICIAN ASSISTANT
Payer: COMMERCIAL

## 2024-05-23 ENCOUNTER — APPOINTMENT (OUTPATIENT)
Dept: CT IMAGING | Facility: CLINIC | Age: 34
End: 2024-05-23
Attending: PHYSICIAN ASSISTANT
Payer: COMMERCIAL

## 2024-05-23 LAB — NT-PROBNP SERPL-MCNC: <36 PG/ML (ref 0–450)

## 2024-05-23 PROCEDURE — 93970 EXTREMITY STUDY: CPT | Mod: 26 | Performed by: RADIOLOGY

## 2024-05-23 PROCEDURE — 250N000013 HC RX MED GY IP 250 OP 250 PS 637: Performed by: STUDENT IN AN ORGANIZED HEALTH CARE EDUCATION/TRAINING PROGRAM

## 2024-05-23 PROCEDURE — 93970 EXTREMITY STUDY: CPT

## 2024-05-23 PROCEDURE — 250N000013 HC RX MED GY IP 250 OP 250 PS 637

## 2024-05-23 PROCEDURE — 70450 CT HEAD/BRAIN W/O DYE: CPT | Mod: 26 | Performed by: RADIOLOGY

## 2024-05-23 PROCEDURE — 99232 SBSQ HOSP IP/OBS MODERATE 35: CPT | Mod: GC | Performed by: PSYCHIATRY & NEUROLOGY

## 2024-05-23 PROCEDURE — 99231 SBSQ HOSP IP/OBS SF/LOW 25: CPT | Performed by: STUDENT IN AN ORGANIZED HEALTH CARE EDUCATION/TRAINING PROGRAM

## 2024-05-23 PROCEDURE — 70450 CT HEAD/BRAIN W/O DYE: CPT

## 2024-05-23 PROCEDURE — 87040 BLOOD CULTURE FOR BACTERIA: CPT | Performed by: PHYSICIAN ASSISTANT

## 2024-05-23 PROCEDURE — 36415 COLL VENOUS BLD VENIPUNCTURE: CPT | Performed by: PHYSICIAN ASSISTANT

## 2024-05-23 PROCEDURE — 250N000013 HC RX MED GY IP 250 OP 250 PS 637: Performed by: PSYCHIATRY & NEUROLOGY

## 2024-05-23 PROCEDURE — 124N000002 HC R&B MH UMMC

## 2024-05-23 PROCEDURE — G0177 OPPS/PHP; TRAIN & EDUC SERV: HCPCS

## 2024-05-23 PROCEDURE — 99254 IP/OBS CNSLTJ NEW/EST MOD 60: CPT | Performed by: PHYSICIAN ASSISTANT

## 2024-05-23 PROCEDURE — 83880 ASSAY OF NATRIURETIC PEPTIDE: CPT | Performed by: PHYSICIAN ASSISTANT

## 2024-05-23 RX ORDER — POLYETHYLENE GLYCOL 3350 17 G/17G
17 POWDER, FOR SOLUTION ORAL
Status: COMPLETED | OUTPATIENT
Start: 2024-05-23 | End: 2024-05-23

## 2024-05-23 RX ORDER — DOCUSATE SODIUM 100 MG/1
100 CAPSULE, LIQUID FILLED ORAL DAILY PRN
Status: DISCONTINUED | OUTPATIENT
Start: 2024-05-23 | End: 2024-06-11 | Stop reason: HOSPADM

## 2024-05-23 RX ORDER — HYDROXYZINE HYDROCHLORIDE 50 MG/1
50 TABLET, FILM COATED ORAL 3 TIMES DAILY
Status: DISCONTINUED | OUTPATIENT
Start: 2024-05-23 | End: 2024-06-11 | Stop reason: HOSPADM

## 2024-05-23 RX ADMIN — GABAPENTIN 600 MG: 300 CAPSULE ORAL at 13:14

## 2024-05-23 RX ADMIN — GABAPENTIN 600 MG: 300 CAPSULE ORAL at 20:32

## 2024-05-23 RX ADMIN — Medication 3 MG: at 20:33

## 2024-05-23 RX ADMIN — OLANZAPINE 15 MG: 15 TABLET, ORALLY DISINTEGRATING ORAL at 20:32

## 2024-05-23 RX ADMIN — OLANZAPINE 10 MG: 10 TABLET, FILM COATED ORAL at 12:25

## 2024-05-23 RX ADMIN — BUSPIRONE HYDROCHLORIDE 10 MG: 10 TABLET ORAL at 08:42

## 2024-05-23 RX ADMIN — POLYETHYLENE GLYCOL 3350 17 G: 17 POWDER, FOR SOLUTION ORAL at 20:17

## 2024-05-23 RX ADMIN — POLYETHYLENE GLYCOL 3350 17 G: 17 POWDER, FOR SOLUTION ORAL at 09:47

## 2024-05-23 RX ADMIN — LORAZEPAM 1 MG: 1 TABLET ORAL at 06:45

## 2024-05-23 RX ADMIN — BUSPIRONE HYDROCHLORIDE 10 MG: 10 TABLET ORAL at 20:32

## 2024-05-23 RX ADMIN — NICOTINE POLACRILEX 2 MG: 2 LOZENGE ORAL at 06:46

## 2024-05-23 RX ADMIN — IBUPROFEN 600 MG: 600 TABLET ORAL at 16:06

## 2024-05-23 RX ADMIN — BUPRENORPHINE 8 MG: 8 TABLET SUBLINGUAL at 08:42

## 2024-05-23 RX ADMIN — LORAZEPAM 1 MG: 1 TABLET ORAL at 16:06

## 2024-05-23 RX ADMIN — ZOLPIDEM TARTRATE 10 MG: 5 TABLET, COATED ORAL at 20:32

## 2024-05-23 RX ADMIN — ESCITALOPRAM OXALATE 10 MG: 10 TABLET ORAL at 08:42

## 2024-05-23 RX ADMIN — HYDROXYZINE HYDROCHLORIDE 50 MG: 50 TABLET, FILM COATED ORAL at 13:14

## 2024-05-23 RX ADMIN — HYDROXYZINE HYDROCHLORIDE 25 MG: 25 TABLET, FILM COATED ORAL at 08:42

## 2024-05-23 RX ADMIN — NICOTINE POLACRILEX 2 MG: 2 LOZENGE ORAL at 16:42

## 2024-05-23 RX ADMIN — HYDROXYZINE HYDROCHLORIDE 50 MG: 50 TABLET, FILM COATED ORAL at 20:32

## 2024-05-23 RX ADMIN — BUPRENORPHINE 8 MG: 8 TABLET SUBLINGUAL at 18:40

## 2024-05-23 RX ADMIN — BUPRENORPHINE 8 MG: 8 TABLET SUBLINGUAL at 13:14

## 2024-05-23 RX ADMIN — GABAPENTIN 600 MG: 300 CAPSULE ORAL at 08:42

## 2024-05-23 ASSESSMENT — ACTIVITIES OF DAILY LIVING (ADL)
ADLS_ACUITY_SCORE: 40
ORAL_HYGIENE: INDEPENDENT;PROMPTS
ADLS_ACUITY_SCORE: 40
DRESS: STREET CLOTHES;SCRUBS (BEHAVIORAL HEALTH);INDEPENDENT
ADLS_ACUITY_SCORE: 40
ORAL_HYGIENE: INDEPENDENT;PROMPTS
ADLS_ACUITY_SCORE: 40
HYGIENE/GROOMING: HANDWASHING;SHOWER;INDEPENDENT
ADLS_ACUITY_SCORE: 40
ADLS_ACUITY_SCORE: 40
LAUNDRY: WITH SUPERVISION
HYGIENE/GROOMING: INDEPENDENT
ADLS_ACUITY_SCORE: 40
DRESS: INDEPENDENT
LAUNDRY: WITH SUPERVISION
ADLS_ACUITY_SCORE: 40
ADLS_ACUITY_SCORE: 40

## 2024-05-23 NOTE — CONSULTS
"[facility]  Consult Note - Hospitalist Service  Date of Admission:  5/16/2024  Consult Requested by: Psychiatry   Reason for Consult: \"concern for MS flare, bilateral lower extremity edema\"    Assessment & Plan   Irma Heller is a 33 year old female with PMH significant for MS, migraines, anxiety, opioid use disorder, depression who was admitted on 5/16/2024 to inpatient Psychiatry for concern for psychosis. Medicine was consulted for concern for MS flare and bilateral lower extremity edema.     MS with possible flare   Left sided weakness, acute    Left sided paresthesias, acute  Reports changes in sensation to left aspect of face, left arm as well as left lower extremity for the past 2 days.  Also reports bilateral blurry vision and weakness, mostly in the left sided extremity.  Also feels that her left eyelid is drooping.  Notes is consistent with previous flares in the past.  Follows with Neurology here, last seen 08/2023. PTA Betaseron every other day, but patient reportedly has not been taking this for ~ 1 month PTA and just restarted on 05/22 (per MAR, this was not restarted on admission due to concern for psychosis however per Neurology note from 05/17, recommended resuming this). MRI from 05/16 with \"Limited evaluation as examination was terminated early due to  patient claustrophobia. T2 hyperintense white matter lesions in the cerebral hemispheres compatible with multiple sclerosis are not significantly changed from prior MRI dated 8/1/2022.\" Exam challenging and limited due to patient participation.  Possible some left-sided weakness on bilateral upper and lower extremities. Sensation in tact. Did not appreciate ptosis bilaterally on exam. Discussed with psychiatry team and recommended a neurology consult if there is concern for flare, which they have placed.  Denies any current infectious symptoms. Mild leukocytosis on exam, but appears this to be " "chronic. VSS, afebrile. Differential includes possible MS flare, but could not rule out potential CVA with acute neurological findings therefore will still pursue stat imaging. Also question possible psychosis contributing to presentation as well.   -Neurology consulted, appreciate recs   -Continue PTA interferon every other day   -CT head stat  -CXR to assess for pneumonia  -Blood cultures and UA ordered  -CBC and BMP ordered      Addendum: CT head without acute findings. CXR without acute focal opacity/consolidation.     Bilateral lower extremity edema   Started 05/23, R>L. No previous symptoms or previous TTE. TSH from 05/16 unremarkable. No personal hx of heart failure or cardiac disease. No chest pain, dyspnea or shortness of breath. No signs of rashes or lesions on bilateral lower extremities.   -BNP ordered   -BL lower extremities venous duplex US ordered     Addendum: CXR with mild streaky perihilar nd basilar opacities which may represent atelectasis, pulmonary edema or atypical infection. BNP wnl. US BLE without acute DVT.       The patient's care was discussed with the Bedside Nurse, Patient, Neurology Consultant(s), and Primary team via this note.    Clinically Significant Risk Factors                        # DMII: A1C = 7.0 % (Ref range: <5.7 %) within past 6 months   # Severe Obesity: Estimated body mass index is 60.86 kg/m  as calculated from the following:    Height as of this encounter: 1.651 m (5' 5\").    Weight as of this encounter: 165.9 kg (365 lb 11.2 oz).      # Financial/Environmental Concerns:           Lalitha Hernandez PA-C  Hospitalist Service  Securely message with [employer] (more info)  Text page via [facility] Paging/Directory   ______________________________________________________________________    Chief Complaint   \"Are you going to start prednisone or what?\"    History is obtained from the patient    History of Present Illness   Irma Heller is a 33 year old female with PMH " significant for MS, migraines, anxiety, opioid use disorder, depression who was admitted on 5/16/2024 to inpatient Psychiatry for concern for psychosis. Medicine was consulted for concern for MS flare and bilateral lower extremity edema.     Noted 2 days ago started having numbness and tingling in the left upper and lower extremity as well as left-sided face.  Also reported weakness in bilateral upper and lower left extremities.  No trauma, and reports that this is consistent with MS flares in the past.  Notes she has taken prednisone in the past with improvement of symptoms.  Reports that even though she has not been taking her MS medications here, she has been off of them roughly 1 month prior to admission.  Also reports bilateral blurry vision.  Denies any eye pain double vision or vision loss.  Also feeling very fatigued.  Denies any headache.  Also reports bilateral lower extremity swelling.  Notes this started this morning.  Never had the symptoms in the past.  No associated chest pain, dyspnea, cough, trouble breathing.  Notes she is a mouth breather.  No trauma to bilateral lower extremities.  Denies any history of heart failure or heart disease.  Was on furosemide in the past.  No rashes.  Does not wish to sit up to examine further.  No congestion, cough, fever, chills.  No nausea, vomiting, diarrhea.  No other concerns at this time.  Asks if she will be given prednisone.      Past Medical History    Past Medical History:   Diagnosis Date    Anxiety     Chiari I malformation (H)     Hx- was a child    Depression     Gastroesophageal reflux disease     Multiple sclerosis (H)     Obese        Past Surgical History   Past Surgical History:   Procedure Laterality Date    AS INSTALL SPINAL SHUNT,LAMINECTOMY      Related to Chiari Malformation surgery    BACK SURGERY      CHOLECYSTECTOMY      DECOMPRESSION CHIARI      ENDOSCOPIC RETROGRADE CHOLANGIOPANCREATOGRAM N/A 3/16/2022    Procedure: ENDOSCOPIC RETROGRADE  CHOLANGIOPANCREATOGRAPHY;  Surgeon: Jojo Moore MD;  Location:  OR       Medications   I have reviewed this patient's current medications       Review of Systems    The 10 point Review of Systems is negative other than noted in the HPI or here.     Social History   I have reviewed this patient's social history and updated it with pertinent information if needed.  Social History     Tobacco Use    Smoking status: Former     Current packs/day: 0.00     Types: Cigarettes     Quit date: 1/1/2021     Years since quitting: 3.3    Smokeless tobacco: Never   Substance Use Topics    Alcohol use: Not Currently    Drug use: Not Currently         Family History   I have reviewed this patient's family history and updated it with pertinent information if needed.  Family History   Problem Relation Age of Onset    Heart Disease Brother          Allergies   Allergies   Allergen Reactions    Glatiramer Hives    Penicillins Anaphylaxis and Hives    Shellfish Allergy Anaphylaxis and Hives    Copaxone [Glatiramer Acetate] Swelling        Physical Exam   Vital Signs: Temp: 97.8  F (36.6  C) Temp src: Temporal BP: (!) 140/84 Pulse: 120     SpO2: 96 % O2 Device: None (Room air)    Weight: 365 lbs 11.2 oz    General: laying in bed, alert, cooperative, awake, in no acute distress  HEENT: normocephalic, atraumatic, anicteric sclera, moist mucus membranes, no lymphadenopathy appreciated, PERRLA, EOM wnl, but limited due to patient's participation, no ptosis appreciated.   Respiratory: breathing comfortably on room air, clear to auscultation bilaterally without wheezing, crackles, or rhonchi appreciated  Cardiac: regular rate and rhythm with normal S1/S2 without murmurs, clicks, rubs or gallops, 2+ radial pulse on LUE, 1+ pitting edema bilaterally R>L  GI: soft, non-distended, normoactive bowel sounds, non-tender per palpation  Neuro: grossly non-focal, alert and oriented, normal speech, CN II -XII in tact, possible 4/5 on left  upper and lower extremities (but question patient's participation), sensation in tact on bilateral upper and lower extremities, no dysarthria appreciates. Following commands   MSK: no bony deformities, moving all extremities independently  Skin: no rashes or lesions on uncovered surfaces, no jaundice      Medical Decision Making       50 MINUTES SPENT BY ME on the date of service doing chart review, history, exam, documentation & further activities per the note.      Data   NOTE: Data reviewed over the past 24 hrs contributes toward MDM complexity

## 2024-05-23 NOTE — PROGRESS NOTES
Rehab Group    Start time: 1015  End time: 1115  Patient time total: 55 minutes    attended full group    #4 attended   Group Type: occupational therapy   Group Topic Covered: coping skills     Group Session Detail:  OT clinic     Patient Response/Contribution:  Cooperative with task, Attentive, and Actively engaged     Patient Detail:    Pt actively participated in occupational therapy clinic to facilitate coping skill exploration, creative expression within personally meaningful activities, and clinical observation of social, cognitive, and kinesthetic performance skills. Pt response: Moderate assistance needed to initiate, gather materials, sequence, and adjust to workspace demands as needed. Demonstrated fair focus and planning, and limited attention to detail, for selected creative expression task. Requested feedback in planning out the details for her task. Somewhat disorganized in her task approach. Able to ask for assistance as needed, and socialized with peers and staff. Calm and cooperative. Affect appeared to brighten in interactions.        Train & Education Service Per Session 45 + Minutes () OT Group code    Patient Active Problem List   Diagnosis    Abnormal liver function test    Acute cholangitis (H28)    Anxiety disorder    Arnold-Chiari malformation (H)    Calculus of bile duct without obstruction    Depression    Multiple sclerosis (H)    Syringomyelia (H)    Tobacco use disorder    Schizophrenia spectrum disorder with psychotic disorder type not yet determined (H)

## 2024-05-23 NOTE — PLAN OF CARE
Goal Outcome Evaluation:    Plan of Care Reviewed With: patient Plan of Care Reviewed With: patient    Overall Patient Progress: improvingOverall Patient Progress: improving       Problem: Adult Behavioral Health Plan of Care  Goal: Plan of Care Review  Outcome: Progressing  Flowsheets  Taken 5/23/2024 1233  Plan of Care Reviewed With: patient  Overall Patient Progress: improving  Patient Agreement with Plan of Care: agrees  Taken 5/23/2024 1211  Patient Agreement with Plan of Care: agrees     Behavioral  Pt slept about 7 hours overnight. Pt was up and visible on the unit this shift. She was intermittently presented with bright affect. Pt mood remains labile with no acute event this shift. Pt seeking for staff and medications frequently. Appeared guarded, suspicious. She is eating and hydrating adequately. Ate 100% for both breakfast and lunch. Compliant with medications. Attending to ADL's independently. Pt speech is pressured; Endorsed anxiety 9, depression 7/10. Pt denied SI, SIB, HI. Denied hallucinations. Pt is contract for safety. Pt expressed concerns about the swelling of her left hand and left legs. Provider was notified. Pt had orders for UA/UC (need to be collected), US to lower extremities venous duplex bilateral, CT head w/o contrast, blood culture, CXR port 1 view. Some labs. Pt went down to US. OK to have essential oil per nursing discretion.     Order for egg crate mattress per pt request.     Medical  Pt complained of generalized pain/discomfort this shift. Endorsed 4-5/10. Scheduled and PRN medications were administered and were somewhat effective. Pain re assessments 4/10.      PRNS:  - Ativan  - Hydroxyzine   - Zyprexa

## 2024-05-23 NOTE — PLAN OF CARE
Problem: Adult Behavioral Health Plan of Care  Goal: Optimized Coping Skills in Response to Life Stressors  Intervention: Promote Effective Coping Strategies  Recent Flowsheet Documentation  Taken 5/22/2024 0357 by Carlton Tomlin RN  Supportive Measures:   active listening utilized   decision-making supported   positive reinforcement provided   verbalization of feelings encouraged   Goal Outcome Evaluation:    Plan of Care Reviewed With: patient        Pt spent most of the shift in the dining room coloring and talking with peers and staff. Pt reported anxiety a few times for which conversation, redirection, reorientation, validation, and hydroxyzine were helpful. Pt also became tearful at one point when expressing concern that one of her  may tell the management of her prior living arrangement that she was here and people could be coming after her. Writer was able to reassure pt staff were all working to maintain safety and the unit remains secure. Pt ate dinner, showered and took all scheduled medications. Pt also requested and received tylenol/ibuprofen for generalized pain in addition to melatonin at bedtime.

## 2024-05-23 NOTE — PLAN OF CARE
BEH IP Unit Acuity Rating Score (UARS)  Patient is given one point for every criteria they meet.    CRITERIA SCORING   On a 72 hour hold, court hold, committed, stay of commitment, or revocation. 0   Patient LOS on BEH unit exceeds 20 days. 0  LOS: 7   Patient under guardianship, 55+, otherwise medically complex, or under age 11. 0   Suicide ideation without relief of precipitating factors. 0   Current plan for suicide. 0   Current plan for homicide. 0   Imminent risk or actual attempt to seriously harm another without relief of factors precipitating the attempt. 0   Severe dysfunction in daily living (ex: complete neglect for self care, extreme disruption in vegetative function, extreme deterioration in social interactions). 1   Recent (last 7 days) or current physical aggression in the ED or on unit. 0   Restraints or seclusion episode in past 72 hours. 0   Recent (last 7 days) or current verbal aggression, agitation, yelling, etc., while in the ED or unit. 0   Active psychosis. 1   Need for constant or near constant redirection (from leaving, from others, etc).  1   Intrusive or disruptive behaviors. 0   Patient requires 3 or more hours of individualized nursing care per 8-hour shift (i.e. for ADLs, meds, therapeutic interventions). 0   TOTAL 3

## 2024-05-23 NOTE — PROVIDER NOTIFICATION
05/23/24 1000   Individualization/Patient Specific Goals   Patient Personal Strengths expressive of emotions;expressive of needs   Patient Vulnerabilities lacks insight into illness;limited support system;poor physical health   Interprofessional Rounds   Summary Discussed pt progress. Continues to present with paranoia and psychosis.   Participants nursing;psychiatrist;CTC;other (see comments)   Behavioral Team Discussion   Participants Dr. Mitzi MD; Nena CONTI; Sarahi CARPENTER [facility]; medical resident, medical students   Progress Minimal   Anticipated length of stay 10-15 days   Continued Stay Criteria/Rationale Symptom stabilization, medication management, care coordination   Medical/Physical See H&P   Precautions See below   Plan Psychiatric assessment/Medication management. Therapeutic Milieu. Individual care planning and after care planning. Patient to participate in unit groups and activities. Individual and group support on unit.   Safety Plan Per unit protocol   Anticipated Discharge Disposition home or self-care     PRECAUTIONS AND SAFETY    Behavioral Orders   Procedures    Code 1 - Restrict to Unit    Code 2    Routine Programming     As clinically indicated    Status 15     Every 15 minutes.       Safety  Safety WDL: WDL  Patient Location: dining room  Observed Behavior: sitting, calm, tearful, pacing  Observed Behavior (Comment): sitting in the dining room eating lunch  Safety Measures: environmental rounds completed, safety rounds completed  Diversional Activity: art work, journaling, play, music, puzzles, television, reading  De-Escalation Techniques: verbally redirected  Suicidality: Status 15

## 2024-05-23 NOTE — PLAN OF CARE
No PRNs given or requested this shift. Pt remained in her room the entire shift. Safety checks completed every 15 minutes; no concerns noted. Pt appears to have slept  7 hours. Will continue to monitor and offer support.     Problem: Sleep Disturbance  Goal: Adequate Sleep/Rest  Outcome: Progressing   Goal Outcome Evaluation:

## 2024-05-23 NOTE — PLAN OF CARE
Individual Therapy Note      Pt progress: Pt approached writer in [facility] to report that she had used her laminated list of coping skills after an incident with another Pt. The specific coping skill she used is a new coping skill she learning yesterday in session with writer. Writer commended Pt on her use of coping, Pt appreciated the encouragement.

## 2024-05-23 NOTE — PLAN OF CARE
Team Note Due:  Thursday    Assessment/Intervention/Current Symtoms and Care Coordination:  Chart review and met with team, discussed pt progress, symptomology, and response to treatment.  Discussed the discharge plan and any potential impediments to discharge.    Pt shared she was okay if writer wanted/needed to contact her CADI CM prior to arranging a conference call. Reached out to Riana to provide contact info and a brief update on pt. Riana confirmed she's available next week. Waiting on confirmation of date/time for meeting.    Met with pt to check in and discuss tentative plan to talk together with her CADI CM next week.    Discharge Plan or Goal:  Pending stabilization of symptoms and safe disposition planning.     Barriers to Discharge:  Patient requires further psychiatric stabilization due to current symptomology, medication management with changes subject to provider, coordination with outside supports, and aftercare planning.     Referral Status:  None at this time  Consider referral to [facility] if pt is not returning to [facility]  Will need new therapist referral     Legal Status:  Voluntary    Contacts:  Elisa Gomez, grandmother, [phone].    Kalie Diazuels, [phone], Aunt, Emergency Contact     Sergio Dunn ([facility] ): [phone]    Riana Galindo (CADI CM): [phone]  [email or web address]     Upcoming Meetings and Dates/Important Information and next steps:  Update assisted living on discharge when known  Coordinate discharge with CADI CM

## 2024-05-23 NOTE — PROGRESS NOTES
"  ----------------------------------------------------------------------------------------------------------  [facility]  Psychiatry Progress Note  Hospital Day #7     Interim History:     The patient's care was discussed with the treatment team and chart notes were reviewed.    Vitals: BP (!) 152/81 (BP Location: Right arm, Patient Position: Sitting, Cuff Size: Adult Regular)   Pulse 96   Temp 97.4  F (36.3  C) (Temporal)   Resp 16   Ht 1.651 m (5' 5\")   Wt (!) 155.9 kg (343 lb 12.8 oz)   SpO2 95%   BMI 57.21 kg/m    Sleep: 7 hours (05/23/24 0600)  Scheduled medications: Took all scheduled medications as prescribed  PRN medications:   - Hydroxyzine 25 mg x2 (05/22 morn and edu)  - Ativan 1 mg x2   - Zyprexa 10 mg  - ibuprofen 600 mg x2  - Tylenol 650 mg (05/22 early morn and edu)  - Melatonin 3 mg  - Nicotine lozenges     Staff Report/Interval Events  No acute behavioral events.     Irma remains bright with anxious mood. She continued to seek out staff for meds and questions frequently. Continues to seek out reassurance. Appeared guarded and suspicious at times. Endorsed high anxiety/depression but otherwise denied SI/SIB/HI/AH/VH. Became tearful when asked what is making her anxious. Again expressed concern with security of her belongings at home as well as staff from her group home finding out she is on the unit as they could come after her. Otherwise, patient was pleasant and interactive with staff. She met with therapist and discussed coping strategies to hang on her wall. Food/fluid intake was adequate and she performed ADLs independently. Notable PRNs include Tylenol/ibuprofen for pain, hydroxyzine, Ativan, and Zyprexa for anxiety. Sleep was 7 hours last night and no PRNs given during night shift.      Subjective:     Patient Interview:  Irma's affect demonstrates some lability today and shift from bright to anxious depending on the conversation. " "When overwhelmed, she uses distraction techniques which help her calm down. Patient requires frequent reassurance but is generally able to calm down when provided. States her mood was \"really good\" in the morning until an interaction with a peer on the unit where she was called a name. She was able to retreat to her room and not take the interaction personally, though it did cause her anxiety to increase. Anxiety/depression are 9/10 currently. She further endorses currently having AH of voices (not her own) talking about the altercation earlier, for example asking: \"why is she doing this to you, Irma?\" Patient also has persistent paranoia such as asking one interviewer if the button on her scrubs was a camera. Denies SI/HI/SIB/VH.     Patient again had numerous questions for interviewers. She requested ECT (likely after talking to a peer who had it) and was counseled on its use. She made various other requests including:  - Increasing scheduled hydroxyzine dose to 50 mg (states this was her PTA dose)  - Compression socks  - Shower chair  - Ginger ale for stomach    Irma also had some medical concerns. Mainly, she is worried she is having an MS flare in the context of not taking her interferon for the past several weeks. Patient endorses numbness on the left side of her body, worse in the left hand/fingers, generalized achy pain, and pain behind her left eye. Patient also noticed some black spots in her right eye while standing up from the toilet today, otherwise has no change in vision. She states the symptoms are consistent with past MS flares. She believes her last flare was about 1 month ago which resolved on its own, though prior flares were effectively managed with prednisone.     Other symptoms for Irma include more frequent BMs and bilateral lower extremity swelling, worse on the left. She states she took Lasix in the past but has not had it for some time. She denies cough, chest pain, and " "shortness of breath. She has no history of heart failure but notes multiple 1st-degree relatives  of heart issues.     Discussed involving neuro and med for consults.    ROS:  Patient endorses diarrhea/loose stools, generalized pain, fatigue, numbness in left hand/fingers  denies acute concerns and constipation (last bowel movement? today)Denied any chest pain, cough.      Objective:     Vitals:  BP (!) 152/81 (BP Location: Right arm, Patient Position: Sitting, Cuff Size: Adult Regular)   Pulse 96   Temp 97.4  F (36.3  C) (Temporal)   Resp 16   Ht 1.651 m (5' 5\")   Wt (!) 155.9 kg (343 lb 12.8 oz)   SpO2 95%   BMI 57.21 kg/m      Allergies:  Allergies   Allergen Reactions    Glatiramer Hives    Penicillins Anaphylaxis and Hives    Shellfish Allergy Anaphylaxis and Hives    Copaxone [Glatiramer Acetate] Swelling       Current Medications:  Scheduled:  Current Facility-Administered Medications   Medication Dose Route Frequency Provider Last Rate Last Admin    acetaminophen (TYLENOL) tablet 650 mg  650 mg Oral Q4H PRN Juju Salgado MD   650 mg at 24 1702    alum & mag hydroxide-simethicone (MAALOX) suspension 30 mL  30 mL Oral Q4H PRN Juju Salgado MD        [Held by provider] amphetamine-dextroamphetamine (ADDERALL XR) 10 mg, amphetamine-dextroamphetamine (ADDERALL XR) 40 mg  50 mg Oral QAM Juju aSlgado MD        [Held by provider] amphetamine-dextroamphetamine (ADDERALL) per tablet 40 mg  40 mg Oral Daily Juju Salgado MD        buprenorphine (SUBUTEX) sublingual tablet 8 mg  8 mg Sublingual TID Shanti Waters MD   8 mg at 24 1702    busPIRone (BUSPAR) tablet 10 mg  10 mg Oral BID Juju Salgado MD   10 mg at 24    [Held by provider] docusate sodium (COLACE) capsule 100 mg  100 mg Oral Daily Juju Salgado MD   100 mg at 24 0911    escitalopram (LEXAPRO) tablet 10 mg  10 mg Oral Daily Juju Salgado MD   10 mg at 24 0917    gabapentin " (NEURONTIN) capsule 600 mg  600 mg Oral TID Juju Salgado MD   600 mg at 05/22/24 2011    hydrOXYzine HCl (ATARAX) tablet 25 mg  25 mg Oral TID Wilmer Cartagena MD   25 mg at 05/22/24 2000    hydrOXYzine HCl (ATARAX) tablet 25 mg  25 mg Oral Q4H PRN Wilmer Cartagena MD   25 mg at 05/22/24 1847    ibuprofen (ADVIL/MOTRIN) tablet 600 mg  600 mg Oral Q8H PRN Juju Salgado MD   600 mg at 05/22/24 2034    interferon beta-1b (BETASERON) injection 0.3 mg  0.3 mg Subcutaneous Every Other Day Shanti Waters MD   0.3 mg at 05/22/24 1254    loperamide (IMODIUM) capsule 2 mg  2 mg Oral 4x Daily PRN Ludmila Lozano MD   2 mg at 05/19/24 2013    LORazepam (ATIVAN) tablet 1 mg  1 mg Oral BID PRN Shanti Waters MD   1 mg at 05/23/24 0645    melatonin tablet 3 mg  3 mg Oral At Bedtime PRN Ludmila Lozano MD   3 mg at 05/22/24 2128    naloxone (NARCAN) injection 0.2 mg  0.2 mg Intravenous Q2 Min PRN Juju Salgado MD        Or    naloxone (NARCAN) injection 0.4 mg  0.4 mg Intravenous Q2 Min PRN Juju Salgado MD        Or    naloxone (NARCAN) injection 0.2 mg  0.2 mg Intramuscular Q2 Min PRN Juju Salgado MD        Or    naloxone (NARCAN) injection 0.4 mg  0.4 mg Intramuscular Q2 Min PRN Juju Salgado MD        nicotine (COMMIT) lozenge 2 mg  2 mg Buccal Q2H PRN Jaren Zaman MD   2 mg at 05/23/24 0646    OLANZapine (zyPREXA) tablet 10 mg  10 mg Oral BID PRN Shanti Waters MD   10 mg at 05/22/24 0928    Or    OLANZapine (zyPREXA) injection 10 mg  10 mg Intramuscular TID PRN Shanti Waters MD        OLANZapine (zyPREXA) tablet 5 mg  5 mg Oral TID PRN Shanti Waters MD   5 mg at 05/19/24 0924    OLANZapine zydis (zyPREXA) ODT tab 15 mg  15 mg Oral At Bedtime Shanti Waters MD   15 mg at 05/22/24 2011    polyethylene glycol (MIRALAX) Packet 17 g  17 g Oral Daily PRN Juju Salgado MD        [Held by provider] SUMAtriptan (IMITREX) tablet 50 mg  50 mg Oral at onset of  headache Juju Salgado MD        zolpidem (AMBIEN) tablet 10 mg  10 mg Oral At Bedtime Shanti Waters MD   10 mg at 05/22/24 2010       PRN:  Current Facility-Administered Medications   Medication Dose Route Frequency Provider Last Rate Last Admin    acetaminophen (TYLENOL) tablet 650 mg  650 mg Oral Q4H PRN Juju Salgado MD   650 mg at 05/22/24 1702    alum & mag hydroxide-simethicone (MAALOX) suspension 30 mL  30 mL Oral Q4H PRN Juju Salgado MD        [Held by provider] amphetamine-dextroamphetamine (ADDERALL XR) 10 mg, amphetamine-dextroamphetamine (ADDERALL XR) 40 mg  50 mg Oral QAM Juju Salgado MD        [Held by provider] amphetamine-dextroamphetamine (ADDERALL) per tablet 40 mg  40 mg Oral Daily Juju Salgado MD        buprenorphine (SUBUTEX) sublingual tablet 8 mg  8 mg Sublingual TID Shanti Waters MD   8 mg at 05/22/24 1702    busPIRone (BUSPAR) tablet 10 mg  10 mg Oral BID Juju Salgado MD   10 mg at 05/22/24 2011    [Held by provider] docusate sodium (COLACE) capsule 100 mg  100 mg Oral Daily Juju Salgado MD   100 mg at 05/19/24 0911    escitalopram (LEXAPRO) tablet 10 mg  10 mg Oral Daily Juju Salgado MD   10 mg at 05/22/24 0917    gabapentin (NEURONTIN) capsule 600 mg  600 mg Oral TID Juju Salgado MD   600 mg at 05/22/24 2011    hydrOXYzine HCl (ATARAX) tablet 25 mg  25 mg Oral TID Wilmer Cartagena MD   25 mg at 05/22/24 2000    hydrOXYzine HCl (ATARAX) tablet 25 mg  25 mg Oral Q4H PRN Wilmer Cartagena MD   25 mg at 05/22/24 1847    ibuprofen (ADVIL/MOTRIN) tablet 600 mg  600 mg Oral Q8H PRN Juju Salgado MD   600 mg at 05/22/24 2034    interferon beta-1b (BETASERON) injection 0.3 mg  0.3 mg Subcutaneous Every Other Day Shanti Waters MD   0.3 mg at 05/22/24 1254    loperamide (IMODIUM) capsule 2 mg  2 mg Oral 4x Daily PRN Ludmila Lozano MD   2 mg at 05/19/24 2013    LORazepam (ATIVAN) tablet 1 mg  1 mg Oral BID PRN Shanti Waters MD   1  mg at 05/23/24 0645    melatonin tablet 3 mg  3 mg Oral At Bedtime PRN Ludmila Lozano MD   3 mg at 05/22/24 2128    naloxone (NARCAN) injection 0.2 mg  0.2 mg Intravenous Q2 Min PRN Juju Salgado MD        Or    naloxone (NARCAN) injection 0.4 mg  0.4 mg Intravenous Q2 Min PRN Juju Salgado MD        Or    naloxone (NARCAN) injection 0.2 mg  0.2 mg Intramuscular Q2 Min PRN Juju Salgado MD        Or    naloxone (NARCAN) injection 0.4 mg  0.4 mg Intramuscular Q2 Min PRN Juju Salgado MD        nicotine (COMMIT) lozenge 2 mg  2 mg Buccal Q2H PRN Jaren Zaman MD   2 mg at 05/23/24 0646    OLANZapine (zyPREXA) tablet 10 mg  10 mg Oral BID PRN Shanti Waters MD   10 mg at 05/22/24 0928    Or    OLANZapine (zyPREXA) injection 10 mg  10 mg Intramuscular TID PRN Shanti Waters MD        OLANZapine (zyPREXA) tablet 5 mg  5 mg Oral TID PRN Shanti Waters MD   5 mg at 05/19/24 0924    OLANZapine zydis (zyPREXA) ODT tab 15 mg  15 mg Oral At Bedtime Shanti Waters MD   15 mg at 05/22/24 2011    polyethylene glycol (MIRALAX) Packet 17 g  17 g Oral Daily PRN Juju Salgado MD        [Held by provider] SUMAtriptan (IMITREX) tablet 50 mg  50 mg Oral at onset of headache Juju Salgado MD        zolpidem (AMBIEN) tablet 10 mg  10 mg Oral At Bedtime Shanti Waters MD   10 mg at 05/22/24 2010       Labs and Imaging:  New results:   No results found for this or any previous visit (from the past 24 hour(s)).    Data this admission:  - CBC unremarkable, WBC 13  - UDS positive for amphetamines, benzos (both prescribed)  - TSH normal  - HbA1c 7.0  - UPT negative  - Vit D normal  - Vit B12 normal  - Folate normal  - EKG with NSR, QTc 450 on 05/16  - MRI (5/18/24) showed stable MS plaques unchanged from previous on 8/1/22     Mental Status Exam:     Oriented to:  Grossly Oriented  General:  Awake and Alert; sitting on edge of bed in home clothes, hair bonnet off   Appearance:  appears  "stated age and Grooming is adequate;   Behavior/Attitude:  cooperative and suspicious; anxious  Eye Contact:  appropriate  Psychomotor: restless no catatonia present  Speech:  appropriate volume/tone; urgency of speech  Language: Fluent in English with appropriate syntax and vocabulary.  Mood:  \"Really good\" earlier, now anxious  Affect: labile, changes from calm demeanor to: blunted, sad, and tearful; lability increased compared to yesterday  Thought Process:  coherent, tangential, and racing thoughts; frequently side-tracked with questions and unrelated thoughts   Thought Content:  no SI/HI/SIB, does not appear to be responding to internal stimuli, No VH, auditory hallucinations of voices saying \"why is she doing this to you, Irma?\" in response to a peer calling her names, and delusions; delusions of paranoia; asking if button on interviewer's scrubs is a camera  Associations:  intact  Insight:  impaired; wants to feel better, but feels that family \"did something to me.\" Does not recognize paranoia.  Judgment:  limited-impaired- patient notably paranoid, suspicious, recent inability to care for self PTA though has been adherent with treatment team recommendations, engaged in groups  Impulse control: impaired  Attention Span:  grossly intact  Concentration:  grossly intact  Recent and Remote Memory:  not formally assessed  Fund of Knowledge:  delayed; not formally diagnosed  Muscle Strength and Tone: normal  Gait and Station: Normal    Physical examination: Does not appear to be in acute distress though appears to be uncomfortable. No notable respiratory distress. Physical exam notable for bilateral LE edea extending to calfs b/l. More prominent around R ankle. Warm to touch b/l.      Psychiatric Assessment     Irma Heller is a 33 year old female previously diagnosed with anxiety, depression, and opioid dependence in remission who presented to the ED by EMS and admitted with psychosis in the context of " likely first-episode psychosis. No prior psychiatric hospitalizations. Significant symptoms on admission include delusions of paranoia, visual and tactile hallucinations, and increased anxiety. The MSE on admission was pertinent for paranoia, anxiety. Biological contributions to mental health presentation include hx of opioid use disorder (though this has been stable) as well as medical diagnosis of MS, Chiari malformation which are reportedly. Psychological contributions to mental health presentation include anxiety, depression. Social factors contributing to mental health presentation include poor social support outside of group home staff. Protective factors include good medication adherence and group home staff.      Patient's definitive diagnosis is still in evolution, differential for psychosis includes primary psychotic disorder (ie, schizophrenia, schizoaffective) or other psychiatric etiology vs. drug-induced psychosis vs. medical etiology of psychosis. Feel that this is likely a primary psychiatric cause in the context of UDS positive only for prescribed medication and no recent drug use.  However, consultation with neurology to evaluate medical etiology, further neuroimaging and further history from collateral would be indicated to get a better understand of patient's symptom timeline and course. Can also consider the role of her interferon in her psychosis. She will likely benefit from antipsychotics this admission.     Given that she currently has psychosis, patient warrants inpatient psychiatric hospitalization to maintain her safety     Psychiatric Plan by Diagnosis      Today's changes:  - Change hydroxyzine to 50 mg TID, remove PRN doses  - Neuro + med consults  - Have patient call CM (with CTC present if possible)  - Shower chair, ginger ale, compression socks for patient    # Psychosis  Patient was given olanzapine as well as IM Haldol/Versed in the ED which seemed to be effective. Plan is to  continue olanzapine for stabilization of acute psychosis and monitoring for improvement.     Medications:  - Olanzapine 15 mg PO at bedtime  - Olanzapine 5 mg TID PRN  - Olanzapine 10 mg PRN for emergency agitation/aggression related to psychosis      # Anxiety/Depression  Patient has a history of anxiety which she states has been well managed by her home meds prior to this point. She has had an acute increase in her anxiety due to being overwhelmed with this new symptoms and fear for her safety. Will plan to continue her home medication regimen in tandem with the olanzapine.      Medications:  - Buspirone 10 mg bid  - Lexapro 10 mg daily  - Hydroxyzine 50 mg TID  - Ativan 1 mg PRN     Pertinent Labs/Monitoring:   - CBC unremarkable   - UDS positive for amphetamines, benzos (both prescribed)  - HbA1c 7.0  - UPT negative  - EKG with NSR, QTc 450 on 05/16     Additional Plans:  - Patient will be treated in therapeutic milieu with appropriate individual and group therapies as described  - Keep in touch with patient's residence and CM once disposition is determined. Call CM with patient once amenable.   - Sergio Dunn ([facility] ): [phone]  - Riana Galindo ([facility]): [phone]  - Plan to obtain collateral information from group home or family  - Update inpatient pharmacy when approximate discharge date is known     Psychiatric Hospital Course:      Irma Heller was admitted to [facility] on a 72 hour hold. Hold was discontinued shortly upon admission as pt agreed to stay voluntary. PTA buspirone, Lexapro, PRN Ativan and hydroxyzine were continued.  PTA Adderall was held to minimize any increased anxiety pending mood stabilization. New medications started at the time of admission include olanzapine. 10mg at night which pt has reported as being helpful for sleep and anxiety to date.       Since her admission to the unit, Irma reported feeling safer in the new room.  She stated she was able to eat the food and had no paranoia surrounding this. She had ongoing anxiety which was reduced with PRN Ativan/hydroxyzine. Patient was able to eat lunch in the milieu with the other patients following this. She denied SI/HI and hallucinations at that time. Given the reduction in symptoms in the context of ongoing paranoia and anxiety, plan is to continue olanzapine and monitor for improvement.     Neuro was consulted regarding the patient's history of MS, Chiari malformation and interferon beta-1b use. There were some cases reported of interferon causing psychosis, though this was discussed with neuro who had low suspician for this etiology due to being on the med for at least 2 years. They recommended we continue patient's interferon to prevent MS flares. However, interferon formulary is not available in our pharmacy so will have to use patient's supply. We will plan to reach out to patient's group home, however we do not have their name or contact information. Though patient adamantly requested us not to inform anyone she is on the unit, we feel she does not currently have capacity and there is a need for collateral information. She also has a daughter that was staying with her aunt per the ED note, and would like to check on her. Attempted to call the aunt at the listed number but it was out of service. Patient's grandmother did not answer. Plan to reassess patient and obtain group home information at a later time.    Our CTC was able to reach the patient's living facility ([facility]) and spoke with the , Sergio. She confirmed Irma resides in an independent living environment and is responsible for taking her own medications. Sergio states the patient is welcome back when ready but would like to see her first. She will be out of office from 05/23 - 05/27 and requested an update prior to that. [facility] also spoke to the patient herself who again emphasized  not wanting to return there. Patient continues to have paranoia surrounding her apartment, the staff, and her family. [facility] discussed with the patient that her CM could be a helpful resource to find discharge options if desired, however at that point the patient expressed not wanting the CM to know she was here. [facility] did reach out to [facility] and received contact info for Riana Galindo and [facility]. Going forward, will plan to obtain collateral from these sources and organize getting patient's interferon.     On 05/21, called [facility] to organize delivery of patient's interferon. [facility] mentioned the patient has not filled her interferon in 4 months. The initial plan was to have [facility] deliver to the hospital, however, while coordinating with behavioral inpt pharmacy, it turns out they are able to have it ordered for the patient here. Will start the interferon as soon as it is available. Patient also requested to have her hydroxyzine changed to scheduled as this is how she has it at home. Hydroxyzine changed to scheduled but will keep the PRN option.    The risks, benefits, alternatives, and side effects were discussed and understood by the patient.     Medical Assessment and Plan     Medical diagnoses to be addressed this admission:      #Elevated A1c  - A1c elevated to 7.0 on admission  - Will discuss with patient and provide outpatient follow-up    #MS  - Given the history of MS and Chiari malformation in the context of new psychosis.  - Plan is to obtain an MRI to rule out medical CNS cause of psychosis, but will wait for neuro.   - No acute concerns per neuro.   - Neuro ok with PTA interferon beta-1b and recommend continuing to minimize risk of MS flare. Interferon is ordered and will be delivered soon.   - Plan to hold Adderall and sumatriptan until mood and anxiety are stabilized.   - Neuro to see patient again regarding possible flare     #Chiari Malformation  - Neuro consulted, no acute concerns  at this time   - MRI pending     #Hx of iron deficiency anemia  - On Ferosul held at time of admission. Will clarify with pt and pharmacy about use      Medical course:   Patient was physically examined by the ED prior to being transferred to the unit and was found to be medically stable and appropriate for admission. Please see ED/Hospital course above for more information.     On hospital day 7, patient developed symptoms concerning for a flare of her MS. Given the new symptoms in the context of not taking her interferon for the past several weeks, will have neuro consult and see the patient. Patient also had some lower extremity edema so will consult medicine as well.      Medications:  - Melatonin and Ambien PRN for sleep  - Colace 100 mg daily, Miralax/Maalox PRN for constipation  - Subutex 8 mg TID for opioid dependence  - Tylenol, ibuprofen PRN for pain  - Betaceron 0.3 mg subcutaneous every other day     Consults:  Neurology for history of MS, Chiari I malformation in context of psychiatric symptoms     Checklist     Legal Status: Voluntary     Safety Assessment:   Behavioral Orders   Procedures    Code 1 - Restrict to Unit    Code 2    Routine Programming     As clinically indicated    Status 15     Every 15 minutes.       Risk Assessment:  Risk for harm is moderate-high.  Risk factors: psychosis, hx of substance use  Protective factors: group home staff, no identified history of attempts, doesn't live alone, family support, children     SIO: none    Disposition: TBD. Disposition pending stabilization, medication optimization, & development of a safe discharge plan.     Attestations     This patient was seen and discussed with my attending physician.  Robel Cho, MS3  [facility] [employer] [employer]    I was present with the medical student who participated in the service and in the documentation of the note. I have verified the history and personally performed the physical exam and medical decision making. I agree  with the assessment and plan of care as documented in the note and have made changes to the note as appropriate.     Shanti Waters, PGY-2 Psychiatry       Attestation:  This patient has been seen and evaluated by me, Wilmer Cartagena MD.  I have discussed this patient with the house staff team including the resident and medical student and I agree with the findings and plan in this note.    I have reviewed today's vital signs, medications, labs and imaging. Wilmer Cartagena MD , PhD.

## 2024-05-24 LAB
ALBUMIN UR-MCNC: NEGATIVE MG/DL
ANION GAP SERPL CALCULATED.3IONS-SCNC: 8 MMOL/L (ref 7–15)
APPEARANCE UR: ABNORMAL
BILIRUB UR QL STRIP: NEGATIVE
BUN SERPL-MCNC: 13.4 MG/DL (ref 6–20)
CALCIUM SERPL-MCNC: 9 MG/DL (ref 8.6–10)
CHLORIDE SERPL-SCNC: 99 MMOL/L (ref 98–107)
COLOR UR AUTO: ABNORMAL
CREAT SERPL-MCNC: 0.61 MG/DL (ref 0.51–0.95)
DEPRECATED HCO3 PLAS-SCNC: 31 MMOL/L (ref 22–29)
EGFRCR SERPLBLD CKD-EPI 2021: >90 ML/MIN/1.73M2
ERYTHROCYTE [DISTWIDTH] IN BLOOD BY AUTOMATED COUNT: 13 % (ref 10–15)
GLUCOSE SERPL-MCNC: 258 MG/DL (ref 70–99)
GLUCOSE UR STRIP-MCNC: 100 MG/DL
HCT VFR BLD AUTO: 39.5 % (ref 35–47)
HGB BLD-MCNC: 12.7 G/DL (ref 11.7–15.7)
HGB UR QL STRIP: NEGATIVE
KETONES UR STRIP-MCNC: NEGATIVE MG/DL
LEUKOCYTE ESTERASE UR QL STRIP: ABNORMAL
MCH RBC QN AUTO: 27.7 PG (ref 26.5–33)
MCHC RBC AUTO-ENTMCNC: 32.2 G/DL (ref 31.5–36.5)
MCV RBC AUTO: 86 FL (ref 78–100)
NITRATE UR QL: NEGATIVE
PH UR STRIP: 7 [PH] (ref 5–7)
PLATELET # BLD AUTO: 284 10E3/UL (ref 150–450)
POTASSIUM SERPL-SCNC: 4.6 MMOL/L (ref 3.4–5.3)
RBC # BLD AUTO: 4.58 10E6/UL (ref 3.8–5.2)
RBC URINE: 2 /HPF
SODIUM SERPL-SCNC: 138 MMOL/L (ref 135–145)
SP GR UR STRIP: 1.02 (ref 1–1.03)
SQUAMOUS EPITHELIAL: 19 /HPF
UROBILINOGEN UR STRIP-MCNC: NORMAL MG/DL
WBC # BLD AUTO: 9 10E3/UL (ref 4–11)
WBC URINE: 4 /HPF

## 2024-05-24 PROCEDURE — 124N000002 HC R&B MH UMMC

## 2024-05-24 PROCEDURE — 250N000012 HC RX MED GY IP 250 OP 636 PS 637: Mod: JZ

## 2024-05-24 PROCEDURE — 250N000013 HC RX MED GY IP 250 OP 250 PS 637

## 2024-05-24 PROCEDURE — 81001 URINALYSIS AUTO W/SCOPE: CPT | Performed by: PHYSICIAN ASSISTANT

## 2024-05-24 PROCEDURE — 250N000013 HC RX MED GY IP 250 OP 250 PS 637: Performed by: STUDENT IN AN ORGANIZED HEALTH CARE EDUCATION/TRAINING PROGRAM

## 2024-05-24 PROCEDURE — 99207 PR NO BILLABLE SERVICE THIS VISIT: CPT | Performed by: PHYSICIAN ASSISTANT

## 2024-05-24 PROCEDURE — 36415 COLL VENOUS BLD VENIPUNCTURE: CPT | Performed by: PHYSICIAN ASSISTANT

## 2024-05-24 PROCEDURE — 90853 GROUP PSYCHOTHERAPY: CPT

## 2024-05-24 PROCEDURE — 85027 COMPLETE CBC AUTOMATED: CPT | Performed by: PHYSICIAN ASSISTANT

## 2024-05-24 PROCEDURE — 99232 SBSQ HOSP IP/OBS MODERATE 35: CPT | Mod: GC | Performed by: PSYCHIATRY & NEUROLOGY

## 2024-05-24 PROCEDURE — 80048 BASIC METABOLIC PNL TOTAL CA: CPT | Performed by: PHYSICIAN ASSISTANT

## 2024-05-24 PROCEDURE — 99232 SBSQ HOSP IP/OBS MODERATE 35: CPT | Performed by: PHYSICIAN ASSISTANT

## 2024-05-24 RX ADMIN — GABAPENTIN 600 MG: 300 CAPSULE ORAL at 19:58

## 2024-05-24 RX ADMIN — HYDROXYZINE HYDROCHLORIDE 50 MG: 50 TABLET, FILM COATED ORAL at 08:41

## 2024-05-24 RX ADMIN — OLANZAPINE 5 MG: 5 TABLET, FILM COATED ORAL at 12:29

## 2024-05-24 RX ADMIN — LORAZEPAM 1 MG: 1 TABLET ORAL at 11:41

## 2024-05-24 RX ADMIN — IBUPROFEN 600 MG: 600 TABLET ORAL at 01:30

## 2024-05-24 RX ADMIN — OLANZAPINE 15 MG: 15 TABLET, ORALLY DISINTEGRATING ORAL at 19:59

## 2024-05-24 RX ADMIN — BUPRENORPHINE 8 MG: 8 TABLET SUBLINGUAL at 08:41

## 2024-05-24 RX ADMIN — HYDROXYZINE HYDROCHLORIDE 50 MG: 50 TABLET, FILM COATED ORAL at 19:59

## 2024-05-24 RX ADMIN — ZOLPIDEM TARTRATE 10 MG: 5 TABLET, COATED ORAL at 19:59

## 2024-05-24 RX ADMIN — INTERFERON BETA-1B 0.3 MG: KIT at 08:42

## 2024-05-24 RX ADMIN — BUPRENORPHINE 8 MG: 8 TABLET SUBLINGUAL at 13:03

## 2024-05-24 RX ADMIN — GABAPENTIN 600 MG: 300 CAPSULE ORAL at 13:03

## 2024-05-24 RX ADMIN — ESCITALOPRAM OXALATE 10 MG: 10 TABLET ORAL at 08:41

## 2024-05-24 RX ADMIN — BUPRENORPHINE 8 MG: 8 TABLET SUBLINGUAL at 17:15

## 2024-05-24 RX ADMIN — LORAZEPAM 1 MG: 1 TABLET ORAL at 01:26

## 2024-05-24 RX ADMIN — NICOTINE POLACRILEX 2 MG: 2 LOZENGE ORAL at 12:28

## 2024-05-24 RX ADMIN — Medication 3 MG: at 20:02

## 2024-05-24 RX ADMIN — BUSPIRONE HYDROCHLORIDE 10 MG: 10 TABLET ORAL at 19:59

## 2024-05-24 RX ADMIN — GABAPENTIN 600 MG: 300 CAPSULE ORAL at 08:41

## 2024-05-24 RX ADMIN — IBUPROFEN 600 MG: 600 TABLET ORAL at 17:15

## 2024-05-24 RX ADMIN — BUSPIRONE HYDROCHLORIDE 10 MG: 10 TABLET ORAL at 08:41

## 2024-05-24 RX ADMIN — HYDROXYZINE HYDROCHLORIDE 50 MG: 50 TABLET, FILM COATED ORAL at 13:03

## 2024-05-24 ASSESSMENT — ACTIVITIES OF DAILY LIVING (ADL)
ORAL_HYGIENE: INDEPENDENT
ADLS_ACUITY_SCORE: 40
HYGIENE/GROOMING: HANDWASHING;SHOWER;INDEPENDENT
ADLS_ACUITY_SCORE: 40
LAUNDRY: WITH SUPERVISION
ADLS_ACUITY_SCORE: 40
DRESS: STREET CLOTHES;SCRUBS (BEHAVIORAL HEALTH);INDEPENDENT
HYGIENE/GROOMING: INDEPENDENT
ADLS_ACUITY_SCORE: 40
DRESS: INDEPENDENT
ADLS_ACUITY_SCORE: 40
LAUNDRY: WITH SUPERVISION
ADLS_ACUITY_SCORE: 40
ORAL_HYGIENE: INDEPENDENT;PROMPTS
ADLS_ACUITY_SCORE: 40
ADLS_ACUITY_SCORE: 40

## 2024-05-24 NOTE — PLAN OF CARE
Team Note Due:  Thursday    Assessment/Intervention/Current Symtoms and Care Coordination:  Chart review and met with team, discussed pt progress, symptomology, and response to treatment.  Discussed the discharge plan and any potential impediments to discharge.    Waiting on confirmation for time of meeting with [facility] CM next week.    Discharge Plan or Goal:  Pending stabilization of symptoms and safe disposition planning.     Barriers to Discharge:  Patient requires further psychiatric stabilization due to current symptomology, medication management with changes subject to provider, coordination with outside supports, and aftercare planning.     Referral Status:  None at this time  Consider referral to [facility] if pt is not returning to [facility]  Will need new therapist referral     Legal Status:  Voluntary    Contacts:  Elisa Gomez, grandmother, [phone].    Kalie Villegas, [phone], Aunt, Emergency Contact     Sergio Dunn ([facility] ): [phone]    Riana Galindo ([facility] ): [phone]  [email or web address]     Upcoming Meetings and Dates/Important Information and next steps:  Update assisted living on discharge when known  Coordinate discharge with [facility] CM

## 2024-05-24 NOTE — PLAN OF CARE
The pt visible out on milieu most of the shift, interact with peer, upon approach presented anxious/suspicious in mood, labile/flat in affect, speech with minimal pressure, endorsed high anxiety 9/10, depression 8/10,verbalized attributed trigger as being in hospital, spontaneous in speech, frequently seek out staff for needs request, denied of SI/HI, AH/VH, urge to hurt self/other and contracted for safety. Complain general body pain 8/10, per requested PRN Ibuprofen given and helpful reported. Per requested PRN Ativan given for anxiety and helpful reported. No behavioral escalation or safety concern noted. Complain constipation, reported, last BM before 3 days, PRN Miralax given in the morning not effective, Resident ordered 1x PRN Miralax given with fluid and ambulation encouragement, the pt receptive and reported effectiveness of intervention. Comply with med and care. Per requested PRN Melatonin given for sleep aid. Urine sample container in room and follow up.      Problem: Adult Behavioral Health Plan of Care  Goal: Plan of Care Review  Outcome: Progressing  Flowsheets (Taken 5/23/2024 1600)  Plan of Care Reviewed With: patient  Overall Patient Progress: improving  Patient Agreement with Plan of Care: agrees  Goal: Develops/Participates in Therapeutic [place] to Support Successful Transition  Outcome: Progressing  Intervention: Foster Therapeutic [place]  Recent Flowsheet Documentation  Taken 5/23/2024 1600 by Onel Talyor, RN  Trust Relationship/Rapport:   care explained                                                   choices provided   emotional support provided                             empathic listening provided   questions answered                                           reassurance provided   thoughts/feelings acknowledged     Problem: Psychotic Signs/Symptoms  Goal: Improved Mood Symptoms (Psychotic Signs/Symptoms)  Outcome: Progressing  Intervention: Optimize Emotion and Mood  Recent  Flowsheet Documentation  Taken 5/23/2024 1600 by Onel Taylor, RN  Supportive Measures:   active listening utilized                                     self-care encouraged   self-reflection promoted                                  self-responsibility promoted   verbalization of feelings encouraged  Diversional Activity:   art work                       journaling   television   Goal Outcome Evaluation:    Plan of Care Reviewed With: patient Plan of Care Reviewed With: patient    Overall Patient Progress: improvingOverall Patient Progress: improving

## 2024-05-24 NOTE — PROGRESS NOTES
"  ----------------------------------------------------------------------------------------------------------  [facility]  Psychiatry Progress Note  Hospital Day #8     Interim History:     The patient's care was discussed with the treatment team and chart notes were reviewed.    Vitals: /72 (BP Location: Left arm, Patient Position: Sitting, Cuff Size: Adult Regular)   Pulse 109   Temp 98  F (36.7  C) (Oral)   Resp 16   Ht 1.651 m (5' 5\")   Wt (!) 165.9 kg (365 lb 11.2 oz)   SpO2 98%   BMI 60.86 kg/m    Sleep: 7 hours (05/23/24 0600)  Scheduled medications: Took all scheduled medications as prescribed  PRN medications:   - ibuprofen 600 mg x2  - Ativan 1 mg x3  - Olanzapine 10 mg  - Melatonin 3 mg  - Miralax 17 g x2  - Ambien 10 mg  - Nicotine lozenges     Staff Report/Interval Events  No acute behavioral events.     Irma was more anxious after seeing medicine and neuro yesterday. She continued to be labile with high anxiety/depression, pressured speech, and paranoia. Patient did deny SI/SIB/HI/hallucinations. She did continue to express concern for the swelling and generalized pain. Patient also had some constipation last night, got Miralax PRN with relief. Other notable PRNs included Ativan and olanzapine, as well as pain and sleep control. Sleep was 7 hrs last night.    Medicine and neuro did see the patient yesterday. They recommended various labs and imaging to rule out MS flare, CVA, infection. CXR did show mild streaky perihilar and basilar opacities representing possible atelectasis, edema, or atypical infection. Workup was otherwise grossly normal including negative head CT and BLE ultrasounds. Patient had a normal (albeit limited) exam for neuro, they recommended no repeat MRI or steroids and to follow up in the outpatient setting.    Labs:  - CBC normal  - BMP with glucose 241  - BNP < 36  - Blood cultures with no growth after 12 hrs  - UA " "negative for infection, glucose 100    Imaging:  - CT head   IMPRESSION:  No acute intracranial pathology.   - BLE ultrasound:  IMPRESSION:  1. No evidence of deep venous thrombosis in either lower extremity.  2. Prominent bilateral benign-appearing lymph nodes within the groin.  3. Right ankle subcutaneous edema.  - CXR  IMPRESSION:  1. No focal consolidation.  2. Mild prominence of the cardiac silhouette  3. Nonspecific mild streaky perihilar and basilar opacities which may  represent atelectasis, pulmonary edema or atypical infection     Subjective:     Patient Interview:  This writer met with Irma. Discussed medicine and neuro results/recommendations noted above. She requested Furosemide and this writer shared we will follow up with medicine on their thoughts and touch base later.   Irma expressed concern with her phone at home and also requested to get in touch with her S worker. This writer agreed she could get the contact from [employer].   Irma requested her Adderrall medication, this writer discussed holding off at this time given concern that Adderall may make it worse . Irma seemed upset by this but agreeable to talking about this further next week.   Irma shared that she does not want to discharge to the streets and wants to stay. Also said she is not ready to remove more towels from the vents at this time.    Medical student later discussed with Irma about Metformin and she reported previous GI difficulties with the Metformin.     ROS:  Orthopnea with laying down flat-said having multiple pillows helps.   denies acute concerns and constipation (last bowel movement? today)Denied any chest pain, cough.      Objective:     Vitals:  /72 (BP Location: Left arm, Patient Position: Sitting, Cuff Size: Adult Regular)   Pulse 109   Temp 98  F (36.7  C) (Oral)   Resp 16   Ht 1.651 m (5' 5\")   Wt (!) 165.9 kg (365 lb 11.2 oz)   SpO2 98%   BMI 60.86 kg/m  "     Allergies:  Allergies   Allergen Reactions    Glatiramer Hives    Penicillins Anaphylaxis and Hives    Shellfish Allergy Anaphylaxis and Hives    Copaxone [Glatiramer Acetate] Swelling       Current Medications:  Scheduled:  Current Facility-Administered Medications   Medication Dose Route Frequency Provider Last Rate Last Admin    acetaminophen (TYLENOL) tablet 650 mg  650 mg Oral Q4H PRN Juju Salgado MD   650 mg at 05/22/24 1702    alum & mag hydroxide-simethicone (MAALOX) suspension 30 mL  30 mL Oral Q4H PRN Juju Salgado MD        [Held by provider] amphetamine-dextroamphetamine (ADDERALL XR) 10 mg, amphetamine-dextroamphetamine (ADDERALL XR) 40 mg  50 mg Oral QAM Juju Salgado MD        [Held by provider] amphetamine-dextroamphetamine (ADDERALL) per tablet 40 mg  40 mg Oral Daily Juju Salgado MD        buprenorphine (SUBUTEX) sublingual tablet 8 mg  8 mg Sublingual TID Shanti Waters MD   8 mg at 05/23/24 1840    busPIRone (BUSPAR) tablet 10 mg  10 mg Oral BID Juju Salgado MD   10 mg at 05/23/24 2032    docusate sodium (COLACE) capsule 100 mg  100 mg Oral Daily PRN Juju Salgado MD        escitalopram (LEXAPRO) tablet 10 mg  10 mg Oral Daily Juju Salgado MD   10 mg at 05/23/24 0842    gabapentin (NEURONTIN) capsule 600 mg  600 mg Oral TID Juju Salgado MD   600 mg at 05/23/24 2032    hydrOXYzine HCl (ATARAX) tablet 50 mg  50 mg Oral TID Shanti Waters MD   50 mg at 05/23/24 2032    ibuprofen (ADVIL/MOTRIN) tablet 600 mg  600 mg Oral Q8H PRN Juju Salgado MD   600 mg at 05/24/24 0130    interferon beta-1b (BETASERON) injection 0.3 mg  0.3 mg Subcutaneous Every Other Day Shanti Waters MD   0.3 mg at 05/22/24 1254    loperamide (IMODIUM) capsule 2 mg  2 mg Oral 4x Daily PRN Ludmila Lozano MD   2 mg at 05/19/24 2013    LORazepam (ATIVAN) tablet 1 mg  1 mg Oral BID PRN Shanti Waters MD   1 mg at 05/24/24 0126    melatonin tablet 3 mg  3 mg Oral  At Bedtime PRN Ludmila Lozano MD   3 mg at 05/23/24 2033    naloxone (NARCAN) injection 0.2 mg  0.2 mg Intravenous Q2 Min PRN Juju Salgado MD        Or    naloxone (NARCAN) injection 0.4 mg  0.4 mg Intravenous Q2 Min PRN Juju Salgado MD        Or    naloxone (NARCAN) injection 0.2 mg  0.2 mg Intramuscular Q2 Min PRN Juju Salgado MD        Or    naloxone (NARCAN) injection 0.4 mg  0.4 mg Intramuscular Q2 Min PRN Juju Salgado MD        nicotine (COMMIT) lozenge 2 mg  2 mg Buccal Q2H PRN Jaren Zaman MD   2 mg at 05/23/24 1642    OLANZapine (zyPREXA) tablet 10 mg  10 mg Oral BID PRN Shanti Waters MD   10 mg at 05/23/24 1225    Or    OLANZapine (zyPREXA) injection 10 mg  10 mg Intramuscular TID PRN Shanti Waters MD        OLANZapine (zyPREXA) tablet 5 mg  5 mg Oral TID PRN Shanti Waters MD   5 mg at 05/19/24 0924    OLANZapine zydis (zyPREXA) ODT tab 15 mg  15 mg Oral At Bedtime Shanti Waters MD   15 mg at 05/23/24 2032    polyethylene glycol (MIRALAX) Packet 17 g  17 g Oral Daily PRN Juju Salgado MD   17 g at 05/23/24 0947    [Held by provider] SUMAtriptan (IMITREX) tablet 50 mg  50 mg Oral at onset of headache Juju Salgado MD        zolpidem (AMBIEN) tablet 10 mg  10 mg Oral At Bedtime Shanti Waters MD   10 mg at 05/23/24 2032       PRN:  Current Facility-Administered Medications   Medication Dose Route Frequency Provider Last Rate Last Admin    acetaminophen (TYLENOL) tablet 650 mg  650 mg Oral Q4H PRN Juju Salgado MD   650 mg at 05/22/24 1702    alum & mag hydroxide-simethicone (MAALOX) suspension 30 mL  30 mL Oral Q4H PRN Juju Salgado MD        [Held by provider] amphetamine-dextroamphetamine (ADDERALL XR) 10 mg, amphetamine-dextroamphetamine (ADDERALL XR) 40 mg  50 mg Oral QAM Juju Salgado MD        [Held by provider] amphetamine-dextroamphetamine (ADDERALL) per tablet 40 mg  40 mg Oral Daily Juju Salgado MD         buprenorphine (SUBUTEX) sublingual tablet 8 mg  8 mg Sublingual TID Shanti Waters MD   8 mg at 05/23/24 1840    busPIRone (BUSPAR) tablet 10 mg  10 mg Oral BID Juju Salgado MD   10 mg at 05/23/24 2032    docusate sodium (COLACE) capsule 100 mg  100 mg Oral Daily PRN Juju Salgado MD        escitalopram (LEXAPRO) tablet 10 mg  10 mg Oral Daily Juju Salgado MD   10 mg at 05/23/24 0842    gabapentin (NEURONTIN) capsule 600 mg  600 mg Oral TID Juju Salgado MD   600 mg at 05/23/24 2032    hydrOXYzine HCl (ATARAX) tablet 50 mg  50 mg Oral TID Shanti Waters MD   50 mg at 05/23/24 2032    ibuprofen (ADVIL/MOTRIN) tablet 600 mg  600 mg Oral Q8H PRN Juju Salgado MD   600 mg at 05/24/24 0130    interferon beta-1b (BETASERON) injection 0.3 mg  0.3 mg Subcutaneous Every Other Day Shanti Waters MD   0.3 mg at 05/22/24 1254    loperamide (IMODIUM) capsule 2 mg  2 mg Oral 4x Daily PRN Ludmila Lozano MD   2 mg at 05/19/24 2013    LORazepam (ATIVAN) tablet 1 mg  1 mg Oral BID PRN Shanti Waters MD   1 mg at 05/24/24 0126    melatonin tablet 3 mg  3 mg Oral At Bedtime PRN Ludmila Lozano MD   3 mg at 05/23/24 2033    naloxone (NARCAN) injection 0.2 mg  0.2 mg Intravenous Q2 Min PRN Juju Salgado MD        Or    naloxone (NARCAN) injection 0.4 mg  0.4 mg Intravenous Q2 Min PRN Juju Salgado MD        Or    naloxone (NARCAN) injection 0.2 mg  0.2 mg Intramuscular Q2 Min PRN Juju Salgado MD        Or    naloxone (NARCAN) injection 0.4 mg  0.4 mg Intramuscular Q2 Min PRN Juju Salgado MD        nicotine (COMMIT) lozenge 2 mg  2 mg Buccal Q2H PRN Jaren Zaman MD   2 mg at 05/23/24 1642    OLANZapine (zyPREXA) tablet 10 mg  10 mg Oral BID PRN Shanti Waters MD   10 mg at 05/23/24 1225    Or    OLANZapine (zyPREXA) injection 10 mg  10 mg Intramuscular TID PRN Shanti Waters MD        OLANZapine (zyPREXA) tablet 5 mg  5 mg Oral TID PRN Jt  MD Shanti   5 mg at 05/19/24 0924    OLANZapine zydis (zyPREXA) ODT tab 15 mg  15 mg Oral At Bedtime Shanti Waters MD   15 mg at 05/23/24 2032    polyethylene glycol (MIRALAX) Packet 17 g  17 g Oral Daily PRN Juju Salgado MD   17 g at 05/23/24 0947    [Held by provider] SUMAtriptan (IMITREX) tablet 50 mg  50 mg Oral at onset of headache Juju Salgado MD        zolpidem (AMBIEN) tablet 10 mg  10 mg Oral At Bedtime Shanti Waters MD   10 mg at 05/23/24 2032       Labs and Imaging:  New results:   Recent Results (from the past 24 hour(s))   Blood Culture Hand, Left    Collection Time: 05/23/24  3:46 PM    Specimen: Hand, Left; Blood   Result Value Ref Range    Culture No growth after 12 hours    Blood Culture Hand, Right    Collection Time: 05/23/24  3:52 PM    Specimen: Hand, Right; Blood   Result Value Ref Range    Culture No growth after 12 hours    Nt probnp inpatient    Collection Time: 05/23/24  3:52 PM   Result Value Ref Range    N terminal Pro BNP Inpatient <36 0 - 450 pg/mL   CBC with platelets    Collection Time: 05/24/24  8:33 AM   Result Value Ref Range    WBC Count 9.0 4.0 - 11.0 10e3/uL    RBC Count 4.58 3.80 - 5.20 10e6/uL    Hemoglobin 12.7 11.7 - 15.7 g/dL    Hematocrit 39.5 35.0 - 47.0 %    MCV 86 78 - 100 fL    MCH 27.7 26.5 - 33.0 pg    MCHC 32.2 31.5 - 36.5 g/dL    RDW 13.0 10.0 - 15.0 %    Platelet Count 284 150 - 450 10e3/uL   Basic metabolic panel    Collection Time: 05/24/24  8:33 AM   Result Value Ref Range    Sodium 138 135 - 145 mmol/L    Potassium 4.6 3.4 - 5.3 mmol/L    Chloride 99 98 - 107 mmol/L    Carbon Dioxide (CO2) 31 (H) 22 - 29 mmol/L    Anion Gap 8 7 - 15 mmol/L    Urea Nitrogen 13.4 6.0 - 20.0 mg/dL    Creatinine 0.61 0.51 - 0.95 mg/dL    GFR Estimate >90 >60 mL/min/1.73m2    Calcium 9.0 8.6 - 10.0 mg/dL    Glucose 258 (H) 70 - 99 mg/dL   UA with Microscopic reflex to Culture    Collection Time: 05/24/24  9:21 AM    Specimen: Urine, Clean Catch   Result  "Value Ref Range    Color Urine Light Yellow Colorless, Straw, Light Yellow, Yellow    Appearance Urine Slightly Cloudy (A) Clear    Glucose Urine 100 (A) Negative mg/dL    Bilirubin Urine Negative Negative    Ketones Urine Negative Negative mg/dL    Specific Gravity Urine 1.017 1.003 - 1.035    Blood Urine Negative Negative    pH Urine 7.0 5.0 - 7.0    Protein Albumin Urine Negative Negative mg/dL    Urobilinogen Urine Normal Normal, 2.0 mg/dL    Nitrite Urine Negative Negative    Leukocyte Esterase Urine Small (A) Negative    RBC Urine 2 <=2 /HPF    WBC Urine 4 <=5 /HPF    Squamous Epithelials Urine 19 (H) <=1 /HPF     - CT head unremarkable  - CXR with mild prominence of the cardiac silhouette, nonspecific mild streaky perihilar and basilar opacities   - BLE ultrasound unremarkable, negative for DVT    Data this admission:  - CBC unremarkable, WBC 13 on initial, 9.0 on repeat  - BMP unremarkable except for glucose 141 (05/17), repeat at 258 (05/24)  - HbA1c 7.0  - Lipids WNL except for HDL at 31  - BNP WNL  - Blood cultures negative  - TSH normal  - Vit D normal  - Vit B12 normal  - Folate normal    - UA negative for infection, glucose 100  - UDS positive for amphetamines, benzos (both prescribed)  - UPT negative    - EKG with NSR, QTc 450 on 05/16    - MRI (5/18/24) showed stable MS plaques unchanged from previous on 8/1/22     Mental Status Exam:     Oriented to:  Grossly Oriented  General:  Awake and Alert; eating chips with Lito prior to encounter. Wearing bonnet.   Appearance:  appears stated age and Grooming is adequate;   Behavior/Attitude:  cooperative and suspicious; anxious  Eye Contact:  appropriate  Psychomotor: restless no catatonia present  Speech:  appropriate volume/tone; urgency of speech  Language: Fluent in English with appropriate syntax and vocabulary.  Mood:  \"I don't want to discharge to the streets\"   Affect: Anxious  Thought Process:  coherent, tangential, and racing thoughts; " "preoccupied with questions related to health  Thought Content:  no SI/HI/SIB, d delusions of paranoia; concerned with vents-stuff coming out of there, has them covered   Associations:  intact  Insight:  impaired; wants to feel better, but feels that family \"did something to me.\" Does not recognize paranoia.  Judgment:  limited-impaired- patient notably paranoid, suspicious, recent inability to care for self PTA though has been adherent with treatment team recommendations, engaged in groups  Impulse control: impaired  Attention Span:  grossly intact  Concentration:  grossly intact  Recent and Remote Memory:  not formally assessed  Fund of Knowledge:  delayed; not formally diagnosed  Muscle Strength and Tone: normal  Gait and Station: Normal     Psychiatric Assessment     Irma Heller is a 33 year old female previously diagnosed with anxiety, depression, and opioid dependence in remission who presented to the ED by EMS and admitted with psychosis in the context of likely first-episode psychosis. No prior psychiatric hospitalizations. Significant symptoms on admission include delusions of paranoia, visual and tactile hallucinations, and increased anxiety. The MSE on admission was pertinent for paranoia, anxiety. Biological contributions to mental health presentation include hx of opioid use disorder (though this has been stable) as well as medical diagnosis of MS, Chiari malformation which are reportedly. Psychological contributions to mental health presentation include anxiety, depression. Social factors contributing to mental health presentation include poor social support outside of group home staff. Protective factors include good medication adherence and group home staff.      Patient's definitive diagnosis is still in evolution, differential for psychosis includes primary psychotic disorder (ie, schizophrenia, schizoaffective) or other psychiatric etiology vs. drug-induced psychosis vs. medical etiology " of psychosis. Feel that this is likely a primary psychiatric cause in the context of UDS positive only for prescribed medication and no recent drug use.  However, consultation with neurology to evaluate medical etiology, further neuroimaging and further history from collateral would be indicated to get a better understand of patient's symptom timeline and course. Can also consider the role of her interferon in her psychosis. She will likely benefit from antipsychotics this admission.     Given that she currently has psychosis, patient warrants inpatient psychiatric hospitalization to maintain her safety     Psychiatric Plan by Diagnosis      Today's changes:  -No changes today.   -Working with medicine to address ongoing medical concerns (see below)    # Psychosis  Patient was given olanzapine as well as IM Haldol/Versed in the ED which seemed to be effective. Plan is to continue olanzapine for stabilization of acute psychosis and monitoring for improvement.     Medications:  - Olanzapine 15 mg PO at bedtime  - Olanzapine 5 mg TID PRN  - Olanzapine 10 mg PRN for emergency agitation/aggression related to psychosis      # Anxiety/Depression  Patient has a history of anxiety which she states has been well managed by her home meds prior to this point. She has had an acute increase in her anxiety due to being overwhelmed with this new symptoms and fear for her safety. Will plan to continue her home medication regimen in tandem with the olanzapine.      Medications:  - Buspirone 10 mg bid  - Lexapro 10 mg daily  - Hydroxyzine 50 mg TID  - Ativan 1 mg PRN     Pertinent Labs/Monitoring:   - CBC unremarkable   - UDS positive for amphetamines, benzos (both prescribed)  - HbA1c 7.0  - UPT negative  - EKG with NSR, QTc 450 on 05/16     Additional Plans:  - Patient will be treated in therapeutic milieu with appropriate individual and group therapies as described  - Keep in touch with patient's residence and CM once disposition  is determined. Call CM with patient once amenable.   - Sergio Dunn ([facility] ): [phone]  - Riana Galindo ([facility]): [phone]  - Plan to obtain collateral information from group home or family  - Update inpatient pharmacy when approximate discharge date is known     Psychiatric Hospital Course:      Irma Heller was admitted to [facility] on a 72 hour hold. Hold was discontinued shortly upon admission as pt agreed to stay voluntary. PTA buspirone, Lexapro, PRN Ativan and hydroxyzine were continued.  PTA Adderall was held to minimize any increased anxiety pending mood stabilization. New medications started at the time of admission include olanzapine. 10mg at night which pt has reported as being helpful for sleep and anxiety to date.       Since her admission to the unit, Irma reported feeling safer in the new room. She stated she was able to eat the food and had no paranoia surrounding this. She had ongoing anxiety which was reduced with PRN Ativan/hydroxyzine. Patient was able to eat lunch in the milieu with the other patients following this. She denied SI/HI and hallucinations at that time. Given the reduction in symptoms in the context of ongoing paranoia and anxiety, plan is to continue olanzapine and monitor for improvement.     Neuro was consulted regarding the patient's history of MS, Chiari malformation and interferon beta-1b use. There were some cases reported of interferon causing psychosis, though this was discussed with neuro who had low suspician for this etiology due to being on the med for at least 2 years. They recommended we continue patient's interferon to prevent MS flares. However, interferon formulary is not available in our pharmacy so will have to use patient's supply. We will plan to reach out to patient's group home, however we do not have their name or contact information. Though patient adamantly requested us not to inform anyone  she is on the unit, we feel she does not currently have capacity and there is a need for collateral information. She also has a daughter that was staying with her aunt per the ED note, and would like to check on her. Attempted to call the aunt at the listed number but it was out of service. Patient's grandmother did not answer. Plan to reassess patient and obtain group home information at a later time.    Our CTC was able to reach the patient's living facility ([facility]) and spoke with the , Sergio. She confirmed Irma resides in an independent living environment and is responsible for taking her own medications. Sergio states the patient is welcome back when ready but would like to see her first. She will be out of office from 05/23 - 05/27 and requested an update prior to that. [facility] also spoke to the patient herself who again emphasized not wanting to return there. Patient continues to have paranoia surrounding her apartment, the staff, and her family. [facility] discussed with the patient that her CM could be a helpful resource to find discharge options if desired, however at that point the patient expressed not wanting the CM to know she was here. [facility] did reach out to [facility] and received contact info for Riana Galindo and [facility]. Going forward, will plan to obtain collateral from these sources and organize getting patient's interferon.     On 05/21, called [facility] to organize delivery of patient's interferon. Henrik mentioned the patient has not filled her interferon in 4 months. The initial plan was to have [facility] deliver to the hospital, however, while coordinating with behavioral inpt pharmacy, it turns out they are able to have it ordered for the patient here. Will start the interferon as soon as it is available. Patient also requested to have her hydroxyzine changed to scheduled as this is how she has it at home. Hydroxyzine changed to scheduled but will  keep the PRN option.    The risks, benefits, alternatives, and side effects were discussed and understood by the patient.     Medical Assessment and Plan     Medical diagnoses to be addressed this admission:      #Elevated A1c  - A1c elevated to 7.0 on admission  - Will discuss with patient and provide outpatient follow-up    #MS  - Given the history of MS and Chiari malformation in the context of new psychosis.  - Plan is to obtain an MRI to rule out medical CNS cause of psychosis, but will wait for neuro.   - No acute concerns per neuro.   - Neuro ok with PTA interferon beta-1b and recommend continuing to minimize risk of MS flare. Interferon is ordered and will be delivered soon.   - Plan to hold Adderall and sumatriptan until mood and anxiety are stabilized.   - Neuro to see patient again regarding possible flare     #Chiari Malformation  - Neuro consulted, no acute concerns at this time   - MRI pending     #Hx of iron deficiency anemia  - On Ferosul held at time of admission. Will clarify with pt and pharmacy about use      Medical course:   Patient was physically examined by the ED prior to being transferred to the unit and was found to be medically stable and appropriate for admission. Please see ED/Hospital course above for more information.     On hospital day 7, patient developed symptoms concerning for a flare of her MS. Given the new symptoms in the context of not taking her interferon for the past several weeks, will have neuro consult and see the patient. Patient also had some lower extremity edema so will consult medicine as well. Both medicine and neuro saw the patient. Medicine workup was largely unremarkable with the exception of high glucose in the blood and urine as well as streaky b/l infiltrates representing possible edema, atypical pneumonia, atelectasis. Other notable labs/imaging include normal BNP, CBC, blood cultures, CT head, and BLE ultrasounds. Neuro noted no deficits on their exam,  thus recommended no additional imaging or immunosuppressive therapy. Will plan to follow up with medicine to discuss plan.    In the context of possible edema on CXR, orthopnea, and BLE edema, felt that echo was warranted. Discussed this as well as workup findings with medicine. Will plan to obtain that in the next day or two and have patient wear her compression stockings. Discussed starting metformin for glucose and metabolic control with patient, however she states 2-3 years ago she had significant GI upset with metformin and was unable to tolerate it. Will check in with patient again and discuss a different med.      Please see medicine and neurology consult notes for additional details.     Medications:  - Melatonin and Ambien PRN for sleep  - Colace 100 mg daily, Miralax/Maalox PRN for constipation  - Subutex 8 mg TID for opioid dependence  - Tylenol, ibuprofen PRN for pain  - Betaceron 0.3 mg subcutaneous every other day     Consults:  Neurology for history of MS, Chiari I malformation in context of psychiatric symptoms     Checklist     Legal Status: Voluntary     Safety Assessment:   Behavioral Orders   Procedures    Code 1 - Restrict to Unit    Code 2    Routine Programming     As clinically indicated    Status 15     Every 15 minutes.       Risk Assessment:  Risk for harm is moderate-high.  Risk factors: psychosis, hx of substance use  Protective factors: group home staff, no identified history of attempts, doesn't live alone, family support, children     SIO: none    Disposition: TBD. Disposition pending stabilization, medication optimization, & development of a safe discharge plan.     Attestations     This patient was seen and discussed with my attending physician.  Robel Cho, MS3  [facility] [employer]    I was present with the medical student who participated in the service and in the documentation of the note. I have verified the history and personally performed the physical exam and medical decision  making. I agree with the assessment and plan of care as documented in the note and have made changes to the note as appropriate.     Shanti Waters, PGY-2 Psychiatry       Attestation:  This patient has been seen and evaluated by me, Wilmer Cartagena MD.  I have discussed this patient with the house staff team including the resident and medical student and I agree with the findings and plan in this note.    I have reviewed today's vital signs, medications, labs and imaging. Wilmer Cartagena MD , PhD.

## 2024-05-24 NOTE — PROGRESS NOTES
"Brief Hospital Medicine Note:     Medicine team following up on infectious work up and labs and imaging.     Nursing notes, vitals, and labs reviewed.    Per review from Neurology note, low suspicion for MS flare at this time and appears that patient has been ambulating and moving appropriately. CT head negative for any acute pathology. Infectious work up including CBC, CXR, UA, unremarkable for any significant findings. Blood cultures with no growth to date. Remains afebrile and VSS.     Work up on bilateral lower extremity edema including TSH, BNP, US without acute findings.     /72 (BP Location: Left arm, Patient Position: Sitting, Cuff Size: Adult Regular)   Pulse 109   Temp 98  F (36.7  C) (Oral)   Resp 16   Ht 1.651 m (5' 5\")   Wt (!) 165.9 kg (365 lb 11.2 oz)   SpO2 98%   BMI 60.86 kg/m      CBC RESULTS:   Recent Labs   Lab Test 05/24/24  0833   WBC 9.0   RBC 4.58   HGB 12.7   HCT 39.5   MCV 86   MCH 27.7   MCHC 32.2   RDW 13.0        Last Comprehensive Metabolic Panel:  Lab Results   Component Value Date     05/24/2024    POTASSIUM 4.6 05/24/2024    CHLORIDE 99 05/24/2024    CO2 31 (H) 05/24/2024    ANIONGAP 8 05/24/2024     (H) 05/24/2024    BUN 13.4 05/24/2024    CR 0.61 05/24/2024    GFRESTIMATED >90 05/24/2024    YUMIKO 9.0 05/24/2024       MS: recommend continuing PTA interferon     Bilateral lower extremity edema  TSH, BNP within normal limits and ultrasound bilateral lower extremities negative for acute DVT. US with \"Prominent bilateral benign-appearing lymph nodes within the groin\" and \"Right ankle subcutaneous edema\".  Patient has been ambulating per review of nursing notes. No further imaging indicated at this time.  Appears question of possible underlying VALENTIN has been raised in the past.  Patient does have risk factors including BMI, open mouth breather, snoring, tobacco use.  Question if potential underlying VALENTIN could be contributing to bilateral lower extremity " edema.  -Monitor for any worsening symptoms   -Consider sleep study as OP   -Consider lymphedema consult, but would defer to primary team if ok to have wraps while on Psych unit or consider compression stockings   -Follow up as OP with PCP, could consider TTE as OP with PCP     Medicine will sign off at this time. Thank you for the consult. If further questions arise, please reach out to Medicine Team. Please reach out to Medicine tea if development of worsening sxs or dyspnea, difficulty breathing, chest pain or new hypoxia.     Addendum: informed by Psychiatry team that they plan to order a TTE here as patient reported orthopnea here today to them. Medicine will follow up results from this.     aLlitha Villalpando PA-C  Hospitalist Service  Contact information available via [facility] Paging/Directory

## 2024-05-24 NOTE — PLAN OF CARE
"Goal Outcome Evaluation:    Plan of Care Reviewed With: patient        Pt visible in the milieu, socialized with peers and staff, attended and participated in group activities. Pt requested for staff to log into Facebook in the work computer and pt redirected and pt was receptive to redirection. Pt endorsed 10/10 and depression 7/10 and received schedule medications including hydroxyzine and gabapentin with morning meds. Anxiety improved to 6/10 and received prn ativan  at 1147 AM per request. Pt anxiety improved to mild level. Pt denies all other mental health psych symptoms and contracted for safety. Pt described mood as good and presented with brighter affect during interaction with peers and staff. Pt speech pressured and hyper verbal, clear and moderate in tone and maintained good eye contact.   Plus 2 edema on bilateral legs and compression stocking applied. Adequate fluid and food intake, voiding freely and urine specimen sent. Pt denies shortness of breath and vital sign stable. /72 (BP Location: Left arm, Patient Position: Sitting, Cuff Size: Adult Regular)   Pulse 109   Temp 98  F (36.7  C) (Oral)   Resp 16   Ht 1.651 m (5' 5\")   Wt (!) 165.9 kg (365 lb 11.2 oz)   SpO2 98%   BMI 60.86 kg/m               "

## 2024-05-24 NOTE — CONSULTS
NEUROLOGY CONSULT NOTE    REASON FOR CONSULT  Concern for MS flare    HISTORY OF PRESENT ILLNESS   is a 33 year old female who is currently admitted in behavioral health unit for acute psychosis.     She has history of multiple sclerosis diagnosed in 2016 after an episode of left optic neuritis and left-sided weakness. MRI brain at that time showed several subcortical white matter T2 hyperintensities most notably in the posterior left frontal lobe. MRI C spine reportedly showed intramedullary T2 hyperintesne with minimal cord swelling. CSF with 28 oligoclonal bands. She was treated with IV steroid and eventually started on Copaxone. She could not tolerate the side effect and the medication was discontinued. She was started on interferon beta after her pregnancy. She has been following with Dr. Chahal since 2022. At outpatient neurology visit, patient frequently reported numbness of the left lower extremity and visual changes without any correlating new/active lesion on MRI.     Today, she reports similar symptoms of numbness in the left foot. She feels that her left eyelid is a little droopy. No change in vision. No new weakness. She is still able to walk. She also reports swelling in the feet and she wonders if the numbness could be from swelling. Because of these symptoms, primary team is concern of MS flare and neurology was consulted. She has missed a few doses of interferon when she was initially admitted but is now back on the medication.       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    Depression     Gastroesophageal reflux disease     Multiple sclerosis (H)     Obese      Past Surgical History:   Procedure Laterality Date    AS INSTALL SPINAL SHUNT,LAMINECTOMY      Related to Chiari Malformation surgery    BACK SURGERY      CHOLECYSTECTOMY      DECOMPRESSION CHIARI      ENDOSCOPIC RETROGRADE  CHOLANGIOPANCREATOGRAM N/A 3/16/2022    Procedure: ENDOSCOPIC RETROGRADE CHOLANGIOPANCREATOGRAPHY;  Surgeon: Jojo Moore MD;  Location:  OR        MEDICATIONS    Current Facility-Administered Medications:     acetaminophen (TYLENOL) tablet 650 mg, 650 mg, Oral, Q4H PRN, Juju Salgado MD, 650 mg at 05/22/24 1702    alum & mag hydroxide-simethicone (MAALOX) suspension 30 mL, 30 mL, Oral, Q4H PRN, Juju Salgado MD    [Held by provider] amphetamine-dextroamphetamine (ADDERALL XR) 10 mg, amphetamine-dextroamphetamine (ADDERALL XR) 40 mg, 50 mg, Oral, QAM, Juju Salgado MD    [Held by provider] amphetamine-dextroamphetamine (ADDERALL) per tablet 40 mg, 40 mg, Oral, Daily, Juju Salgado MD    buprenorphine (SUBUTEX) sublingual tablet 8 mg, 8 mg, Sublingual, TID, Shanti Waters MD, 8 mg at 05/23/24 1840    busPIRone (BUSPAR) tablet 10 mg, 10 mg, Oral, BID, Juju Salgado MD, 10 mg at 05/23/24 2032    docusate sodium (COLACE) capsule 100 mg, 100 mg, Oral, Daily PRN, Juju Salgado MD    escitalopram (LEXAPRO) tablet 10 mg, 10 mg, Oral, Daily, Juju Salgado MD, 10 mg at 05/23/24 0842    gabapentin (NEURONTIN) capsule 600 mg, 600 mg, Oral, TID, Juju Salgado MD, 600 mg at 05/23/24 2032    hydrOXYzine HCl (ATARAX) tablet 50 mg, 50 mg, Oral, TID, Shanti Waters MD, 50 mg at 05/23/24 2032    ibuprofen (ADVIL/MOTRIN) tablet 600 mg, 600 mg, Oral, Q8H PRN, Juju Salgado MD, 600 mg at 05/24/24 0130    interferon beta-1b (BETASERON) injection 0.3 mg, 0.3 mg, Subcutaneous, Every Other Day, Shanti Waters MD, 0.3 mg at 05/22/24 1254    loperamide (IMODIUM) capsule 2 mg, 2 mg, Oral, 4x Daily PRN, Ludmila Lozano MD, 2 mg at 05/19/24 2013    LORazepam (ATIVAN) tablet 1 mg, 1 mg, Oral, BID PRN, Shanti Waters MD, 1 mg at 05/24/24 0126    melatonin tablet 3 mg, 3 mg, Oral, At Bedtime PRN, Ludmila Lozano MD, 3 mg at 05/23/24 2033    naloxone (NARCAN) injection 0.2  "mg, 0.2 mg, Intravenous, Q2 Min PRN **OR** naloxone (NARCAN) injection 0.4 mg, 0.4 mg, Intravenous, Q2 Min PRN **OR** naloxone (NARCAN) injection 0.2 mg, 0.2 mg, Intramuscular, Q2 Min PRN **OR** naloxone (NARCAN) injection 0.4 mg, 0.4 mg, Intramuscular, Q2 Min PRN, Juju Salgado MD    nicotine (COMMIT) lozenge 2 mg, 2 mg, Buccal, Q2H PRN, Jaren Zaman MD, 2 mg at 05/23/24 1642    OLANZapine (zyPREXA) tablet 10 mg, 10 mg, Oral, BID PRN, 10 mg at 05/23/24 1225 **OR** OLANZapine (zyPREXA) injection 10 mg, 10 mg, Intramuscular, TID PRN, Shanti Waters MD    OLANZapine (zyPREXA) tablet 5 mg, 5 mg, Oral, TID PRN, Shanti Waters MD, 5 mg at 05/19/24 0924    OLANZapine zydis (zyPREXA) ODT tab 15 mg, 15 mg, Oral, At Bedtime, Shanti Waters MD, 15 mg at 05/23/24 2032    polyethylene glycol (MIRALAX) Packet 17 g, 17 g, Oral, Daily PRN, Juju Salgado MD, 17 g at 05/23/24 0947    [Held by provider] SUMAtriptan (IMITREX) tablet 50 mg, 50 mg, Oral, at onset of headache, Juju Salgado MD    zolpidem (AMBIEN) tablet 10 mg, 10 mg, Oral, At Bedtime, Shanti Waters MD, 10 mg at 05/23/24 2032    ALLERGIES:  Allergies   Allergen Reactions    Glatiramer Hives    Penicillins Anaphylaxis and Hives    Shellfish Allergy Anaphylaxis and Hives    Copaxone [Glatiramer Acetate] Swelling       PHYSICAL EXAM  /72 (BP Location: Left arm, Patient Position: Sitting, Cuff Size: Adult Regular)   Pulse 109   Temp 98  F (36.7  C) (Oral)   Resp 16   Ht 1.651 m (5' 5\")   Wt (!) 165.9 kg (365 lb 11.2 oz)   SpO2 98%   BMI 60.86 kg/m      NEUROLOGICAL EXAMINATION  Mental status: awake and alert, oriented. Able to provide history and follows to all commands.   Cranial nerves: pupils 2 mm equally reactive to light, no RAPD, no clear ptosis but patient has redundant skin over left eyelid (dermatochalasis), EOM intact in all directions, no facial weakness, no dysarthria.  Muscle strength: Normal muscle " strength 5/5 proximally and distally in upper and lower limbs.   Sensation: Intact sensation to light touch and vibration in upper and lower limbs including in the feet.   Deep tendon reflexes: Normal reflexes in the upper and lower limbs including intact bilateral ankle reflexest  Coordination: normal rapid alternating movements and finger to nose testing  Gait: normal. Able to walk around the unit floor without any difficulties.  Posture: normal.  Romberg: negative.    MRI brain w/o contrast 5/18/2024: Stable T2 hyperintense white matter lesions unchanged from MRI 8/1/2022    ASSESSMENT / PLAN  #1 History of multiple sclerosis: no evidence of exacerbation    Ms. Arron's neurological exam today did not show any focal neurological deficit that would suggest multiple sclerosis exacerbation. She has full muscle strength in bilateral upper and lower limbs. Sensory exam to large and small fiber modalities is intact in the feet. Reflexes are symmetric. Subjective paresthesia in the left foot could be residual symptom from her initial MS presentation as she presented with weakness and numbness in the same leg in 2016. She has also reported this similar symptoms at multiple outpatient visits in the past. MRI brain from 5 days ago did not show any progression of MS.     Recommendations:  - Given exam is unremarkable and symptom is very mild and does not affect daily function, there is no indication for MRI or immunosuppressive therapy at this time.   - Continue interferon  - Follow-up with outpatient neurologist.

## 2024-05-24 NOTE — PLAN OF CARE
Problem: Sleep Disturbance  Goal: Adequate Sleep/Rest  Outcome: Progressing    Patient appears sleeping most of the shift. Patient complained of B leg pain-had Ativan and Advil given-effective. Patient continues on q15 minutes safety checks, no behavioral issues noted. Will continue to monitor the patient and provide therapeutic intervention as needed. Will continue with current plan of care. Notify MD with any concerns. The patient had 7 total hours of sleep this shift.

## 2024-05-24 NOTE — PLAN OF CARE
BEH IP Unit Acuity Rating Score (UARS)  Patient is given one point for every criteria they meet.    CRITERIA SCORING   On a 72 hour hold, court hold, committed, stay of commitment, or revocation. 0   Patient LOS on BEH unit exceeds 20 days. 0  LOS: 8   Patient under guardianship, 55+, otherwise medically complex, or under age 11. 0   Suicide ideation without relief of precipitating factors. 0   Current plan for suicide. 0   Current plan for homicide. 0   Imminent risk or actual attempt to seriously harm another without relief of factors precipitating the attempt. 0   Severe dysfunction in daily living (ex: complete neglect for self care, extreme disruption in vegetative function, extreme deterioration in social interactions). 1   Recent (last 7 days) or current physical aggression in the ED or on unit. 0   Restraints or seclusion episode in past 72 hours. 0   Recent (last 7 days) or current verbal aggression, agitation, yelling, etc., while in the ED or unit. 0   Active psychosis. 1   Need for constant or near constant redirection (from leaving, from others, etc).  1   Intrusive or disruptive behaviors. 0   Patient requires 3 or more hours of individualized nursing care per 8-hour shift (i.e. for ADLs, meds, therapeutic interventions). 0   TOTAL 3

## 2024-05-24 NOTE — PLAN OF CARE
Group Attendance:  attended full group    Time session began: 1015  Time session ended: 1100  Patient's total time in group: 45    Total # Attendees   5   Group Type psychotherapeutic     Group Topic Covered symptom management and healthy coping skills      Group Session Detail Introduced Spoon Theory - a metaphor used to explain the limited energy and resources that individuals with chronic illnesses or disabilities have to manage their daily lives. Group discussed the complexities of managing energy levels and the importance of self-care and pacing in maintaining well-being.  Participants completed energy management worksheet.      Patient's response to the group topic/interactions:  Positive Affect, Expressed understanding of topic, and Actively engaged     Patient Details: Pt checked in feeling disappointed and concerned, long term goal is to go visit her family farm in [place]. Pt was actively engaged, asked many clarifying questions, less anxiety than in previous sessions.           [id number] - Group psychotherapy - 1 Session    Patient Active Problem List   Diagnosis    Abnormal liver function test    Acute cholangitis (H28)    Anxiety disorder    Arnold-Chiari malformation (H)    Calculus of bile duct without obstruction    Depression    Multiple sclerosis (H)    Syringomyelia (H)    Tobacco use disorder    Schizophrenia spectrum disorder with psychotic disorder type not yet determined (H)

## 2024-05-25 PROCEDURE — 250N000013 HC RX MED GY IP 250 OP 250 PS 637: Performed by: STUDENT IN AN ORGANIZED HEALTH CARE EDUCATION/TRAINING PROGRAM

## 2024-05-25 PROCEDURE — 250N000013 HC RX MED GY IP 250 OP 250 PS 637

## 2024-05-25 PROCEDURE — 124N000002 HC R&B MH UMMC

## 2024-05-25 RX ORDER — FUROSEMIDE 20 MG
20 TABLET ORAL ONCE
Status: COMPLETED | OUTPATIENT
Start: 2024-05-26 | End: 2024-05-26

## 2024-05-25 RX ADMIN — Medication 3 MG: at 20:58

## 2024-05-25 RX ADMIN — IBUPROFEN 600 MG: 600 TABLET ORAL at 07:45

## 2024-05-25 RX ADMIN — LORAZEPAM 1 MG: 1 TABLET ORAL at 03:05

## 2024-05-25 RX ADMIN — ACETAMINOPHEN 650 MG: 325 TABLET, FILM COATED ORAL at 10:10

## 2024-05-25 RX ADMIN — HYDROXYZINE HYDROCHLORIDE 50 MG: 50 TABLET, FILM COATED ORAL at 13:00

## 2024-05-25 RX ADMIN — BUSPIRONE HYDROCHLORIDE 10 MG: 10 TABLET ORAL at 20:11

## 2024-05-25 RX ADMIN — ESCITALOPRAM OXALATE 10 MG: 10 TABLET ORAL at 07:45

## 2024-05-25 RX ADMIN — OLANZAPINE 5 MG: 5 TABLET, FILM COATED ORAL at 07:51

## 2024-05-25 RX ADMIN — GABAPENTIN 600 MG: 300 CAPSULE ORAL at 20:10

## 2024-05-25 RX ADMIN — BUPRENORPHINE 8 MG: 8 TABLET SUBLINGUAL at 17:41

## 2024-05-25 RX ADMIN — HYDROXYZINE HYDROCHLORIDE 50 MG: 50 TABLET, FILM COATED ORAL at 20:11

## 2024-05-25 RX ADMIN — BUPRENORPHINE 8 MG: 8 TABLET SUBLINGUAL at 07:45

## 2024-05-25 RX ADMIN — LORAZEPAM 1 MG: 1 TABLET ORAL at 13:00

## 2024-05-25 RX ADMIN — GABAPENTIN 600 MG: 300 CAPSULE ORAL at 13:00

## 2024-05-25 RX ADMIN — BUSPIRONE HYDROCHLORIDE 10 MG: 10 TABLET ORAL at 07:45

## 2024-05-25 RX ADMIN — BUPRENORPHINE 8 MG: 8 TABLET SUBLINGUAL at 13:00

## 2024-05-25 RX ADMIN — ACETAMINOPHEN 650 MG: 325 TABLET, FILM COATED ORAL at 03:07

## 2024-05-25 RX ADMIN — GABAPENTIN 600 MG: 300 CAPSULE ORAL at 07:45

## 2024-05-25 RX ADMIN — NICOTINE POLACRILEX 2 MG: 2 LOZENGE ORAL at 08:56

## 2024-05-25 RX ADMIN — HYDROXYZINE HYDROCHLORIDE 50 MG: 50 TABLET, FILM COATED ORAL at 07:45

## 2024-05-25 RX ADMIN — OLANZAPINE 15 MG: 15 TABLET, ORALLY DISINTEGRATING ORAL at 20:11

## 2024-05-25 RX ADMIN — OLANZAPINE 10 MG: 10 TABLET, FILM COATED ORAL at 10:12

## 2024-05-25 RX ADMIN — ZOLPIDEM TARTRATE 10 MG: 5 TABLET, COATED ORAL at 20:11

## 2024-05-25 ASSESSMENT — ACTIVITIES OF DAILY LIVING (ADL)
ADLS_ACUITY_SCORE: 40

## 2024-05-25 NOTE — PLAN OF CARE
Goal Outcome Evaluation:    Plan of Care Reviewed With: patient      Problem: Adult Behavioral Health Plan of Care  Goal: Plan of Care Review  Outcome: Progressing  Flowsheets (Taken 5/25/2024 1100)  Patient Agreement with Plan of Care: agrees  Pt was up on the milieu and was social with peers and staff. She was pleasant on approach, needing some encouragement for some activities to foster independency, example, when pt asked writer to give her a hand to raise up from laying position and help with putting on her regular hospital socks, which she was able to do after instructions and encouragement.  She was medicated with ibuprofen for pain, and olanzapine for voices and later ativan prn with relief sated. Pt denied any SI, HI and she contracted for safety. She is eating and drinking adequately and she spent most of there day out in the milieu coloring, which she says helps distract her.   Pt uses WON socks which are in the intake room.   Pt will be starting on lasix tomorrow morning and her ibuprofen has been held for that matter. Will need to measure urine output starting tomorrow. Will continue  to monitor and with plan of care.

## 2024-05-25 NOTE — PROGRESS NOTES
Brief Note:     This writer went to check in on Irma, reminder of  the Echo (discussed yesterday already with medical student) and also plan to do one time dose of Lasix given pt reported previously being on Furosemide one month ago and b/l lower extremity edema.     This writer went to Irma's room in the early afternoon. She was sleeping with multiple pillows under her head, compression stocking son. Notable pitting edema extending to her calf bilaterally.   Discussed plan as noted above. Irma asked if lasix could be started tomorrow am  as she will have to go to the bathroom a lot. Given lack of acute concerns, this writer was agreeable.       Plan:  -lasix 20mg one time Sunday, 5/26 am.   -Output monitoring order for tomorrow.   -Will monitor for any SOB, lightheadedness.   -Will have on call resident check in tomorrow after lasix administration.  -Hold tomorrow's ibuprofen given pt using Lasix. Can optimize tylenol for pain management.   -Plan for Echo when available.  -Otherwise, plan per primary team note.     Shanti Watres, on-call resident  PGY-2 Psychiatry   [facility]

## 2024-05-25 NOTE — PLAN OF CARE
Problem: Sleep Disturbance  Goal: Adequate Sleep/Rest  Outcome: Progressing   Patient woke at 0300, had snacks of cereal and milk. Patient conversant with other patient. Patient endorsed anxiety 10/10 and generalized  pain, had Ativan and Tylenol, effective. Patient continues on q15 minutes safety checks, no behavioral issues noted. Will continue to monitor the patient and provide therapeutic intervention as needed. Will continue with current plan of care. Notify MD with any concerns. The patient had 5.75 total hours of sleep this shift.

## 2024-05-25 NOTE — PLAN OF CARE
Goal Outcome Evaluation:    Plan of Care Reviewed With: patient Plan of Care Reviewed With: patient    Overall Patient Progress: improvingOverall Patient Progress: improving       Problem: Adult Behavioral Health Plan of Care  Goal: Plan of Care Review  Outcome: Progressing  Flowsheets  Taken 5/24/2024 2046  Plan of Care Reviewed With: patient  Overall Patient Progress: improving  Patient Agreement with Plan of Care: agrees  Taken 5/24/2024 1800  Patient Agreement with Plan of Care: agrees     Pt was up and visible on the unit this shift engaging and socializing with peers. She was intermittently presented with bright affect. Pt mood remains labile with no acute event this shift. Pt seeking for staff and medications frequently. Appeared distrustful and suspicious. She is eating and hydrating adequately. Ate 100% for supper and snacks. Compliant with medications. Attending to ADL's independently. Pt speech is pressured; Endorsed anxiety 10. Pt denied SI, SIB, HI. Denied hallucinations. Pt is contract for safety. Pt has an order for Echo. Called ECHO dept but there no answer for both Echo and Echo/cardio lab. Please follow up in the morning.  OK to have essential oil per nursing discretion. OK to access FB to get [employer] worker number.     Pt wears TEDS during the day. Washed and hang up in the exam room.      Medical  Pt complained of generalized pain/discomfort this shift. Endorsed 10/10. Scheduled and PRN medications with minimum effect.      PRNS:  - Ativan  - Hydroxyzine   - Zyprexa  - Melatonin

## 2024-05-26 ENCOUNTER — APPOINTMENT (OUTPATIENT)
Dept: CARDIOLOGY | Facility: CLINIC | Age: 34
End: 2024-05-26
Attending: PSYCHIATRY & NEUROLOGY
Payer: COMMERCIAL

## 2024-05-26 LAB — LVEF ECHO: NORMAL

## 2024-05-26 PROCEDURE — 250N000013 HC RX MED GY IP 250 OP 250 PS 637

## 2024-05-26 PROCEDURE — 255N000002 HC RX 255 OP 636: Performed by: STUDENT IN AN ORGANIZED HEALTH CARE EDUCATION/TRAINING PROGRAM

## 2024-05-26 PROCEDURE — 93306 TTE W/DOPPLER COMPLETE: CPT | Mod: 26 | Performed by: STUDENT IN AN ORGANIZED HEALTH CARE EDUCATION/TRAINING PROGRAM

## 2024-05-26 PROCEDURE — C8929 TTE W OR WO FOL WCON,DOPPLER: HCPCS

## 2024-05-26 PROCEDURE — 250N000012 HC RX MED GY IP 250 OP 636 PS 637: Mod: JZ

## 2024-05-26 PROCEDURE — 124N000002 HC R&B MH UMMC

## 2024-05-26 PROCEDURE — 250N000013 HC RX MED GY IP 250 OP 250 PS 637: Performed by: STUDENT IN AN ORGANIZED HEALTH CARE EDUCATION/TRAINING PROGRAM

## 2024-05-26 RX ORDER — LIDOCAINE 40 MG/G
CREAM TOPICAL
Status: DISCONTINUED | OUTPATIENT
Start: 2024-05-26 | End: 2024-06-11 | Stop reason: HOSPADM

## 2024-05-26 RX ORDER — SIMETHICONE 80 MG
80 TABLET,CHEWABLE ORAL EVERY 6 HOURS PRN
Status: DISCONTINUED | OUTPATIENT
Start: 2024-05-26 | End: 2024-06-11 | Stop reason: HOSPADM

## 2024-05-26 RX ADMIN — ACETAMINOPHEN 650 MG: 325 TABLET, FILM COATED ORAL at 16:02

## 2024-05-26 RX ADMIN — NICOTINE POLACRILEX 2 MG: 2 LOZENGE ORAL at 09:44

## 2024-05-26 RX ADMIN — GABAPENTIN 600 MG: 300 CAPSULE ORAL at 19:47

## 2024-05-26 RX ADMIN — LORAZEPAM 1 MG: 1 TABLET ORAL at 14:39

## 2024-05-26 RX ADMIN — FUROSEMIDE 20 MG: 20 TABLET ORAL at 07:52

## 2024-05-26 RX ADMIN — NICOTINE POLACRILEX 2 MG: 2 LOZENGE ORAL at 13:47

## 2024-05-26 RX ADMIN — BUPRENORPHINE 8 MG: 8 TABLET SUBLINGUAL at 13:38

## 2024-05-26 RX ADMIN — GABAPENTIN 600 MG: 300 CAPSULE ORAL at 13:37

## 2024-05-26 RX ADMIN — BUPRENORPHINE 8 MG: 8 TABLET SUBLINGUAL at 07:53

## 2024-05-26 RX ADMIN — HYDROXYZINE HYDROCHLORIDE 50 MG: 50 TABLET, FILM COATED ORAL at 19:47

## 2024-05-26 RX ADMIN — BUSPIRONE HYDROCHLORIDE 10 MG: 10 TABLET ORAL at 19:47

## 2024-05-26 RX ADMIN — ACETAMINOPHEN 650 MG: 325 TABLET, FILM COATED ORAL at 08:53

## 2024-05-26 RX ADMIN — LORAZEPAM 1 MG: 1 TABLET ORAL at 01:22

## 2024-05-26 RX ADMIN — ESCITALOPRAM OXALATE 10 MG: 10 TABLET ORAL at 07:53

## 2024-05-26 RX ADMIN — OLANZAPINE 10 MG: 10 TABLET, FILM COATED ORAL at 11:33

## 2024-05-26 RX ADMIN — HYDROXYZINE HYDROCHLORIDE 50 MG: 50 TABLET, FILM COATED ORAL at 07:52

## 2024-05-26 RX ADMIN — OLANZAPINE 5 MG: 5 TABLET, FILM COATED ORAL at 08:01

## 2024-05-26 RX ADMIN — HYDROXYZINE HYDROCHLORIDE 50 MG: 50 TABLET, FILM COATED ORAL at 13:37

## 2024-05-26 RX ADMIN — OLANZAPINE 5 MG: 5 TABLET, FILM COATED ORAL at 16:11

## 2024-05-26 RX ADMIN — INTERFERON BETA-1B 0.3 MG: KIT at 08:00

## 2024-05-26 RX ADMIN — OLANZAPINE 15 MG: 15 TABLET, ORALLY DISINTEGRATING ORAL at 19:47

## 2024-05-26 RX ADMIN — BUSPIRONE HYDROCHLORIDE 10 MG: 10 TABLET ORAL at 07:52

## 2024-05-26 RX ADMIN — ZOLPIDEM TARTRATE 10 MG: 5 TABLET, COATED ORAL at 19:46

## 2024-05-26 RX ADMIN — BUPRENORPHINE 8 MG: 8 TABLET SUBLINGUAL at 18:34

## 2024-05-26 RX ADMIN — ACETAMINOPHEN 650 MG: 325 TABLET, FILM COATED ORAL at 01:40

## 2024-05-26 RX ADMIN — HUMAN ALBUMIN MICROSPHERES AND PERFLUTREN 6 ML: 10; .22 INJECTION, SOLUTION INTRAVENOUS at 12:35

## 2024-05-26 RX ADMIN — GABAPENTIN 600 MG: 300 CAPSULE ORAL at 07:53

## 2024-05-26 ASSESSMENT — ACTIVITIES OF DAILY LIVING (ADL)
HYGIENE/GROOMING: INDEPENDENT
ORAL_HYGIENE: INDEPENDENT
HYGIENE/GROOMING: SHOWER;INDEPENDENT
DRESS: INDEPENDENT;SCRUBS (BEHAVIORAL HEALTH);STREET CLOTHES
ADLS_ACUITY_SCORE: 40
DRESS: INDEPENDENT
ADLS_ACUITY_SCORE: 40
ORAL_HYGIENE: INDEPENDENT
ADLS_ACUITY_SCORE: 40

## 2024-05-26 NOTE — PLAN OF CARE
RN Assessment:    Pt presented with euthymic affect. Pt was cooperative while interacting with writer. Pt was alert and oriented x 4. Pt denied having SI, HI, and thoughts of SIB. Pt endorsed having auditory hallucinations. Pt stated she was hearing non-command voices. Pt endorsed having moderate left leg pain. Pt given PRN medication for pain. Pt had no new medical concerns. Pt endorsed sleeping well last night. Pt feels the medications that are currently ordered are working well. No medication side effects endorsed by pt or observed by writer. Pt was present in the milieu. Continue to monitor for safety and changes in medical condition.     Pt had echocardiogram this shift.     Shortly before lunch pt approached writer and advised she was agitated. Pt stated a peer had called her a derogatory name. Pt had tense muscle tone. Pt had elevated rate of speech. Pt requested olanzapine. Pt given requested medication after writer received approval from Pharmacist, as pt had olanzapine for anxiety related to hallucinations earlier in the shift.       Output Monitorin: 300 mL  0945: 400 mL  1036: 275 mL  1140: 300 mL  1232: 250 mL  1355: 300 mL  1445: 100 mL  Shift Total: 1925 mL      PRN Medications passed this shift:    0801: Olanzapine 5 mg PO for anxiety related to hallucinations. This medication was effective per pt report.    0853: Acetaminophen 650 mg PO for left leg pain. This medication was effective per pt report.     0944: Nicotine lozenge 2 mg PO for nicotine withdrawal symptoms.     1133: Olanzapine 10 mg PO for agitation. This medication was effective per pt report.     1347: Nicotine lozenge 2 mg PO for nicotine withdrawal symptoms.    1439: Lorazepam 1 mg PO for anxiety.

## 2024-05-26 NOTE — CARE PLAN
"  Rehab Group    Start time: 2015  End time: 2100  Patient time total: 20 minutes    attended partial group    #4 attended   Group Type: occupational therapy   Group Topic Covered: coping skills, social skills, self-esteem, and healthy leisure time       Group Session Detail:   Hands on healthy leisure exploration with Watercoloring activity for concentration, creative expression, follow through, relaxation, coping, healthy leisure, mood stabilization, reality-based activity, building self-esteem, and quiet socialization.          Patient Response/Contribution:  Actively engaged       Patient Detail:  Pt joined group late and was initially tearful and easily upset.  Pt shared it was due to a verbal disagreement with a peer earlier who was in group tonight prior to her arrival and was quite abrupt with her when she interrupted him a couple of times.  Pt explained she was just trying to be empathetic, not disrespectful.  Pt was anxious and emotional, though responded positively to comforting from this writer.  Pt was planning to leave group right away and go to her room, but she remained and took part in the activity with some encouragement and reassurance.  Pt did verbalize a couple of delusional thoughts in the course of the activity.  At times she did express some insight regarding her illness, such as acknowledging, \"sometimes I can get a bit paranoid.\"  Pt benefited from direct support.  She left group early, though in a much calmer state due to be tired from her medication.  No charge.                Patient Active Problem List   Diagnosis    Abnormal liver function test    Acute cholangitis (H28)    Anxiety disorder    Arnold-Chiari malformation (H)    Calculus of bile duct without obstruction    Depression    Multiple sclerosis (H)    Syringomyelia (H)    Tobacco use disorder    Schizophrenia spectrum disorder with psychotic disorder type not yet determined (H)       "

## 2024-05-26 NOTE — PLAN OF CARE
Problem: Sleep Disturbance  Goal: Adequate Sleep/Rest  Outcome: Progressing   Patient appears sleeping most of the shift. Patient complained of generalized pain and anxiety-medicated with Tylenol and Ativan, effective. Patient no complaints of chest pain, SOB, dizziness, lightheadedness or any N/V. Patient will be started on Lasix this morning. Patient on I and O's- placed new hat in the BR. Patient instructed to call staff after she voids so staff can record urine output- patient seems to understand. Patient continues on q15 minutes safety checks, no behavioral issues noted. Will continue to monitor the patient and provide therapeutic intervention as needed. Will continue with current plan of care. Notify MD with any concerns. The patient had 6 total hours of sleep this shift.

## 2024-05-27 PROCEDURE — 250N000013 HC RX MED GY IP 250 OP 250 PS 637

## 2024-05-27 PROCEDURE — 124N000002 HC R&B MH UMMC

## 2024-05-27 PROCEDURE — 99207 PR NO BILLABLE SERVICE THIS VISIT: CPT | Performed by: STUDENT IN AN ORGANIZED HEALTH CARE EDUCATION/TRAINING PROGRAM

## 2024-05-27 PROCEDURE — 250N000013 HC RX MED GY IP 250 OP 250 PS 637: Performed by: STUDENT IN AN ORGANIZED HEALTH CARE EDUCATION/TRAINING PROGRAM

## 2024-05-27 RX ADMIN — BUPRENORPHINE 8 MG: 8 TABLET SUBLINGUAL at 17:05

## 2024-05-27 RX ADMIN — HYDROXYZINE HYDROCHLORIDE 50 MG: 50 TABLET, FILM COATED ORAL at 09:33

## 2024-05-27 RX ADMIN — OLANZAPINE 5 MG: 5 TABLET, FILM COATED ORAL at 21:59

## 2024-05-27 RX ADMIN — Medication 3 MG: at 00:45

## 2024-05-27 RX ADMIN — HYDROXYZINE HYDROCHLORIDE 50 MG: 50 TABLET, FILM COATED ORAL at 20:48

## 2024-05-27 RX ADMIN — ESCITALOPRAM OXALATE 10 MG: 10 TABLET ORAL at 09:34

## 2024-05-27 RX ADMIN — LORAZEPAM 1 MG: 1 TABLET ORAL at 00:45

## 2024-05-27 RX ADMIN — OLANZAPINE 10 MG: 10 TABLET, FILM COATED ORAL at 11:41

## 2024-05-27 RX ADMIN — BUSPIRONE HYDROCHLORIDE 10 MG: 10 TABLET ORAL at 09:33

## 2024-05-27 RX ADMIN — GABAPENTIN 600 MG: 300 CAPSULE ORAL at 09:33

## 2024-05-27 RX ADMIN — BUPRENORPHINE 8 MG: 8 TABLET SUBLINGUAL at 09:33

## 2024-05-27 RX ADMIN — ACETAMINOPHEN 650 MG: 325 TABLET, FILM COATED ORAL at 16:33

## 2024-05-27 RX ADMIN — ZOLPIDEM TARTRATE 10 MG: 5 TABLET, COATED ORAL at 20:48

## 2024-05-27 RX ADMIN — NICOTINE POLACRILEX 2 MG: 2 LOZENGE ORAL at 11:41

## 2024-05-27 RX ADMIN — ACETAMINOPHEN 650 MG: 325 TABLET, FILM COATED ORAL at 00:47

## 2024-05-27 RX ADMIN — BUSPIRONE HYDROCHLORIDE 10 MG: 10 TABLET ORAL at 19:52

## 2024-05-27 RX ADMIN — ACETAMINOPHEN 650 MG: 325 TABLET, FILM COATED ORAL at 12:12

## 2024-05-27 RX ADMIN — HYDROXYZINE HYDROCHLORIDE 50 MG: 50 TABLET, FILM COATED ORAL at 13:03

## 2024-05-27 RX ADMIN — GABAPENTIN 600 MG: 300 CAPSULE ORAL at 13:03

## 2024-05-27 RX ADMIN — GABAPENTIN 600 MG: 300 CAPSULE ORAL at 19:53

## 2024-05-27 RX ADMIN — BUPRENORPHINE 8 MG: 8 TABLET SUBLINGUAL at 13:03

## 2024-05-27 RX ADMIN — LORAZEPAM 1 MG: 1 TABLET ORAL at 13:04

## 2024-05-27 RX ADMIN — ACETAMINOPHEN 650 MG: 325 TABLET, FILM COATED ORAL at 06:23

## 2024-05-27 RX ADMIN — OLANZAPINE 15 MG: 15 TABLET, ORALLY DISINTEGRATING ORAL at 20:48

## 2024-05-27 ASSESSMENT — ACTIVITIES OF DAILY LIVING (ADL)
ADLS_ACUITY_SCORE: 40
LAUNDRY: WITH SUPERVISION
ADLS_ACUITY_SCORE: 40
ADLS_ACUITY_SCORE: 40
ORAL_HYGIENE: INDEPENDENT;PROMPTS
ADLS_ACUITY_SCORE: 40
ADLS_ACUITY_SCORE: 40
HYGIENE/GROOMING: INDEPENDENT
ADLS_ACUITY_SCORE: 40
DRESS: INDEPENDENT
ADLS_ACUITY_SCORE: 40
ADLS_ACUITY_SCORE: 40
DRESS: SCRUBS (BEHAVIORAL HEALTH);INDEPENDENT;STREET CLOTHES
ADLS_ACUITY_SCORE: 40
ADLS_ACUITY_SCORE: 40
LAUNDRY: WITH SUPERVISION
ADLS_ACUITY_SCORE: 40
ORAL_HYGIENE: INDEPENDENT
HYGIENE/GROOMING: HANDWASHING;SHOWER;INDEPENDENT
ADLS_ACUITY_SCORE: 40

## 2024-05-27 NOTE — PLAN OF CARE
"Pt denies hallucinations.  Pt denies SI.  Pt rates depression 7/10 and anxiety 9/10.  Pt requested PRN Ativan several times this shift.  Explained to pt has rec'd 2 does already today.  Pt rec'd PRN Zyprexa as per request with some relief.  Pt is on top of the hour requests.  Pt then reminds staff in between the top of the hour the requests she wants even though she writes down her requests to give to staff at top of the hour.  Pt been having conflicts with peers in [facility].    Problem: Adult Behavioral Health Plan of Care  Goal: Plan of Care Review  Outcome: Not Progressing  Goal: Patient-Specific Goal (Individualization)  Description: You can add care plan individualizations to a care plan. Examples of Individualization might be:  \"Parent requests to be called daily at 9am for status\", \"I have a hard time hearing out of my right ear\", or \"Do not touch me to wake me up as it startles  me\".  Outcome: Not Progressing  Goal: Adheres to Safety Considerations for Self and Others  Outcome: Not Progressing  Intervention: Develop and Maintain Individualized Safety Plan  Recent Flowsheet Documentation  Taken 5/26/2024 1919 by Kika Dawkins, RN  Safety Measures:   environmental rounds completed   safety rounds completed  Goal: Absence of New-Onset Illness or Injury  Outcome: Not Progressing  Intervention: Identify and Manage Fall Risk  Recent Flowsheet Documentation  Taken 5/26/2024 1919 by Kika Dawkins, RN  Safety Measures:   environmental rounds completed   safety rounds completed  Goal: Optimized Coping Skills in Response to Life Stressors  Outcome: Not Progressing  Goal: Develops/Participates in Therapeutic [place] to Support Successful Transition  Outcome: Not Progressing   Goal Outcome Evaluation:                        "

## 2024-05-27 NOTE — PLAN OF CARE
Team Note Due:  Thursday    Assessment/Intervention/Current Symtoms and Care Coordination:  Chart review and met with team, discussed pt progress, symptomology, and response to treatment.  Discussed the discharge plan and any potential impediments to discharge.    Writer met with pt throughout the morning as she had questions on the results of her echo. On-call attending was on the unit, so she was consulted if the results could be interpreted and relayed to pt. After pt met with provider to discuss echo, pt requested to meet with writer again. Pt shared she's feeling extremely anxious and scared due to results. Writer validated pt's feelings and discussed coping skills. Pt was fearful she'd be transferred to another unit for medical reasons but was assured unless there are significant medical concerns that can't be managed on current unit, there is no plan at this time to transfer. Confirmed I am still waiting to hear back from BRITTA  regarding a conference call.    Discharge Plan or Goal:  Pending stabilization of symptoms and safe disposition planning.     Barriers to Discharge:  Patient requires further psychiatric stabilization due to current symptomology, medication management with changes subject to provider, coordination with outside supports, and aftercare planning.     Referral Status:  None at this time  Consider referral to [facility] apts if pt is not returning to [facility]  Will need new therapist referral     Legal Status:  Voluntary    Contacts:  Elisa Gomez, grandmother, [phone].    Kalie Villegas, [phone], Aunt, Emergency Contact     Sergio Dunn ([facility] ): [phone]    Riana Galindo ([facility]): [phone]  [email or web address]     Upcoming Meetings and Dates/Important Information and next steps:  Update assisted living on discharge when known  Coordinate discharge with [facility]

## 2024-05-27 NOTE — PLAN OF CARE
The pt visible out on milieu most of the shift social with selected peer. The pt presented calm in mood, flat in affect, endorsed minimal for both anxiety and depression but verbalized sadness and appeared preoccupied which she attributed to concern of Echo result she discussed with the provider, denied of SI/HI, AH/VH and contracted for safety. The pt despite on top an hourly request for needs, frequently seek out staff for needs. Complain general body pain 6/10 which managed with PRN Tylenol. Appetite good and adequate fluid intake. Initiated shower and well groomed, and laundry her clothes. The pt appeared working on art work/coloring most of the shift. No behavioral escalation/safety concern. Comply with med and care. The pt at HS appeared agitated, endorsed high anxiety, verbalized trigger as unknown and scared, PRN Zyprexa 5 mg given and helpful noted.      Problem: Adult Behavioral Health Plan of Care  Goal: Develops/Participates in Therapeutic [place] to Support Successful Transition  Outcome: Progressing  Intervention: Foster Therapeutic [place]  Recent Flowsheet Documentation  Taken 5/27/2024 1700 by Onel Taylor RN  Trust Relationship/Rapport:   care explained                                              choices provided   emotional support provided                        empathic listening provided   questions answered                                      reassurance provided   thoughts/feelings acknowledged     Problem: Psychotic Signs/Symptoms  Goal: Improved Mood Symptoms (Psychotic Signs/Symptoms)  Outcome: Progressing  Intervention: Optimize Emotion and Mood  Recent Flowsheet Documentation  Taken 5/27/2024 1700 by Onel Taylor RN  Supportive Measures:   active listening utilized                                  self-care encouraged   self-reflection promoted                                self-responsibility promoted   verbalization of feelings encouraged  Diversional Activity:   art  work                                    journaling   [employer]                                        television   Goal Outcome Evaluation:    Plan of Care Reviewed With: patient

## 2024-05-27 NOTE — PLAN OF CARE
Problem: Anxiety  Goal: Anxiety Reduction or Resolution  Outcome: Not Progressing     Problem: Sleep Disturbance  Goal: Adequate Sleep/Rest  Outcome: Progressing   Patient appears sleeping most of the shift. Complaints of BLE pain, headache and anxiety-Ativan and Tylenol given which were  effective. Patient continues on q15 minutes safety checks. Patient is needy asked for a lot of things. Patient on I and O's . Patient voiding large amount of clear yellow urine. Patient SOB on exertion on lasix. No complaints of chest pain, dizziness, lightheadedness or N/V.  Will continue to monitor the patient and provide therapeutic intervention as needed. Will continue with current plan of care. Notify MD with any concerns. The patient had 6.25  total hours of sleep this shift.

## 2024-05-27 NOTE — PROGRESS NOTES
Brief Medicine Progress Note     Chart checked today.       # Obesity, severe (BMI 60)  #Underlying VALENTIN, suspected  # Bilateral lower extremity  Work up on bilateral lower extremity edema including TSH, BNP, US without acute findings.  Echocardiogram ordered by psychiatry shows borderline LV function at 50 to 55%, no valvular abnormalities (technically difficult exam due to body habitus).  Question of VALENTIN has been raised in the past and given risk factors of BMI, open health fair, snoring, tobacco use I think this is likely contributing.  Unable to perform sleep study while on psychiatry units but she would benefit from this in the future.  - Advised against medication management of lower extremity edema at this time  - Consider lymphedema consult, but would defer to primary team if ok to have wraps while on Psych unit or consider compression stockings   - Recommend outpatient sleep study    Currently, medically stable and internal medicine will sign off. Please contact if future questions or concerns arise. Thank you for the opportunity to be a part of this patient's care.       Tong Ghosh PA-C  Internal Medicine [facility] Hospitalist  [facility]  Pager [phone]

## 2024-05-27 NOTE — PLAN OF CARE
"Goal Outcome Evaluation:    Plan of Care Reviewed With: patient Plan of Care Reviewed With: patient    Overall Patient Progress: improvingOverall Patient Progress: improving       Problem: Adult Behavioral Health Plan of Care  Goal: Plan of Care Review  Outcome: Progressing  Flowsheets  Taken 5/27/2024 1445  Plan of Care Reviewed With: patient  Overall Patient Progress: improving  Patient Agreement with Plan of Care: agrees  Taken 5/27/2024 1200  Patient Agreement with Plan of Care: agrees     Pt slept in this morning. Upon waking up, pt ate some breakfast and took the morning medications. Pt expressed sadness after speaking to a provider due to recent echo results. Pt also was frustrated due to another peer's poor boundaries and calling her names. Pt feelings were acknowledged and re assurance was provided that staff will offer support and making sure boundaries is maintained. Pt used some positive coping skills and distractions. Pt continue seeking for staff and medications frequently. Pt is on \"top of the hour\" request. Pt is eating and drinking adequately. Hygiene is appropriate to situation. Pt is independent with ADL's and urine output is adequate. Pt denied being constipated. Pt has a double portion order and snacks in between. Pt  is voluntary. Pt endorses high anxiety and generalized body pain. Denies all other psych symptoms and is marcia for safety. PRN Ativan, Zyprexa and hydroxyzine was given.   "

## 2024-05-27 NOTE — PLAN OF CARE
Rehab Group    Start time: 10:15  End time: 11:10  Patient time total: 15 minutes    attended partial group    #8 attended   Group Type: occupational therapy   Group Topic Covered: coping skills, emotional regulation, social skills, and healthy leisure time     Group Session Detail:  Patient engaged in group leisure activity (Family Feud) with the focus being: building interpersonal skills, encouraging team collaboration, and promoting overall prosocial engagement and interaction.      Patient Response/Contribution:  Cooperative with task and Actively engaged     Patient Detail:  Patient briefly engaged in group activity. Patient was pleasant in interactions with writer; however, minimally intrusive to other's in group. Patient spoke with increased volume and occasionally spoke over others/interrupted others. Patient appeared fairly assertive when providing ideas to group dynamic. Patient left group early; no return. No charge.        Patient Active Problem List   Diagnosis    Abnormal liver function test    Acute cholangitis (H28)    Anxiety disorder    Arnold-Chiari malformation (H)    Calculus of bile duct without obstruction    Depression    Multiple sclerosis (H)    Syringomyelia (H)    Tobacco use disorder    Schizophrenia spectrum disorder with psychotic disorder type not yet determined (H)

## 2024-05-28 LAB
BACTERIA BLD CULT: NO GROWTH
BACTERIA BLD CULT: NO GROWTH

## 2024-05-28 PROCEDURE — 250N000013 HC RX MED GY IP 250 OP 250 PS 637

## 2024-05-28 PROCEDURE — 124N000002 HC R&B MH UMMC

## 2024-05-28 PROCEDURE — 250N000012 HC RX MED GY IP 250 OP 636 PS 637: Mod: JZ

## 2024-05-28 PROCEDURE — 99232 SBSQ HOSP IP/OBS MODERATE 35: CPT | Mod: GC | Performed by: PSYCHIATRY & NEUROLOGY

## 2024-05-28 RX ORDER — BUPRENORPHINE 8 MG/1
8 TABLET SUBLINGUAL AT BEDTIME
Status: DISCONTINUED | OUTPATIENT
Start: 2024-05-28 | End: 2024-05-28

## 2024-05-28 RX ORDER — BUPRENORPHINE 8 MG/1
8 TABLET SUBLINGUAL 3 TIMES DAILY
Status: COMPLETED | OUTPATIENT
Start: 2024-05-28 | End: 2024-05-28

## 2024-05-28 RX ORDER — BUPRENORPHINE 8 MG/1
8 TABLET SUBLINGUAL AT BEDTIME
Status: DISCONTINUED | OUTPATIENT
Start: 2024-05-29 | End: 2024-05-31

## 2024-05-28 RX ORDER — BUPRENORPHINE 8 MG/1
16 TABLET SUBLINGUAL DAILY
Status: DISCONTINUED | OUTPATIENT
Start: 2024-05-29 | End: 2024-06-11 | Stop reason: HOSPADM

## 2024-05-28 RX ADMIN — INTERFERON BETA-1B 0.3 MG: KIT at 09:19

## 2024-05-28 RX ADMIN — LORAZEPAM 1 MG: 1 TABLET ORAL at 00:22

## 2024-05-28 RX ADMIN — OLANZAPINE 10 MG: 10 TABLET, FILM COATED ORAL at 02:56

## 2024-05-28 RX ADMIN — ACETAMINOPHEN 650 MG: 325 TABLET, FILM COATED ORAL at 07:00

## 2024-05-28 RX ADMIN — BUPRENORPHINE 8 MG: 8 TABLET SUBLINGUAL at 17:14

## 2024-05-28 RX ADMIN — BUSPIRONE HYDROCHLORIDE 10 MG: 10 TABLET ORAL at 19:08

## 2024-05-28 RX ADMIN — GABAPENTIN 600 MG: 300 CAPSULE ORAL at 07:58

## 2024-05-28 RX ADMIN — BUSPIRONE HYDROCHLORIDE 10 MG: 10 TABLET ORAL at 07:58

## 2024-05-28 RX ADMIN — ZOLPIDEM TARTRATE 10 MG: 5 TABLET, COATED ORAL at 19:09

## 2024-05-28 RX ADMIN — HYDROXYZINE HYDROCHLORIDE 50 MG: 50 TABLET, FILM COATED ORAL at 07:58

## 2024-05-28 RX ADMIN — HYDROXYZINE HYDROCHLORIDE 50 MG: 50 TABLET, FILM COATED ORAL at 19:09

## 2024-05-28 RX ADMIN — GABAPENTIN 600 MG: 300 CAPSULE ORAL at 19:08

## 2024-05-28 RX ADMIN — ESCITALOPRAM OXALATE 10 MG: 10 TABLET ORAL at 07:58

## 2024-05-28 RX ADMIN — ACETAMINOPHEN 650 MG: 325 TABLET, FILM COATED ORAL at 02:56

## 2024-05-28 RX ADMIN — LORAZEPAM 1 MG: 1 TABLET ORAL at 15:12

## 2024-05-28 RX ADMIN — IBUPROFEN 600 MG: 600 TABLET ORAL at 11:29

## 2024-05-28 RX ADMIN — OLANZAPINE 5 MG: 5 TABLET, FILM COATED ORAL at 17:14

## 2024-05-28 RX ADMIN — ACETAMINOPHEN 650 MG: 325 TABLET, FILM COATED ORAL at 19:10

## 2024-05-28 RX ADMIN — HYDROXYZINE HYDROCHLORIDE 50 MG: 50 TABLET, FILM COATED ORAL at 13:03

## 2024-05-28 RX ADMIN — BUPRENORPHINE 8 MG: 8 TABLET SUBLINGUAL at 13:03

## 2024-05-28 RX ADMIN — OLANZAPINE 5 MG: 5 TABLET, FILM COATED ORAL at 09:25

## 2024-05-28 RX ADMIN — OLANZAPINE 15 MG: 15 TABLET, ORALLY DISINTEGRATING ORAL at 19:09

## 2024-05-28 RX ADMIN — GABAPENTIN 600 MG: 300 CAPSULE ORAL at 13:04

## 2024-05-28 RX ADMIN — BUPRENORPHINE 8 MG: 8 TABLET SUBLINGUAL at 07:58

## 2024-05-28 ASSESSMENT — ACTIVITIES OF DAILY LIVING (ADL)
ADLS_ACUITY_SCORE: 40
HYGIENE/GROOMING: INDEPENDENT
ADLS_ACUITY_SCORE: 40
ORAL_HYGIENE: INDEPENDENT
HYGIENE/GROOMING: INDEPENDENT
ADLS_ACUITY_SCORE: 40
DRESS: INDEPENDENT
LAUNDRY: WITH SUPERVISION
ADLS_ACUITY_SCORE: 40
ORAL_HYGIENE: INDEPENDENT
ADLS_ACUITY_SCORE: 40
DRESS: INDEPENDENT
ADLS_ACUITY_SCORE: 40
ADLS_ACUITY_SCORE: 40

## 2024-05-28 NOTE — PROGRESS NOTES
"  ----------------------------------------------------------------------------------------------------------  [facility]  Psychiatry Progress Note  Hospital Day #12     Interim History:     The patient's care was discussed with the treatment team and chart notes were reviewed.    Vitals: /84 (BP Location: Right arm, Patient Position: Sitting, Cuff Size: Adult Regular)   Pulse 100   Temp 98.2  F (36.8  C) (Oral)   Resp 16   Ht 1.651 m (5' 5\")   Wt (!) 165.9 kg (365 lb 11.2 oz)   SpO2 96%   BMI 60.86 kg/m    Sleep: 5.5 hours (05/28/24 0600)  Scheduled medications: Took all scheduled medications as prescribed  PRN medications:   Friday (05/24)  - Ativan 1 mg x2  - Olanzapine 5 mg   - ibuprofen 600 mg x2  - Melatonin 3 mg  - Nicotine lozenges     Saturday (05/25)  - Olanzapine 10 mg x1, 5 mg x1  - Ativan 1 mg x2  - Tylenol 650 mg x2  - ibuprofen 600 mg  - Melatonin 3 mg   - Nicotine lozenges    Sunday (05/26)  -Olanzapine 10 mg x1, 5 mg x2  - Ativan 1 mg x2  - Tylenol 650 mg x3  - ibuprofen 600 mg  - Nicotine lozenges    Monday (05/27)  - Olanzapine 10 mg x1, 5 mg x1  - Ativan 1 mg x2  - Tylenol 650 mg x4  - Melatonin 3 mg   - Nicotine lozenges     Tuesday am (05/28)  - Olanzapine 10 mg x1  - Tylenol 650 mg x2  - Ativan 1 mg x1    Staff Report/Interval Events  No acute behavioral events.     Irma's mental status throughout the weekend was similar to the week prior. She continued to have high levels of anxiety/depression for which she took her PRNs consistently. She requests PRNs at the top of the hour consistently. She was visible on the unit and interacting with peers much of the time, though appeared distrustful and suspicious. Patient did have AH intermittently during the weekend. She did have a period of agitation the morning of 05/28 after a patient called her a name. She had extra dose of olanzapine with pharm approval at that time. Patient continued " "to have conflicts with peers in the lounge. Food and fluid intake has been adequate. Sleep as follows: 5.75 hrs Fri, 6 hrs Sat, 6.25 hrs Sun, 5.5 hrs Mon.    Patient also had persistent discomfort from her generalized pain and BLE edema. Patient had PRNs to manage her pain. She has been followed by both medicine and neurology. Patient got an echo this weekend which showed LV function at 50-55% and this was discussed with patient. She did have some anxiety with these results. Lasix was started on Sat after which patient had increasing frequency of urination and improvement of edema. Medicine felt the patient is medically stable, recommended outpatient sleep study to assess for VALENTIN.     Imaging:  Echocardiogram    Interpretation Summary  Technically difficult study. Extremely difficult acoustic windows despite the use of contrast for endcardial border definition. Left ventricular function is decreased. The ejection fraction is 50-55% (borderline).  Global right ventricular function is mildly reduced.  The inferior vena cava cannot be assessed.  No pericardial effusion is present.     Subjective:     Patient Interview:  Irma was anxious today and asked to meet with a smaller group of people. She reports having high anxiety (8/10) due to the recent medicine workup and echo results. She also endorses continuation of her auditory hallucinations which she was having just prior to this interview. She recognizes the voices to be abnormal and notifies staff when she hears them. Patient also states a peer on the unit has been making inappropriate gestures towards her and calling her names, which has increased anxiety. She has been consistently taking her medications and using PRNs. Patient reports olanzapine has helped with the voices but not anxiety. She has been doing more artwork and continues to use coping mechanisms successfully. Her pain is \"okay\" at this time.     Irma was anxious to get more information about " "her echo and heart function. We discussed the echo findings and emphasized she is not in acute heart failure but it should be followed in the outpatient setting. Will also provide handout with information on heart failure and discuss with her tomorrow. She improved with the Lasix and we discussed this was a one-time dose, medicine recommended compression stockings going forward. Recommended keeping her legs elevated. Patient also endorses continued orthopnea and dyspnea while trying to sleep. Told her about medicine's concern for VALENTIN and recommendation for outpatient sleep study.Discussed we will place referrals for PCP and sleep medicine on discharge.     Irma also reports that over the weekend while eating an apple, her two medial incisors chipped off. She has a partial denture on the bottom teeth which now rubs against the chipped teeth and causes pain. Discussed plan to obtain a dental consult and will provide Orajel for pain. Likely will need outpatient dental follow-up.    Patient also expressed concern with holding her Adderall and asks \"why are you guys doing this to me?\" Again discussed the risk of making her anxiety and AH worse, however she was adamant about resuming the Adderall. Discussed that we will consider restarting Adderall at a low dose or trying a different medication for her fatigue     She would also like her Subutex dosing switched to x2 in the morning and x1 in the afternoon as this was her PTA regimen.     ROS:  Orthopnea with laying down flat-said having multiple pillows helps.   denies acute concerns      Objective:     Vitals:  /84 (BP Location: Right arm, Patient Position: Sitting, Cuff Size: Adult Regular)   Pulse 100   Temp 98.2  F (36.8  C) (Oral)   Resp 16   Ht 1.651 m (5' 5\")   Wt (!) 165.9 kg (365 lb 11.2 oz)   SpO2 96%   BMI 60.86 kg/m      Allergies:  Allergies   Allergen Reactions    Glatiramer Hives    Penicillins Anaphylaxis and Hives    Shellfish Allergy " Anaphylaxis and Hives    Copaxone [Glatiramer Acetate] Swelling       Current Medications:  Scheduled:  Current Facility-Administered Medications   Medication Dose Route Frequency Provider Last Rate Last Admin    acetaminophen (TYLENOL) tablet 650 mg  650 mg Oral Q4H PRN Juju Salgado MD   650 mg at 05/28/24 0700    alum & mag hydroxide-simethicone (MAALOX) suspension 30 mL  30 mL Oral Q4H PRN Juju Salgado MD        [Held by provider] amphetamine-dextroamphetamine (ADDERALL XR) 10 mg, amphetamine-dextroamphetamine (ADDERALL XR) 40 mg  50 mg Oral QAM Juju Salgado MD        [Held by provider] amphetamine-dextroamphetamine (ADDERALL) per tablet 40 mg  40 mg Oral Daily Juju Salgado MD        buprenorphine (SUBUTEX) sublingual tablet 8 mg  8 mg Sublingual TID Shanti Waters MD   8 mg at 05/28/24 0758    busPIRone (BUSPAR) tablet 10 mg  10 mg Oral BID Juju Salgado MD   10 mg at 05/28/24 0758    docusate sodium (COLACE) capsule 100 mg  100 mg Oral Daily PRN Juju Salgado MD        escitalopram (LEXAPRO) tablet 10 mg  10 mg Oral Daily Juju Salgado MD   10 mg at 05/28/24 0758    gabapentin (NEURONTIN) capsule 600 mg  600 mg Oral TID Juju Salgado MD   600 mg at 05/28/24 0758    hydrOXYzine HCl (ATARAX) tablet 50 mg  50 mg Oral TID Shanti Waters MD   50 mg at 05/28/24 0758    [Held by provider] ibuprofen (ADVIL/MOTRIN) tablet 600 mg  600 mg Oral Q8H PRN Juju Salgado MD   600 mg at 05/25/24 0745    interferon beta-1b (BETASERON) injection 0.3 mg  0.3 mg Subcutaneous Every Other Day Shanti Waters MD   0.3 mg at 05/26/24 0800    lidocaine (LMX4) cream   Topical Q1H PRN Lalitha Serrano PA-C        lidocaine 1 % 0.1-1 mL  0.1-1 mL Other Q1H PRN Lalitha Serrano PA-C        loperamide (IMODIUM) capsule 2 mg  2 mg Oral 4x Daily PRN Ludmila Lozano MD   2 mg at 05/19/24 2013    LORazepam (ATIVAN) tablet 1 mg  1 mg Oral BID PRN Shanti Waters MD   1 mg at  05/28/24 0022    melatonin tablet 3 mg  3 mg Oral At Bedtime PRN Ludmila Lozano MD   3 mg at 05/27/24 0045    naloxone (NARCAN) injection 0.2 mg  0.2 mg Intravenous Q2 Min PRN Juju Salgado MD        Or    naloxone (NARCAN) injection 0.4 mg  0.4 mg Intravenous Q2 Min PRN Juju Salgado MD        Or    naloxone (NARCAN) injection 0.2 mg  0.2 mg Intramuscular Q2 Min PRN Juju Salgado MD        Or    naloxone (NARCAN) injection 0.4 mg  0.4 mg Intramuscular Q2 Min PRN Juju Salgado MD        nicotine (COMMIT) lozenge 2 mg  2 mg Buccal Q2H PRN Jaren Zaman MD   2 mg at 05/27/24 1141    OLANZapine (zyPREXA) tablet 10 mg  10 mg Oral BID PRN Shanti Waters MD   10 mg at 05/28/24 0256    Or    OLANZapine (zyPREXA) injection 10 mg  10 mg Intramuscular TID PRN Shanti Waters MD        OLANZapine (zyPREXA) tablet 5 mg  5 mg Oral TID PRN Shanti Waters MD   5 mg at 05/27/24 2159    OLANZapine zydis (zyPREXA) ODT tab 15 mg  15 mg Oral At Bedtime Shanti Waters MD   15 mg at 05/27/24 2048    polyethylene glycol (MIRALAX) Packet 17 g  17 g Oral Daily PRN Juju Salgado MD   17 g at 05/23/24 0947    simethicone (MYLICON) chewable tablet 80 mg  80 mg Oral Q6H PRN Ludmila Lozano MD        sodium chloride (PF) 0.9% PF flush 3 mL  3 mL Intracatheter q1 min prn Lalitha Serrano PA-C        [Held by provider] SUMAtriptan (IMITREX) tablet 50 mg  50 mg Oral at onset of headache Juju Salgado MD        zolpidem (AMBIEN) tablet 10 mg  10 mg Oral At Bedtime Shanti Waters MD   10 mg at 05/27/24 2048       PRN:  Current Facility-Administered Medications   Medication Dose Route Frequency Provider Last Rate Last Admin    acetaminophen (TYLENOL) tablet 650 mg  650 mg Oral Q4H PRN Juju Salgado MD   650 mg at 05/28/24 0700    alum & mag hydroxide-simethicone (MAALOX) suspension 30 mL  30 mL Oral Q4H PRN Juju Salgado MD        [Held by provider] amphetamine-dextroamphetamine  (ADDERALL XR) 10 mg, amphetamine-dextroamphetamine (ADDERALL XR) 40 mg  50 mg Oral QAM Juju Salgado MD        [Held by provider] amphetamine-dextroamphetamine (ADDERALL) per tablet 40 mg  40 mg Oral Daily Juju Salgado MD        buprenorphine (SUBUTEX) sublingual tablet 8 mg  8 mg Sublingual TID Shanti Waters MD   8 mg at 05/28/24 0758    busPIRone (BUSPAR) tablet 10 mg  10 mg Oral BID uJju Salgado MD   10 mg at 05/28/24 0758    docusate sodium (COLACE) capsule 100 mg  100 mg Oral Daily PRN Juju Salgado MD        escitalopram (LEXAPRO) tablet 10 mg  10 mg Oral Daily Juju Salgado MD   10 mg at 05/28/24 0758    gabapentin (NEURONTIN) capsule 600 mg  600 mg Oral TID Juju Salgado MD   600 mg at 05/28/24 0758    hydrOXYzine HCl (ATARAX) tablet 50 mg  50 mg Oral TID Shanti Waters MD   50 mg at 05/28/24 0758    [Held by provider] ibuprofen (ADVIL/MOTRIN) tablet 600 mg  600 mg Oral Q8H PRN Juju Salgado MD   600 mg at 05/25/24 0745    interferon beta-1b (BETASERON) injection 0.3 mg  0.3 mg Subcutaneous Every Other Day Shanti Waters MD   0.3 mg at 05/26/24 0800    lidocaine (LMX4) cream   Topical Q1H PRN Lalitha Serrano PA-C        lidocaine 1 % 0.1-1 mL  0.1-1 mL Other Q1H PRN Lalitha Serrano PA-C        loperamide (IMODIUM) capsule 2 mg  2 mg Oral 4x Daily PRN Ludmila Lozano MD   2 mg at 05/19/24 2013    LORazepam (ATIVAN) tablet 1 mg  1 mg Oral BID PRN Shanti Waters MD   1 mg at 05/28/24 0022    melatonin tablet 3 mg  3 mg Oral At Bedtime PRN Ludmila Lozano MD   3 mg at 05/27/24 0045    naloxone (NARCAN) injection 0.2 mg  0.2 mg Intravenous Q2 Min PRN Juju Salgado MD        Or    naloxone (NARCAN) injection 0.4 mg  0.4 mg Intravenous Q2 Min PRN Juju Salgado MD        Or    naloxone (NARCAN) injection 0.2 mg  0.2 mg Intramuscular Q2 Min PRN Juju Salgado MD        Or    naloxone (NARCAN) injection 0.4 mg  0.4 mg Intramuscular Q2 Min PRN  Juju Salgado MD        nicotine (COMMIT) lozenge 2 mg  2 mg Buccal Q2H PRN Jaren Zaman MD   2 mg at 05/27/24 1141    OLANZapine (zyPREXA) tablet 10 mg  10 mg Oral BID PRN Shanti Waters MD   10 mg at 05/28/24 0256    Or    OLANZapine (zyPREXA) injection 10 mg  10 mg Intramuscular TID PRN Shanti Waters MD        OLANZapine (zyPREXA) tablet 5 mg  5 mg Oral TID PRN Shanti Waters MD   5 mg at 05/27/24 2159    OLANZapine zydis (zyPREXA) ODT tab 15 mg  15 mg Oral At Bedtime Shanti Waters MD   15 mg at 05/27/24 2048    polyethylene glycol (MIRALAX) Packet 17 g  17 g Oral Daily PRN Juju Salgado MD   17 g at 05/23/24 0947    simethicone (MYLICON) chewable tablet 80 mg  80 mg Oral Q6H PRN Ludmila Lozano MD        sodium chloride (PF) 0.9% PF flush 3 mL  3 mL Intracatheter q1 min prn Lalitha Serrano PA-C        [Held by provider] SUMAtriptan (IMITREX) tablet 50 mg  50 mg Oral at onset of headache Juju Salgado MD        zolpidem (AMBIEN) tablet 10 mg  10 mg Oral At Bedtime Shanti Waters MD   10 mg at 05/27/24 2048       Labs and Imaging:  New results:   No results found for this or any previous visit (from the past 24 hour(s)).    Echocardiogram  Interpretation Summary  Technically difficult study. Extremely difficult acoustic windows despite the use of contrast for endcardial border definition. Left ventricular function is decreased. The ejection fraction is 50-55% (borderline).  Global right ventricular function is mildly reduced.  The inferior vena cava cannot be assessed.  No pericardial effusion is present.    Data this admission:  - CBC unremarkable, WBC 13 on initial, 9.0 on repeat  - BMP unremarkable except for glucose 141 (05/17), repeat at 258 (05/24)  - HbA1c 7.0  - Lipids WNL except for HDL at 31  - BNP WNL  - Blood cultures negative  - TSH normal  - Vit D normal  - Vit B12 normal  - Folate normal    - UA negative for infection, glucose 100  - UDS  "positive for amphetamines, benzos (both prescribed)  - UPT negative    - EKG with NSR, QTc 450 on 05/16    - MRI (5/18/24) showed stable MS plaques unchanged from previous on 8/1/22  - CT head unremarkable  - CXR with mild prominence of the cardiac silhouette, nonspecific mild streaky perihilar and basilar opacities   - BLE ultrasound unremarkable, negative for DVT     Mental Status Exam:     Oriented to:  Grossly Oriented  General:  Awake and Alert; sitting on edge of bed in scrub top and shorts. Standing intermittently due to leg pain.  Appearance:  appears stated age and Grooming is adequate;   Behavior/Attitude:  cooperative and suspicious; anxious  Eye Contact:  appropriate  Psychomotor: restless no catatonia present  Speech:  appropriate volume/tone; urgency of speech, hyperverbal (not pressured)  Language: Fluent in English with appropriate syntax and vocabulary.  Mood:  \"nervous\"   Affect: Labile. Anxious with intermittent mood shifts between tearful/scared and bright  Thought Process:  coherent, tangential, and racing thoughts; preoccupied with questions related to health  Thought Content:  delusions of paranoia; concerned with staying in hospital because she is afraid of being harmed at her home. Auditory hallucinations telling her she shouldn't be here.AH related to health concerns (heart failure)  Associations:  intact  Insight:  impaired; wants to feel better, but feels that people are \"out to get me\" at her apartment. Does not recognize paranoia but does recognize AH as being abnormal.  Judgment:  limited-impaired- patient notably paranoid, suspicious, recent inability to care for self PTA though has been adherent with treatment team recommendations, engaged in groups  Impulse control: impaired  Attention Span:  grossly intact  Concentration:  grossly intact  Recent and Remote Memory:  not formally assessed  Fund of Knowledge:  delayed; not formally diagnosed  Muscle Strength and Tone: normal  Gait and " Station: Normal     Psychiatric Assessment     Irma Heller is a 33 year old female previously diagnosed with anxiety, depression, and opioid dependence in remission who presented to the ED by EMS and admitted with psychosis in the context of likely first-episode psychosis. No prior psychiatric hospitalizations. Significant symptoms on admission include delusions of paranoia, visual and tactile hallucinations, and increased anxiety. The MSE on admission was pertinent for paranoia, anxiety. Biological contributions to mental health presentation include hx of opioid use disorder (though this has been stable) as well as medical diagnosis of MS, Chiari malformation which are reportedly. Psychological contributions to mental health presentation include anxiety, depression. Social factors contributing to mental health presentation include poor social support outside of group home staff. Protective factors include good medication adherence and group home staff.      Patient's definitive diagnosis is still in evolution, differential for psychosis includes primary psychotic disorder (ie, schizophrenia, schizoaffective) or other psychiatric etiology vs. drug-induced psychosis vs. medical etiology of psychosis. Feel that this is likely a primary psychiatric cause in the context of UDS positive only for prescribed medication and no recent drug use.  However, consultation with neurology to evaluate medical etiology, further neuroimaging and further history from collateral would be indicated to get a better understand of patient's symptom timeline and course. Can also consider the role of her interferon in her psychosis. She will likely benefit from antipsychotics this admission.     Given that she currently has psychosis, patient warrants inpatient psychiatric hospitalization to maintain her safety     Psychiatric Plan by Diagnosis      Today's changes:  - Subutex dosing changed to 8 mg x2 in the morning and 8 mg x1 in  the afternoon  - Resume ibuprofen PRN  - Orajel for tooth pain and consult with dentistry   - Consider restarting Adderall vs. modafinil to help with fatigue related to MS (on PTA high dose of adderral)   -PCP and sleep medicine consults on discharge     # Psychosis  Patient was given olanzapine as well as IM Haldol/Versed in the ED which seemed to be effective. Plan is to continue olanzapine for stabilization of acute psychosis and monitoring for improvement.     Medications:  - Olanzapine 15 mg PO at bedtime  - Olanzapine 5 mg TID PRN  - Olanzapine 10 mg PRN for emergency agitation/aggression related to psychosis      # Anxiety/Depression  Patient has a history of anxiety which she states has been well managed by her home meds prior to this point. She has had an acute increase in her anxiety due to being overwhelmed with this new symptoms and fear for her safety. Will plan to continue her home medication regimen in tandem with the olanzapine.      Medications:  - Buspirone 10 mg bid  - Lexapro 10 mg daily  - Hydroxyzine 50 mg TID  - Ativan 1 mg PRN     Pertinent Labs/Monitoring:   - CBC unremarkable   - UDS positive for amphetamines, benzos (both prescribed)  - HbA1c 7.0  - UPT negative  - EKG with NSR, QTc 450 on 05/16     Additional Plans:  - Patient will be treated in therapeutic milieu with appropriate individual and group therapies as described  - Keep in touch with patient's residence and CM once disposition is determined. Call CM with patient once amenable.   - Sergio Dunn ([facility] ): [phone]  - Riana Galindo ([facility]): [phone]  - Plan to obtain collateral information from group home or family  - Update inpatient pharmacy when approximate discharge date is known     Psychiatric Hospital Course:      Irma Heller was admitted to [facility] on a 72 hour hold. Hold was discontinued shortly upon admission as pt agreed to stay voluntary. PTA buspirone,  Lexapro, PRN Ativan and hydroxyzine were continued.  PTA Adderall was held to minimize any increased anxiety pending mood stabilization. New medications started at the time of admission include olanzapine. 10mg at night which pt has reported as being helpful for sleep and anxiety to date.       Since her admission to the unit, Irma reported feeling safer in the new room. She stated she was able to eat the food and had no paranoia surrounding this. She had ongoing anxiety which was reduced with PRN Ativan/hydroxyzine. Patient was able to eat lunch in the milieu with the other patients following this. She denied SI/HI and hallucinations at that time. Given the reduction in symptoms in the context of ongoing paranoia and anxiety, plan is to continue olanzapine and monitor for improvement.     Neuro was consulted regarding the patient's history of MS, Chiari malformation and interferon beta-1b use. There were some cases reported of interferon causing psychosis, though this was discussed with neuro who had low suspician for this etiology due to being on the med for at least 2 years. They recommended we continue patient's interferon to prevent MS flares. However, interferon formulary is not available in our pharmacy so will have to use patient's supply. We will plan to reach out to patient's group home, however we do not have their name or contact information. Though patient adamantly requested us not to inform anyone she is on the unit, we feel she does not currently have capacity and there is a need for collateral information. She also has a daughter that was staying with her aunt per the ED note, and would like to check on her. Attempted to call the aunt at the listed number but it was out of service. Patient's grandmother did not answer. Plan to reassess patient and obtain group home information at a later time.    Our CTC was able to reach the patient's living facility ([facility]) and spoke with the  , Sergio. She confirmed Irma resides in an independent living environment and is responsible for taking her own medications. Sergio states the patient is welcome back when ready but would like to see her first. She will be out of office from 05/23 - 05/27 and requested an update prior to that. [facility] also spoke to the patient herself who again emphasized not wanting to return there. Patient continues to have paranoia surrounding her apartment, the staff, and her family. [facility] discussed with the patient that her CM could be a helpful resource to find discharge options if desired, however at that point the patient expressed not wanting the CM to know she was here. [facility] did reach out to [facility] and received contact info for Riana Galindo and [facility]. Going forward, will plan to obtain collateral from these sources and organize getting patient's interferon.     On 05/21, called [facility] to organize delivery of patient's interferon. Henrik mentioned the patient has not filled her interferon in 4 months. The initial plan was to have [facility] deliver to the hospital, however, while coordinating with behavioral inpt pharmacy, it turns out they are able to have it ordered for the patient here. Will start the interferon as soon as it is available. Patient also requested to have her hydroxyzine changed to scheduled as this is how she has it at home. Hydroxyzine changed to scheduled but will keep the PRN option.    The risks, benefits, alternatives, and side effects were discussed and understood by the patient.     Medical Assessment and Plan     Medical diagnoses to be addressed this admission:      #Elevated A1c  - A1c elevated to 7.0 on admission  - Will discuss with patient and provide outpatient follow-up    #MS  - Given the history of MS and Chiari malformation in the context of new psychosis.  - Plan is to obtain an MRI to rule out medical CNS cause of psychosis, but will wait for  neuro.   - No acute concerns per neuro.   - Neuro ok with PTA interferon beta-1b and recommend continuing to minimize risk of MS flare. Interferon is ordered and will be delivered soon.   - Plan to hold Adderall and sumatriptan until mood and anxiety are stabilized.   - Neuro to see patient again regarding possible flare     #Chiari Malformation  - Neuro consulted, no acute concerns at this time   - MRI pending     #Hx of iron deficiency anemia  - On Ferosul held at time of admission. Will clarify with pt and pharmacy about use      Medical course:   Patient was physically examined by the ED prior to being transferred to the unit and was found to be medically stable and appropriate for admission. Please see ED/Hospital course above for more information.     On hospital day 7, patient developed symptoms concerning for a flare of her MS. Given the new symptoms in the context of not taking her interferon for the past several weeks, will have neuro consult and see the patient. Patient also had some lower extremity edema so will consult medicine as well. Both medicine and neuro saw the patient. Medicine workup was largely unremarkable with the exception of high glucose in the blood and urine as well as streaky b/l infiltrates representing possible edema, atypical pneumonia, atelectasis. Other notable labs/imaging include normal BNP, CBC, blood cultures, CT head, and BLE ultrasounds. Neuro noted no deficits on their exam, thus recommended no additional imaging or immunosuppressive therapy. Will plan to follow up with medicine to discuss plan.    In the context of possible edema on CXR, orthopnea, and BLE edema, felt that echo was warranted. Discussed this as well as workup findings with medicine. Will plan to obtain that in the next day or two and have patient wear her compression stockings. Discussed starting metformin for glucose and metabolic control with patient, however she states 2-3 years ago she had significant GI  upset with metformin and was unable to tolerate it. Will check in with patient again and discuss a different med.     Patient did have an echo which showed slightly reduced LV function with an EF of 50-55% (though it was a technically difficult exam). Medicine was not concerned for heart failure at the moment though they did recommend an outpatient sleep study to assess for VALENTIN. They also advised against medication management of LE edema, recommended continuation of compression stockings. Patient was concerned with the findings so will plan to give her an information sheet regarding heart failure and review it with her. Otherwise, patient is medically stable at this point. Did discuss with her that we can coordinate referral for cardiology, primary care, and dentistry upon discharge.     Please see medicine and neurology consult notes for additional details.     Medications:  - Melatonin and Ambien PRN for sleep  - Colace 100 mg daily, Miralax/Maalox PRN for constipation  - Subutex 8 mg TID for opioid dependence (x2 in the morning, x1 in the afternoon)  - Tylenol, ibuprofen PRN for pain  - Betaceron 0.3 mg subcutaneous every other day     Consults:  Medicine for BLE edema, orthopnea. Neurology for history of MS, Chiari I malformation in context of psychiatric symptoms. Dentistry for dental pain in the context of tooth fracture.      Checklist     Legal Status: Voluntary     Safety Assessment:   Behavioral Orders   Procedures    Code 1 - Restrict to Unit    Code 2    Code 2     Ok to leave unit for echo. Per unit routine.    Routine Programming     As clinically indicated    Status 15     Every 15 minutes.       Risk Assessment:  Risk for harm is moderate-high.  Risk factors: psychosis, hx of substance use  Protective factors: group home staff, no identified history of attempts, doesn't live alone, family support, children     SIO: none    Disposition: TBD. Disposition pending stabilization, medication optimization,  & development of a safe discharge plan.     Attestations     This patient was seen and discussed with my attending physician.  Robel Cho, MS3  [employer] [employer]    I was present with the medical student who participated in the service and in the documentation of the note. I have verified the history and personally performed the physical exam and medical decision making. I agree with the assessment and plan of care as documented in the note and have made changes to the note as appropriate.     Shanti Waters, PGY-2 Psychiatry       Attestation:  This patient has been seen and evaluated by me, Wilmer Cartagena MD.  I have discussed this patient with the house staff team including the resident and medical student and I agree with the findings and plan in this note.    I have reviewed today's vital signs, medications, labs and imaging. Wilmer Cartagena MD , PhD.

## 2024-05-28 NOTE — PLAN OF CARE
Team Note Due:  Thursday    Assessment/Intervention/Current Symtoms and Care Coordination:  Chart review and met with team, discussed pt progress, symptomology, and response to treatment.  Discussed the discharge plan and any potential impediments to discharge.    Sent follow up to pt's BRITTA DONAHUE to coordinate meeting with pt. Riana is only available in the morning. Talked with pt who is comfortable talking with Riana alone. Confirmed Riana can call pt via the unit phone number.    Discharge Plan or Goal:  Pending stabilization of symptoms and safe disposition planning.     Barriers to Discharge:  Patient requires further psychiatric stabilization due to current symptomology, medication management with changes subject to provider, coordination with outside supports, and aftercare planning.     Referral Status:  None at this time  Consider referral to [facility] apts if pt is not returning to [facility]  Will need new therapist referral     Legal Status:  Voluntary    Contacts:  Elisa Gomez, grandmother, [phone].    Kalie Villegas, [phone], Aunt, Emergency Contact     Sergio Dunn ([facility] ): [phone]    Riana Galindo ([facility]): [phone]  [email or web address]     Upcoming Meetings and Dates/Important Information and next steps:  Update assisted living on discharge when known  Coordinate discharge with BRITTA DONAHUE

## 2024-05-28 NOTE — PLAN OF CARE
Problem: Sleep Disturbance  Goal: Adequate Sleep/Rest  Outcome: Progressing  Patient sleeps most  the shift. Patient complaints of L shoulder pain and gum pain, denies chest pain, dizziness or N/V, no respiratory distress noted. Patient had Tylenol, endorsed anxiety, Ativan given. Patient worried about the echocardiogram results yesterday. Patient continues on q15 minutes safety checks, noted. Will continue to monitor the patient and provide therapeutic intervention as needed. Will continue with current plan of care. Notify MD with any concerns. The patient had 5.5 total hours of sleep this shift.

## 2024-05-28 NOTE — PLAN OF CARE
Individual Therapy Note    Session duration in minutes: 7    Pt progress: Pt had requested earlier in the day to discuss concerns about potential heart failure. Met with Pt after meeting with team, learned her heart issues are not as bad as she thought and was very relieved. Discussed importance of continuing to exercise and eat healthy even still, Pt reports she is exercising more on unit walking and chair exercises. Writer attempted to engage Pt in safety planning but discussing warning signs appeared to upset Pt, reports she doesn't like to discuss the past. Pt requested to end session, made plan to meet later this week to complete safety plan.           No Charge (0-15 min)

## 2024-05-28 NOTE — PLAN OF CARE
BEH IP Unit Acuity Rating Score (UARS)  Patient is given one point for every criteria they meet.    CRITERIA SCORING   On a 72 hour hold, court hold, committed, stay of commitment, or revocation. 0   Patient LOS on BEH unit exceeds 20 days. 0  LOS: 12   Patient under guardianship, 55+, otherwise medically complex, or under age 11. 0   Suicide ideation without relief of precipitating factors. 0   Current plan for suicide. 0   Current plan for homicide. 0   Imminent risk or actual attempt to seriously harm another without relief of factors precipitating the attempt. 0   Severe dysfunction in daily living (ex: complete neglect for self care, extreme disruption in vegetative function, extreme deterioration in social interactions). 1   Recent (last 7 days) or current physical aggression in the ED or on unit. 0   Restraints or seclusion episode in past 72 hours. 0   Recent (last 7 days) or current verbal aggression, agitation, yelling, etc., while in the ED or unit. 0   Active psychosis. 1   Need for constant or near constant redirection (from leaving, from others, etc).  1   Intrusive or disruptive behaviors. 0   Patient requires 3 or more hours of individualized nursing care per 8-hour shift (i.e. for ADLs, meds, therapeutic interventions). 0   TOTAL 3

## 2024-05-28 NOTE — PLAN OF CARE
05/28/24 1500   General Information   Art Directive other (see comments)     Goal Outcome Evaluation      Rehab Group    Start time: 1015   End time: 1215  Patient time total: 120 minutes    attended full group    #5 attended   Group Type: art   Group Topic Covered: healthy leisure time and mindfulness       Group Session Detail:  Art Therapy directive was to create a collage vision board using magazines and mixed media art materials. Goals of directive: to identify personal strengths and goals, to express aspects of self, emotional expression, future focused directive.      Patient Response/Contribution:  Cooperative with task       Patient Detail:    Pt was an engaged participant, initially hesitant to create a collage without scissors (torn collage technique) though once pt got started she was able to finish collage and said that she enjoyed working on it. Pt initially needed some assistance with tearing images. Pt said she feels she is a perfectionistic, so tearing images was difficult for her at first because edges were not straight.  Pt finished collage and briefly verbally processed with author and group. Pt identified several positive goals related to stable housing, hobbies including gardening.  Pt was hyperverbal at times,  Enjoys art and attention easily redirectable to task.        Group Therapy (78182)    Patient Active Problem List   Diagnosis    Abnormal liver function test    Acute cholangitis (H28)    Anxiety disorder    Arnold-Chiari malformation (H)    Calculus of bile duct without obstruction    Depression    Multiple sclerosis (H)    Syringomyelia (H)    Tobacco use disorder    Schizophrenia spectrum disorder with psychotic disorder type not yet determined (H)

## 2024-05-28 NOTE — PLAN OF CARE
"Pt is visible in the milieu. She is eating and drinking fine. She consumes 100% of her meals. Pt is medication compliant. She endorsed high anxiety in the morning, denied SI and hallucinations.PRN requested and administered.  Pt is able to make needs known. Frequently asks/requests from Staff. She was anxious about results of her ECHO, and encouraged her to speak with the team for more clarification.  Pt did complain of headache. PRN Ibuprofen administered.  Pt wore her compression stockings with staff assistance. A few hours later , she took them off, saying that she was not comfortable. Pt comes up for medications . Requests for scheduled medications early.     Pt able to join OT group this shift.     PRN: Zyprexa 5 mg           Ibuprofen 600  Problem: Adult Behavioral Health Plan of Care  Goal: Patient-Specific Goal (Individualization)  Description: You can add care plan individualizations to a care plan. Examples of Individualization might be:  \"Parent requests to be called daily at 9am for status\", \"I have a hard time hearing out of my right ear\", or \"Do not touch me to wake me up as it startles  me\".  Outcome: Progressing  Goal: Adheres to Safety Considerations for Self and Others  Outcome: Progressing  Intervention: Develop and Maintain Individualized Safety Plan  Recent Flowsheet Documentation  Taken 5/28/2024 1200 by Ashlie Cavazos RN  Safety Measures:   environmental rounds completed   safety rounds completed     Problem: Psychotic Signs/Symptoms  Goal: Improved Behavioral Control (Psychotic Signs/Symptoms)  Outcome: Progressing     Problem: Anxiety  Goal: Anxiety Reduction or Resolution  Outcome: Progressing  Intervention: Promote Anxiety Reduction  Recent Flowsheet Documentation  Taken 5/28/2024 1200 by Ashlie Cavazos RN  Supportive Measures:   active listening utilized   self-care encouraged   self-reflection promoted   self-responsibility promoted   verbalization of feelings " encouraged  Family/Support System Care:   involvement promoted   self-care encouraged     Problem: Sleep Disturbance  Goal: Adequate Sleep/Rest  Outcome: Progressing   Goal Outcome Evaluation:    Plan of Care Reviewed With: patient

## 2024-05-29 PROCEDURE — 250N000013 HC RX MED GY IP 250 OP 250 PS 637

## 2024-05-29 PROCEDURE — 99231 SBSQ HOSP IP/OBS SF/LOW 25: CPT | Mod: GC | Performed by: PSYCHIATRY & NEUROLOGY

## 2024-05-29 PROCEDURE — 250N000013 HC RX MED GY IP 250 OP 250 PS 637: Performed by: STUDENT IN AN ORGANIZED HEALTH CARE EDUCATION/TRAINING PROGRAM

## 2024-05-29 PROCEDURE — 124N000002 HC R&B MH UMMC

## 2024-05-29 RX ADMIN — HYDROXYZINE HYDROCHLORIDE 50 MG: 50 TABLET, FILM COATED ORAL at 13:53

## 2024-05-29 RX ADMIN — OLANZAPINE 5 MG: 5 TABLET, FILM COATED ORAL at 05:29

## 2024-05-29 RX ADMIN — NICOTINE POLACRILEX 2 MG: 2 LOZENGE ORAL at 19:17

## 2024-05-29 RX ADMIN — BUPRENORPHINE 16 MG: 8 TABLET SUBLINGUAL at 08:37

## 2024-05-29 RX ADMIN — GABAPENTIN 600 MG: 300 CAPSULE ORAL at 08:37

## 2024-05-29 RX ADMIN — ESCITALOPRAM OXALATE 10 MG: 10 TABLET ORAL at 08:38

## 2024-05-29 RX ADMIN — POLYETHYLENE GLYCOL 3350 17 G: 17 POWDER, FOR SOLUTION ORAL at 13:53

## 2024-05-29 RX ADMIN — BUPRENORPHINE 8 MG: 8 TABLET SUBLINGUAL at 18:25

## 2024-05-29 RX ADMIN — OLANZAPINE 5 MG: 5 TABLET, FILM COATED ORAL at 12:36

## 2024-05-29 RX ADMIN — LORAZEPAM 1 MG: 1 TABLET ORAL at 02:34

## 2024-05-29 RX ADMIN — ZOLPIDEM TARTRATE 10 MG: 5 TABLET, COATED ORAL at 20:29

## 2024-05-29 RX ADMIN — OLANZAPINE 15 MG: 15 TABLET, ORALLY DISINTEGRATING ORAL at 20:30

## 2024-05-29 RX ADMIN — Medication 3 MG: at 02:34

## 2024-05-29 RX ADMIN — GABAPENTIN 600 MG: 300 CAPSULE ORAL at 13:53

## 2024-05-29 RX ADMIN — HYDROXYZINE HYDROCHLORIDE 50 MG: 50 TABLET, FILM COATED ORAL at 20:29

## 2024-05-29 RX ADMIN — GABAPENTIN 600 MG: 300 CAPSULE ORAL at 20:29

## 2024-05-29 RX ADMIN — BUSPIRONE HYDROCHLORIDE 10 MG: 10 TABLET ORAL at 08:37

## 2024-05-29 RX ADMIN — HYDROXYZINE HYDROCHLORIDE 50 MG: 50 TABLET, FILM COATED ORAL at 08:38

## 2024-05-29 RX ADMIN — BUSPIRONE HYDROCHLORIDE 10 MG: 10 TABLET ORAL at 20:29

## 2024-05-29 RX ADMIN — LORAZEPAM 1 MG: 1 TABLET ORAL at 14:47

## 2024-05-29 ASSESSMENT — ACTIVITIES OF DAILY LIVING (ADL)
ADLS_ACUITY_SCORE: 40
ADLS_ACUITY_SCORE: 40
HYGIENE/GROOMING: INDEPENDENT
ADLS_ACUITY_SCORE: 40
LAUNDRY: WITH SUPERVISION
ORAL_HYGIENE: INDEPENDENT
DRESS: INDEPENDENT

## 2024-05-29 NOTE — PLAN OF CARE
Team Note Due:  Thursday    Assessment/Intervention/Current Symtoms and Care Coordination:  Chart review and met with team, discussed pt progress, symptomology, and response to treatment.  Discussed the discharge plan and any potential impediments to discharge.    Met with pt as she had not heard from her [facility] CM this morning. I let pt know I would follow up with her and encouraged pt to initiate the call as well if she feels comfortable. Pt shared she tried calling Riana one time but it went to . Pt mentioned she may be interested in an IRTS after discharge.    Sent follow up to Riana requesting confirmation on her ability to connect with pt.    Discussed with pt her frustrations and anxiety about not hearing from her [facility] CM as she has questions for her. Validated pt's feelings and assured her I am continuing to try and contact Riana as well. Pt is now okay with R[facility] going to [facility] to obtain some of her belongings if she is able. Agreed to discuss this when she reaches back out.    Discharge Plan or Goal:  Pending stabilization of symptoms and safe disposition planning.  Needs therapy, psychiatry, and PCP appts scheduled following discharge     Barriers to Discharge:  Patient requires further psychiatric stabilization due to current symptomology, medication management with changes subject to provider, coordination with outside supports, and aftercare planning.     Referral Status:  None at this time     Legal Status:  Voluntary    Contacts:  Elisa Gomez, grandmother, [phone].    Kalie Villegas, [phone], Aunt, Emergency Contact     Sergio Dunn ([facility] ): [phone]    Riana Galindo ([facility]): [phone]  [email or web address]     Upcoming Meetings and Dates/Important Information and next steps:  Schedule therapy, PCP, and psychiatry appts  Update assisted living on discharge when known  [facility] with [facility]

## 2024-05-29 NOTE — CONSULTS
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  Dental and oropharyngeal history is pertinent for nearly edentulous - pt has full maxillary removable denture, mandibular partial removable denture (both made in 2020, according to patient); no dental home - pt reports that she had been seeing her childhood dentist up until around 2019 when she switched insurances, which was no longer accepted at her dental clinic; pt is interested in dental referral outpatient.  The patient reports \"my mouth hurts so bad and I don't want to walk around with a hole in my face.\" Pt points to mandibular central incisors (tooth #s 24, 25) and explained these teeth broke approximately two weeks ago while eating an apple. These teeth are the only two remaining in natural dentition and were supporting partial denture, which is now rubbing the underlying gum tissue and contributing to irritation.       *Note: pt was teary through most of assessment today, but was able to be redirected.      General Observations   Level of support Patient is fully independent   Patient currently in pain Yes: Location: mandibular alveolar ridge (particularly left buccal vestibule); Severity: worst pain possible 10/10; Duration: 2 weeks   Patient wears dentures Yes: Both - full " maxillary removable denture, partial mandibular removable denture   Current oral care routine Denture care unclear - pt states that she has been brushing her dentures, but did not explain how often; has been using the manual toothbrushes provided by hospital and reports that she has had to throw away several of these brushes due to the amount of denture adhesive stuck to them   Patient has oral care products If Applicable, Denture Care Products (Labeled Case, Brush, Tabs) and Other: denture adhesive   Extraoral assessment   General appearance Symmetrical and TMJ dysfuncction (pt explains that jaw has been hurting bilaterally)    Lymph nodes Tender submandibular and submental lymph nodes severely tender on palpation; no swelling noted today   Oral Health Assessment Tool (OHAT)   Lips 0=Healthy: smooth, pink, moist   Tongue 0=Healthy: normal, moist, roughness, pink   Gums and Tissues 2=Unhealthy: (ie. swollen, bleeding, ulcers, white/red patches, generalized redness (under dentures) )- generalized soft tissue ulcerations and redness under both dentures   Saliva 1=Changes: (ie. dry, sticky tissues, little saliva present, resident thinks they have dry mouth )- dry, sticky tissues   Natural Teeth Yes/No 2=Unhealthy: (ie. 4+ decayed or broken teeth/roots, or very worn down teeth, or less than 4 teeth) - only tooth #s 24 and 25 remaining of natural dentition, both broken at the gumline with root tips   Dentures Yes/No 2=Unhealthy: (ie. more than 1 broken area/tooth, denture missing or not worn, loose and needs denture adhesive, or not named) - both dentures are loose, requiring adhesive; mandibular partial denture is rubbing against soft tissue and contributing to sores   Oral Cleanliness 2=Unhealthy: ( ie. food particles/tartar/plaque in most areas of the mouth or on most of dentures or severe halitosis (bad breath)) - generalized moderate dental biofilm present on both dentures   Dental Pain 2=Unhealthy: (ie. there are  physical pain signs (swelling or cheek or gum, broken teeth, ulcers), as well as verbal and/or behavioural signs (pulling at face, not eating, aggression)) - physical signs include soft tissue sores beneath dentures; pt verbally endorses pain, as well as expresses difficulty eating   OHAT Total Score (0-16) 11   Other Dental Concerns Patient does not have dental home, Infrequent professional dental care, and Ill-fitting dentures   Care provided during assessment Oral Assessment, Connect with other provider, Oral care, and Patient education   Follow up DH assessments required? No   Connect with NC or SOD care? Yes: SOD information provided to patient   For Dental Team Service Requesting Consult: [facility]  Clearance/Reason: chipped teeth  Dental CT status: not ordered  Upcoming Sx date(s), if known: N/A  Expected discharge date, if known: likely not until at least next week  Ability to transfer to [facility]: yes  Pain reported, by pt: yes  Swelling present: none  Current dental Rx regimen: Orajel ordered, Tylenol prn    Oral Care Plan DENTURE CARE:  Remove dentures at least once daily (ideally overnight) and soak in denture case with cleaning tabs. Before seating back in mouth, brush both dentures with the denture brush provided during today's assessment. Use MINIMAL amounts of denture adhesive before seating back in mouth. This same process should be repeated following each meal.     Gently brush underlying gum tissue at least BID with soft-bristled manual toothbrush to help massage/stimulate gum tissue. Recommend removal of dentures if at all possible until sores heal.     Soft food diet - avoid anything hard, crunchy, or overly acidic/spicy.    Continue Orajel and Tylenol prn for pain management. Recommend rinsing with Magic Mouthwash, alcohol-free antiseptic mouthrinse (hospital-provided is sufficient) or warm salt water.    Pt should establish outpatient dental home upon discharge to resume comprehensive dental  care, including fabrication of new dentures. Dental school information provided below.            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      Plan:   Implement oral care plan as described above. Pt is currently independent in oral cares.  Continue Orajel and Tylenol as prescribed for pain management. Consider adding Magic Mouthwash for prn use. Also suggested alcohol-free antiseptic rinse and/or warm salt water rinses to help reduce inflammation.   has forwarded findings to the hospital dental team for further consult.   Pt should also establish outpatient dental home at clinic of choice. [employer] information follows, if pt chooses to use (this information was also printed and provided to pt):  [employer]  [facility] - [address] [place] [address], [phone]    RIMMA Fields  Message on [employer]  Pager: [phone]      Past Medical History   I have reviewed this patient's medical history and updated it with pertinent information if needed.  Past Medical History:   Diagnosis Date    Anxiety     Chiari I malformation (H)     Hx- was a child    Depression     Gastroesophageal reflux disease     Multiple sclerosis (H)     Obese        Past Surgical History   I have reviewed this patient's surgical history and updated it with pertinent information if  needed.  Past Surgical History:   Procedure Laterality Date    AS INSTALL SPINAL SHUNT,LAMINECTOMY      Related to Chiari Malformation surgery    BACK SURGERY      CHOLECYSTECTOMY      DECOMPRESSION CHIARI      ENDOSCOPIC RETROGRADE CHOLANGIOPANCREATOGRAM N/A 3/16/2022    Procedure: ENDOSCOPIC RETROGRADE CHOLANGIOPANCREATOGRAPHY;  Surgeon: Jojo Moore MD;  Location:  OR       Social History   I have reviewed this patient's social history and updated it with pertinent information if needed.  Social History     Tobacco Use    Smoking status: Former     Current packs/day: 0.00     Types: Cigarettes     Quit date: 1/1/2021     Years since quitting: 3.4    Smokeless tobacco: Never   Substance Use Topics    Alcohol use: Not Currently    Drug use: Not Currently     Prior to Admission Medications   Prior to Admission Medications   Prescriptions Last Dose Informant Patient Reported? Taking?   FEROSUL 325 (65 Fe) MG tablet   Yes No   Sig: Take 325 mg by mouth daily   LORazepam (ATIVAN) 1 MG tablet   Yes No   Sig: Take 1 mg by mouth 2 times daily as needed for anxiety   Multiple Vitamin (TAB-A-AMBROSE) TABS   Yes No   Sig: Take 1 tablet by mouth daily   SUMAtriptan (IMITREX) 50 MG tablet   No No   Sig: Take 1 tablet (50 mg) by mouth at onset of headache for migraine May repeat in 2 hours. Max 2 tablets/24 hours.   amphetamine-dextroamphetamine (ADDERALL XR) 25 MG 24 hr capsule   Yes No   Sig: Take 50 mg by mouth every morning   amphetamine-dextroamphetamine (ADDERALL) 20 MG tablet   Yes No   Sig: Take 40 mg by mouth daily at 2 pm   buprenorphine (SUBUTEX) 8 MG SUBL sublingual tablet   Yes No   Sig: Place 8 mg under the tongue 3 times daily   busPIRone (BUSPAR) 10 MG tablet   Yes No   Sig: Take 10 mg by mouth 2 times daily   docusate sodium (COLACE) 100 MG capsule   Yes No   Sig: Take 100 mg by mouth daily   escitalopram (LEXAPRO) 10 MG tablet   Yes No   Sig: Take 10 mg by mouth daily    gabapentin (NEURONTIN) 300  MG capsule   Yes No   Sig: Take 600 mg by mouth 3 times daily   hydrOXYzine (ATARAX) 50 MG tablet   Yes No   Sig: Take 50 mg by mouth 4 times daily as needed for anxiety   ibuprofen (ADVIL/MOTRIN) 600 MG tablet   Yes No   Sig: Take 600 mg by mouth every 8 hours as needed for mild pain   interferon beta-1b (BETASERON) 0.3 MG injection   No No   Sig: Inject 300 mcg (0.3 mg) Subcutaneous every other day Inject 0.3 mg subcutaneous every other day   melatonin 5 MG tablet   Yes No   Sig: Take 5 mg by mouth at bedtime   zolpidem (AMBIEN) 10 MG tablet   Yes No   Sig: Take 10 mg by mouth nightly as needed for sleep      Facility-Administered Medications: None     Allergies   Allergies   Allergen Reactions    Glatiramer Hives    Penicillins Anaphylaxis and Hives    Shellfish Allergy Anaphylaxis and Hives    Copaxone [Glatiramer Acetate] Swelling     Physical Exam

## 2024-05-29 NOTE — CARE PLAN
Rehab Group    Start time: 2000  End time: 2100  Patient time total: 25 minutes    attended partial group     #3 attended   Group Type: occupational therapy   Group Topic Covered: balanced lifestyle, coping skills, social skills, healthy leisure time, and cognitive activities       Group Session Detail:   Healthy leisure exploration with hands on Tapple game for concentration, cognitive processing, tracking, flexible thinking skills, coping, mood stabilization, reality-based activity, follow though, frustration tolerance, meaningful activity and socialization        Patient Response/Contribution:  Actively engaged       Patient Detail:  Pt was pleasant and she recalled working with this writer last weekend.  Pt was in and out of group many times, preoccupied by snacks that were served to the unit soon after group began.  Pt was able to give effective responses when the activity was less challenging.  However, as the level of difficulty louise, she had a more difficult time generating relevant responses.  She still made an effort, but those responses were often not exactly on the correct category.  No charge.                Patient Active Problem List   Diagnosis    Abnormal liver function test    Acute cholangitis (H28)    Anxiety disorder    Arnold-Chiari malformation (H)    Calculus of bile duct without obstruction    Depression    Multiple sclerosis (H)    Syringomyelia (H)    Tobacco use disorder    Schizophrenia spectrum disorder with psychotic disorder type not yet determined (H)

## 2024-05-29 NOTE — PROGRESS NOTES
"  ----------------------------------------------------------------------------------------------------------  [facility]  Psychiatry Progress Note  Hospital Day #13     Interim History:     The patient's care was discussed with the treatment team and chart notes were reviewed.    Vitals: /78 (BP Location: Right arm)   Pulse 89   Temp 98.8  F (37.1  C) (Oral)   Resp 18   Ht 1.651 m (5' 5\")   Wt (!) 165.9 kg (365 lb 11.2 oz)   SpO2 100%   BMI 60.86 kg/m    Sleep: 5.5 hours (05/28/24 0600)  Scheduled medications: Took all scheduled medications as prescribed  PRN medications:   - Olanzapine 10 mg x1, 5 mg x3 (including early am today)  - Ativan 1 mg x3 (early am and afternoon 05/28, early am today)  - Tylenol 650 mg x3  - ibuprofen 600 mg  - Melatonin 3 mg    Staff Report/Interval Events  No acute behavioral events.     Irma continues to have high levels of anxiety and has frequent questions/requests for staff. She is on \"top of the hour requests\" but is unable to follow this. She has persistent worry from her recent echo. Patient wore compression stockings initially but took them off a few hours later because they were uncomfortable. She did engage in therapy yesterday afternoon where she discussed feelings surrounding her medical workup. Therapy attempted to develop a safety plan but this made the patient upset and she requested to end the session. Patient was interactive with peers and participated in groups. Patient endorses 7/10 anxiety and 8/10 generalized pain for which she received PRNs as above, including the full 40 mg limit of olanzapine. Sleep was 5.75 hrs las night.     Subjective:     Patient Interview:  Irma is anxious and mood was labile throughout the interview. She states she was not able to reach her [facility] CM, discussed we would connect with our CM to get a hold of her. She had questions about her belongings and housing options. " "  Patient states she feels safe here but does not feel safe anywhere else at the moment. She is afraid that someone is trying to kill her. She endorses AHs, which were what initially drove her to the hospital. The voices are now questioning her choice of coming to the hospital. She asked what can she do other than coloring to make her feel better. Discussed coping skills, her motivation of getting better, and medication regimen. She notes she is consistently requesting her olanzapine PRN. She believes olanzapine is working. On a numerical scale, if her anxiety was 100 when she came here, she says she's at about a 70 now.    Dental saw her for her chipped teeth, she is still in pain but is taking tylenol and ibuprofen at the moment. Discussed pain management going forward and outpatient follow-up after discharge. She is interested using Orajel. Patient voiced concern with the lack of underwear and clean clothing. We discussed the possibility of her [facility] CM going to get her clothes, though she initially declined this as she does not want the housing staff to know where she is. After longer discussion, Irma became agreeable with the [facility] CM going to get her clothes and phone. She is requesting her lemon lime soda today as well.     ROS:  Positive: orthopnea with laying down flat-said having multiple pillows helps, tooth pain, generalized pain   Negative: denies acute concerns      Objective:     Vitals:  /78 (BP Location: Right arm)   Pulse 89   Temp 98.8  F (37.1  C) (Oral)   Resp 18   Ht 1.651 m (5' 5\")   Wt (!) 165.9 kg (365 lb 11.2 oz)   SpO2 100%   BMI 60.86 kg/m      Allergies:  Allergies   Allergen Reactions    Glatiramer Hives    Penicillins Anaphylaxis and Hives    Shellfish Allergy Anaphylaxis and Hives    Copaxone [Glatiramer Acetate] Swelling       Current Medications:  Scheduled:  Current Facility-Administered Medications   Medication Dose Route Frequency Provider Last Rate Last Admin    " acetaminophen (TYLENOL) tablet 650 mg  650 mg Oral Q4H PRN Juju Salgado MD   650 mg at 05/28/24 1910    alum & mag hydroxide-simethicone (MAALOX) suspension 30 mL  30 mL Oral Q4H PRN Juju Salgado MD        [Held by provider] amphetamine-dextroamphetamine (ADDERALL XR) 10 mg, amphetamine-dextroamphetamine (ADDERALL XR) 40 mg  50 mg Oral QAM Juju Salgado MD        [Held by provider] amphetamine-dextroamphetamine (ADDERALL) per tablet 40 mg  40 mg Oral Daily Juju Salgado MD        benzocaine (ORAJEL MAXIMUM STRENGTH) 20 % gel   Mouth/Throat 4x Daily PRN Shanti Waters MD        buprenorphine (SUBUTEX) sublingual tablet 16 mg  16 mg Sublingual Daily Shanti Waters MD        buprenorphine (SUBUTEX) sublingual tablet 8 mg  8 mg Sublingual At Bedtime Shanti Waters MD        busPIRone (BUSPAR) tablet 10 mg  10 mg Oral BID Juju Salgado MD   10 mg at 05/28/24 1908    docusate sodium (COLACE) capsule 100 mg  100 mg Oral Daily PRN Juju Salgado MD        escitalopram (LEXAPRO) tablet 10 mg  10 mg Oral Daily Juju Salgado MD   10 mg at 05/28/24 0758    gabapentin (NEURONTIN) capsule 600 mg  600 mg Oral TID Juju Salgado MD   600 mg at 05/28/24 1908    hydrOXYzine HCl (ATARAX) tablet 50 mg  50 mg Oral TID Shanti Waters MD   50 mg at 05/28/24 1909    ibuprofen (ADVIL/MOTRIN) tablet 600 mg  600 mg Oral Q8H PRN Shanti Waters MD   600 mg at 05/28/24 1129    interferon beta-1b (BETASERON) injection 0.3 mg  0.3 mg Subcutaneous Every Other Day Shanti Waters MD   0.3 mg at 05/28/24 0919    lidocaine (LMX4) cream   Topical Q1H PRN Lalitha Serrano PA-C        lidocaine 1 % 0.1-1 mL  0.1-1 mL Other Q1H PRN Lalitha Serrano PA-C        loperamide (IMODIUM) capsule 2 mg  2 mg Oral 4x Daily PRN Ludmila Lozano MD   2 mg at 05/19/24 2013    LORazepam (ATIVAN) tablet 1 mg  1 mg Oral BID PRN Shanti Waters MD   1 mg at 05/29/24 0234    melatonin tablet  3 mg  3 mg Oral At Bedtime PRN Ludmila Lozano MD   3 mg at 05/29/24 0234    naloxone (NARCAN) injection 0.2 mg  0.2 mg Intravenous Q2 Min PRN Juju Salgado MD        Or    naloxone (NARCAN) injection 0.4 mg  0.4 mg Intravenous Q2 Min PRN Juju Salgado MD        Or    naloxone (NARCAN) injection 0.2 mg  0.2 mg Intramuscular Q2 Min PRN Juju Salgado MD        Or    naloxone (NARCAN) injection 0.4 mg  0.4 mg Intramuscular Q2 Min PRN Juju Salgado MD        nicotine (COMMIT) lozenge 2 mg  2 mg Buccal Q2H PRN Jaren Zaman MD   2 mg at 05/27/24 1141    OLANZapine (zyPREXA) tablet 10 mg  10 mg Oral BID PRN Shanti Waters MD   10 mg at 05/28/24 0256    Or    OLANZapine (zyPREXA) injection 10 mg  10 mg Intramuscular TID PRN Shanti Waters MD        OLANZapine (zyPREXA) tablet 5 mg  5 mg Oral TID PRN Shanti Waters MD   5 mg at 05/29/24 0529    OLANZapine zydis (zyPREXA) ODT tab 15 mg  15 mg Oral At Bedtime Shanti Waters MD   15 mg at 05/28/24 1909    polyethylene glycol (MIRALAX) Packet 17 g  17 g Oral Daily PRN Juju Salgado MD   17 g at 05/23/24 0947    simethicone (MYLICON) chewable tablet 80 mg  80 mg Oral Q6H PRN Ludmila Lozano MD        sodium chloride (PF) 0.9% PF flush 3 mL  3 mL Intracatheter q1 min prn Lalitha Serrano PA-C        [Held by provider] SUMAtriptan (IMITREX) tablet 50 mg  50 mg Oral at onset of headache Juju Salgado MD        zolpidem (AMBIEN) tablet 10 mg  10 mg Oral At Bedtime Shanti Waters MD   10 mg at 05/28/24 1909       PRN:  Current Facility-Administered Medications   Medication Dose Route Frequency Provider Last Rate Last Admin    acetaminophen (TYLENOL) tablet 650 mg  650 mg Oral Q4H PRN Juju Salgado MD   650 mg at 05/28/24 1910    alum & mag hydroxide-simethicone (MAALOX) suspension 30 mL  30 mL Oral Q4H PRN Juju Salgado MD        [Held by provider] amphetamine-dextroamphetamine (ADDERALL XR) 10 mg,  amphetamine-dextroamphetamine (ADDERALL XR) 40 mg  50 mg Oral QAM Juju Salgado MD        [Held by provider] amphetamine-dextroamphetamine (ADDERALL) per tablet 40 mg  40 mg Oral Daily Juju Salgado MD        benzocaine (ORAJEL MAXIMUM STRENGTH) 20 % gel   Mouth/Throat 4x Daily PRN Shanti Waters MD        buprenorphine (SUBUTEX) sublingual tablet 16 mg  16 mg Sublingual Daily Shanti Waters MD        buprenorphine (SUBUTEX) sublingual tablet 8 mg  8 mg Sublingual At Bedtime Shanti Waters MD        busPIRone (BUSPAR) tablet 10 mg  10 mg Oral BID Juju Salgado MD   10 mg at 05/28/24 1908    docusate sodium (COLACE) capsule 100 mg  100 mg Oral Daily PRN Juju Salgado MD        escitalopram (LEXAPRO) tablet 10 mg  10 mg Oral Daily Juju Salgado MD   10 mg at 05/28/24 0758    gabapentin (NEURONTIN) capsule 600 mg  600 mg Oral TID Juju Salgado MD   600 mg at 05/28/24 1908    hydrOXYzine HCl (ATARAX) tablet 50 mg  50 mg Oral TID Shanti Waters MD   50 mg at 05/28/24 1909    ibuprofen (ADVIL/MOTRIN) tablet 600 mg  600 mg Oral Q8H PRN Shanti Waters MD   600 mg at 05/28/24 1129    interferon beta-1b (BETASERON) injection 0.3 mg  0.3 mg Subcutaneous Every Other Day Shanti Waters MD   0.3 mg at 05/28/24 0919    lidocaine (LMX4) cream   Topical Q1H PRN Lalitha Serrano PA-C        lidocaine 1 % 0.1-1 mL  0.1-1 mL Other Q1H PRN Lalitha Serrano PA-C        loperamide (IMODIUM) capsule 2 mg  2 mg Oral 4x Daily PRN Ludmila Lozano MD   2 mg at 05/19/24 2013    LORazepam (ATIVAN) tablet 1 mg  1 mg Oral BID PRN Shanti Waters MD   1 mg at 05/29/24 0234    melatonin tablet 3 mg  3 mg Oral At Bedtime PRN Ludmila Lozano MD   3 mg at 05/29/24 0234    naloxone (NARCAN) injection 0.2 mg  0.2 mg Intravenous Q2 Min PRN Juju Salgado MD        Or    naloxone (NARCAN) injection 0.4 mg  0.4 mg Intravenous Q2 Min PRN Juju Salgado MD        Or    naloxone  (NARCAN) injection 0.2 mg  0.2 mg Intramuscular Q2 Min PRN Juju Salgado MD        Or    naloxone (NARCAN) injection 0.4 mg  0.4 mg Intramuscular Q2 Min PRN Juju Salgado MD        nicotine (COMMIT) lozenge 2 mg  2 mg Buccal Q2H PRN Jaren Zaman MD   2 mg at 05/27/24 1141    OLANZapine (zyPREXA) tablet 10 mg  10 mg Oral BID PRN Shanti Waters MD   10 mg at 05/28/24 0256    Or    OLANZapine (zyPREXA) injection 10 mg  10 mg Intramuscular TID PRN Shanti Waters MD        OLANZapine (zyPREXA) tablet 5 mg  5 mg Oral TID PRN Shanti Waters MD   5 mg at 05/29/24 0529    OLANZapine zydis (zyPREXA) ODT tab 15 mg  15 mg Oral At Bedtime Shanti Waters MD   15 mg at 05/28/24 1909    polyethylene glycol (MIRALAX) Packet 17 g  17 g Oral Daily PRN Juju Salgado MD   17 g at 05/23/24 0947    simethicone (MYLICON) chewable tablet 80 mg  80 mg Oral Q6H PRN Ludmila Lozano MD        sodium chloride (PF) 0.9% PF flush 3 mL  3 mL Intracatheter q1 min prn Lalitha Serrano PA-C        [Held by provider] SUMAtriptan (IMITREX) tablet 50 mg  50 mg Oral at onset of headache Juju Salgado MD        zolpidem (AMBIEN) tablet 10 mg  10 mg Oral At Bedtime Shanti Waters MD   10 mg at 05/28/24 1909       Labs and Imaging:  New results:   No results found for this or any previous visit (from the past 24 hour(s)).    Data this admission:  - CBC unremarkable, WBC 13 on initial, 9.0 on repeat  - BMP unremarkable except for glucose 141 (05/17), repeat at 258 (05/24)  - HbA1c 7.0  - Lipids WNL except for HDL at 31  - BNP WNL  - Blood cultures negative  - TSH normal  - Vit D normal  - Vit B12 normal  - Folate normal    - UA negative for infection, glucose 100  - UDS positive for amphetamines, benzos (both prescribed)  - UPT negative    - EKG with NSR, QTc 450 on 05/16    - MRI (5/18/24) showed stable MS plaques unchanged from previous on 8/1/22  - CT head unremarkable  - CXR with mild prominence of  "the cardiac silhouette, nonspecific mild streaky perihilar and basilar opacities   - BLE ultrasound unremarkable, negative for DVT  - Echo was a technically difficult study, showed mildly decreased LV function with EF 50-55% and globally reduced RV function     Mental Status Exam:     Oriented to:  Grossly Oriented  General:  Awake and Alert; sitting on edge of bed in hospital scrubs. Standing intermittently due to leg pain.  Appearance:  appears stated age and Grooming is adequate; recently showered  Behavior/Attitude:  cooperative and suspicious; anxious  Eye Contact:  appropriate  Psychomotor: restless no catatonia present  Speech:  appropriate volume/tone; urgency of speech, hyperverbal (not pressured)  Language: Fluent in English with appropriate syntax and vocabulary.  Mood:  \"Anxious\"   Affect: Labile. Anxious with intermittent mood shifts between tearful/scared and bright  Thought Process:  coherent, tangential, and racing thoughts; less preoccupied with questions related to health compared to yesterday  Thought Content:  delusions of paranoia; concerned with staying in hospital because she is afraid of being harmed at her home. AH telling her she shouldn't be here.  Associations:  intact  Insight:  impaired; wants to feel better, but feels that people are \"out to get me\" at her apartment. Does not recognize paranoia but does recognize AH as being abnormal.  Judgment:  limited- patient notably paranoid, suspicious, recent inability to care for self PTA though has been adherent with treatment team recommendations, engaged in groups  Impulse control: impaired  Attention Span:  grossly intact  Concentration:  grossly intact  Recent and Remote Memory:  not formally assessed  Fund of Knowledge:  delayed; not formally diagnosed  Muscle Strength and Tone: normal  Gait and Station: Normal     Psychiatric Assessment     Irma Heller is a 33 year old female previously diagnosed with anxiety, depression, and " opioid dependence in remission who presented to the ED by EMS and admitted with psychosis in the context of likely first-episode psychosis. No prior psychiatric hospitalizations. Significant symptoms on admission include delusions of paranoia, visual and tactile hallucinations, and increased anxiety. The MSE on admission was pertinent for paranoia, anxiety. Biological contributions to mental health presentation include hx of opioid use disorder (though this has been stable) as well as medical diagnosis of MS, Chiari malformation which are reportedly. Psychological contributions to mental health presentation include anxiety, depression. Social factors contributing to mental health presentation include poor social support outside of group home staff. Protective factors include good medication adherence and group home staff.      Patient's definitive diagnosis is still in evolution, differential for psychosis includes primary psychotic disorder (ie, schizophrenia, schizoaffective) or other psychiatric etiology vs. drug-induced psychosis vs. medical etiology of psychosis. Feel that this is likely a primary psychiatric cause in the context of UDS positive only for prescribed medication and no recent drug use.  However, consultation with neurology to evaluate medical etiology, further neuroimaging and further history from collateral was considered to get a better understand of patient's symptom timeline and course. Can also consider the role of her interferon in her psychosis. She will likely benefit from antipsychotics this admission.     Given that she currently has psychosis, patient warrants inpatient psychiatric hospitalization to maintain her safety     Psychiatric Plan by Diagnosis      Today's changes:  - Subutex dosing changed to 8 mg x2 in the morning and 8 mg x1 in the afternoon  - Resume ibuprofen PRN  - Orajel for tooth pain and consult with dentistry   - Consider restarting Adderall vs. modafinil to help  with fatigue related to MS (on PTA high dose of adderral)   -PCP and sleep medicine consults on discharge     # Psychosis  Patient was given olanzapine as well as IM Haldol/Versed in the ED which seemed to be effective. Plan is to continue olanzapine for stabilization of acute psychosis and monitoring for improvement.     Medications:  - Olanzapine 15 mg PO at bedtime  - Olanzapine 5 mg TID PRN  - Olanzapine 10 mg PRN for emergency agitation/aggression related to psychosis      # Anxiety/Depression  Patient has a history of anxiety which she states has been well managed by her home meds prior to this point. She has had an acute increase in her anxiety due to being overwhelmed with new symptoms and fear for her safety. Will plan to continue her home medication regimen in tandem with the olanzapine.      Medications:  - Buspirone 10 mg bid  - Lexapro 10 mg daily  - Hydroxyzine 50 mg TID  - Ativan 1 mg PRN     Pertinent Labs/Monitoring:   - CBC unremarkable   - UDS positive for amphetamines, benzos (both prescribed)  - HbA1c 7.0  - UPT negative  - EKG with NSR, QTc 450 on 05/16     Additional Plans:  - Patient will be treated in therapeutic milieu with appropriate individual and group therapies as described  - Keep in touch with patient's residence and CM once disposition is determined. Call CM with patient once amenable.   - Sergio Dunn ([facility] ): [phone]  - Riana Galindo ([facility]): [phone]  - Plan to obtain collateral information from group home or family  - Update inpatient pharmacy when approximate discharge date is known     Psychiatric Hospital Course:      Irma Heller was admitted to [facility] on a 72 hour hold. Hold was discontinued shortly upon admission as pt agreed to stay voluntary. PTA buspirone, Lexapro, PRN Ativan and hydroxyzine were continued.  PTA Adderall was held to minimize any increased anxiety pending mood stabilization. New medications  started at the time of admission include olanzapine. 10mg at night which pt has reported as being helpful for sleep and anxiety to date.       Since her admission to the unit, Imra reported feeling safer in the new room. She stated she was able to eat the food and had no paranoia surrounding this. She had ongoing anxiety which was reduced with PRN Ativan/hydroxyzine. Patient was able to eat lunch in the milieu with the other patients following this. She denied SI/HI and hallucinations at that time. Given the reduction in symptoms in the context of ongoing paranoia and anxiety, plan is to continue olanzapine and monitor for improvement.     Neuro was consulted regarding the patient's history of MS, Chiari malformation and interferon beta-1b use. There were some cases reported of interferon causing psychosis, though this was discussed with neuro who had low suspicion for this etiology due to being on the med for at least 2 years. They recommended we continue patient's interferon to prevent MS flares. However, interferon formulary is not available in our pharmacy so will have to use patient's supply. We will plan to reach out to patient's group home, however we do not have their name or contact information. Though patient adamantly requested us not to inform anyone she is on the unit, we feel she does not currently have capacity and there is a need for collateral information. She also has a daughter that was staying with her aunt per the ED note, and would like to check on her. Attempted to call the aunt at the listed number but it was out of service. Patient's grandmother did not answer. Planned to reassess patient and obtain group home information at a later time.    Our CTC was able to reach the patient's living facility ([facility]) and spoke with the , Sergio. She confirmed Irma resides in an independent living environment and is responsible for taking her own medications.  Sergio states the patient is welcome back when ready but would like to see her first. She will be out of office from 05/23 - 05/27 and requested an update prior to that. [facility] the patient herself who again emphasized not wanting to return there. Patient continues to have paranoia surrounding her apartment, the staff, and her family. [facility] discussed with the patient that her CM could be a helpful resource to find discharge options if desired, however at that point the patient expressed not wanting the CM to know she was here. [facility] did reach out to [facility] and received contact info for Riana Galindo and [facility]. Going forward, will plan to obtain collateral from these sources and organize getting patient's interferon.     Patient also requested to have several of her medication dosing switched to her PTA routine. Hydroxyzine changed to scheduled in addition to PRN, Subitex changed to x2 doses in the morning and x1 dose in the afternoon, and Ambien scheduled at bedtime.     As of 05/29, patient is continuing to have paranoia, AH, and significant anxiety. She is consistently using up to her 40 mg limit of combined PRN and scheduled olanzapine dose. In the context of metabolic syndrome and lack of improvement in psychosis, will consider trying Haldol going forward.       Medical Assessment and Plan     Medical diagnoses to be addressed this admission:      #Elevated A1c  - A1c elevated to 7.0 on admission  - Will discuss with patient and provide outpatient follow-up    #MS  - Given the history of MS and Chiari malformation in the context of new psychosis.  - Plan is to obtain an MRI to rule out medical CNS cause of psychosis, but will wait for neuro.   - No acute concerns per neuro.   - Neuro ok with PTA interferon beta-1b and recommend continuing to minimize risk of MS flare. Interferon is ordered and will be delivered soon.   - Plan to hold Adderall and sumatriptan until mood and anxiety are  stabilized.   - Neuro to see patient again regarding possible flare     #Chiari Malformation  - Neuro consulted, no acute concerns at this time   - MRI pending     #Hx of iron deficiency anemia  - On Ferosul held at time of admission. Will clarify with pt and pharmacy about use      Medical course:   Patient was physically examined by the ED prior to being transferred to the unit and was found to be medically stable and appropriate for admission. Please see ED/Hospital course above for more information.     On 05/21, called [facility] to organize delivery of patient's interferon. [facility] mentioned the patient has not filled her interferon in 4 months. The initial plan was to have [facility] deliver to the hospital, however, while coordinating with behavioral inpt pharmacy, it turns out they are able to have it ordered for the patient here. Interferon now started as self-inject every other day. Still holding off on the patient's Adderall but considering changing to modafinil for MS-related fatigue.    On hospital day 7, patient developed symptoms concerning for a flare of her MS. Given the new symptoms in the context of not taking her interferon for the past several weeks, neuro were consulted. Patient also had some lower extremity edema so will consult medicine as well. Both medicine and neuro saw the patient. Medicine workup was largely unremarkable with the exception of high glucose in the blood and urine as well as streaky b/l infiltrates representing possible edema, atypical pneumonia, atelectasis. Other notable labs/imaging include normal BNP, CBC, blood cultures, CT head, and BLE ultrasounds. Neuro noted no deficits on their exam, thus recommended no additional imaging or immunosuppressive therapy. Will plan to follow up with medicine to discuss plan.    In the context of possible edema on CXR, orthopnea, and BLE edema, felt that echo was warranted. Discussed this as well as workup findings with medicine. Discussed  starting metformin for glucose and metabolic control with patient, however she states 2-3 years ago she had significant GI upset with metformin and was unable to tolerate it. Will check in with patient again and discuss a different med.     Patient did have an echo which showed slightly reduced LV function with an EF of 50-55% (though it was a technically difficult exam). Medicine was not concerned for heart failure at the moment though they did recommend an outpatient sleep study to assess for VALENTIN. They also advised against medication management of LE edema, recommended continuation of compression stockings. Patient was concerned with the findings so will plan to give her an information sheet regarding heart failure and review it with her. Otherwise, patient is medically stable at this point. Did discuss with her that we can coordinate referral for cardiology, primary care, and dentistry upon discharge.    On hospital day 12, patient reported to us she chipped her two lower central incisors while eating an apple. She endorses now having dental pain and mouth sores in that area. We did consult dental who saw the patient on day 13. They plan to file down the teeth and have her follow up in the outpatient setting. Orajel, Tylenol, and ibuprofen PRN for pain relief in the hospital.     Please see medicine, neurology, and dental consult notes for additional details.     Medications:  - Melatonin and Ambien PRN for sleep  - Colace 100 mg daily, Miralax/Maalox PRN for constipation  - Subutex 8 mg TID for opioid dependence (x2 in the morning, x1 in the afternoon)  - Tylenol, ibuprofen PRN for pain  - Betaceron 0.3 mg subcutaneous every other day     Consults:  Medicine for BLE edema, orthopnea. Neurology for history of MS, Chiari I malformation in context of psychiatric symptoms. Dentistry for dental pain in the context of tooth fracture.      Checklist     Legal Status: Voluntary     Safety Assessment:   Behavioral Orders    Procedures    Code 1 - Restrict to Unit    Code 2    Code 2     Ok to leave unit for echo. Per unit routine.    Routine Programming     As clinically indicated    Status 15     Every 15 minutes.       Risk Assessment:  Risk for harm is moderate-high.  Risk factors: psychosis, hx of substance use  Protective factors: group home staff, no identified history of attempts, doesn't live alone, family support, children     SIO: none    Disposition: TBD. Disposition pending stabilization, medication optimization, & development of a safe discharge plan.     Attestations     This patient was seen and discussed with my attending physician.  Robel Cho, MS3  [facility] [employer]    I was present with the medical student who participated in the service and in the documentation of the note. I have verified the history and personally performed the physical exam and medical decision making. I agree with the assessment and plan of care as documented in the note and have made changes to the note as appropriate.     Ramez Dagher, MD  Psychiatry Resident    Attestation:  This patient has been seen and evaluated by me, Wilmer Cartagena MD.  I have discussed this patient with the house staff team including the resident and medical student and I agree with the findings and plan in this note.    I have reviewed today's vital signs, medications, labs and imaging. Wilmer Cartagena MD , PhD.

## 2024-05-29 NOTE — PLAN OF CARE
"Problem: Adult Behavioral Health Plan of Care  Goal: Plan of Care Review  Outcome: Progressing  Flowsheets (Taken 5/28/2024 4099)  Patient Agreement with Plan of Care: agrees   Goal Outcome Evaluation:    Plan of Care Reviewed With: patient          Pt was out in the milieu most of the shift. She was out socializing and interacting with peers. Affect flat and blunted. She was observed watching the TV with peers. She was able to make her needs known. Hygiene was good. Eating and drinking okay. She was out for snacks and dinner and ate 100%. She was very demanding and seeking out for staff all shift. Pt is on \"top of the hour requests.\" But unable to follow it because she needed reminder all the time. She complained of generalized pain 8/10. She was given prn Tylenol 650 mg which was helpful. She rated anxiety 7/10 and was given prn Zyprexa 5 mg which was also helpful. She denied all other psych symptoms and contracted for safety. She was medication compliant. No medication side effect reported or noted this shift. She attended community meeting at 4 pm and OT evening group at 8 pm. No other concern noted at this time. Will continue to monitor and will assist if need arise.           "

## 2024-05-29 NOTE — PLAN OF CARE
"Pt is visible in the milieu. She is eating and drinking fine. She is medication compliant. He is cooperative with care. Pt endorsed high anxiety at 7/10 denied depression, SI and hallucinations. Pt complained of chronic pain rating it at 9/10. Mostly seeks out staff for needs. PRNs were requested for anxiety. Pt able to take shower in the morning.    Problem: Sleep Disturbance  Goal: Adequate Sleep/Rest  Outcome: Not Progressing     Problem: Adult Behavioral Health Plan of Care  Goal: Patient-Specific Goal (Individualization)  Description: You can add care plan individualizations to a care plan. Examples of Individualization might be:  \"Parent requests to be called daily at 9am for status\", \"I have a hard time hearing out of my right ear\", or \"Do not touch me to wake me up as it startles  me\".  Outcome: Progressing  Goal: Adheres to Safety Considerations for Self and Others  Outcome: Progressing  Intervention: Develop and Maintain Individualized Safety Plan  Recent Flowsheet Documentation  Taken 5/29/2024 1500 by Ashlie Cavazos RN  Safety Measures:   environmental rounds completed   safety rounds completed  Goal: Absence of New-Onset Illness or Injury  Outcome: Progressing  Intervention: Identify and Manage Fall Risk  Recent Flowsheet Documentation  Taken 5/29/2024 1500 by Ashlie Cavazos, RN  Safety Measures:   environmental rounds completed   safety rounds completed     Problem: Anxiety  Goal: Anxiety Reduction or Resolution  Outcome: Progressing  Intervention: Promote Anxiety Reduction  Recent Flowsheet Documentation  Taken 5/29/2024 1500 by Ashlie Cavazos, RN  Supportive Measures:   verbalization of feelings encouraged   positive reinforcement provided   active listening utilized  Family/Support System Care:   involvement promoted   self-care encouraged   support provided   Goal Outcome Evaluation:    Plan of Care Reviewed With: patient                   "

## 2024-05-29 NOTE — PLAN OF CARE
Problem: Sleep Disturbance  Goal: Adequate Sleep/Rest  Outcome: Progressing     Patient endorsed anxiety and unable to sleep- had Ativan and Melatonin,effective. Patient is very needy, asked one thing after another.  No complaints of chest pain, dizziness, lightheadedness or N/V. Patient continues on q15 minutes safety checks, noted. Will continue to monitor the patient and provide therapeutic intervention as needed. Will continue with current plan of care. Notify MD with any concerns. The patient had 5.75 total hours of sleep this shift.

## 2024-05-29 NOTE — PLAN OF CARE
"Rehab Group     Start time: 1115            End time: 1200  Patient time total: 25 minutes     Attended partial group session     #2 attended   Group Type: occupational therapy   Group Topic Covered: coping skills, healthy leisure time, and cognitive activities   Group Session Detail: OT: Education on healthy relaxation and creative hands-on endeavor (bookmarks) to increase concentration, focus, attention to task/detail, decision making, problem solving, frustration tolerance, task follow through, coping with stress, relaxation healthy leisure engagement, creative expression, and social engagement    Patient Response/Contribution:  Cooperative with task, Left the group on several occasions , Safe use of materials/group supplies, Actively engaged, and Other Semi-hyperverbal   Patient Detail: Pt arrived late to group and stayed for remainder. Pt elected to sit away from peer for presented activity but engaged in reciprocal social interactions with peer and therapist. Pt at times was semi-hyperverbal when conversing with peer about available community supports, and many times spoke over peer and therapist. Pt worked in a quick and semi-neat manner to complete project. Pt reported she had a \"brain surgery\" recently and she feels as if the surgery has impacted how her brain feels. Pt reported she is looking forward to using completed project.       No Charge    Patient Active Problem List   Diagnosis    Abnormal liver function test    Acute cholangitis (H28)    Anxiety disorder    Arnold-Chiari malformation (H)    Calculus of bile duct without obstruction    Depression    Multiple sclerosis (H)    Syringomyelia (H)    Tobacco use disorder    Schizophrenia spectrum disorder with psychotic disorder type not yet determined (H)         "

## 2024-05-29 NOTE — PLAN OF CARE
BEH IP Unit Acuity Rating Score (UARS)  Patient is given one point for every criteria they meet.    CRITERIA SCORING   On a 72 hour hold, court hold, committed, stay of commitment, or revocation. 0   Patient LOS on BEH unit exceeds 20 days. 0  LOS: 13   Patient under guardianship, 55+, otherwise medically complex, or under age 11. 0   Suicide ideation without relief of precipitating factors. 0   Current plan for suicide. 0   Current plan for homicide. 0   Imminent risk or actual attempt to seriously harm another without relief of factors precipitating the attempt. 0   Severe dysfunction in daily living (ex: complete neglect for self care, extreme disruption in vegetative function, extreme deterioration in social interactions). 1   Recent (last 7 days) or current physical aggression in the ED or on unit. 0   Restraints or seclusion episode in past 72 hours. 0   Recent (last 7 days) or current verbal aggression, agitation, yelling, etc., while in the ED or unit. 0   Active psychosis. 1   Need for constant or near constant redirection (from leaving, from others, etc).  1   Intrusive or disruptive behaviors. 0   Patient requires 3 or more hours of individualized nursing care per 8-hour shift (i.e. for ADLs, meds, therapeutic interventions). 0   TOTAL 3

## 2024-05-30 PROCEDURE — 250N000013 HC RX MED GY IP 250 OP 250 PS 637: Performed by: STUDENT IN AN ORGANIZED HEALTH CARE EDUCATION/TRAINING PROGRAM

## 2024-05-30 PROCEDURE — 124N000002 HC R&B MH UMMC

## 2024-05-30 PROCEDURE — 250N000013 HC RX MED GY IP 250 OP 250 PS 637

## 2024-05-30 PROCEDURE — H2032 ACTIVITY THERAPY, PER 15 MIN: HCPCS

## 2024-05-30 PROCEDURE — 99232 SBSQ HOSP IP/OBS MODERATE 35: CPT | Mod: GC | Performed by: PSYCHIATRY & NEUROLOGY

## 2024-05-30 PROCEDURE — 250N000012 HC RX MED GY IP 250 OP 636 PS 637: Mod: JZ

## 2024-05-30 RX ADMIN — LORAZEPAM 1 MG: 1 TABLET ORAL at 23:45

## 2024-05-30 RX ADMIN — ALUMINUM HYDROXIDE, MAGNESIUM HYDROXIDE, AND SIMETHICONE 30 ML: 1200; 120; 1200 SUSPENSION ORAL at 10:49

## 2024-05-30 RX ADMIN — LORAZEPAM 1 MG: 1 TABLET ORAL at 12:29

## 2024-05-30 RX ADMIN — Medication 3 MG: at 01:03

## 2024-05-30 RX ADMIN — BUSPIRONE HYDROCHLORIDE 10 MG: 10 TABLET ORAL at 20:38

## 2024-05-30 RX ADMIN — OLANZAPINE 15 MG: 15 TABLET, ORALLY DISINTEGRATING ORAL at 20:38

## 2024-05-30 RX ADMIN — BUSPIRONE HYDROCHLORIDE 10 MG: 10 TABLET ORAL at 08:09

## 2024-05-30 RX ADMIN — HYDROXYZINE HYDROCHLORIDE 50 MG: 50 TABLET, FILM COATED ORAL at 20:38

## 2024-05-30 RX ADMIN — GABAPENTIN 600 MG: 300 CAPSULE ORAL at 20:38

## 2024-05-30 RX ADMIN — POLYETHYLENE GLYCOL 3350 17 G: 17 POWDER, FOR SOLUTION ORAL at 11:25

## 2024-05-30 RX ADMIN — ESCITALOPRAM OXALATE 10 MG: 10 TABLET ORAL at 08:09

## 2024-05-30 RX ADMIN — GABAPENTIN 600 MG: 300 CAPSULE ORAL at 13:08

## 2024-05-30 RX ADMIN — NICOTINE POLACRILEX 2 MG: 2 LOZENGE ORAL at 10:49

## 2024-05-30 RX ADMIN — ACETAMINOPHEN 650 MG: 325 TABLET, FILM COATED ORAL at 12:02

## 2024-05-30 RX ADMIN — GABAPENTIN 600 MG: 300 CAPSULE ORAL at 08:09

## 2024-05-30 RX ADMIN — ACETAMINOPHEN 650 MG: 325 TABLET, FILM COATED ORAL at 17:22

## 2024-05-30 RX ADMIN — HYDROXYZINE HYDROCHLORIDE 50 MG: 50 TABLET, FILM COATED ORAL at 08:09

## 2024-05-30 RX ADMIN — ZOLPIDEM TARTRATE 10 MG: 5 TABLET, COATED ORAL at 20:38

## 2024-05-30 RX ADMIN — INTERFERON BETA-1B 0.3 MG: KIT at 09:25

## 2024-05-30 RX ADMIN — Medication 3 MG: at 20:38

## 2024-05-30 RX ADMIN — Medication: at 15:23

## 2024-05-30 RX ADMIN — Medication: at 20:42

## 2024-05-30 RX ADMIN — BUPRENORPHINE 8 MG: 8 TABLET SUBLINGUAL at 17:23

## 2024-05-30 RX ADMIN — IBUPROFEN 600 MG: 600 TABLET ORAL at 15:22

## 2024-05-30 RX ADMIN — BUPRENORPHINE 16 MG: 8 TABLET SUBLINGUAL at 08:13

## 2024-05-30 RX ADMIN — Medication: at 13:08

## 2024-05-30 RX ADMIN — HYDROXYZINE HYDROCHLORIDE 50 MG: 50 TABLET, FILM COATED ORAL at 13:08

## 2024-05-30 ASSESSMENT — ACTIVITIES OF DAILY LIVING (ADL)
ORAL_HYGIENE: INDEPENDENT
ADLS_ACUITY_SCORE: 40
LAUNDRY: WITH SUPERVISION
ADLS_ACUITY_SCORE: 40
DRESS: INDEPENDENT
ADLS_ACUITY_SCORE: 40
ORAL_HYGIENE: INDEPENDENT
ADLS_ACUITY_SCORE: 40
HYGIENE/GROOMING: INDEPENDENT
ADLS_ACUITY_SCORE: 40
DRESS: INDEPENDENT
HYGIENE/GROOMING: INDEPENDENT
ADLS_ACUITY_SCORE: 40

## 2024-05-30 NOTE — PLAN OF CARE
BEH IP Unit Acuity Rating Score (UARS)  Patient is given one point for every criteria they meet.    CRITERIA SCORING   On a 72 hour hold, court hold, committed, stay of commitment, or revocation. 0   Patient LOS on BEH unit exceeds 20 days. 0  LOS: 14   Patient under guardianship, 55+, otherwise medically complex, or under age 11. 0   Suicide ideation without relief of precipitating factors. 0   Current plan for suicide. 0   Current plan for homicide. 0   Imminent risk or actual attempt to seriously harm another without relief of factors precipitating the attempt. 0   Severe dysfunction in daily living (ex: complete neglect for self care, extreme disruption in vegetative function, extreme deterioration in social interactions). 1   Recent (last 7 days) or current physical aggression in the ED or on unit. 0   Restraints or seclusion episode in past 72 hours. 0   Recent (last 7 days) or current verbal aggression, agitation, yelling, etc., while in the ED or unit. 0   Active psychosis. 1   Need for constant or near constant redirection (from leaving, from others, etc).  1   Intrusive or disruptive behaviors. 0   Patient requires 3 or more hours of individualized nursing care per 8-hour shift (i.e. for ADLs, meds, therapeutic interventions). 0   TOTAL 3

## 2024-05-30 NOTE — CONSULTS
Dental Consult,  [facility]     Patient:   Irma Heller    Date of birth 1990   MRN:  [id number]   Date of Visit:   05/30/2024   Date of Admission 5/16/2024   Consult Requested by Wilmer Cartagena MD     I did not see the patient in person, I reviewed the [facility]'s consult notes on 05/30.                                      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 Diagnosis upon admission MDD, Schizophrenia spectrum disorder with psychotic disorder type not yet determined (H).   Dental exam pertinent for: poor dentition - only tooth #s 24 and 25 remaining of natural dentition, both broken at the gumline with root tips; ill-fitting upper and lower dentures, contributing to oral sores and pain; poor oral hygiene - moderate dental biofilm present on both dentures.  .    NO dental CT was taken.      RECOMMENDATION:  For current urgent needs, call [facility] to schedule an appointment for limited exam, dental radiographs and definite treatment.  [facility]  [place] [place] - [address] [place] [address], [phone]  Also recommend to establish a dental home with either [facility] or community dentist of her choice.   Recommend orajel and tylenol/ pain control per primary team.   Recommend Magic Mouth wash for PRN use. Remove dentures when she is not eating or functioning to reduce the denture sore.   Continue DH recommendation: DENTURE CARE:  Remove dentures at least once daily (ideally overnight) and soak in denture case with  cleaning tabs. Before seating back in mouth, brush both dentures with the denture brush provided during today's assessment. Use MINIMAL amounts of denture adhesive before seating back in mouth. This same process should be repeated following each meal.      Gently brush underlying gum tissue at least BID with soft-bristled manual toothbrush to help massage/stimulate gum tissue. Recommend removal of dentures if at all possible until sores heal.     Soft food diet - avoid anything hard, crunchy, or overly acidic/spicy.     Continue Orajel and Tylenol prn for pain management. Recommend rinsing with Magic Mouthwash, alcohol-free antiseptic mouthrinse (hospital-provided is sufficient) or warm salt water.     Pt should establish outpatient dental home upon discharge to resume comprehensive dental care, including fabrication of new dentures. Dental school information provided below.    _____________________________________________________________________  Thank you for allowing us to participate in the care of this patient,  Direct any further questions to:     Elaina Askew MD DMD, Fellow  Pager: [phone]    Patient discussed with:   Wayne Francis DDS  , [facility]     Clinic information:   [facility]  [facility]  [address] [place] [id number]  Phone:[phone]  [place], [employer] phone: [phone]                                             Reason for Consult:   Referring MD & Reason for Visit: I was asked by Wilmer Cartagena MD, to see Irma Heller for a dental consultation at patient request.                                               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  Dental and oropharyngeal history is pertinent for nearly edentulous - pt has full maxillary removable denture, mandibular partial removable denture (both made in 2020, according to patient); no dental home - pt reports that she had been seeing her childhood dentist up until around 2019 when she switched insurances, which was no longer accepted at her dental clinic; pt is interested in dental referral outpatient.  The patient reports \"my mouth hurts so bad and I don't want to walk around with a hole in my face.\" Pt points to mandibular central incisors (tooth #s 24, 25) and explained these teeth broke approximately two weeks ago while eating an apple. These teeth are the only two remaining in natural dentition and were supporting partial denture, which is now rubbing the underlying gum tissue and contributing to irritation.     *Note: pt was teary through most of assessment today, but was able to be redirected.                                                 Clinical Examination    Please see complete exam from [facility]'s consult notes on 05/29.  Vitals were reviewed  Temp: 98  F (36.7  C)   BP: (!) 149/82 Pulse: 109     SpO2: 95 % O2 Device: None (Room air)      See H&P for complete ROS.       Lab results:        CBC RESULTS:   Recent Labs   Lab Test 05/24/24  0833   WBC 9.0   RBC 4.58   HGB 12.7   HCT 39.5   MCV 86   MCH 27.7   MCHC 32.2   RDW 13.0          Last Basic Metabolic Panel:  Lab Results   Component Value Date     05/24/2024      Lab Results   Component Value Date    POTASSIUM 4.6 05/24/2024    POTASSIUM 4.0 03/15/2022     Lab Results   Component Value Date    CHLORIDE 99 05/24/2024    CHLORIDE 101 03/15/2022     Lab Results   Component Value Date    YUMIKO 9.0 05/24/2024     Lab Results   Component Value Date    CO2 31 05/24/2024    CO2 28 03/15/2022 "     Lab Results   Component Value Date    BUN 13.4 05/24/2024    BUN 14 03/15/2022     Lab Results   Component Value Date    CR 0.61 05/24/2024     Lab Results   Component Value Date     05/24/2024     03/15/2022                                                              Imaging   NO dental CT was taken.                                     Past Medical History      Past Medical History:   Diagnosis Date    Anxiety     Chiari I malformation (H)     Hx- was a child    Depression     Gastroesophageal reflux disease     Multiple sclerosis (H)     Obese        Immunization History   Administered Date(s) Administered    COVID-19 Monovalent 12+ (Pfizer 2022) 10/14/2022    [id number] Novel Flu- Nasal 11/06/2009    HPV Quadrivalent 10/02/2009, 09/07/2012, 11/27/2012    HepB, Unspecified 08/22/2000, 11/09/2000    Hepatitis B, Adult 01/24/2011    Hepatitis B, Peds 08/22/2000, 11/09/2000, 05/04/2001    Historical DTP/aP 01/31/1989, 05/31/1989, 07/31/1989, 08/31/1990, 09/30/1993, 05/04/2001    Influenza (H1N1) 11/06/2009    Influenza (IIV3) PF 11/09/2000, 10/25/2003, 09/01/2011    Influenza Vaccine >6 months,quad, PF 10/09/2013, 10/15/2014, 01/27/2020    Influenza Vaccine, 6+MO IM (QUADRIVALENT W/PRESERVATIVES) 10/09/2013    Influenza, seasonal, injectable, PF 12/28/2010    MMR 01/08/1990, 10/25/1993    Pneumococcal 23 valent 11/27/2012    Polio, Unspecified 01/31/1989, 05/31/1989, 08/31/1989, 09/30/1993    TDAP (Adacel,Boostrix) 11/27/2012    Varicella 08/22/2000       Past Surgical History   Past Surgical History:   Procedure Laterality Date    AS INSTALL SPINAL SHUNT,LAMINECTOMY      Related to Chiari Malformation surgery    BACK SURGERY      CHOLECYSTECTOMY      DECOMPRESSION CHIARI      ENDOSCOPIC RETROGRADE CHOLANGIOPANCREATOGRAM N/A 3/16/2022    Procedure: ENDOSCOPIC RETROGRADE CHOLANGIOPANCREATOGRAPHY;  Surgeon: Jojo Moore MD;  Location:  OR                                           Social  History      Family History   Problem Relation Age of Onset    Heart Disease Brother                                           Allergies      Allergies   Allergen Reactions    Glatiramer Hives    Penicillins Anaphylaxis and Hives    Shellfish Allergy Anaphylaxis and Hives    Copaxone [Glatiramer Acetate] Swelling                                        Medications        Medications Prior to Admission   Medication Sig Dispense Refill Last Dose    amphetamine-dextroamphetamine (ADDERALL XR) 25 MG 24 hr capsule Take 50 mg by mouth every morning       amphetamine-dextroamphetamine (ADDERALL) 20 MG tablet Take 40 mg by mouth daily at 2 pm       buprenorphine (SUBUTEX) 8 MG SUBL sublingual tablet Place 8 mg under the tongue 3 times daily       busPIRone (BUSPAR) 10 MG tablet Take 10 mg by mouth 2 times daily       docusate sodium (COLACE) 100 MG capsule Take 100 mg by mouth daily       escitalopram (LEXAPRO) 10 MG tablet Take 10 mg by mouth daily        FEROSUL 325 (65 Fe) MG tablet Take 325 mg by mouth daily       gabapentin (NEURONTIN) 300 MG capsule Take 600 mg by mouth 3 times daily       hydrOXYzine (ATARAX) 50 MG tablet Take 50 mg by mouth 4 times daily as needed for anxiety       ibuprofen (ADVIL/MOTRIN) 600 MG tablet Take 600 mg by mouth every 8 hours as needed for mild pain       interferon beta-1b (BETASERON) 0.3 MG injection Inject 300 mcg (0.3 mg) Subcutaneous every other day Inject 0.3 mg subcutaneous every other day 14 kit 11     LORazepam (ATIVAN) 1 MG tablet Take 1 mg by mouth 2 times daily as needed for anxiety       melatonin 5 MG tablet Take 5 mg by mouth at bedtime       Multiple Vitamin (TAB-A-AMBROSE) TABS Take 1 tablet by mouth daily       SUMAtriptan (IMITREX) 50 MG tablet Take 1 tablet (50 mg) by mouth at onset of headache for migraine May repeat in 2 hours. Max 2 tablets/24 hours. 9 tablet 5     zolpidem (AMBIEN) 10 MG tablet Take 10 mg by mouth nightly as needed for sleep            Current  Facility-Administered Medications   Medication Dose Route Frequency Provider Last Rate Last Admin    acetaminophen (TYLENOL) tablet 650 mg  650 mg Oral Q4H PRN Juju Salgado MD   650 mg at 05/28/24 1910    alum & mag hydroxide-simethicone (MAALOX) suspension 30 mL  30 mL Oral Q4H PRN Juju Salgado MD        [Held by provider] amphetamine-dextroamphetamine (ADDERALL XR) 10 mg, amphetamine-dextroamphetamine (ADDERALL XR) 40 mg  50 mg Oral QAM Juju Salgado MD        [Held by provider] amphetamine-dextroamphetamine (ADDERALL) per tablet 40 mg  40 mg Oral Daily Juju Salgado MD        benzocaine (ORAJEL MAXIMUM STRENGTH) 20 % gel   Mouth/Throat 4x Daily PRN Shanti Waters MD        buprenorphine (SUBUTEX) sublingual tablet 16 mg  16 mg Sublingual Daily Shanti Waters MD   16 mg at 05/29/24 0837    buprenorphine (SUBUTEX) sublingual tablet 8 mg  8 mg Sublingual At Bedtime Shanti Waters MD   8 mg at 05/29/24 1825    busPIRone (BUSPAR) tablet 10 mg  10 mg Oral BID Juju Salgado MD   10 mg at 05/29/24 2029    docusate sodium (COLACE) capsule 100 mg  100 mg Oral Daily PRN Juju Salgado MD        escitalopram (LEXAPRO) tablet 10 mg  10 mg Oral Daily Juju Salgado MD   10 mg at 05/29/24 0838    gabapentin (NEURONTIN) capsule 600 mg  600 mg Oral TID Juju Salgado MD   600 mg at 05/29/24 2029    hydrOXYzine HCl (ATARAX) tablet 50 mg  50 mg Oral TID Shanti Waters MD   50 mg at 05/29/24 2029    ibuprofen (ADVIL/MOTRIN) tablet 600 mg  600 mg Oral Q8H PRN Shanti Waters MD   600 mg at 05/28/24 1129    interferon beta-1b (BETASERON) injection 0.3 mg  0.3 mg Subcutaneous Every Other Day Shanti Waters MD   0.3 mg at 05/28/24 0919    lidocaine (LMX4) cream   Topical Q1H PRN Lalitha Serrano PA-C        lidocaine 1 % 0.1-1 mL  0.1-1 mL Other Q1H PRN Lalitha Serrano PA-C        loperamide (IMODIUM) capsule 2 mg  2 mg Oral 4x Daily PRN Ludmila Lozano MD   2  mg at 05/19/24 2013    LORazepam (ATIVAN) tablet 1 mg  1 mg Oral BID PRN Shanti Waters MD   1 mg at 05/29/24 1447    melatonin tablet 3 mg  3 mg Oral At Bedtime PRN Ludmila Lozano MD   3 mg at 05/30/24 0103    naloxone (NARCAN) injection 0.2 mg  0.2 mg Intravenous Q2 Min PRN Juju Salgado MD        Or    naloxone (NARCAN) injection 0.4 mg  0.4 mg Intravenous Q2 Min PRN Juju Salgado MD        Or    naloxone (NARCAN) injection 0.2 mg  0.2 mg Intramuscular Q2 Min PRN Juju Salgado MD        Or    naloxone (NARCAN) injection 0.4 mg  0.4 mg Intramuscular Q2 Min PRN Juju Salgado MD        nicotine (COMMIT) lozenge 2 mg  2 mg Buccal Q2H PRN Jaren Zaman MD   2 mg at 05/29/24 1917    OLANZapine (zyPREXA) tablet 10 mg  10 mg Oral BID PRN Shanti Waters MD   10 mg at 05/28/24 0256    Or    OLANZapine (zyPREXA) injection 10 mg  10 mg Intramuscular TID PRN Shanti Waters MD        OLANZapine (zyPREXA) tablet 5 mg  5 mg Oral TID PRN Shanti Waters MD   5 mg at 05/29/24 1236    OLANZapine zydis (zyPREXA) ODT tab 15 mg  15 mg Oral At Bedtime Shanti Waters MD   15 mg at 05/29/24 2030    polyethylene glycol (MIRALAX) Packet 17 g  17 g Oral Daily PRN Juju Salgado MD   17 g at 05/29/24 1353    simethicone (MYLICON) chewable tablet 80 mg  80 mg Oral Q6H PRN Ludmila Lozano MD        sodium chloride (PF) 0.9% PF flush 3 mL  3 mL Intracatheter q1 min prn Lalitha Serrano PA-C        [Held by provider] SUMAtriptan (IMITREX) tablet 50 mg  50 mg Oral at onset of headache Juju Salgado MD        zolpidem (AMBIEN) tablet 10 mg  10 mg Oral At Bedtime Shanti Waters MD   10 mg at 05/29/24 2029

## 2024-05-30 NOTE — PROGRESS NOTES
"  ----------------------------------------------------------------------------------------------------------  [facility]  Psychiatry Progress Note  Hospital Day #14     Interim History:     The patient's care was discussed with the treatment team and chart notes were reviewed.    Vitals: BP (!) 149/82   Pulse 109   Temp 98  F (36.7  C)   Resp 18   Ht 1.651 m (5' 5\")   Wt (!) 165.9 kg (365 lb 11.2 oz)   SpO2 95%   BMI 60.86 kg/m    Sleep: 6.5 hours (05/30/24 0700)  Scheduled medications: Took all scheduled medications as prescribed  PRN medications:   - Olanzapine 5 mg x2 (early am and afternoon 05/29)  - Ativan 1 mg x2 (early am and afternoon 05/29)  - Miralax 17 g  - Melatonin 3 mg  - Nicotine lozenges    Staff Report/Interval Events  No acute behavioral events.     Irma remains relatively unchanged from days prior. She continues to endorse high anxiety/depression and paranoia regarding her safety at home, though she had no SI or hallucinations yesterday. Still endorses chronic pain. She is doing ADLs appropriately including showering and doing laundry. Patient is eating and drinking adequately. Continues to seek out staff frequently for needs. Used less PRNs yesterday compared to the last several days. Stated yesterday she feels overall improved compared to when she came in. Sleep was 6.5 hrs.    Dental saw the patient yesterday, recommended continuation of Tylenol/ibuprofen and Orajel for pain as well as alcohol-free antiseptic or warm salt water rinses for inflammation. Will coordinate outpatient dental follow-up.     Subjective:     Patient Interview:  The team met with Irma in the conference room. 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 She states that she is \"staying positive\" today and feels that she is doing better overall. The past couple days are worse, " specifically because we are nearing the anniversary of her brother's death. Patient becomes tearful talking about this. Praying every morning has helped. She continues to have high anxiety but is not currently having the AH. She further denies SI/SIB/HI.     Did discuss medications with Irma including potentially switching to Haldol from olanzapine and trying modafinil instead of Adderall. She is resistant to trying meds she hasn't had in the past. She did request information for modafinil which was provided, will revisit this tomorrow. Patient would also like to stay at current dose of olanzapine.    Irma is having persistent generalized pain as well some constipation, was taking Miralax at the time of interview. Requested to resume her docusate which was held while taking Lasix. Also had questions about Miralax vs Maalox which were answered. Dicussed dental care with her including plan for an appointment off the unit to address chipped teeth and make new dentures. She continues to have significant pain for which she has Tylenol, ibuprofen, and Orajel available. Has not received Orajel yet but will start as soon as they are available. Patient also endorses intermittent difficulty breathing, mainly when getting her BP or weight taken. Discussed that this may just be a stress or nervous reaction which is normal. Suggested trying coping mechanisms. Mentioned to her this could be connected to VALENTIN concerns and outpatient sleep study may be insightful.     She was able to reach her [facility] CM who ultimately told her she was not comfortable going to get the patient's belongings. They discussed their involvement will likely be limited to looking into housing for after discharge. Patient is still concerned about her assisted living facility staff entering her room or handling her belongings.    Updated patient on acquiring more clothing which should be here by Monday. Patient is anxious about having to wait that long,  "and is specifically concerned with having incontinence. She asked for katya from staff in regard to washing her clothes everyday.     ROS:  Positive: orthopnea with laying down flat-said having multiple pillows helps, tooth pain, generalized pain  Negative: denies acute concerns      Objective:     Vitals:  BP (!) 149/82   Pulse 109   Temp 98  F (36.7  C)   Resp 18   Ht 1.651 m (5' 5\")   Wt (!) 165.9 kg (365 lb 11.2 oz)   SpO2 95%   BMI 60.86 kg/m      Allergies:  Allergies   Allergen Reactions    Glatiramer Hives    Penicillins Anaphylaxis and Hives    Shellfish Allergy Anaphylaxis and Hives    Copaxone [Glatiramer Acetate] Swelling       Current Medications:  Scheduled:  Current Facility-Administered Medications   Medication Dose Route Frequency Provider Last Rate Last Admin    acetaminophen (TYLENOL) tablet 650 mg  650 mg Oral Q4H PRN Juju Salgado MD   650 mg at 05/28/24 1910    alum & mag hydroxide-simethicone (MAALOX) suspension 30 mL  30 mL Oral Q4H PRN Juju Salgado MD        [Held by provider] amphetamine-dextroamphetamine (ADDERALL XR) 10 mg, amphetamine-dextroamphetamine (ADDERALL XR) 40 mg  50 mg Oral QAM Juju Salgado MD        [Held by provider] amphetamine-dextroamphetamine (ADDERALL) per tablet 40 mg  40 mg Oral Daily Juju Salgado MD        benzocaine (ORAJEL MAXIMUM STRENGTH) 20 % gel   Mouth/Throat 4x Daily PRN Shanti Waters MD        buprenorphine (SUBUTEX) sublingual tablet 16 mg  16 mg Sublingual Daily Shanti Waters MD   16 mg at 05/29/24 0837    buprenorphine (SUBUTEX) sublingual tablet 8 mg  8 mg Sublingual At Bedtime Shanti Waters MD   8 mg at 05/29/24 1825    busPIRone (BUSPAR) tablet 10 mg  10 mg Oral BID Juju Salgado MD   10 mg at 05/29/24 2029    docusate sodium (COLACE) capsule 100 mg  100 mg Oral Daily PRN Juju Salgado MD        escitalopram (LEXAPRO) tablet 10 mg  10 mg Oral Daily Juju Salgado MD   10 mg at 05/29/24 0838    " gabapentin (NEURONTIN) capsule 600 mg  600 mg Oral TID Juju Salgado MD   600 mg at 05/29/24 2029    hydrOXYzine HCl (ATARAX) tablet 50 mg  50 mg Oral TID Shanti Waters MD   50 mg at 05/29/24 2029    ibuprofen (ADVIL/MOTRIN) tablet 600 mg  600 mg Oral Q8H PRN Shanti Waters MD   600 mg at 05/28/24 1129    interferon beta-1b (BETASERON) injection 0.3 mg  0.3 mg Subcutaneous Every Other Day Shanti Waters MD   0.3 mg at 05/28/24 0919    lidocaine (LMX4) cream   Topical Q1H PRN Lalitha Serrano PA-C        lidocaine 1 % 0.1-1 mL  0.1-1 mL Other Q1H PRN Lalihta Serrano PA-C        loperamide (IMODIUM) capsule 2 mg  2 mg Oral 4x Daily PRN Ludmila Lozano MD   2 mg at 05/19/24 2013    LORazepam (ATIVAN) tablet 1 mg  1 mg Oral BID PRN Shanti Waters MD   1 mg at 05/29/24 1447    melatonin tablet 3 mg  3 mg Oral At Bedtime PRN Ludmila Lozano MD   3 mg at 05/30/24 0103    naloxone (NARCAN) injection 0.2 mg  0.2 mg Intravenous Q2 Min PRN Juju Salgado MD        Or    naloxone (NARCAN) injection 0.4 mg  0.4 mg Intravenous Q2 Min PRN Juju Salgado MD        Or    naloxone (NARCAN) injection 0.2 mg  0.2 mg Intramuscular Q2 Min PRN Juju Salgado MD        Or    naloxone (NARCAN) injection 0.4 mg  0.4 mg Intramuscular Q2 Min PRN Juju Salgado MD        nicotine (COMMIT) lozenge 2 mg  2 mg Buccal Q2H PRN Jaren Zaman MD   2 mg at 05/29/24 1917    OLANZapine (zyPREXA) tablet 10 mg  10 mg Oral BID PRN Shanti Waters MD   10 mg at 05/28/24 0256    Or    OLANZapine (zyPREXA) injection 10 mg  10 mg Intramuscular TID PRN Shanti Waters MD        OLANZapine (zyPREXA) tablet 5 mg  5 mg Oral TID PRN Shanti Waters MD   5 mg at 05/29/24 1236    OLANZapine zydis (zyPREXA) ODT tab 15 mg  15 mg Oral At Bedtime Shanti Waters MD   15 mg at 05/29/24 2030    polyethylene glycol (MIRALAX) Packet 17 g  17 g Oral Daily PRN Juju Salgado MD   17 g at 05/29/24  1353    simethicone (MYLICON) chewable tablet 80 mg  80 mg Oral Q6H PRN Ludmila Lozano MD        sodium chloride (PF) 0.9% PF flush 3 mL  3 mL Intracatheter q1 min prn Lalitha Serrano PA-C        [Held by provider] SUMAtriptan (IMITREX) tablet 50 mg  50 mg Oral at onset of headache Juju Salgado MD        zolpidem (AMBIEN) tablet 10 mg  10 mg Oral At Bedtime Shanti Waters MD   10 mg at 05/29/24 2029       PRN:  Current Facility-Administered Medications   Medication Dose Route Frequency Provider Last Rate Last Admin    acetaminophen (TYLENOL) tablet 650 mg  650 mg Oral Q4H PRN Juju Salgado MD   650 mg at 05/28/24 1910    alum & mag hydroxide-simethicone (MAALOX) suspension 30 mL  30 mL Oral Q4H PRN Juju Salgado MD        [Held by provider] amphetamine-dextroamphetamine (ADDERALL XR) 10 mg, amphetamine-dextroamphetamine (ADDERALL XR) 40 mg  50 mg Oral QAM Juju Salgado MD        [Held by provider] amphetamine-dextroamphetamine (ADDERALL) per tablet 40 mg  40 mg Oral Daily Juju Salgado MD        benzocaine (ORAJEL MAXIMUM STRENGTH) 20 % gel   Mouth/Throat 4x Daily PRN Shanti Waters MD        buprenorphine (SUBUTEX) sublingual tablet 16 mg  16 mg Sublingual Daily Shanti Waters MD   16 mg at 05/29/24 0837    buprenorphine (SUBUTEX) sublingual tablet 8 mg  8 mg Sublingual At Bedtime Shanti Waters MD   8 mg at 05/29/24 1825    busPIRone (BUSPAR) tablet 10 mg  10 mg Oral BID Juju Salgado MD   10 mg at 05/29/24 2029    docusate sodium (COLACE) capsule 100 mg  100 mg Oral Daily PRN Juju Salgado MD        escitalopram (LEXAPRO) tablet 10 mg  10 mg Oral Daily Juju Salgado MD   10 mg at 05/29/24 0838    gabapentin (NEURONTIN) capsule 600 mg  600 mg Oral TID Juju Salgado MD   600 mg at 05/29/24 2029    hydrOXYzine HCl (ATARAX) tablet 50 mg  50 mg Oral TID Shanti Waters MD   50 mg at 05/29/24 2029    ibuprofen (ADVIL/MOTRIN) tablet 600 mg  600 mg  Oral Q8H PRN Shanti Waters MD   600 mg at 05/28/24 1129    interferon beta-1b (BETASERON) injection 0.3 mg  0.3 mg Subcutaneous Every Other Day Shanti Waters MD   0.3 mg at 05/28/24 0919    lidocaine (LMX4) cream   Topical Q1H PRN Lalitha Serrano PA-C        lidocaine 1 % 0.1-1 mL  0.1-1 mL Other Q1H PRN Lalitha Serrano PA-C        loperamide (IMODIUM) capsule 2 mg  2 mg Oral 4x Daily PRN Ludmila Lozano MD   2 mg at 05/19/24 2013    LORazepam (ATIVAN) tablet 1 mg  1 mg Oral BID PRN Shanti Waters MD   1 mg at 05/29/24 1447    melatonin tablet 3 mg  3 mg Oral At Bedtime PRN Ludmila Lozano MD   3 mg at 05/30/24 0103    naloxone (NARCAN) injection 0.2 mg  0.2 mg Intravenous Q2 Min PRN Juju Salgado MD        Or    naloxone (NARCAN) injection 0.4 mg  0.4 mg Intravenous Q2 Min PRN Juju Salgado MD        Or    naloxone (NARCAN) injection 0.2 mg  0.2 mg Intramuscular Q2 Min PRN Juju Salgado MD        Or    naloxone (NARCAN) injection 0.4 mg  0.4 mg Intramuscular Q2 Min PRN Juju Salgado MD        nicotine (COMMIT) lozenge 2 mg  2 mg Buccal Q2H PRN Jaren Zaman MD   2 mg at 05/29/24 1917    OLANZapine (zyPREXA) tablet 10 mg  10 mg Oral BID PRN Shanti Waters MD   10 mg at 05/28/24 0256    Or    OLANZapine (zyPREXA) injection 10 mg  10 mg Intramuscular TID PRN Shanti Waters MD        OLANZapine (zyPREXA) tablet 5 mg  5 mg Oral TID PRN Shanti Waters MD   5 mg at 05/29/24 1236    OLANZapine zydis (zyPREXA) ODT tab 15 mg  15 mg Oral At Bedtime Shanti Waters MD   15 mg at 05/29/24 2030    polyethylene glycol (MIRALAX) Packet 17 g  17 g Oral Daily PRN Juju Salgado MD   17 g at 05/29/24 1353    simethicone (MYLICON) chewable tablet 80 mg  80 mg Oral Q6H PRN Ludmila Lozano MD        sodium chloride (PF) 0.9% PF flush 3 mL  3 mL Intracatheter q1 min prn Lalitha Serrano PA-C        [Held by provider] SUMAtriptan (IMITREX) tablet 50 mg  50  "mg Oral at onset of headache Juju Salgado MD        zolpidem (AMBIEN) tablet 10 mg  10 mg Oral At Bedtime Shanti Waters MD   10 mg at 05/29/24 2029       Labs and Imaging:  New results:   No results found for this or any previous visit (from the past 24 hour(s)).    Data this admission:  - CBC unremarkable, WBC 13 on initial, 9.0 on repeat  - BMP unremarkable except for glucose 141 (05/17), repeat at 258 (05/24)  - HbA1c 7.0  - Lipids WNL except for HDL at 31  - BNP WNL  - Blood cultures negative  - TSH normal  - Vit D normal  - Vit B12 normal  - Folate normal    - UA negative for infection, glucose 100  - UDS positive for amphetamines, benzos (both prescribed)  - UPT negative    - EKG with NSR, QTc 450 on 05/16    - MRI (5/18/24) showed stable MS plaques unchanged from previous on 8/1/22  - CT head unremarkable  - CXR with mild prominence of the cardiac silhouette, nonspecific mild streaky perihilar and basilar opacities   - BLE ultrasound unremarkable, negative for DVT  - Echo was a technically difficult study, showed mildly decreased LV function with EF 50-55% and globally reduced RV function     Mental Status Exam:     Oriented to:  Grossly Oriented  General:  Awake and Alert  Appearance:  appears stated age and Grooming is adequate; recently showered  Behavior/Attitude:  cooperative and suspicious; anxious  Eye Contact:  appropriate  Psychomotor: restless no catatonia present  Speech:  appropriate volume/tone; urgency of speech, hyperverbal (not pressured)  Language: Fluent in English with appropriate syntax and vocabulary.  Mood:  \"good\"   Affect: Less labile than previous. Mainly bright, but occasionally anxious. with making requests on top of the hour  Thought Content:  delusions of paranoia; concerned with staying in hospital because she is afraid of being harmed at her home. No current AH.   Associations:  intact  Insight:  impaired; wants to feel better, but feels that people are \"out to get " "me\" at her apartment. Does not recognize paranoia but does recognize AH as being abnormal.  Judgment:  limited- patient notably paranoid, suspicious, recent inability to care for self PTA though has been adherent with treatment team recommendations, engaged in groups  Impulse control: impaired  Attention Span:  grossly intact  Concentration:  grossly intact  Recent and Remote Memory:  not formally assessed  Fund of Knowledge:  delayed; not formally diagnosed  Muscle Strength and Tone: normal  Gait and Station: Normal     Psychiatric Assessment     Irma Heller is a 33 year old female previously diagnosed with anxiety, depression, and opioid dependence in remission who presented to the ED by EMS and admitted with psychosis in the context of likely first-episode psychosis. No prior psychiatric hospitalizations. Significant symptoms on admission include delusions of paranoia, visual and tactile hallucinations, and increased anxiety. The MSE on admission was pertinent for paranoia, anxiety. Biological contributions to mental health presentation include hx of opioid use disorder (though this has been stable) as well as medical diagnosis of MS, Chiari malformation which are reportedly. Psychological contributions to mental health presentation include anxiety, depression. Social factors contributing to mental health presentation include poor social support outside of group home staff. Protective factors include good medication adherence and group home staff.      Patient's definitive diagnosis is still in evolution, differential for psychosis includes primary psychotic disorder (ie, schizophrenia, schizoaffective) or other psychiatric etiology vs. drug-induced psychosis vs. medical etiology of psychosis. Feel that this is likely a primary psychiatric cause in the context of UDS positive only for prescribed medication and no recent drug use.  However, consultation with neurology to evaluate medical etiology, further " neuroimaging and further history from collateral was considered to get a better understand of patient's symptom timeline and course. Can also consider the role of her interferon in her psychosis. She will likely benefit from antipsychotics this admission.     Given that she currently has psychosis, patient warrants inpatient psychiatric hospitalization to maintain her safety     Psychiatric Plan by Diagnosis      Today's changes:  - Consider restarting Adderall vs. modafinil to help with fatigue related to MS (on PTA high dose of adderral)   - PCP and sleep medicine consults on discharge     # Psychosis  Patient was given olanzapine as well as IM Haldol/Versed in the ED which seemed to be effective. Plan is to continue olanzapine for stabilization of acute psychosis and monitoring for improvement.     Medications:  - Olanzapine 15 mg PO at bedtime  - Olanzapine 5 mg TID PRN  - Olanzapine 10 mg PRN for emergency agitation/aggression related to psychosis      # Anxiety/Depression  Patient has a history of anxiety which she states has been well managed by her home meds prior to this point. She has had an acute increase in her anxiety due to being overwhelmed with new symptoms and fear for her safety. Will plan to continue her home medication regimen in tandem with the olanzapine.      Medications:  - Buspirone 10 mg bid  - Lexapro 10 mg daily  - Hydroxyzine 50 mg TID  - Ativan 1 mg PRN     Pertinent Labs/Monitoring:   - CBC unremarkable   - UDS positive for amphetamines, benzos (both prescribed)  - HbA1c 7.0  - UPT negative  - EKG with NSR, QTc 450 on 05/16     Additional Plans:  - Patient will be treated in therapeutic milieu with appropriate individual and group therapies as described  - Keep in touch with patient's residence and CM once disposition is determined. Call CM with patient once amenable.   - Sergio Dunn ([facility] ): [phone]  - Riana Galindo ([facility] CM):  [phone]  - Plan to obtain collateral information from group home or family  - Update inpatient pharmacy when approximate discharge date is known     Psychiatric Hospital Course:      Irma Heller was admitted to [facility] on a 72 hour hold. Hold was discontinued shortly upon admission as pt agreed to stay voluntary. PTA buspirone, Lexapro, PRN Ativan and hydroxyzine were continued.  PTA Adderall was held to minimize any increased anxiety pending mood stabilization. New medications started at the time of admission include olanzapine 10mg at night which pt has reported as being helpful for sleep and anxiety to date.     Since her admission to the unit, Irma reported feeling safer in the new room. She stated she was able to eat the food and had no paranoia surrounding this. She had ongoing anxiety which was reduced with PRN Ativan/hydroxyzine. Patient was able to eat lunch in the milieu with the other patients following this. She denied SI/HI and hallucinations at that time. Given the reduction in symptoms in the context of ongoing paranoia and anxiety, plan is to continue olanzapine and monitor for improvement.     Neuro was consulted regarding the patient's history of MS, Chiari malformation and interferon beta-1b use. There were some cases reported of interferon causing psychosis, though this was discussed with neuro who had low suspicion for this etiology due to being on the med for at least 2 years. They recommended we continue patient's interferon to prevent MS flares. However, interferon formulary is not available in our pharmacy so will have to use patient's supply. We will plan to reach out to patient's group home, however we do not have their name or contact information. Though patient adamantly requested us not to inform anyone she is on the unit, we feel she does not currently have capacity and there is a need for collateral information. She also has a daughter that was staying with her  aunt per the ED note, and would like to check on her. Attempted to call the aunt at the listed number but it was out of service. Patient's grandmother did not answer. Planned to reassess patient and obtain group home information at a later time.    Our CTC was able to reach the patient's living facility ([facility]) and spoke with the , Sergio. She confirmed Irma resides in an independent living environment and is responsible for taking her own medications. Sergio states the patient is welcome back when ready but would like to see her first. She will be out of office from 05/23 - 05/27 and requested an update prior to that. [facility] also spoke to the patient herself who again emphasized not wanting to return there. Patient continues to have paranoia surrounding her apartment, the staff, and her family. [facility] discussed with the patient that her CM could be a helpful resource to find discharge options if desired, however at that point the patient expressed not wanting the CM to know she was here. [facility] did reach out to [facility] and received contact info for Riana Galindo and [facility]. Going forward, will plan to obtain collateral from these sources and organize getting patient's interferon.     Patient also requested to have several of her medication dosing switched to her PTA routine. Hydroxyzine changed to scheduled in addition to PRN, Buprenorphine changed to x2 doses in the morning and x1 dose in the afternoon, and Ambien scheduled at bedtime.     As of 05/29, patient is continuing to have paranoia, AH, and significant anxiety. She is consistently using up to her 40 mg limit of combined PRN and scheduled olanzapine dose. In the context of metabolic syndrome and lack of improvement in psychosis, will consider trying Haldol going forward. Patient was resistant to switching to Haldol. [place] she was doing better so will continue olanzapine and monitor for improvement. Plan  to add modafinil in place of patient's Adderall if patient is amenable.     Medical Assessment and Plan     Medical diagnoses to be addressed this admission:      #Elevated A1c  - A1c elevated to 7.0 on admission  - Will discuss with patient and provide outpatient follow-up    #MS  - Given the history of MS and Chiari malformation in the context of new psychosis.  - Plan is to obtain an MRI to rule out medical CNS cause of psychosis, but will wait for neuro.   - No acute concerns per neuro.   - Neuro ok with PTA interferon beta-1b and recommend continuing to minimize risk of MS flare. Interferon is ordered and will be delivered soon.   - Plan to hold Adderall and sumatriptan until mood and anxiety are stabilized. Will try modafinil if patient is amenable.      #Chiari Malformation  - Neuro consulted, no acute concerns at this time   - MRI pending     #Hx of iron deficiency anemia  - On Ferosul held at time of admission    Medical course:   Patient was physically examined by the ED prior to being transferred to the unit and was found to be medically stable and appropriate for admission. Please see ED/Hospital course above for more information.     On 05/21, called [facility] to organize delivery of patient's interferon. Henrik mentioned the patient has not filled her interferon in 4 months. The initial plan was to have [facility] deliver to the hospital, however, while coordinating with behavioral inpt pharmacy, it turns out they are able to have it ordered for the patient here. Interferon now started as self-inject every other day. Still holding off on the patient's Adderall but considering changing to modafinil for MS-related fatigue.    On hospital day 7, patient developed symptoms concerning for a flare of her MS. Given the new symptoms in the context of not taking her interferon for the past several weeks, neuro were consulted. Patient also had some lower extremity edema so will consult medicine as well. Both medicine  and neuro saw the patient. Medicine workup was largely unremarkable with the exception of high glucose in the blood and urine as well as streaky b/l infiltrates representing possible edema, atypical pneumonia, atelectasis. Other notable labs/imaging include normal BNP, CBC, blood cultures, CT head, and BLE ultrasounds. Neuro noted no deficits on their exam, thus recommended no additional imaging or immunosuppressive therapy. Will plan to follow up with medicine to discuss plan.    In the context of possible edema on CXR, orthopnea, and BLE edema, felt that echo was warranted. Discussed this as well as workup findings with medicine. Discussed starting metformin for glucose and metabolic control with patient, however she states 2-3 years ago she had significant GI upset with metformin and was unable to tolerate it. Will check in with patient again and discuss a different med.     Patient did have an echo which showed slightly reduced LV function with an EF of 50-55% (though it was a technically difficult exam). Medicine was not concerned for heart failure at the moment though they did recommend an outpatient sleep study to assess for VALENTIN. They also advised against medication management of LE edema, recommended continuation of compression stockings. Patient was concerned with the findings so will plan to give her an information sheet regarding heart failure and review it with her. Otherwise, patient is medically stable at this point. Did discuss with her that we can coordinate referral for cardiology, primary care, and dentistry upon discharge.    On hospital day 12, patient reported to us she chipped her two lower central incisors while eating an apple. She endorses now having dental pain and mouth sores in that area. We did consult dental who saw the patient on day 13. They plan to file down the teeth and have her follow up in the outpatient setting. Orajel, Tylenol, and ibuprofen PRN for pain relief in the hospital.  Also recommended alcohol-free antiseptic rinse or warm salt water rinse for inflammation. They will facilitate an appointment while patient is in the hospital to address dental fractures and get new dentures started.     Please see medicine, neurology, and dental consult notes for additional details.     Medications:  - Melatonin and Ambien PRN for sleep  - Colace 100 mg daily, Miralax/Maalox PRN for constipation  - Subutex 8 mg TID for opioid dependence (x2 in the morning, x1 in the afternoon)  - Tylenol, ibuprofen PRN for pain  - Betaceron 0.3 mg subcutaneous every other day     Consults:  Medicine for BLE edema, orthopnea. Neurology for history of MS, Chiari I malformation in context of psychiatric symptoms. Dentistry for dental pain in the context of tooth fracture.      Checklist     Legal Status: Voluntary     Safety Assessment:   Behavioral Orders   Procedures    Code 1 - Restrict to Unit    Code 2    Code 2     Ok to leave unit for echo. Per unit routine.    Routine Programming     As clinically indicated    Status 15     Every 15 minutes.       Risk Assessment:  Risk for harm is moderate-high.  Risk factors: psychosis, hx of substance use  Protective factors: group home staff, no identified history of attempts, doesn't live alone, family support, children     SIO: none    Disposition: TBD. Disposition pending stabilization, medication optimization, & development of a safe discharge plan.     Attestations     This patient was seen and discussed with my attending physician.  Robel Cho, MS3  [employer]    I was present with the medical student who participated in the service and in the documentation of the note. I have verified the history and personally performed the physical exam and medical decision making. I agree with the assessment and plan of care as documented in the note and have made changes to the note as appropriate.     Ramez Dagher, MD  Psychiatry Resident    Attestation:  This patient  has been seen and evaluated by me, Wilmer Cartagena MD.  I have discussed this patient with the house staff team including the resident and medical student and I agree with the findings and plan in this note.    I have reviewed today's vital signs, medications, labs and imaging. Wilmer Cartagena MD , PhD.

## 2024-05-30 NOTE — PROVIDER NOTIFICATION
05/30/24 1155   Individualization/Patient Specific Goals   Patient Personal Strengths expressive of emotions;expressive of needs   Patient Vulnerabilities lacks insight into illness;limited support system;poor physical health   Interprofessional Rounds   Summary Discussed pt progress and discharge planning   Participants nursing;psychiatrist;CTC;other (see comments)   Behavioral Team Discussion   Participants Dr. Mitzi MD; Arielle CONTI; Sarahi CARPENTER [facility] [place][note 2]; medical resident, medical students   Progress Minimal   Anticipated length of stay 10-15 days   Continued Stay Criteria/Rationale Symptom stabilization, medication management, care coordination   Medical/Physical See H&P   Precautions See below   Plan Psychiatric assessment/Medication management. Therapeutic Milieu. Individual care planning and after care planning. Patient to participate in unit groups and activities. Individual and group support on unit.   Safety Plan Per unit protocol   Anticipated Discharge Disposition group home;assisted living     PRECAUTIONS AND SAFETY    Behavioral Orders   Procedures    Code 1 - Restrict to Unit    Code 2    Code 2     Ok to leave unit for echo. Per unit routine.    Routine Programming     As clinically indicated    Status 15     Every 15 minutes.       Safety  Safety WDL: WDL  Patient Location: hallway, dining room, lounge, patient room, own  Observed Behavior: sitting, walking  Observed Behavior (Comment): pacing on the hallway.  Safety Measures: environmental rounds completed, safety rounds completed  Diversional Activity: art work  De-Escalation Techniques: verbally redirected, 1:1 observation initiated  Suicidality: Status 15

## 2024-05-30 NOTE — PLAN OF CARE
Problem: Sleep Disturbance  Goal: Adequate Sleep/Rest  5/30/2024 0709 by Erika Self, RN  Outcome: Progressing       Patient appears sleeping most of the shift. No complaints of pain and discomfort. Patient continues on q15 minutes safety checks, Patient is very needy. Will continue to monitor the patient and provide therapeutic intervention as needed. Will continue with current plan of care. Notify MD with any concerns. The patient had 6.5 total hours of sleep this shift.

## 2024-05-30 NOTE — PLAN OF CARE
Team Note Due:  Thursday    Assessment/Intervention/Current Symtoms and Care Coordination:  Chart review and met with team, discussed pt progress, symptomology, and response to treatment.  Discussed the discharge plan and any potential impediments to discharge.    Pt shared she spoke with her BRITTA DONAHUE this morning and discussed alternative housing but per pt, BRITTA DONAHUE requested any other next steps be discussed with writer present.     Followed up with BRITTA DONAHUE to provide availability for next week to schedule a meeting.    Discharge Plan or Goal:  Pending stabilization of symptoms and safe disposition planning.  Needs therapy, psychiatry, and PCP appts scheduled following discharge     Barriers to Discharge:  Patient requires further psychiatric stabilization due to current symptomology, medication management with changes subject to provider, coordination with outside supports, and aftercare planning.     Referral Status:  None at this time     Legal Status:  Voluntary    Contacts:  Elisa Gomez, grandmother, [phone].    Kalie Villegas, [phone], Aunt, Emergency Contact     Sergio Dunn ([facility] residential ): [phone]    Riana Galindo (BRITTA DONAHUE): [phone]  [email or web address]     Upcoming Meetings and Dates/Important Information and next steps:  Schedule therapy, PCP, and psychiatry appts  Update assisted living on discharge when known  Coordinate discharge with BRITTA DONAHUE

## 2024-05-30 NOTE — PLAN OF CARE
"  Pt took a long nap at the beginning of the shift. She continued to be visible in the milieu afterward. She is eating and drinking fine. She is medication compliant. Continued to be seeking staff for needs. She rated her anxiety at 6/10, depression at 8/10 denies SI and hallucinations. She reports that she still feels paranoid about people from her home trying to do her harm.  Pt feels safe on the units and contracts for safety.  Pt said that she feels improvement from her condition when she first came admitted. Pt  was able to do her laundry this shift.     PRN :  Nicotine Lozenge     Problem: Adult Behavioral Health Plan of Care  Goal: Patient-Specific Goal (Individualization)  Description: You can add care plan individualizations to a care plan. Examples of Individualization might be:  \"Parent requests to be called daily at 9am for status\", \"I have a hard time hearing out of my right ear\", or \"Do not touch me to wake me up as it startles  me\".  5/29/2024 2219 by Ashlie Cavazos RN  Outcome: Progressing  5/29/2024 1543 by Ashlie Cavazos, RN  Outcome: Progressing  Goal: Adheres to Safety Considerations for Self and Others  5/29/2024 2219 by Ashlie Cavazos RN  Outcome: Progressing  5/29/2024 1543 by Ashlie Cavazos, RN  Outcome: Progressing  Intervention: Develop and Maintain Individualized Safety Plan  Recent Flowsheet Documentation  Taken 5/29/2024 1500 by Ashlie Cavazos RN  Safety Measures:   environmental rounds completed   safety rounds completed  Goal: Absence of New-Onset Illness or Injury  5/29/2024 2219 by Ashlie Cavazos, RN  Outcome: Progressing  5/29/2024 1543 by Ashlie Cavazos, RN  Outcome: Progressing  Intervention: Identify and Manage Fall Risk  Recent Flowsheet Documentation  Taken 5/29/2024 1500 by Ashlie Cavazos RN  Safety Measures:   environmental rounds completed   safety rounds completed     Problem: Psychotic " Signs/Symptoms  Goal: Improved Behavioral Control (Psychotic Signs/Symptoms)  Outcome: Progressing     Problem: Anxiety  Goal: Anxiety Reduction or Resolution  5/29/2024 2219 by Ashlie Cavazos RN  Outcome: Progressing  5/29/2024 1543 by Ashlie Cavazos RN  Outcome: Progressing  Intervention: Promote Anxiety Reduction  Recent Flowsheet Documentation  Taken 5/29/2024 1500 by Ashlie Cavazos RN  Supportive Measures:   verbalization of feelings encouraged   positive reinforcement provided   active listening utilized  Family/Support System Care:   involvement promoted   self-care encouraged   support provided     Problem: Sleep Disturbance  Goal: Adequate Sleep/Rest  5/29/2024 2219 by Ashlie Cavazos RN  Outcome: Progressing  5/29/2024 1543 by Ashlie Cavazos RN  Outcome: Not Progressing   Goal Outcome Evaluation:    Plan of Care Reviewed With: patient

## 2024-05-30 NOTE — PLAN OF CARE
Rehab Group    Start time: 1115  End time: 1200  Patient time total: 15 minutes    attended partial group     #3 attended   Group Type: occupational therapy   Group Topic Covered: healthy leisure time and cognitive activities    [place]      Group Session Detail:    Patient Response: Pt partcipated in group focused on leisure participation and exploration, socialization and cognitive challenge. Discussed how participation in leisure activities can be used as a healthy coping skill in symptom management and a strategy to reduce stress.    Mood/Affect: Pleasant    Plan: Patient encouraged to maintain attendance for continued ongoing support in working towards occupational therapy goals to support overall treatment/care.        Patient Detail:    Wan initially hesitant to join a group other than OT clinic group, but ultimately decided to join. Did state that they would likely need to leave early, wanting to meet with nursing soon. Per chart review patient has made these type of statements in the past when patient appears to be not particularly excited about the type of group being offered.   Was an active participant during this time. After about 15 minutes however a patient outside the group room began to be disruptive which this and other patients then elected to leave group to avoid becoming irritable with this patient outside the room.        No Charge    Patient Active Problem List   Diagnosis    Abnormal liver function test    Acute cholangitis (H28)    Anxiety disorder    Arnold-Chiari malformation (H)    Calculus of bile duct without obstruction    Depression    Multiple sclerosis (H)    Syringomyelia (H)    Tobacco use disorder    Schizophrenia spectrum disorder with psychotic disorder type not yet determined (H)

## 2024-05-30 NOTE — PLAN OF CARE
Goal Outcome Evaluation:    Plan of Care Reviewed With: patient Plan of Care Reviewed With: patient      Pt was out in the milieu most of the shift. Pt frequently seeks out staff She was very demanding much of time, making lists of hourly requests.  Affect flat and blunted. She was able to make her needs known.She rated anxiety 7/10. She denied all other psych symptoms and contracted for safety. Pt requested pain med for tooth; received Tylenol x1 and Orajel X1. Stated no BM today requested Miralax, she was medication compliant today. Asked for incontinence pads often as well. There were underwear ordered for her from [employer] and they should arrive on Monday.     Will continue to monitor; Safety checks in place.

## 2024-05-31 PROCEDURE — 99232 SBSQ HOSP IP/OBS MODERATE 35: CPT | Mod: GC | Performed by: PSYCHIATRY & NEUROLOGY

## 2024-05-31 PROCEDURE — 124N000002 HC R&B MH UMMC

## 2024-05-31 PROCEDURE — 250N000013 HC RX MED GY IP 250 OP 250 PS 637

## 2024-05-31 PROCEDURE — 90853 GROUP PSYCHOTHERAPY: CPT

## 2024-05-31 PROCEDURE — 250N000013 HC RX MED GY IP 250 OP 250 PS 637: Performed by: STUDENT IN AN ORGANIZED HEALTH CARE EDUCATION/TRAINING PROGRAM

## 2024-05-31 RX ORDER — BUPRENORPHINE 8 MG/1
8 TABLET SUBLINGUAL AT BEDTIME
Status: DISCONTINUED | OUTPATIENT
Start: 2024-05-31 | End: 2024-06-11 | Stop reason: HOSPADM

## 2024-05-31 RX ORDER — DOCUSATE SODIUM 100 MG/1
100 CAPSULE, LIQUID FILLED ORAL 2 TIMES DAILY
Status: DISCONTINUED | OUTPATIENT
Start: 2024-05-31 | End: 2024-06-10

## 2024-05-31 RX ADMIN — BUSPIRONE HYDROCHLORIDE 10 MG: 10 TABLET ORAL at 20:20

## 2024-05-31 RX ADMIN — ESCITALOPRAM OXALATE 10 MG: 10 TABLET ORAL at 08:24

## 2024-05-31 RX ADMIN — DOCUSATE SODIUM 100 MG: 100 CAPSULE, LIQUID FILLED ORAL at 20:19

## 2024-05-31 RX ADMIN — GABAPENTIN 600 MG: 300 CAPSULE ORAL at 13:01

## 2024-05-31 RX ADMIN — HYDROXYZINE HYDROCHLORIDE 50 MG: 50 TABLET, FILM COATED ORAL at 20:20

## 2024-05-31 RX ADMIN — BUSPIRONE HYDROCHLORIDE 10 MG: 10 TABLET ORAL at 08:24

## 2024-05-31 RX ADMIN — IBUPROFEN 600 MG: 600 TABLET ORAL at 11:35

## 2024-05-31 RX ADMIN — SIMETHICONE 80 MG: 80 TABLET, CHEWABLE ORAL at 09:19

## 2024-05-31 RX ADMIN — OLANZAPINE 10 MG: 10 TABLET, FILM COATED ORAL at 05:28

## 2024-05-31 RX ADMIN — NICOTINE POLACRILEX 2 MG: 2 LOZENGE ORAL at 16:22

## 2024-05-31 RX ADMIN — GABAPENTIN 600 MG: 300 CAPSULE ORAL at 08:24

## 2024-05-31 RX ADMIN — Medication 1 LOZENGE: at 20:36

## 2024-05-31 RX ADMIN — HYDROXYZINE HYDROCHLORIDE 50 MG: 50 TABLET, FILM COATED ORAL at 08:24

## 2024-05-31 RX ADMIN — Medication 3 MG: at 20:20

## 2024-05-31 RX ADMIN — DOCUSATE SODIUM 100 MG: 100 CAPSULE, LIQUID FILLED ORAL at 11:35

## 2024-05-31 RX ADMIN — Medication 1 TABLET: at 20:01

## 2024-05-31 RX ADMIN — ZOLPIDEM TARTRATE 10 MG: 5 TABLET, COATED ORAL at 20:20

## 2024-05-31 RX ADMIN — Medication: at 11:35

## 2024-05-31 RX ADMIN — OLANZAPINE 5 MG: 5 TABLET, FILM COATED ORAL at 13:48

## 2024-05-31 RX ADMIN — BUPRENORPHINE 16 MG: 8 TABLET SUBLINGUAL at 08:24

## 2024-05-31 RX ADMIN — Medication 1 LOZENGE: at 19:16

## 2024-05-31 RX ADMIN — HYDROXYZINE HYDROCHLORIDE 50 MG: 50 TABLET, FILM COATED ORAL at 13:01

## 2024-05-31 RX ADMIN — LORAZEPAM 1 MG: 1 TABLET ORAL at 11:01

## 2024-05-31 RX ADMIN — Medication: at 16:02

## 2024-05-31 RX ADMIN — GABAPENTIN 600 MG: 300 CAPSULE ORAL at 20:20

## 2024-05-31 RX ADMIN — NICOTINE POLACRILEX 2 MG: 2 LOZENGE ORAL at 11:35

## 2024-05-31 RX ADMIN — BUPRENORPHINE 8 MG: 8 TABLET SUBLINGUAL at 16:01

## 2024-05-31 RX ADMIN — OLANZAPINE 15 MG: 15 TABLET, ORALLY DISINTEGRATING ORAL at 20:19

## 2024-05-31 ASSESSMENT — ACTIVITIES OF DAILY LIVING (ADL)
ADLS_ACUITY_SCORE: 40
DRESS: INDEPENDENT
ADLS_ACUITY_SCORE: 40
LAUNDRY: UNABLE TO COMPLETE
ADLS_ACUITY_SCORE: 40
ORAL_HYGIENE: INDEPENDENT
ADLS_ACUITY_SCORE: 40
ADLS_ACUITY_SCORE: 40
DRESS: INDEPENDENT
ADLS_ACUITY_SCORE: 40
ADLS_ACUITY_SCORE: 40
ORAL_HYGIENE: INDEPENDENT
ADLS_ACUITY_SCORE: 40
ADLS_ACUITY_SCORE: 40
HYGIENE/GROOMING: INDEPENDENT
ADLS_ACUITY_SCORE: 40
HYGIENE/GROOMING: INDEPENDENT
ADLS_ACUITY_SCORE: 40

## 2024-05-31 NOTE — PLAN OF CARE
Team Note Due:  Thursday    Assessment/Intervention/Current Symtoms and Care Coordination:  Chart review and met with team, discussed pt progress, symptomology, and response to treatment.  Discussed the discharge plan and any potential impediments to discharge.    Met with pt who shared she received some personal belongings and she's feeling a lot more positive after having some extra clothing items. Pt aware writer is waiting to hear back from [facility]  on scheduling a meeting. No other concerns other than [facility]  being able to find alternative placement for pt.    Discharge Plan or Goal:  Pending stabilization of symptoms and safe disposition planning.  Needs therapy, psychiatry, and PCP appts scheduled following discharge     Barriers to Discharge:  Patient requires further psychiatric stabilization due to current symptomology, medication management with changes subject to provider, coordination with outside supports, and aftercare planning.     Referral Status:  None at this time     Legal Status:  Voluntary    Contacts:  Elisa Gomez, grandmother, [phone].    Kalie Villegas, [phone], Aunt, Emergency Contact     Sergio Dunn ([facility] residential ): [phone]    Riana Galindo ([facility] ): [phone]  [email or web address]     Upcoming Meetings and Dates/Important Information and next steps:  Schedule therapy, PCP, and psychiatry appts  Update assisted living on discharge when known  Coordinate discharge with [facility]

## 2024-05-31 NOTE — PLAN OF CARE
Problem: Sleep Disturbance  Goal: Adequate Sleep/Rest  Outcome: Progressing     Pt appears to have slept for  6 hours. Pt did not complain of pain. PRN Zyprexa for agitation and PRN Ativan for anxiety were given. PRN medications were effective, as there were no further complain of agitation and anxiety. 15 minutes safety checks were in place. Staff will continue to offer support to pt.

## 2024-05-31 NOTE — PLAN OF CARE
BEH IP Unit Acuity Rating Score (UARS)  Patient is given one point for every criteria they meet.    CRITERIA SCORING   On a 72 hour hold, court hold, committed, stay of commitment, or revocation. 0   Patient LOS on BEH unit exceeds 20 days. 0  LOS: 15   Patient under guardianship, 55+, otherwise medically complex, or under age 11. 0   Suicide ideation without relief of precipitating factors. 0   Current plan for suicide. 0   Current plan for homicide. 0   Imminent risk or actual attempt to seriously harm another without relief of factors precipitating the attempt. 0   Severe dysfunction in daily living (ex: complete neglect for self care, extreme disruption in vegetative function, extreme deterioration in social interactions). 1   Recent (last 7 days) or current physical aggression in the ED or on unit. 0   Restraints or seclusion episode in past 72 hours. 0   Recent (last 7 days) or current verbal aggression, agitation, yelling, etc., while in the ED or unit. 0   Active psychosis. 1   Need for constant or near constant redirection (from leaving, from others, etc).  1   Intrusive or disruptive behaviors. 0   Patient requires 3 or more hours of individualized nursing care per 8-hour shift (i.e. for ADLs, meds, therapeutic interventions). 0   TOTAL 3

## 2024-05-31 NOTE — PLAN OF CARE
"  Pt is visible in the milieu. She is medication compliant. She is eating and drinking fine. She is able to verbalize needs and frequently seeks out staff for her needs. Pt endorsed high anxiety, denied any depression, SI. She said she over heard her peers talking about some aliens and she said she got fearful.  Then that made her hear voices and voices tell her some bad things about her. Pt then requested for PRN Zyprexa. Pt complained of generalized body ache and requested for PRN. Pt was counseled to improve on her coping mechanisms and she agrees with it. Pt trying to decrease food intake but she kept on asking for some snack. Pt pre occupied with food and medications.     Pt was happy in the morning that she received some supplies for her to use in the unit.     PRNs : Ibuprofen, simethicone, benzocaine, nicotine, Zyprexa.     Problem: Adult Behavioral Health Plan of Care  Goal: Patient-Specific Goal (Individualization)  Description: You can add care plan individualizations to a care plan. Examples of Individualization might be:  \"Parent requests to be called daily at 9am for status\", \"I have a hard time hearing out of my right ear\", or \"Do not touch me to wake me up as it startles  me\".  Outcome: Not Progressing     Problem: Anxiety  Goal: Anxiety Reduction or Resolution  Outcome: Not Progressing     Problem: Adult Behavioral Health Plan of Care  Goal: Adheres to Safety Considerations for Self and Others  Outcome: Progressing  Intervention: Develop and Maintain Individualized Safety Plan  Recent Flowsheet Documentation  Taken 5/31/2024 1500 by Ashlie Cavazos RN  Safety Measures:   environmental rounds completed   safety rounds completed  Goal: Absence of New-Onset Illness or Injury  Outcome: Progressing  Intervention: Identify and Manage Fall Risk  Recent Flowsheet Documentation  Taken 5/31/2024 1500 by Ashlie Cavazos, RN  Safety Measures:   environmental rounds completed   safety rounds " completed     Problem: Sleep Disturbance  Goal: Adequate Sleep/Rest  Outcome: Progressing   Goal Outcome Evaluation:    Plan of Care Reviewed With: patient                    Reduced oral grading/Anterior loss of bolus/Lateral loss of bolus/Bolus falls into anterior sulcus/Bolus falls into right lateral sulci

## 2024-05-31 NOTE — PLAN OF CARE
Individual Therapy Note    Session duration in minutes: 5      Pt progress: Pt approached writer, proudly showed writer the clothes she received from RN manager she had been asking for. Pt requested paint by sticker sheets which writer provided. Pt requested to go over coping skills, writer encouraged Pt to attend group session that morning as the topic is coping which Pt was agreeable to.

## 2024-05-31 NOTE — PLAN OF CARE
Pt was visible in the milieu. Pt was intermittently sitting in the lounge watching tv. Pt presents with flat affect. Pt was compliant with top of the hour requests with few reminders. Pt endorsed anxiety 8/10. Pt denies for depression, suicidal, and hallucinations. Pt did her laundry. Prn melatonin for sleep and Orajel sore on her lip. No behavior concerns.    Goal Outcome Evaluation:  Problem: Adult Behavioral Health Plan of Care  Goal: Adheres to Safety Considerations for Self and Others  Intervention: Develop and Maintain Individualized Safety Plan  Recent Flowsheet Documentation  Taken 5/30/2024 1733 by Lencho Mi RN  Safety Measures:   environmental rounds completed   safety rounds completed  Goal: Absence of New-Onset Illness or Injury  Intervention: Identify and Manage Fall Risk  Recent Flowsheet Documentation  Taken 5/30/2024 1733 by Lencho Mi RN  Safety Measures:   environmental rounds completed   safety rounds completed     Problem: Psychotic Signs/Symptoms  Goal: Improved Behavioral Control (Psychotic Signs/Symptoms)  Intervention: Manage Behavior  Recent Flowsheet Documentation  Taken 5/30/2024 1733 by Lencho Mi RN  De-Escalation Techniques: verbally redirected  Goal: Increased Participation and Engagement (Psychotic Signs/Symptoms)  Intervention: Facilitate Participation and Engagement  Recent Flowsheet Documentation  Taken 5/30/2024 1733 by Lencho Mi RN  Diversional Activity: television  Goal: Improved Mood Symptoms (Psychotic Signs/Symptoms)  Intervention: Optimize Emotion and Mood  Recent Flowsheet Documentation  Taken 5/30/2024 1733 by Lencho Mi RN  Diversional Activity: television  Goal: Improved Psychomotor Symptoms (Psychotic Signs/Symptoms)  Intervention: Manage Psychomotor Movement  Recent Flowsheet Documentation  Taken 5/30/2024 1733 by Lencho Mi RN  Diversional Activity: television  Activity (Behavioral Health): up ad sander

## 2024-05-31 NOTE — PROGRESS NOTES
"  ----------------------------------------------------------------------------------------------------------  [facility]  Psychiatry Progress Note  Hospital Day #15     Interim History:     The patient's care was discussed with the treatment team and chart notes were reviewed.    Vitals: /85 (BP Location: Right arm, Patient Position: Sitting, Cuff Size: Adult Regular)   Pulse 107   Temp 99.2  F (37.3  C) (Oral)   Resp 18   Ht 1.651 m (5' 5\")   Wt (!) 165.6 kg (365 lb)   SpO2 96%   BMI 60.74 kg/m    Sleep: 6 hours (05/31/24 0700)  Scheduled medications: Took all scheduled medications as prescribed  PRN medications:   - Olanzapine 10 mg   - Ativan 1 mg x2  - Tylenol 650 mg x2  - ibuprofen 600 mg  - Orajel x3  - Miralax 17 g  - Maalox 30 mL  - Melatonin 3 mg  - Nicotine lozenges    Staff Report/Interval Events  No acute behavioral events.     Irma remains relatively unchanged from days prior. She continues to endorse high anxiety/depression but otherwise denied psychiatric symptoms and contracted for safety. Patient participated in groups and was seen intermittently out on the milieu watching TV. Continues to make top of the hour requests and make needs known. Had Miralax for constipation and Maalox for epigastric discomfort. Other PRNs for anxiety, sleep, and pain as above. Sleep was 6 hrs.    Of note, the team was able to acquire several sets of clothing for the patient which have been given to her.     Subjective:     Patient Interview:  The team interviewed Irma in the conference room today. Patient is concerned with a new burning sensation in the hands, feet, and back of the head. She feels her feet and hands are more swollen as well. She has not been wearing her compression stockings. She endorses having similar pain in the past and again states it feels like prior MS flares. She is requesting lasix and steroids for the symptoms. Discussed she was " "seen by neuro and medicine who recommended one-time dose of Lasix and no prednisone. Did tell her there will be someone available all weekend if symptoms persist or worsen. She is agreeable to monitoring through the weekend before more in depth intervention.     Patient states she slept \"like a baby\" last night and had dreams about her brother visiting. He passed away around this time two years ago and patient thinks this may be contributing to her stress. Rates her anxiety is at 7/10 after taking Ativan. She describes her mood as \"down\" today, however, she reports having a good day today overall. She asked how to deal with grief, we discussed that grief is different for everyone as well as coping mechanisms. She further endorses intrusive thoughts telling her to sign out of the hospital, but she recognizes these are not appropriate and is able to ignore them. Otherwise denies SI/HI/AH/VH.    Says she still hasn't gotten docusate so has some constipation despite taking Miralax. She is still endorsing pain in her mouth, and is waiting on a dental appointment. Recommended that she take ibuprofen as well as discussed that the appointment is still in process and will notify her when the appointment has been scheduled.    Further discussed the modafinil after patient read that handout. She had a question about the starting dose and why we should start low. Discussed that we like to start low to monitor side effects and efficacy, she is unsure if she wants it at this moment. Discussed if it will \"make her happy\" and that it will give her more energy. She will continue to do research on it and will touch base after the weekend.    Notably, Irma spoke with her grandmother yesterday and this was the first time she has been in contact with family. Her grandmother told her there was missing persons report out for her in [place]. Her IHC worker was made aware of this and visited her home, eventually found out patient was " "in the hospital and notified the grandmother who is her current emergency contact. Patient is comfortable with grandmother knowing she is in the hospital. Also asked about patient's daughter. Patient states she only lives with her intermittently but is in the custody of her aunt.    Patient was pleased we were able to get her extra clothing. Also discussed this was the last day for this writer and the other med student. Patient is resistant to change but appreciative of the help.    ROS:  Positive: orthopnea with laying down flat-said having multiple pillows helps, tooth pain, generalized pain, burning in hands and feet, headache behind left eye  Negative: denies acute concerns      Objective:     Vitals:  /85 (BP Location: Right arm, Patient Position: Sitting, Cuff Size: Adult Regular)   Pulse 107   Temp 99.2  F (37.3  C) (Oral)   Resp 18   Ht 1.651 m (5' 5\")   Wt (!) 165.6 kg (365 lb)   SpO2 96%   BMI 60.74 kg/m      Allergies:  Allergies   Allergen Reactions    Glatiramer Hives    Penicillins Anaphylaxis and Hives    Shellfish Allergy Anaphylaxis and Hives    Copaxone [Glatiramer Acetate] Swelling       Current Medications:  Scheduled:  Current Facility-Administered Medications   Medication Dose Route Frequency Provider Last Rate Last Admin    acetaminophen (TYLENOL) tablet 650 mg  650 mg Oral Q4H PRN Juju Salgado MD   650 mg at 05/30/24 1722    alum & mag hydroxide-simethicone (MAALOX) suspension 30 mL  30 mL Oral Q4H PRN Juju Salgado MD   30 mL at 05/30/24 1049    [Held by provider] amphetamine-dextroamphetamine (ADDERALL XR) 10 mg, amphetamine-dextroamphetamine (ADDERALL XR) 40 mg  50 mg Oral QAM Juju Salgado MD        [Held by provider] amphetamine-dextroamphetamine (ADDERALL) per tablet 40 mg  40 mg Oral Daily Juju Salgado MD        benzocaine (ORAJEL MAXIMUM STRENGTH) 20 % gel   Mouth/Throat 4x Daily PRN Shanti Waters MD   Given at 05/30/24 2042    buprenorphine " (SUBUTEX) sublingual tablet 16 mg  16 mg Sublingual Daily Shanti Waters MD   16 mg at 05/30/24 0813    buprenorphine (SUBUTEX) sublingual tablet 8 mg  8 mg Sublingual At Bedtime Shanti Waters MD   8 mg at 05/30/24 1723    busPIRone (BUSPAR) tablet 10 mg  10 mg Oral BID Juju Salgado MD   10 mg at 05/30/24 2038    docusate sodium (COLACE) capsule 100 mg  100 mg Oral Daily PRN Juju Salgado MD        escitalopram (LEXAPRO) tablet 10 mg  10 mg Oral Daily Juju Salgado MD   10 mg at 05/30/24 0809    gabapentin (NEURONTIN) capsule 600 mg  600 mg Oral TID Juju Salgado MD   600 mg at 05/30/24 2038    hydrOXYzine HCl (ATARAX) tablet 50 mg  50 mg Oral TID Shanti Waters MD   50 mg at 05/30/24 2038    ibuprofen (ADVIL/MOTRIN) tablet 600 mg  600 mg Oral Q8H PRN Shanti Waters MD   600 mg at 05/30/24 1522    interferon beta-1b (BETASERON) injection 0.3 mg  0.3 mg Subcutaneous Every Other Day Shanti Waters MD   0.3 mg at 05/30/24 0925    lidocaine (LMX4) cream   Topical Q1H PRN Lalitha Serrano PA-C        lidocaine 1 % 0.1-1 mL  0.1-1 mL Other Q1H PRN Lalitha Serrano PA-C        loperamide (IMODIUM) capsule 2 mg  2 mg Oral 4x Daily PRN Ludmila Lozano MD   2 mg at 05/19/24 2013    LORazepam (ATIVAN) tablet 1 mg  1 mg Oral BID PRN Shanti Waters MD   1 mg at 05/30/24 2345    melatonin tablet 3 mg  3 mg Oral At Bedtime PRN Ludmila Lozano MD   3 mg at 05/30/24 2038    naloxone (NARCAN) injection 0.2 mg  0.2 mg Intravenous Q2 Min PRN Juju Salgado MD        Or    naloxone (NARCAN) injection 0.4 mg  0.4 mg Intravenous Q2 Min PRN Juju Salgado MD        Or    naloxone (NARCAN) injection 0.2 mg  0.2 mg Intramuscular Q2 Min PRN Juju Salgado MD        Or    naloxone (NARCAN) injection 0.4 mg  0.4 mg Intramuscular Q2 Min PRN Juju Salgado MD        nicotine (COMMIT) lozenge 2 mg  2 mg Buccal Q2H PRN Jaren Zaman MD   2 mg at 05/30/24 1049    OLANZapine  (zyPREXA) tablet 10 mg  10 mg Oral BID PRN Shanti Waters MD   10 mg at 05/31/24 0528    Or    OLANZapine (zyPREXA) injection 10 mg  10 mg Intramuscular TID PRN Shanti Waters MD        OLANZapine (zyPREXA) tablet 5 mg  5 mg Oral TID PRN Shanti Waters MD   5 mg at 05/29/24 1236    OLANZapine zydis (zyPREXA) ODT tab 15 mg  15 mg Oral At Bedtime Shanti Waters MD   15 mg at 05/30/24 2038    polyethylene glycol (MIRALAX) Packet 17 g  17 g Oral Daily PRN Juju Salgado MD   17 g at 05/30/24 1125    simethicone (MYLICON) chewable tablet 80 mg  80 mg Oral Q6H PRN Ludmila Lozano MD        sodium chloride (PF) 0.9% PF flush 3 mL  3 mL Intracatheter q1 min prn Lalitha Serrano PA-C        [Held by provider] SUMAtriptan (IMITREX) tablet 50 mg  50 mg Oral at onset of headache Juju Salgado MD        zolpidem (AMBIEN) tablet 10 mg  10 mg Oral At Bedtime Shanti Waters MD   10 mg at 05/30/24 2038       PRN:  Current Facility-Administered Medications   Medication Dose Route Frequency Provider Last Rate Last Admin    acetaminophen (TYLENOL) tablet 650 mg  650 mg Oral Q4H PRN Juju Salgado MD   650 mg at 05/30/24 1722    alum & mag hydroxide-simethicone (MAALOX) suspension 30 mL  30 mL Oral Q4H PRN Juju Salgado MD   30 mL at 05/30/24 1049    [Held by provider] amphetamine-dextroamphetamine (ADDERALL XR) 10 mg, amphetamine-dextroamphetamine (ADDERALL XR) 40 mg  50 mg Oral QAM Juju Salgado MD        [Held by provider] amphetamine-dextroamphetamine (ADDERALL) per tablet 40 mg  40 mg Oral Daily Juju Salgado MD        benzocaine (ORAJEL MAXIMUM STRENGTH) 20 % gel   Mouth/Throat 4x Daily PRN Shanti Waters MD   Given at 05/30/24 2042    buprenorphine (SUBUTEX) sublingual tablet 16 mg  16 mg Sublingual Daily Shanti Waters MD   16 mg at 05/30/24 0813    buprenorphine (SUBUTEX) sublingual tablet 8 mg  8 mg Sublingual At Bedtime Shanti Waters MD   8 mg  at 05/30/24 1723    busPIRone (BUSPAR) tablet 10 mg  10 mg Oral BID Juju Salgado MD   10 mg at 05/30/24 2038    docusate sodium (COLACE) capsule 100 mg  100 mg Oral Daily PRN Juju Salgado MD        escitalopram (LEXAPRO) tablet 10 mg  10 mg Oral Daily Juju Salgado MD   10 mg at 05/30/24 0809    gabapentin (NEURONTIN) capsule 600 mg  600 mg Oral TID Juju Salgado MD   600 mg at 05/30/24 2038    hydrOXYzine HCl (ATARAX) tablet 50 mg  50 mg Oral TID Shanti Waters MD   50 mg at 05/30/24 2038    ibuprofen (ADVIL/MOTRIN) tablet 600 mg  600 mg Oral Q8H PRN Shanti Waters MD   600 mg at 05/30/24 1522    interferon beta-1b (BETASERON) injection 0.3 mg  0.3 mg Subcutaneous Every Other Day Shanti Waters MD   0.3 mg at 05/30/24 0925    lidocaine (LMX4) cream   Topical Q1H PRN Lalitha Serrano PA-C        lidocaine 1 % 0.1-1 mL  0.1-1 mL Other Q1H PRN Lalitha Serrano PA-C        loperamide (IMODIUM) capsule 2 mg  2 mg Oral 4x Daily PRN Ludmila Lozano MD   2 mg at 05/19/24 2013    LORazepam (ATIVAN) tablet 1 mg  1 mg Oral BID PRN Shanti Waters MD   1 mg at 05/30/24 2345    melatonin tablet 3 mg  3 mg Oral At Bedtime PRN Ludmila Lozano MD   3 mg at 05/30/24 2038    naloxone (NARCAN) injection 0.2 mg  0.2 mg Intravenous Q2 Min PRN Juju Salgado MD        Or    naloxone (NARCAN) injection 0.4 mg  0.4 mg Intravenous Q2 Min PRN Juju Salgado MD        Or    naloxone (NARCAN) injection 0.2 mg  0.2 mg Intramuscular Q2 Min PRN Juju Salgado MD        Or    naloxone (NARCAN) injection 0.4 mg  0.4 mg Intramuscular Q2 Min PRN Juju Salgado MD        nicotine (COMMIT) lozenge 2 mg  2 mg Buccal Q2H PRN Jaren Zaman MD   2 mg at 05/30/24 1049    OLANZapine (zyPREXA) tablet 10 mg  10 mg Oral BID PRN Shanti Waters MD   10 mg at 05/31/24 0528    Or    OLANZapine (zyPREXA) injection 10 mg  10 mg Intramuscular TID PRN Shanti Waters MD        OLANZapine  (zyPREXA) tablet 5 mg  5 mg Oral TID PRN Shanti Waters MD   5 mg at 05/29/24 1236    OLANZapine zydis (zyPREXA) ODT tab 15 mg  15 mg Oral At Bedtime Shanti Waters MD   15 mg at 05/30/24 2038    polyethylene glycol (MIRALAX) Packet 17 g  17 g Oral Daily PRN Juju Salgado MD   17 g at 05/30/24 1125    simethicone (MYLICON) chewable tablet 80 mg  80 mg Oral Q6H PRN Ludmila Lozano MD        sodium chloride (PF) 0.9% PF flush 3 mL  3 mL Intracatheter q1 min prn Lalitha Serrano PA-C        [Held by provider] SUMAtriptan (IMITREX) tablet 50 mg  50 mg Oral at onset of headache Juju Salgado MD        zolpidem (AMBIEN) tablet 10 mg  10 mg Oral At Bedtime Shanti Waters MD   10 mg at 05/30/24 2038       Labs and Imaging:  New results:   No results found for this or any previous visit (from the past 24 hour(s)).    Data this admission:  - CBC unremarkable, WBC 13 on initial, 9.0 on repeat  - BMP unremarkable except for glucose 141 (05/17), repeat at 258 (05/24)  - HbA1c 7.0  - Lipids WNL except for HDL at 31  - BNP WNL  - Blood cultures negative  - TSH normal  - Vit D normal  - Vit B12 normal  - Folate normal    - UA negative for infection, glucose 100  - UDS positive for amphetamines, benzos (both prescribed)  - UPT negative    - EKG with NSR, QTc 450 on 05/16    - MRI (5/18/24) showed stable MS plaques unchanged from previous on 8/1/22  - CT head unremarkable  - CXR with mild prominence of the cardiac silhouette, nonspecific mild streaky perihilar and basilar opacities   - BLE ultrasound unremarkable, negative for DVT  - Echo was a technically difficult study, showed mildly decreased LV function with EF 50-55% and globally reduced RV function     Mental Status Exam:     Oriented to:  Grossly Oriented  General:  Awake and Alert  Appearance:  appears stated age and Grooming is adequate; recently showered  Behavior/Attitude:  cooperative and suspicious; anxious  Eye Contact:   "appropriate  Psychomotor: restless no catatonia present  Speech:  appropriate volume/tone; urgency of speech, hyperverbal (not pressured)  Language: Fluent in English with appropriate syntax and vocabulary.  Mood:  \"a little down\"   Affect: Less labile than previous. Mainly bright, but occasionally anxious. Able to redirect self when anxiety is exacerbated. Upset when talking about her brother's passing.  Thought Content: tangential, requiring redirection; delusions of paranoia; concerned with staying in hospital because she is afraid of being harmed at her home. No current AH. Preoccupied with making requests on top of the hour.  Associations:  intact  Insight:  impaired; wants to feel better, but feels that people are \"out to get me\" at her apartment. Does not recognize paranoia but does recognize AH as being abnormal.  Judgment:  limited- patient notably paranoid, suspicious, recent inability to care for self PTA though has been adherent with treatment team recommendations, engaged in groups  Impulse control: impaired  Attention Span:  grossly intact  Concentration:  grossly intact  Recent and Remote Memory:  not formally assessed  Fund of Knowledge:  delayed; not formally diagnosed  Muscle Strength and Tone: normal  Gait and Station: Normal    Physical Exam: BLE edema greatly improved from last week. [id number] edema present in extremities, BLE equal in size.      Psychiatric Assessment     Irma Heller is a 33 year old female previously diagnosed with anxiety, depression, and opioid dependence in remission who presented to the ED by EMS and admitted with psychosis in the context of likely first-episode psychosis. No prior psychiatric hospitalizations. Significant symptoms on admission include delusions of paranoia, visual and tactile hallucinations, and increased anxiety. The MSE on admission was pertinent for paranoia, anxiety. Biological contributions to mental health presentation include hx of " opioid use disorder (though this has been stable) as well as medical diagnosis of MS, Chiari malformation which are reportedly. Psychological contributions to mental health presentation include anxiety, depression. Social factors contributing to mental health presentation include poor social support outside of group home staff. Protective factors include good medication adherence and group home staff.      Patient's definitive diagnosis is still in evolution, differential for psychosis includes primary psychotic disorder (ie, schizophrenia, schizoaffective) or other psychiatric etiology vs. drug-induced psychosis vs. medical etiology of psychosis. Feel that this is likely a primary psychiatric cause in the context of UDS positive only for prescribed medication and no recent drug use.  However, consultation with neurology to evaluate medical etiology, further neuroimaging and further history from collateral was considered to get a better understand of patient's symptom timeline and course. Can also consider the role of her interferon in her psychosis. She will likely benefit from antipsychotics this admission.     Given that she currently has psychosis, patient warrants inpatient psychiatric hospitalization to maintain her safety     Psychiatric Plan by Diagnosis      Today's changes:  - Evenin Subutex moved to 1700  - Docusate restarted  - Consider restarting Adderall vs. modafinil to help with fatigue related to MS (on PTA high dose of adderral). Patient provided information, will read this and follow up at later time  - PCP and sleep medicine consults on discharge     # Psychosis  Patient was given olanzapine as well as IM Haldol/Versed in the ED which seemed to be effective. Plan is to continue olanzapine for stabilization of acute psychosis and monitoring for improvement.     Medications:  - Olanzapine 15 mg PO at bedtime  - Olanzapine 5 mg TID PRN  - Olanzapine 10 mg PRN for emergency agitation/aggression  related to psychosis      # Anxiety/Depression  Patient has a history of anxiety which she states has been well managed by her home meds prior to this point. She has had an acute increase in her anxiety due to being overwhelmed with new symptoms and fear for her safety. Will plan to continue her home medication regimen in tandem with the olanzapine.      Medications:  - Buspirone 10 mg bid  - Lexapro 10 mg daily  - Hydroxyzine 50 mg TID  - Ativan 1 mg PRN     Pertinent Labs/Monitoring:   - CBC unremarkable   - UDS positive for amphetamines, benzos (both prescribed)  - HbA1c 7.0  - UPT negative  - EKG with NSR, QTc 450 on 05/16     Additional Plans:  - Patient will be treated in therapeutic milieu with appropriate individual and group therapies as described  - Keep in touch with patient's residence and CM once disposition is determined. Call CM with patient once amenable.   - Sergio Dunn ([facility] residential ): [phone]  - Riana Galindo ([facility] CM): [phone]  - Plan to obtain collateral information from group home or family  - Update inpatient pharmacy when approximate discharge date is known     Psychiatric Hospital Course:      Irma Heller was admitted to [facility] on a 72 hour hold. Hold was discontinued shortly upon admission as pt agreed to stay voluntary. PTA buspirone, Lexapro, PRN Ativan and hydroxyzine were continued.  PTA Adderall was held to minimize any increased anxiety pending mood stabilization. New medications started at the time of admission include olanzapine 10mg at night which pt has reported as being helpful for sleep and anxiety to date.     Since her admission to the unit, Irma reported feeling safer in the new room. She stated she was able to eat the food and had no paranoia surrounding this. She had ongoing anxiety which was reduced with PRN Ativan/hydroxyzine. Patient was able to eat lunch in the milieu with the other patients following this.  She denied SI/HI and hallucinations at that time. Given the reduction in symptoms in the context of ongoing paranoia and anxiety, plan is to continue olanzapine and monitor for improvement.     Neuro was consulted regarding the patient's history of MS, Chiari malformation and interferon beta-1b use. There were some cases reported of interferon causing psychosis, though this was discussed with neuro who had low suspicion for this etiology due to being on the med for at least 2 years. They recommended we continue patient's interferon to prevent MS flares. However, interferon formulary is not available in our pharmacy so will have to use patient's supply. We will plan to reach out to patient's group home, however we do not have their name or contact information. Though patient adamantly requested us not to inform anyone she is on the unit, we feel she does not currently have capacity and there is a need for collateral information. She also has a daughter that was staying with her aunt per the ED note, and would like to check on her. Attempted to call the aunt at the listed number but it was out of service. Patient's grandmother did not answer. Planned to reassess patient and obtain group home information at a later time.    Our CTC was able to reach the patient's living facility ([facility]) and spoke with the , Sergio. She confirmed Irma resides in an independent living environment and is responsible for taking her own medications. Sergio states the patient is welcome back when ready but would like to see her first. She will be out of office from 05/23 - 05/27 and requested an update prior to that. [facility] also spoke to the patient herself who again emphasized not wanting to return there. Patient continues to have paranoia surrounding her apartment, the staff, and her family. [facility] discussed with the patient that her CM could be a helpful resource to find discharge options if desired,  however at that point the patient expressed not wanting the CM to know she was here. [facility] did reach out to [facility] and received contact info for Riana Galindo and [facility]. Going forward, will plan to obtain collateral from these sources and organize getting patient's interferon.     Patient also requested to have several of her medication dosing switched to her PTA routine. Hydroxyzine changed to scheduled in addition to PRN, Buprenorphine changed to x2 doses in the morning and x1 dose in the afternoon, and Ambien scheduled at bedtime.     As of 05/31, patient is continuing to have paranoia, AH, and significant anxiety. She is consistently using up to her 40 mg limit of combined PRN and scheduled olanzapine dose. In the context of metabolic syndrome and lack of improvement in psychosis, will consider trying Haldol going forward. Patient was resistant to switching to Haldol. Clayton she was doing better so will continue olanzapine and monitor for improvement. Plan to add modafinil in place of patient's Adderall if patient is amenable.     Medical Assessment and Plan     Medical diagnoses to be addressed this admission:      #Elevated A1c  - A1c elevated to 7.0 on admission  - Will discuss with patient and provide outpatient follow-up    #MS  - Given the history of MS and Chiari malformation in the context of new psychosis.  - Plan is to obtain an MRI to rule out medical CNS cause of psychosis, but will wait for neuro.   - No acute concerns per neuro.   - Neuro ok with PTA interferon beta-1b and recommend continuing to minimize risk of MS flare. Interferon is ordered and will be delivered soon.   - Plan to hold Adderall and sumatriptan until mood and anxiety are stabilized. Will try modafinil if patient is amenable.      #Chiari Malformation  - Neuro consulted, no acute concerns at this time   - MRI pending     #Hx of iron deficiency anemia  - On Ferosul held at time of admission    Medical course:    Patient was physically examined by the ED prior to being transferred to the unit and was found to be medically stable and appropriate for admission. Please see ED/Hospital course above for more information.     On 05/21, called [facility] to organize delivery of patient's interferon. Henrik mentioned the patient has not filled her interferon in 4 months. The initial plan was to have Henrik deliver to the hospital, however, while coordinating with behavioral inpt pharmacy, it turns out they are able to have it ordered for the patient here. Interferon now started as self-inject every other day. Still holding off on the patient's Adderall but considering changing to modafinil for MS-related fatigue.    On hospital day 7, patient developed symptoms concerning for a flare of her MS. Given the new symptoms in the context of not taking her interferon for the past several weeks, neuro were consulted. Patient also had some lower extremity edema so will consult medicine as well. Both medicine and neuro saw the patient. Medicine workup was largely unremarkable with the exception of high glucose in the blood and urine as well as streaky b/l infiltrates representing possible edema, atypical pneumonia, atelectasis. Other notable labs/imaging include normal BNP, CBC, blood cultures, CT head, and BLE ultrasounds. Neuro noted no deficits on their exam, thus recommended no additional imaging or immunosuppressive therapy. Will plan to follow up with medicine to discuss plan.    In the context of possible edema on CXR, orthopnea, and BLE edema, felt that echo was warranted. Discussed this as well as workup findings with medicine. Discussed starting metformin for glucose and metabolic control with patient, however she states 2-3 years ago she had significant GI upset with metformin and was unable to tolerate it. Will check in with patient again and discuss a different med.     Patient did have an echo which showed slightly reduced LV  function with an EF of 50-55% (though it was a technically difficult exam). Medicine was not concerned for heart failure at the moment though they did recommend an outpatient sleep study to assess for VALENTIN. They also advised against medication management of LE edema, recommended continuation of compression stockings. Patient was concerned with the findings so will plan to give her an information sheet regarding heart failure and review it with her. Otherwise, patient is medically stable at this point. Did discuss with her that we can coordinate referral for cardiology, primary care, and dentistry upon discharge.    On hospital day 12, patient reported to us she chipped her two lower central incisors while eating an apple. She endorses now having dental pain and mouth sores in that area. We did consult dental who saw the patient on day 13. They plan to file down the teeth and have her follow up in the outpatient setting. Orajel, Tylenol, and ibuprofen PRN for pain relief in the hospital. Also recommended alcohol-free antiseptic rinse or warm salt water rinse for inflammation. They will facilitate an appointment while patient is in the hospital to address dental fractures and get new dentures started.     Please see medicine, neurology, and dental consult notes for additional details.     Medications:  - Melatonin and Ambien PRN for sleep  - Colace 100 mg daily, Miralax/Maalox PRN for constipation  - Subutex 8 mg TID for opioid dependence (x2 in the morning, x1 in the afternoon)  - Tylenol, ibuprofen PRN for pain  - Betaceron 0.3 mg subcutaneous every other day     Consults:  Medicine for BLE edema, orthopnea. Neurology for history of MS, Chiari I malformation in context of psychiatric symptoms. Dentistry for dental pain in the context of tooth fracture.      Checklist     Legal Status: Voluntary     Safety Assessment:   Behavioral Orders   Procedures    Code 1 - Restrict to Unit    Code 2    Code 2     Ok to leave  unit for echo. Per unit routine.    Routine Programming     As clinically indicated    Status 15     Every 15 minutes.       Risk Assessment:  Risk for harm is moderate.  Risk factors: psychosis, hx of substance use  Protective factors: group home staff, no identified history of attempts, doesn't live alone, family support, children     SIO: none    Disposition: TBD. Disposition pending stabilization, medication optimization, & development of a safe discharge plan.     Attestations     This patient was seen and discussed with my attending physician.  Robel Cho, MS3  [employer]    I was present with the medical student who participated in the service and in the documentation of the note. I have verified the history and personally performed the physical exam and medical decision making. I agree with the assessment and plan of care as documented in the note and have made changes to the note as appropriate.     Ramez Dagher, MD  Psychiatry Resident    Attestation:  This patient has been seen and evaluated by me, Wilmer Cartagena MD.  I have discussed this patient with the house staff team including the resident and medical student and I agree with the findings and plan in this note.    I have reviewed today's vital signs, medications, labs and imaging. Wilmer Cartagena MD , PhD.

## 2024-05-31 NOTE — PLAN OF CARE
Group Attendance:  attended partial group    Time session began: 1015  Time session ended: 1100  Patient's total time in group: 15    Total # Attendees   7   Group Type psychotherapeutic and life skills     Group Topic Covered healthy coping skills     Group Session Detail As a group brainstormed coping strategies, discussed healthy versus unhealthy coping. Used individual examples to analyze coping strategies including identifying barriers, benefits, and choosing coping strategies to use while in the hospital.      Patient's response to the group topic/interactions:  Positive Affect, Organized, Attentive, and Actively engaged     Patient Details: Pt checked in feeling decent. Pt asked many questions and sought reassurance often. Pt reports coloring is her main coping skill but she is getting tired of coloring. Came to group to learn more coping skills but left after 15 minutes to use the bathroom but did not return.           No Charge (0-15 min)    Patient Active Problem List   Diagnosis    Abnormal liver function test    Acute cholangitis (H28)    Anxiety disorder    Arnold-Chiari malformation (H)    Calculus of bile duct without obstruction    Depression    Multiple sclerosis (H)    Syringomyelia (H)    Tobacco use disorder    Schizophrenia spectrum disorder with psychotic disorder type not yet determined (H)

## 2024-06-01 PROCEDURE — 250N000013 HC RX MED GY IP 250 OP 250 PS 637

## 2024-06-01 PROCEDURE — 250N000012 HC RX MED GY IP 250 OP 636 PS 637: Mod: JZ

## 2024-06-01 PROCEDURE — 250N000013 HC RX MED GY IP 250 OP 250 PS 637: Performed by: STUDENT IN AN ORGANIZED HEALTH CARE EDUCATION/TRAINING PROGRAM

## 2024-06-01 PROCEDURE — 124N000002 HC R&B MH UMMC

## 2024-06-01 RX ADMIN — DOCUSATE SODIUM 100 MG: 100 CAPSULE, LIQUID FILLED ORAL at 07:53

## 2024-06-01 RX ADMIN — LORAZEPAM 1 MG: 1 TABLET ORAL at 05:55

## 2024-06-01 RX ADMIN — LORAZEPAM 1 MG: 1 TABLET ORAL at 21:29

## 2024-06-01 RX ADMIN — IBUPROFEN 600 MG: 600 TABLET ORAL at 09:03

## 2024-06-01 RX ADMIN — ESCITALOPRAM OXALATE 10 MG: 10 TABLET ORAL at 07:53

## 2024-06-01 RX ADMIN — GABAPENTIN 600 MG: 300 CAPSULE ORAL at 19:29

## 2024-06-01 RX ADMIN — DOCUSATE SODIUM 100 MG: 100 CAPSULE, LIQUID FILLED ORAL at 20:33

## 2024-06-01 RX ADMIN — GABAPENTIN 600 MG: 300 CAPSULE ORAL at 07:53

## 2024-06-01 RX ADMIN — ZOLPIDEM TARTRATE 10 MG: 5 TABLET, COATED ORAL at 20:32

## 2024-06-01 RX ADMIN — BUSPIRONE HYDROCHLORIDE 10 MG: 10 TABLET ORAL at 20:33

## 2024-06-01 RX ADMIN — NICOTINE POLACRILEX 2 MG: 2 LOZENGE ORAL at 09:03

## 2024-06-01 RX ADMIN — Medication 3 MG: at 20:32

## 2024-06-01 RX ADMIN — HYDROXYZINE HYDROCHLORIDE 50 MG: 50 TABLET, FILM COATED ORAL at 14:45

## 2024-06-01 RX ADMIN — GABAPENTIN 600 MG: 300 CAPSULE ORAL at 14:45

## 2024-06-01 RX ADMIN — BUSPIRONE HYDROCHLORIDE 10 MG: 10 TABLET ORAL at 07:53

## 2024-06-01 RX ADMIN — IBUPROFEN 600 MG: 600 TABLET ORAL at 01:13

## 2024-06-01 RX ADMIN — BUPRENORPHINE 8 MG: 8 TABLET SUBLINGUAL at 16:32

## 2024-06-01 RX ADMIN — Medication 1 LOZENGE: at 18:35

## 2024-06-01 RX ADMIN — NICOTINE POLACRILEX 2 MG: 2 LOZENGE ORAL at 18:36

## 2024-06-01 RX ADMIN — ACETAMINOPHEN 650 MG: 325 TABLET, FILM COATED ORAL at 11:48

## 2024-06-01 RX ADMIN — INTERFERON BETA-1B 0.3 MG: KIT at 11:43

## 2024-06-01 RX ADMIN — OLANZAPINE 15 MG: 15 TABLET, ORALLY DISINTEGRATING ORAL at 20:33

## 2024-06-01 RX ADMIN — BUPRENORPHINE 16 MG: 8 TABLET SUBLINGUAL at 07:53

## 2024-06-01 RX ADMIN — HYDROXYZINE HYDROCHLORIDE 50 MG: 50 TABLET, FILM COATED ORAL at 07:53

## 2024-06-01 RX ADMIN — HYDROXYZINE HYDROCHLORIDE 50 MG: 50 TABLET, FILM COATED ORAL at 19:29

## 2024-06-01 RX ADMIN — OLANZAPINE 5 MG: 5 TABLET, FILM COATED ORAL at 10:39

## 2024-06-01 RX ADMIN — ACETAMINOPHEN 650 MG: 325 TABLET, FILM COATED ORAL at 16:33

## 2024-06-01 RX ADMIN — IBUPROFEN 600 MG: 600 TABLET ORAL at 17:51

## 2024-06-01 ASSESSMENT — ACTIVITIES OF DAILY LIVING (ADL)
ADLS_ACUITY_SCORE: 40
DRESS: INDEPENDENT
HYGIENE/GROOMING: INDEPENDENT
ORAL_HYGIENE: INDEPENDENT
ADLS_ACUITY_SCORE: 40

## 2024-06-01 NOTE — PLAN OF CARE
"Pt denies SI.  Pt reports high anxiety.  Despite being  on top of the hour requests pt almost continuously has requests ie.. coloring pages, pads, did not get desert on lunch tray despite her not ordering a desert for lunch, asking multiple times if she will get her social security while she's in the hospital, pillow cases, Pt asking many many times for snacks form kitchen and snacks from her locker.  Pt wanting staff to trace things for her and help her sit up.  Pt Interferon was not available right away this morning.   Pt very very anxious about it getting to the floor despite staff assurance will get here.  Pt states hears her brothers voice saying that she's going to die.    Problem: Adult Behavioral Health Plan of Care  Goal: Plan of Care Review  Outcome: Not Progressing  Goal: Patient-Specific Goal (Individualization)  Description: You can add care plan individualizations to a care plan. Examples of Individualization might be:  \"Parent requests to be called daily at 9am for status\", \"I have a hard time hearing out of my right ear\", or \"Do not touch me to wake me up as it startles  me\".  Outcome: Not Progressing  Goal: Adheres to Safety Considerations for Self and Others  Outcome: Not Progressing  Intervention: Develop and Maintain Individualized Safety Plan  Recent Flowsheet Documentation  Taken 6/1/2024 1100 by Kika Dawkins RN  Safety Measures: safety rounds completed  Goal: Absence of New-Onset Illness or Injury  Outcome: Not Progressing  Intervention: Identify and Manage Fall Risk  Recent Flowsheet Documentation  Taken 6/1/2024 1100 by Kika Dawkins RN  Safety Measures: safety rounds completed  Goal: Optimized Coping Skills in Response to Life Stressors  Outcome: Not Progressing  Goal: Develops/Participates in Therapeutic [place] to Support Successful Transition  Outcome: Not Progressing   Goal Outcome Evaluation:                        "

## 2024-06-01 NOTE — PLAN OF CARE
Problem: Sleep Disturbance  Goal: Adequate Sleep/Rest  Outcome: Progressing    Pt appears to have slept for  5.5 hours. PRN Ibuprofen for generalized body pain and PRN Ativan for anxiety were given. PRN medications were effective, as there were no further complain of pain and anxiety. 15 minutes safety checks were in place. Staff will continue to offer support to pt.

## 2024-06-01 NOTE — PLAN OF CARE
Group Attendance:  came in and out of group session    Time session began: 1915  Time session ended: 2000  Patient's total time in group: 30    Total # Attendees   4   Group Type psychotherapeutic     Group Topic Covered insight improvement        Group Session Detail Group members checked in with how they are feeling today. The group then answered and discussed questions relating to self-knowledge and self-awareness, goals and life purpose, self-esteem, and self-confidence.      Patient's response to the group topic/interactions:  Able to recall/repeat information presented, Cooperative with task, Positive Affect, Listened actively, Expressed understanding of topic, Organized, Attentive, and Actively engaged     Patient Details: Pt attended group and checked in as feeling optimistic. Pt talked peer who pt believed to have COVID and politely asked if she was going to be in the group b/c if the peer was she would have to leave b/c she has no immune system b/c of having MS. Peer left the group and they were both very polite to one another. Pt and peers discussed the situation and frustrations with staff on the unit. Pt's provided support to one another. Pt was active in group but did come and go often. Pt had a hard time sitting in the group.           [id number] - Group psychotherapy - 1 Session    Patient Active Problem List   Diagnosis    Abnormal liver function test    Acute cholangitis (H28)    Anxiety disorder    Arnold-Chiari malformation (H)    Calculus of bile duct without obstruction    Depression    Multiple sclerosis (H)    Syringomyelia (H)    Tobacco use disorder    Schizophrenia spectrum disorder with psychotic disorder type not yet determined (H)

## 2024-06-01 NOTE — PLAN OF CARE
Patient was out visible, socially interactive with others, presented with poor boundaries required multiple redirection, she endorsed pain level of 9/10 that is chronic and general, patent came complaining stating she might been exposed to Covid as she found rumors that other patient's might have Covid and requested RADHA for preventative approach, she also requested throat lozenge and Dr. Wallace was able to place an order, write requested a Covid test however patient  declined stating I will take the test tomorrow, writer was able to educate patient about respecting other patient's boundaries and she was somewhat receptive, she does require reinforcements, she took all her scheduled medications with some prn's.    Problem: Adult Behavioral Health Plan of Care  Goal: Optimized Coping Skills in Response to Life Stressors  Outcome: Progressing  Flowsheets (Taken 5/31/2024 2235)  Optimized Coping Skills in Response to Life Stressors: making progress toward outcome  Note: Patient struggles with applying positive coping skills as she presented with restlessness and inability to comprehend.  Intervention: Promote Effective Coping Strategies  Recent Flowsheet Documentation  Taken 5/31/2024 2200 by Kay Jarrell RN  Supportive Measures:   positive reinforcement provided   active listening utilized     Problem: Psychotic Signs/Symptoms  Goal: Improved Behavioral Control (Psychotic Signs/Symptoms)  Outcome: Progressing  Flowsheets (Taken 5/31/2024 2235)  Mutually Determined Action Steps (Improved Behavioral Control): verbalizes personal treatment goal  Intervention: Manage Behavior  Recent Flowsheet Documentation  Taken 5/31/2024 2200 by Kay Jarrell RN  De-Escalation Techniques: verbally redirected  Goal: Increased Participation and Engagement (Psychotic Signs/Symptoms)  Outcome: Progressing  Intervention: Facilitate Participation and Engagement  Recent Flowsheet Documentation  Taken 5/31/2024 2200 by Chrystal  Kay FINCH RN  Supportive Measures:   positive reinforcement provided   active listening utilized  Diversional Activity: television  Goal: Improved Mood Symptoms (Psychotic Signs/Symptoms)  Outcome: Progressing  Intervention: Optimize Emotion and Mood  Recent Flowsheet Documentation  Taken 5/31/2024 2200 by Kay Jarrell RN  Supportive Measures:   positive reinforcement provided   active listening utilized  Diversional Activity: television   Goal Outcome Evaluation:    Plan of Care Reviewed With: patient

## 2024-06-01 NOTE — PLAN OF CARE
Patient was visible hyper-verbal and pacing the hallway, she presented with labile mood, affect was full range, she reported moderate anxiety and depressive symptoms, she endorsed 8/10 chronic generalized body aches and pains and requested Tylenol and Ibuprofen interchangeably, continues to report unresolved pain even with pain medications, hygiene was adequate, she is compliant with treatment regimen, she stated she is optimistic about future goals and wants to have safe, clear, consistent and stable discharge plan, insight appears to have improved from baseline, she denied hallucinations, denied SI/SIB and agitation.    Problem: Adult Behavioral Health Plan of Care  Goal: Optimized Coping Skills in Response to Life Stressors  Outcome: Progressing  Flowsheets (Taken 6/1/2024 1850)  Optimized Coping Skills in Response to Life Stressors: making progress toward outcome     Problem: Psychotic Signs/Symptoms  Goal: Improved Behavioral Control (Psychotic Signs/Symptoms)  Outcome: Progressing  Flowsheets (Taken 6/1/2024 1850)  Mutually Determined Action Steps (Improved Behavioral Control):   identifies future-oriented goal   verbalizes gratifying activity     Problem: Psychotic Signs/Symptoms  Goal: Improved Behavioral Control (Psychotic Signs/Symptoms)  Intervention: Manage Behavior  Recent Flowsheet Documentation  Taken 6/1/2024 1800 by Kay Jarrell RN  De-Escalation Techniques: verbally redirected     Problem: Psychotic Signs/Symptoms  Goal: Optimal Cognitive Function (Psychotic Signs/Symptoms)  Outcome: Progressing  Flowsheets (Taken 6/1/2024 1850)  Mutually Determined Action Steps (Optimal Cognitive Function): remains focused during activity     Problem: Psychotic Signs/Symptoms  Goal: Improved Mood Symptoms (Psychotic Signs/Symptoms)  Intervention: Optimize Emotion and Mood  Recent Flowsheet Documentation  Taken 6/1/2024 1800 by Kay Jarrell RN  Supportive Measures:   positive reinforcement provided   active  listening utilized  Diversional Activity: television   Goal Outcome Evaluation:    Plan of Care Reviewed With: patient

## 2024-06-02 PROCEDURE — 250N000013 HC RX MED GY IP 250 OP 250 PS 637

## 2024-06-02 PROCEDURE — 124N000002 HC R&B MH UMMC

## 2024-06-02 PROCEDURE — 250N000013 HC RX MED GY IP 250 OP 250 PS 637: Performed by: STUDENT IN AN ORGANIZED HEALTH CARE EDUCATION/TRAINING PROGRAM

## 2024-06-02 RX ADMIN — BUSPIRONE HYDROCHLORIDE 10 MG: 10 TABLET ORAL at 20:22

## 2024-06-02 RX ADMIN — ESCITALOPRAM OXALATE 10 MG: 10 TABLET ORAL at 07:58

## 2024-06-02 RX ADMIN — NICOTINE POLACRILEX 2 MG: 2 LOZENGE ORAL at 19:55

## 2024-06-02 RX ADMIN — ACETAMINOPHEN 650 MG: 325 TABLET, FILM COATED ORAL at 12:13

## 2024-06-02 RX ADMIN — DOCUSATE SODIUM 100 MG: 100 CAPSULE, LIQUID FILLED ORAL at 20:22

## 2024-06-02 RX ADMIN — GABAPENTIN 600 MG: 300 CAPSULE ORAL at 19:55

## 2024-06-02 RX ADMIN — LORAZEPAM 1 MG: 1 TABLET ORAL at 16:09

## 2024-06-02 RX ADMIN — Medication 3 MG: at 20:21

## 2024-06-02 RX ADMIN — DOCUSATE SODIUM 100 MG: 100 CAPSULE, LIQUID FILLED ORAL at 07:58

## 2024-06-02 RX ADMIN — BUPRENORPHINE 16 MG: 8 TABLET SUBLINGUAL at 09:20

## 2024-06-02 RX ADMIN — IBUPROFEN 600 MG: 600 TABLET ORAL at 07:59

## 2024-06-02 RX ADMIN — GABAPENTIN 600 MG: 300 CAPSULE ORAL at 07:58

## 2024-06-02 RX ADMIN — IBUPROFEN 600 MG: 600 TABLET ORAL at 18:37

## 2024-06-02 RX ADMIN — HYDROXYZINE HYDROCHLORIDE 50 MG: 50 TABLET, FILM COATED ORAL at 19:55

## 2024-06-02 RX ADMIN — GABAPENTIN 600 MG: 300 CAPSULE ORAL at 13:02

## 2024-06-02 RX ADMIN — HYDROXYZINE HYDROCHLORIDE 50 MG: 50 TABLET, FILM COATED ORAL at 13:02

## 2024-06-02 RX ADMIN — NICOTINE POLACRILEX 2 MG: 2 LOZENGE ORAL at 18:37

## 2024-06-02 RX ADMIN — LORAZEPAM 1 MG: 1 TABLET ORAL at 05:44

## 2024-06-02 RX ADMIN — BUPRENORPHINE 8 MG: 8 TABLET SUBLINGUAL at 16:09

## 2024-06-02 RX ADMIN — ZOLPIDEM TARTRATE 10 MG: 5 TABLET, COATED ORAL at 20:22

## 2024-06-02 RX ADMIN — OLANZAPINE 15 MG: 15 TABLET, ORALLY DISINTEGRATING ORAL at 20:22

## 2024-06-02 RX ADMIN — OLANZAPINE 10 MG: 10 TABLET, FILM COATED ORAL at 10:12

## 2024-06-02 RX ADMIN — HYDROXYZINE HYDROCHLORIDE 50 MG: 50 TABLET, FILM COATED ORAL at 07:58

## 2024-06-02 RX ADMIN — BUSPIRONE HYDROCHLORIDE 10 MG: 10 TABLET ORAL at 07:58

## 2024-06-02 ASSESSMENT — ACTIVITIES OF DAILY LIVING (ADL)
ADLS_ACUITY_SCORE: 40
ORAL_HYGIENE: INDEPENDENT
ADLS_ACUITY_SCORE: 40
ADLS_ACUITY_SCORE: 40
DRESS: INDEPENDENT
ORAL_HYGIENE: INDEPENDENT
ADLS_ACUITY_SCORE: 40
HYGIENE/GROOMING: INDEPENDENT
ADLS_ACUITY_SCORE: 40
DRESS: INDEPENDENT
ADLS_ACUITY_SCORE: 40
HYGIENE/GROOMING: INDEPENDENT;SHOWER
ADLS_ACUITY_SCORE: 40

## 2024-06-02 NOTE — PLAN OF CARE
"Pt asked staff if there are aliens. Pt said a patient told her there is.  Pt said I know they are none but then I was questioning myself.  Pt stated she smell gas in her room.  Yesterday a patient complained of smelling gas,  But that patient room in from of the building where cars often times keep there engines running and the fumes are sucked up with the vents that are ground level at the round about.  [facility] room is on other side and down the milligan where the round about is.   Pt said okay.  Pt requested and rec'd Zyprexa 10mg for \"hearing things. :  Pt not observed talking to self.  Pt requested to take shower.  Pt very specific on how many towels what kind of shampoo and conditioner.  Pt wanted a sheet for after the shower.  Pt wanted staff to unfold sheet and show her its a regular size flat sheet.  Pt continues on top of hour request.  Pt asked for comb at a non tip of the hour.  Pt reminded to ask for this at top of hour.  Pt said she had a comb in her room and that she is just being lazy.   Pt wanted her WON on after breakfast.  This staff did put on  WON hose on 1 leg.  Then pt stated it was too tight and wanted it off.  Pt encouraged to put WON house on 1st thing in the am.   Problem: Adult Behavioral Health Plan of Care  Goal: Plan of Care Review  Outcome: Not Progressing  Goal: Patient-Specific Goal (Individualization)  Description: You can add care plan individualizations to a care plan. Examples of Individualization might be:  \"Parent requests to be called daily at 9am for status\", \"I have a hard time hearing out of my right ear\", or \"Do not touch me to wake me up as it startles  me\".  Outcome: Not Progressing  Goal: Adheres to Safety Considerations for Self and Others  Outcome: Not Progressing  Intervention: Develop and Maintain Individualized Safety Plan  Recent Flowsheet Documentation  Taken 6/2/2024 7652 by Kika Dawkins RN  Safety Measures: safety rounds completed  Goal: Absence of New-Onset " Illness or Injury  Outcome: Not Progressing  Intervention: Identify and Manage Fall Risk  Recent Flowsheet Documentation  Taken 6/2/2024 0952 by Kika Dawkins RN  Safety Measures: safety rounds completed  Goal: Optimized Coping Skills in Response to Life Stressors  Outcome: Not Progressing  Goal: Develops/Participates in Therapeutic [place] to Support Successful Transition  Outcome: Not Progressing   Goal Outcome Evaluation:

## 2024-06-02 NOTE — PLAN OF CARE
"  Problem: Sleep Disturbance  Goal: Adequate Sleep/Rest  Outcome: Progressing   Goal Outcome Evaluation:     Pt was in bed at the start of the shift.  PRN Ativan given for anxiety and was effective.  Pt has been intermittently in and out of her room requesting snacks and medications. She appears to have slept for   6.25. No safety or behavioral concerns noted this shift, continue POC    Blood pressure 129/76, pulse 108, temperature 98.1  F (36.7  C), temperature source Oral, resp. rate 18, height 1.651 m (5' 5\"), weight (!) 165.6 kg (365 lb), SpO2 97%, not currently breastfeeding.   "

## 2024-06-03 PROCEDURE — 124N000002 HC R&B MH UMMC

## 2024-06-03 PROCEDURE — G0177 OPPS/PHP; TRAIN & EDUC SERV: HCPCS

## 2024-06-03 PROCEDURE — 250N000013 HC RX MED GY IP 250 OP 250 PS 637

## 2024-06-03 PROCEDURE — 99232 SBSQ HOSP IP/OBS MODERATE 35: CPT | Mod: GC | Performed by: STUDENT IN AN ORGANIZED HEALTH CARE EDUCATION/TRAINING PROGRAM

## 2024-06-03 PROCEDURE — 250N000012 HC RX MED GY IP 250 OP 636 PS 637: Mod: JZ

## 2024-06-03 PROCEDURE — 250N000013 HC RX MED GY IP 250 OP 250 PS 637: Performed by: STUDENT IN AN ORGANIZED HEALTH CARE EDUCATION/TRAINING PROGRAM

## 2024-06-03 RX ORDER — OLANZAPINE 10 MG/1
20 TABLET, ORALLY DISINTEGRATING ORAL AT BEDTIME
Status: DISCONTINUED | OUTPATIENT
Start: 2024-06-03 | End: 2024-06-11 | Stop reason: HOSPADM

## 2024-06-03 RX ADMIN — DOCUSATE SODIUM 100 MG: 100 CAPSULE, LIQUID FILLED ORAL at 19:13

## 2024-06-03 RX ADMIN — ZOLPIDEM TARTRATE 10 MG: 5 TABLET, COATED ORAL at 20:56

## 2024-06-03 RX ADMIN — LORAZEPAM 1 MG: 1 TABLET ORAL at 12:42

## 2024-06-03 RX ADMIN — LORAZEPAM 1 MG: 1 TABLET ORAL at 01:20

## 2024-06-03 RX ADMIN — HYDROXYZINE HYDROCHLORIDE 50 MG: 50 TABLET, FILM COATED ORAL at 19:12

## 2024-06-03 RX ADMIN — GABAPENTIN 600 MG: 300 CAPSULE ORAL at 19:12

## 2024-06-03 RX ADMIN — OLANZAPINE 10 MG: 10 TABLET, FILM COATED ORAL at 11:35

## 2024-06-03 RX ADMIN — GABAPENTIN 600 MG: 300 CAPSULE ORAL at 13:52

## 2024-06-03 RX ADMIN — GABAPENTIN 600 MG: 300 CAPSULE ORAL at 07:49

## 2024-06-03 RX ADMIN — BUSPIRONE HYDROCHLORIDE 10 MG: 10 TABLET ORAL at 07:50

## 2024-06-03 RX ADMIN — INTERFERON BETA-1B 0.3 MG: KIT at 09:23

## 2024-06-03 RX ADMIN — HYDROXYZINE HYDROCHLORIDE 50 MG: 50 TABLET, FILM COATED ORAL at 13:52

## 2024-06-03 RX ADMIN — BUSPIRONE HYDROCHLORIDE 10 MG: 10 TABLET ORAL at 20:56

## 2024-06-03 RX ADMIN — NICOTINE POLACRILEX 2 MG: 2 LOZENGE ORAL at 12:42

## 2024-06-03 RX ADMIN — BUPRENORPHINE 16 MG: 8 TABLET SUBLINGUAL at 07:49

## 2024-06-03 RX ADMIN — BUPRENORPHINE 8 MG: 8 TABLET SUBLINGUAL at 16:39

## 2024-06-03 RX ADMIN — HYDROXYZINE HYDROCHLORIDE 50 MG: 50 TABLET, FILM COATED ORAL at 07:50

## 2024-06-03 RX ADMIN — Medication 3 MG: at 20:56

## 2024-06-03 RX ADMIN — OLANZAPINE 20 MG: 10 TABLET, ORALLY DISINTEGRATING ORAL at 20:56

## 2024-06-03 RX ADMIN — IBUPROFEN 600 MG: 600 TABLET ORAL at 12:45

## 2024-06-03 RX ADMIN — ACETAMINOPHEN 650 MG: 325 TABLET, FILM COATED ORAL at 16:53

## 2024-06-03 RX ADMIN — ESCITALOPRAM OXALATE 10 MG: 10 TABLET ORAL at 07:50

## 2024-06-03 RX ADMIN — NICOTINE POLACRILEX 2 MG: 2 LOZENGE ORAL at 09:34

## 2024-06-03 ASSESSMENT — ACTIVITIES OF DAILY LIVING (ADL)
ADLS_ACUITY_SCORE: 40

## 2024-06-03 NOTE — PLAN OF CARE
"  Problem: Sleep Disturbance  Goal: Adequate Sleep/Rest  Outcome: Progressing   Goal Outcome Evaluation:    Pt appears to have slept for 5.5 hours. PRN  Ativan given around 0120  x1 for anxiety. Pt  later reported medication was helpful. No c/o pain. Pt denies all mental claude symptoms.  No new change in condition noted. Continue POC.     Blood pressure 125/78, pulse 106, temperature 97.4  F (36.3  C), temperature source Oral, resp. rate 16, height 1.651 m (5' 5\"), weight (!) 162.9 kg (359 lb 3.2 oz), SpO2 92%, not currently breastfeeding.                                                                                                                                                                                                                                                          "

## 2024-06-03 NOTE — PROGRESS NOTES
Rehab Group    Start time: 1015  End time: 1215  Patient time total: 60 minutes    attended full group    #5 attended   Group Type: occupational therapy   Group Topic Covered: coping skills     Group Session Detail:  OT clinic     Patient Response/Contribution:  Cooperative with task, Attentive, and Actively engaged     Patient Detail:    Pt actively participated in occupational therapy clinic to facilitate coping skill exploration, creative expression within personally meaningful activities, and clinical observation of social, cognitive, and kinesthetic performance skills. Pt response: Moderate assistance needed to initiate, gather materials, sequence, and adjust to workspace demands as needed. Demonstrated fair focus, planning, and attention to detail for selected creative expression task. Requested assistance in managing task materials, and frequently sought feedback throughout task engagement. Able to ask for assistance as needed, and socialized with peers and staff. Calm and cooperative. Reported feeling intermittently anxious, and shared that she felt less anxious after checking in with her CTC.        Train & Education Service Per Session 45 + Minutes () OT Group code    Patient Active Problem List   Diagnosis    Abnormal liver function test    Acute cholangitis (H28)    Anxiety disorder    Arnold-Chiari malformation (H)    Calculus of bile duct without obstruction    Depression    Multiple sclerosis (H)    Syringomyelia (H)    Tobacco use disorder    Schizophrenia spectrum disorder with psychotic disorder type not yet determined (H)

## 2024-06-03 NOTE — PLAN OF CARE
Individual Therapy Note    Session duration in minutes: 10       Pt progress: Pt requested to meet with writer to process concerns about discharge disposition and keeping her CADI waiver. Discussed possibility of going back to [facility], Pt appeared fearful but also open to talking with staff about her safety concerns. Pt also shared that she would be open to going back to [facility] temporarily until she could move elsewhere, in order to keep CADI waiver. Pt later had conversation with staff at [facility] and [facility] that went well, looking at other locations that [facility] operates. Pt expressed appreciation to writer and [facility].

## 2024-06-03 NOTE — PLAN OF CARE
"  Problem: Adult Behavioral Health Plan of Care  Goal: Plan of Care Review  Outcome: Progressing  Flowsheets (Taken 6/3/2024 1456)  Plan of Care Reviewed With: patient  Overall Patient Progress: improving  Patient Agreement with Plan of Care: agrees  Goal: Patient-Specific Goal (Individualization)  Description: You can add care plan individualizations to a care plan. Examples of Individualization might be:  \"Parent requests to be called daily at 9am for status\", \"I have a hard time hearing out of my right ear\", or \"Do not touch me to wake me up as it startles  me\".  Outcome: Progressing  Goal: Adheres to Safety Considerations for Self and Others  Outcome: Progressing  Goal: Absence of New-Onset Illness or Injury  Outcome: Progressing  Goal: Optimized Coping Skills in Response to Life Stressors  Outcome: Progressing  Intervention: Promote Effective Coping Strategies  Recent Flowsheet Documentation  Taken 6/3/2024 1043 by Debby Henson RN  Supportive Measures:   positive reinforcement provided   active listening utilized  Goal: Develops/Participates in Therapeutic [place] to Support Successful Transition  Outcome: Progressing  Intervention: Foster Therapeutic [place]  Recent Flowsheet Documentation  Taken 6/3/2024 1043 by Debby Henson RN  Trust Relationship/Rapport:   choices provided   empathic listening provided   reassurance provided   thoughts/feelings acknowledged     Problem: Psychotic Signs/Symptoms  Goal: Improved Behavioral Control (Psychotic Signs/Symptoms)  Outcome: Progressing  Goal: Optimal Cognitive Function (Psychotic Signs/Symptoms)  Outcome: Progressing  Intervention: Support and Promote Cognitive Ability  Recent Flowsheet Documentation  Taken 6/3/2024 1043 by Debby Henson RN  Trust Relationship/Rapport:   choices provided   empathic listening provided   reassurance provided   thoughts/feelings acknowledged  Goal: Increased Participation and Engagement (Psychotic Signs/Symptoms)  Outcome: " Progressing  Intervention: Facilitate Participation and Engagement  Recent Flowsheet Documentation  Taken 6/3/2024 1043 by Debby Henson RN  Supportive Measures:   positive reinforcement provided   active listening utilized  Goal: Improved Mood Symptoms (Psychotic Signs/Symptoms)  Outcome: Progressing  Intervention: Optimize Emotion and Mood  Recent Flowsheet Documentation  Taken 6/3/2024 1043 by Debby Henson RN  Supportive Measures:   positive reinforcement provided   active listening utilized  Goal: Improved Psychomotor Symptoms (Psychotic Signs/Symptoms)  Outcome: Progressing  Goal: Decreased Sensory Symptoms (Psychotic Signs/Symptoms)  Outcome: Progressing  Goal: Improved Sleep (Psychotic Signs/Symptoms)  Outcome: Progressing  Goal: Enhanced Social, Occupational or Functional Skills (Psychotic Signs/Symptoms)  Outcome: Progressing  Intervention: Promote Social, Occupational and Functional Ability  Recent Flowsheet Documentation  Taken 6/3/2024 1043 by Debby Henson RN  Trust Relationship/Rapport:   choices provided   empathic listening provided   reassurance provided   thoughts/feelings acknowledged     Problem: Anxiety  Goal: Anxiety Reduction or Resolution  Outcome: Progressing  Intervention: Promote Anxiety Reduction  Recent Flowsheet Documentation  Taken 6/3/2024 1043 by Debby Henson RN  Supportive Measures:   positive reinforcement provided   active listening utilized  Family/Support System Care:   involvement promoted   self-care encouraged   support provided     Problem: Sleep Disturbance  Goal: Adequate Sleep/Rest  Outcome: Progressing   Goal Outcome Evaluation:      Plan of Care Reviewed With: patient    Overall Patient Progress: improving  Patient has been visible in the milieu with multiple requests.Patient needy,seeks out staff frequently.Patient is compliant with medication.No side effect noted.Patient is independent with her injectable  medication.Patient declined colace with am  medication.Patient endorses anxiety 10/10,PRN Ativan & hydroxyzine  with some relief.Denies all other MH issues.Patient is not ready to go back to her old group home therefore she feels like she has nowhere to go.Affect labile.Mood sad.Appropriate hygiene.Adequate food and fluid intake.No escalation of behaviors.No safety concerns.

## 2024-06-03 NOTE — PLAN OF CARE
Team Note Due:  Thursday    Assessment/Intervention/Current Symtoms and Care Coordination:  Chart review and met with team, discussed pt progress, symptomology, and response to treatment.  Discussed the discharge plan and any potential impediments to discharge.    Received response from BRITTA DONAHUE requesting to meet virtually with pt on Friday. Responded and requested a meeting tomorrow or Weds given pt's LOS thus far and needing to coordinate next steps sooner than Friday. Scheduled meeting for 10:30am tomorrow. Updated pt.    Met with pt who agreed to calling [facility] together. Pt and writer called to speak with Sergio and pt expressed her concern for safety with what she experienced prior to coming to the hospital. Sergio then shared what she/staff observed and assured pt she is safe. Pt and Sergio agreed pt may benefit from alternative placement at another location or a . Sergio confirmed she'd reach out to [facility] and provide writer an update. Pt shared she is okay with writer speaking to Sergio regarding updates.    Discharge Plan or Goal:  Pending stabilization of symptoms and safe disposition planning.  Needs therapy, psychiatry, and PCP appts scheduled following discharge     Barriers to Discharge:  Patient requires further psychiatric stabilization due to current symptomology, medication management with changes subject to provider, coordination with outside supports, and aftercare planning.     Referral Status:  None at this time     Legal Status:  Voluntary    Contacts:  Elisa Gomez, grandmother, [phone].    Kalie Villegas, [phone], Aunt, Emergency Contact     Sergio Dunn ([facility] ): [phone]    Riana Galindo ([facility] DONAHUE): [phone]  [email or web address]     Upcoming Meetings and Dates/Important Information and next steps:  Schedule therapy, PCP, and psychiatry appts  Update assisted living on discharge when  known  Coordinate discharge with [facility] CM

## 2024-06-03 NOTE — PROGRESS NOTES
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 regarding heart failure and review it with her. Otherwise, patient is medically stable at this point. Did discuss with her that we can coordinate referral for cardiology, primary care, and dentistry upon discharge.    On hospital day 12, patient reported to us she chipped her two lower central incisors while eating an apple. She endorses now having dental pain and mouth sores in that area. We did consult dental who saw the patient on day 13. They plan to file down the teeth and have her follow up in the outpatient setting. Orajel, Tylenol, and ibuprofen PRN for pain relief in the hospital. Also recommended alcohol-free antiseptic rinse or warm salt water rinse for inflammation. They will facilitate an appointment while patient is in the hospital to address dental fractures and get new dentures started.     Please see  medicine, neurology, and dental consult notes for additional details.     Medications:  - Melatonin and Ambien PRN for sleep  - Colace 100 mg daily, Miralax/Maalox PRN for constipation  - Subutex 8 mg TID for opioid dependence (x2 in the morning, x1 in the afternoon)  - Tylenol, ibuprofen PRN for pain  - Betaceron 0.3 mg subcutaneous every other day     Consults:  Medicine for BLE edema, orthopnea. Neurology for history of MS, Chiari I malformation in context of psychiatric symptoms. Dentistry for dental pain in the context of tooth fracture.      Checklist     Legal Status: Voluntary     Safety Assessment:   Behavioral Orders   Procedures    Code 1 - Restrict to Unit    Code 2    Code 2     Ok to leave unit for echo. Per unit routine.    Routine Programming     As clinically indicated    Status 15     Every 15 minutes.       Risk Assessment:  Risk for harm is moderate.  Risk factors: psychosis, hx of substance use  Protective factors: group home staff, no identified history of attempts, doesn't live alone, family support, children     SIO: none    Disposition: TBD. Disposition pending stabilization, medication optimization, & development of a safe discharge plan.     Attestations     This patient was seen and discussed with my attending physician.  Robert Alexander,  MS3  [employer]    I was present with the medical student who participated in the service and in the documentation of the note. I have verified the history and personally performed the physical exam and medical decision making. I agree with the assessment and plan of care as documented in the note and have made changes to the note as appropriate.     Ramez Dagher, MD  Psychiatry Resident

## 2024-06-03 NOTE — PLAN OF CARE
BEH IP Unit Acuity Rating Score (UARS)  Patient is given one point for every criteria they meet.    CRITERIA SCORING   On a 72 hour hold, court hold, committed, stay of commitment, or revocation. 0   Patient LOS on BEH unit exceeds 20 days. 0  LOS: 18   Patient under guardianship, 55+, otherwise medically complex, or under age 11. 0   Suicide ideation without relief of precipitating factors. 0   Current plan for suicide. 0   Current plan for homicide. 0   Imminent risk or actual attempt to seriously harm another without relief of factors precipitating the attempt. 0   Severe dysfunction in daily living (ex: complete neglect for self care, extreme disruption in vegetative function, extreme deterioration in social interactions). 1   Recent (last 7 days) or current physical aggression in the ED or on unit. 0   Restraints or seclusion episode in past 72 hours. 0   Recent (last 7 days) or current verbal aggression, agitation, yelling, etc., while in the ED or unit. 0   Active psychosis. 1   Need for constant or near constant redirection (from leaving, from others, etc).  1   Intrusive or disruptive behaviors. 0   Patient requires 3 or more hours of individualized nursing care per 8-hour shift (i.e. for ADLs, meds, therapeutic interventions). 0   TOTAL 3

## 2024-06-04 PROCEDURE — 99232 SBSQ HOSP IP/OBS MODERATE 35: CPT | Mod: GC | Performed by: PSYCHIATRY & NEUROLOGY

## 2024-06-04 PROCEDURE — 250N000013 HC RX MED GY IP 250 OP 250 PS 637

## 2024-06-04 PROCEDURE — 250N000013 HC RX MED GY IP 250 OP 250 PS 637: Performed by: STUDENT IN AN ORGANIZED HEALTH CARE EDUCATION/TRAINING PROGRAM

## 2024-06-04 PROCEDURE — 124N000002 HC R&B MH UMMC

## 2024-06-04 PROCEDURE — G0177 OPPS/PHP; TRAIN & EDUC SERV: HCPCS

## 2024-06-04 RX ORDER — DIVALPROEX SODIUM 250 MG/1
250 TABLET, EXTENDED RELEASE ORAL AT BEDTIME
Status: DISCONTINUED | OUTPATIENT
Start: 2024-06-04 | End: 2024-06-06

## 2024-06-04 RX ADMIN — HYDROXYZINE HYDROCHLORIDE 50 MG: 50 TABLET, FILM COATED ORAL at 13:07

## 2024-06-04 RX ADMIN — OLANZAPINE 5 MG: 5 TABLET, FILM COATED ORAL at 09:00

## 2024-06-04 RX ADMIN — NICOTINE POLACRILEX 2 MG: 2 LOZENGE ORAL at 13:07

## 2024-06-04 RX ADMIN — ACETAMINOPHEN 650 MG: 325 TABLET, FILM COATED ORAL at 19:06

## 2024-06-04 RX ADMIN — BUPRENORPHINE 16 MG: 8 TABLET SUBLINGUAL at 08:06

## 2024-06-04 RX ADMIN — DIVALPROEX SODIUM 250 MG: 250 TABLET, FILM COATED, EXTENDED RELEASE ORAL at 21:04

## 2024-06-04 RX ADMIN — ESCITALOPRAM OXALATE 10 MG: 10 TABLET ORAL at 08:05

## 2024-06-04 RX ADMIN — ACETAMINOPHEN 650 MG: 325 TABLET, FILM COATED ORAL at 06:28

## 2024-06-04 RX ADMIN — HYDROXYZINE HYDROCHLORIDE 50 MG: 50 TABLET, FILM COATED ORAL at 19:07

## 2024-06-04 RX ADMIN — HYDROXYZINE HYDROCHLORIDE 50 MG: 50 TABLET, FILM COATED ORAL at 08:06

## 2024-06-04 RX ADMIN — ZOLPIDEM TARTRATE 10 MG: 5 TABLET, COATED ORAL at 21:04

## 2024-06-04 RX ADMIN — OLANZAPINE 20 MG: 10 TABLET, ORALLY DISINTEGRATING ORAL at 21:04

## 2024-06-04 RX ADMIN — BUPRENORPHINE 8 MG: 8 TABLET SUBLINGUAL at 17:06

## 2024-06-04 RX ADMIN — Medication 3 MG: at 21:04

## 2024-06-04 RX ADMIN — GABAPENTIN 600 MG: 300 CAPSULE ORAL at 13:06

## 2024-06-04 RX ADMIN — LORAZEPAM 1 MG: 1 TABLET ORAL at 02:18

## 2024-06-04 RX ADMIN — IBUPROFEN 600 MG: 600 TABLET ORAL at 02:18

## 2024-06-04 RX ADMIN — GABAPENTIN 600 MG: 300 CAPSULE ORAL at 19:07

## 2024-06-04 RX ADMIN — DOCUSATE SODIUM 100 MG: 100 CAPSULE, LIQUID FILLED ORAL at 19:07

## 2024-06-04 RX ADMIN — BUSPIRONE HYDROCHLORIDE 10 MG: 10 TABLET ORAL at 08:08

## 2024-06-04 RX ADMIN — DOCUSATE SODIUM 100 MG: 100 CAPSULE, LIQUID FILLED ORAL at 08:05

## 2024-06-04 RX ADMIN — LORAZEPAM 1 MG: 1 TABLET ORAL at 11:25

## 2024-06-04 RX ADMIN — IBUPROFEN 600 MG: 600 TABLET ORAL at 11:25

## 2024-06-04 RX ADMIN — BUSPIRONE HYDROCHLORIDE 10 MG: 10 TABLET ORAL at 21:04

## 2024-06-04 RX ADMIN — GABAPENTIN 600 MG: 300 CAPSULE ORAL at 08:08

## 2024-06-04 ASSESSMENT — ACTIVITIES OF DAILY LIVING (ADL)
ADLS_ACUITY_SCORE: 40
ADLS_ACUITY_SCORE: 40
LAUNDRY: UNABLE TO COMPLETE
ADLS_ACUITY_SCORE: 40
ORAL_HYGIENE: INDEPENDENT
ORAL_HYGIENE: INDEPENDENT
ADLS_ACUITY_SCORE: 40
HYGIENE/GROOMING: INDEPENDENT
ADLS_ACUITY_SCORE: 40
HYGIENE/GROOMING: INDEPENDENT
ADLS_ACUITY_SCORE: 40
ADLS_ACUITY_SCORE: 40
DRESS: SCRUBS (BEHAVIORAL HEALTH);INDEPENDENT
LAUNDRY: WITH SUPERVISION
DRESS: INDEPENDENT
ADLS_ACUITY_SCORE: 40
ADLS_ACUITY_SCORE: 40

## 2024-06-04 NOTE — PLAN OF CARE
"Problem: Adult Behavioral Health Plan of Care  Goal: Plan of Care Review  Outcome: Progressing  Flowsheets (Taken 6/3/2024 1073)  Patient Agreement with Plan of Care: agrees   Goal Outcome Evaluation:    Plan of Care Reviewed With: patient          Pt was visible in the milieu most of the shift. She was observed socializing and interacting with selected peers. Her affect was flat but pleasant upon approach. She was observed coloring and reading in the dinning room. She was able to make her needs known. Hygiene was good. She took a shower this shift. Intake adequate. She was out for dinner and snacks and ate 100%. She was very demanding and seeking out for staff all shift. She was encouraged to follow the \"Top of the hour request list.\" She did not attend community meeting at 4 pm but  to attended OT evening group partially at 8 pm. Vitals were WNL.      She was cooperative with assessment. She endorsed generalized pain 8/10 and was given prn Tylenol 650 mg which she said was helpful. She endorsed anxiety 10/10 and depression 3/10. She was given scheduled anxiety medication which she said was helpful. She denied SI/SIB/HI/AH/VH. She contracted for safety. She was medication compliant. Prn Melatonin 3 mg was given at bed time for insomnia per pt request. No other concern noted at this time. No safety concern noted at this time and no medication side effect reported or noted this shift. Will continue to monitor and will assist if need arise.          "

## 2024-06-04 NOTE — PLAN OF CARE
Problem: Psychotic Signs/Symptoms  Goal: Improved Behavioral Control (Psychotic Signs/Symptoms)  Outcome: Progressing  Intervention: Manage Behavior  Recent Flowsheet Documentation  Taken 6/4/2024 1200 by Martin Montano RN  De-Escalation Techniques: verbally redirected     Problem: Anxiety  Goal: Anxiety Reduction or Resolution  Outcome: Progressing  Intervention: Promote Anxiety Reduction  Recent Flowsheet Documentation  Taken 6/4/2024 1320 by Martin Montano RN  Supportive Measures:   active listening utilized   goal-setting facilitated   decision-making supported   journaling promoted   relaxation techniques promoted   positive reinforcement provided   self-care encouraged   self-responsibility promoted  Taken 6/4/2024 1200 by Martin Montano RN  Supportive Measures:   verbalization of feelings encouraged   positive reinforcement provided   active listening utilized  Family/Support System Care:   involvement promoted   self-care encouraged   support provided   Goal Outcome Evaluation:    Plan of Care Reviewed With: patient      Pt was visible in the milieu during this shift. She was hyper-verbal and paced between her room, hallway, and living area. She reported waking up in the middle of a dream, got anxiety meds, and was still thinking about the dream. Pt rated anxiety at 10/10 and depression at 4/10 and stated both were related to thoughts of getting poisoned with electricity from residential options. PRN meds were given: Ibuprofen for pain 8/10 with little relief, lorazepam and olanzapine for anxiety with no relief, and nicotine gum. Pt denied SI, SIB, AVH, and HA. She called the [facility] offices for a scheduled 's license hearing and reported she was in the hospital and was told not to be bothered but instead to call them once she was out of the hospital. The mood continues to be anxious and depressed, and she presents with blunted, flat, and labile. She frequently seeks out staff and requires  redirection. The patient was med-compliant. The plan of care is ongoing.

## 2024-06-04 NOTE — PROGRESS NOTES
"  ----------------------------------------------------------------------------------------------------------  [facility]  Psychiatry Progress Note  Hospital Day #19     Interim History:     The patient's care was discussed with the treatment team and chart notes were reviewed.    Vitals: /77 (BP Location: Left arm)   Pulse 102   Temp 98  F (36.7  C)   Resp 16   Ht 1.651 m (5' 5\")   Wt (!) 162.9 kg (359 lb 3.2 oz)   SpO2 93%   BMI 59.77 kg/m    Sleep: 6.25 hours (06/04/24 0600)  Scheduled medications: Took all scheduled medications as prescribed  PRN medications:     Last 24H PRN:     acetaminophen (TYLENOL) tablet 650 mg, 650 mg at 06/04/24 0628    ibuprofen (ADVIL/MOTRIN) tablet 600 mg, 600 mg at 06/04/24 0218    LORazepam (ATIVAN) tablet 1 mg, 1 mg at 06/04/24 0218    melatonin tablet 3 mg, 3 mg at 06/03/24 2056    nicotine (COMMIT) lozenge 2 mg, 2 mg at 06/03/24 1242    OLANZapine (zyPREXA) tablet 10 mg, 10 mg at 06/03/24 1135 **OR** OLANZapine (zyPREXA) injection 10 mg      Staff Report/Interval Events    Yesterday, pt discussed concerns about keeping [facility] waiver with Sarahi and agreed to consider going back to [facility], at least temporarily. Pt still has safety concerns, however, had a conversation yesterday with the [facility] staff which went well, looking at other locations that [facility] operates. Overnight, she was observed socializing and interacting with selected peers. Her affect was flat but pleasant upon approach. She was observed coloring and reading in the dinning room. She was encouraged to follow \"Top of the hour list\" request. She did not attend community meeting at 4 pm, but did attend OT evening group partially at 8 pm. Please see staff notes for more details.       Subjective:     Patient Interview:  The team interviewed Irma in her room today. Pt eager to show off new drawings/colorings, collage. Pt states she does not like being on the " "scheduled 20 mg Olanzapine because \"it s making it hard to breathe\". She states last night she woke up at 2am \"crying and feeling bad\", asked for PRN lorazepam. Today, she states she is feeling okay, but is wanting to switch back to 15 mg Olanzapine. Pt is adamant that she does not want to be on Haldol. Pt states she thinks the Olanzapine is working and would rather stay on 20 mg Olanzapine than change to Haldol. She is not sure about trying risperidone. She says she would be comfortable trying valproate because she has used it in the past for headaches.      Yesterday, she spoke to [facility] and feels she is making progress. She states she is also working on getting back  s license. She says she feels overwhelmed and  jem nervous . Does not want to go back to group home, feels this is going  backwards . She states she does not want to go back to [facility] as she will be poisoned there. She is tearful talking about discharge plans.     Pt complaining of edema in legs, says her legs are painful when touched since Saturday. Pt has not noticed any redness or heat in legs, but has noticed a lump under breast. She states she cannot wear compression socks overnight, but is amenable to plan for help donning these. She requests an extra pillow so she can elevate her feet.    ROS:  Positive: Denies acute concerns   Negative: Denies acute concerns      Objective:     Vitals:  /77 (BP Location: Left arm)   Pulse 102   Temp 98  F (36.7  C)   Resp 16   Ht 1.651 m (5' 5\")   Wt (!) 162.9 kg (359 lb 3.2 oz)   SpO2 93%   BMI 59.77 kg/m      Allergies:  Allergies   Allergen Reactions    Glatiramer Hives    Penicillins Anaphylaxis and Hives    Shellfish Allergy Anaphylaxis and Hives    Copaxone [Glatiramer Acetate] Swelling       Current Medications:  Scheduled:  Current Facility-Administered Medications   Medication Dose Route Frequency Provider Last Rate Last Admin    acetaminophen (TYLENOL) tablet 650 " mg  650 mg Oral Q4H PRN Juju Salgado MD   650 mg at 06/04/24 0628    alum & mag hydroxide-simethicone (MAALOX) suspension 30 mL  30 mL Oral Q4H PRN Juju Salgado MD   30 mL at 05/30/24 1049    benzocaine (ORAJEL MAXIMUM STRENGTH) 20 % gel   Mouth/Throat 4x Daily PRN Shanti Waters MD   Given at 05/31/24 1602    benzocaine-menthol (CHLORASEPTIC) 6-10 MG lozenge 1 lozenge  1 lozenge Buccal Q1H PRN Madison Bolivar MD   1 lozenge at 06/01/24 1835    buprenorphine (SUBUTEX) sublingual tablet 16 mg  16 mg Sublingual Daily Shanti Waters MD   16 mg at 06/04/24 0806    buprenorphine (SUBUTEX) sublingual tablet 8 mg  8 mg Sublingual At Bedtime Dagher, Ramez, MD   8 mg at 06/03/24 1639    busPIRone (BUSPAR) tablet 10 mg  10 mg Oral BID Juju Salgado MD   10 mg at 06/03/24 2056    docusate sodium (COLACE) capsule 100 mg  100 mg Oral BID Dagher, Ramez, MD   100 mg at 06/04/24 0805    docusate sodium (COLACE) capsule 100 mg  100 mg Oral Daily PRN Juju Salgado MD        escitalopram (LEXAPRO) tablet 10 mg  10 mg Oral Daily Juju Salgado MD   10 mg at 06/04/24 0805    gabapentin (NEURONTIN) capsule 600 mg  600 mg Oral TID Juju Salgado MD   600 mg at 06/03/24 1912    hydrOXYzine HCl (ATARAX) tablet 50 mg  50 mg Oral TID Shanti Waters MD   50 mg at 06/04/24 0806    ibuprofen (ADVIL/MOTRIN) tablet 600 mg  600 mg Oral Q8H PRN Shanti Waters MD   600 mg at 06/04/24 0218    interferon beta-1b (BETASERON) injection 0.3 mg  0.3 mg Subcutaneous Every Other Day Shanti Waters MD   0.3 mg at 06/03/24 0923    lidocaine (LMX4) cream   Topical Q1H PRN Lalitha Serrano PA-C        lidocaine 1 % 0.1-1 mL  0.1-1 mL Other Q1H PRN Lalitha Serrano PA-C        loperamide (IMODIUM) capsule 2 mg  2 mg Oral 4x Daily PRN Ludmila Loazno MD   2 mg at 05/19/24 2013    LORazepam (ATIVAN) tablet 1 mg  1 mg Oral BID PRN Shanti Waters MD   1 mg at 06/04/24 0218    melatonin tablet 3 mg   3 mg Oral At Bedtime PRN Ludmila Lozano MD   3 mg at 06/03/24 2056    naloxone (NARCAN) injection 0.2 mg  0.2 mg Intravenous Q2 Min PRN Juju Salgado MD        Or    naloxone (NARCAN) injection 0.4 mg  0.4 mg Intravenous Q2 Min PRN Juju Salgado MD        Or    naloxone (NARCAN) injection 0.2 mg  0.2 mg Intramuscular Q2 Min PRN Juju Salgado MD        Or    naloxone (NARCAN) injection 0.4 mg  0.4 mg Intramuscular Q2 Min PRN Juju Salgado MD        nicotine (COMMIT) lozenge 2 mg  2 mg Buccal Q2H PRN Jaren Zaman MD   2 mg at 06/03/24 1242    OLANZapine (zyPREXA) tablet 10 mg  10 mg Oral BID PRN Shanti Waters MD   10 mg at 06/03/24 1135    Or    OLANZapine (zyPREXA) injection 10 mg  10 mg Intramuscular TID PRN Shanti Waters MD        OLANZapine (zyPREXA) tablet 5 mg  5 mg Oral TID PRN Shanti Waters MD   5 mg at 06/01/24 1039    OLANZapine zydis (zyPREXA) ODT tab 20 mg  20 mg Oral At Bedtime Loren Genao MD   20 mg at 06/03/24 2056    polyethylene glycol (MIRALAX) Packet 17 g  17 g Oral Daily PRN Juju Salgado MD   17 g at 05/30/24 1125    simethicone (MYLICON) chewable tablet 80 mg  80 mg Oral Q6H PRN Ludmila Lozano MD   80 mg at 05/31/24 0919    sodium chloride (PF) 0.9% PF flush 3 mL  3 mL Intracatheter q1 min prn Lalitha Serrano PA-C        [Held by provider] SUMAtriptan (IMITREX) tablet 50 mg  50 mg Oral at onset of headache Juju Salgado MD        zolpidem (AMBIEN) tablet 10 mg  10 mg Oral At Bedtime Shanti Waters MD   10 mg at 06/03/24 2056       PRN:  Current Facility-Administered Medications   Medication Dose Route Frequency Provider Last Rate Last Admin    acetaminophen (TYLENOL) tablet 650 mg  650 mg Oral Q4H PRN Juju Salgado MD   650 mg at 06/04/24 0628    alum & mag hydroxide-simethicone (MAALOX) suspension 30 mL  30 mL Oral Q4H PRN Juju Salgado MD   30 mL at 05/30/24 1049    benzocaine (ORAJEL MAXIMUM STRENGTH) 20 % gel   Mouth/Throat  4x Daily PRN Shanti Waters MD   Given at 05/31/24 1602    benzocaine-menthol (CHLORASEPTIC) 6-10 MG lozenge 1 lozenge  1 lozenge Buccal Q1H PRN Madison Bolivar MD   1 lozenge at 06/01/24 1835    buprenorphine (SUBUTEX) sublingual tablet 16 mg  16 mg Sublingual Daily Shanti Waters MD   16 mg at 06/04/24 0806    buprenorphine (SUBUTEX) sublingual tablet 8 mg  8 mg Sublingual At Bedtime Dagher, Ramez, MD   8 mg at 06/03/24 1639    busPIRone (BUSPAR) tablet 10 mg  10 mg Oral BID Juju Salgado MD   10 mg at 06/03/24 2056    docusate sodium (COLACE) capsule 100 mg  100 mg Oral BID Dagher, Ramez, MD   100 mg at 06/04/24 0805    docusate sodium (COLACE) capsule 100 mg  100 mg Oral Daily PRN Juju Salgado MD        escitalopram (LEXAPRO) tablet 10 mg  10 mg Oral Daily Juju Salgado MD   10 mg at 06/04/24 0805    gabapentin (NEURONTIN) capsule 600 mg  600 mg Oral TID Juju Salgado MD   600 mg at 06/03/24 1912    hydrOXYzine HCl (ATARAX) tablet 50 mg  50 mg Oral TID Shanti Waters MD   50 mg at 06/04/24 0806    ibuprofen (ADVIL/MOTRIN) tablet 600 mg  600 mg Oral Q8H PRN Shanti Waters MD   600 mg at 06/04/24 0218    interferon beta-1b (BETASERON) injection 0.3 mg  0.3 mg Subcutaneous Every Other Day Shanti Waters MD   0.3 mg at 06/03/24 0923    lidocaine (LMX4) cream   Topical Q1H PRN Lalitha Serrano PA-C        lidocaine 1 % 0.1-1 mL  0.1-1 mL Other Q1H PRN Lalitha Serrano PA-C        loperamide (IMODIUM) capsule 2 mg  2 mg Oral 4x Daily PRN Ludmila Lozano MD   2 mg at 05/19/24 2013    LORazepam (ATIVAN) tablet 1 mg  1 mg Oral BID PRN Shanti Waters MD   1 mg at 06/04/24 0218    melatonin tablet 3 mg  3 mg Oral At Bedtime PRN Ludmila Lozano MD   3 mg at 06/03/24 2056    naloxone (NARCAN) injection 0.2 mg  0.2 mg Intravenous Q2 Min PRN Juju Salgado MD        Or    naloxone (NARCAN) injection 0.4 mg  0.4 mg Intravenous Q2 Min PRN Juju Salgado MD         Or    naloxone (NARCAN) injection 0.2 mg  0.2 mg Intramuscular Q2 Min PRN Juju Salgado MD        Or    naloxone (NARCAN) injection 0.4 mg  0.4 mg Intramuscular Q2 Min PRN Juju Salgado MD        nicotine (COMMIT) lozenge 2 mg  2 mg Buccal Q2H PRN Jaren Zaman MD   2 mg at 06/03/24 1242    OLANZapine (zyPREXA) tablet 10 mg  10 mg Oral BID PRN Shanti Waters MD   10 mg at 06/03/24 1135    Or    OLANZapine (zyPREXA) injection 10 mg  10 mg Intramuscular TID PRN Shanti Waters MD        OLANZapine (zyPREXA) tablet 5 mg  5 mg Oral TID PRN Shanti Waters MD   5 mg at 06/01/24 1039    OLANZapine zydis (zyPREXA) ODT tab 20 mg  20 mg Oral At Bedtime Loren Genao MD   20 mg at 06/03/24 2056    polyethylene glycol (MIRALAX) Packet 17 g  17 g Oral Daily PRN Juju Salgado MD   17 g at 05/30/24 1125    simethicone (MYLICON) chewable tablet 80 mg  80 mg Oral Q6H PRN Ludmila Lozano MD   80 mg at 05/31/24 0919    sodium chloride (PF) 0.9% PF flush 3 mL  3 mL Intracatheter q1 min prn Lalitha Serrano PA-C        [Held by provider] SUMAtriptan (IMITREX) tablet 50 mg  50 mg Oral at onset of headache Juju Salgado MD        zolpidem (AMBIEN) tablet 10 mg  10 mg Oral At Bedtime Shanti Waters MD   10 mg at 06/03/24 2056       Labs and Imaging:  New results:   No results found for this or any previous visit (from the past 24 hour(s)).    Data this admission:  - CBC unremarkable, WBC 13 on initial, 9.0 on repeat  - BMP unremarkable except for glucose 141 (05/17), repeat at 258 (05/24)  - HbA1c 7.0  - Lipids WNL except for HDL at 31  - BNP WNL  - Blood cultures negative  - TSH normal  - Vit D normal  - Vit B12 normal  - Folate normal    - UA negative for infection, glucose 100  - UDS positive for amphetamines, benzos (both prescribed)  - UPT negative    - EKG with NSR, QTc 450 on 05/16    - MRI (5/18/24) showed stable MS plaques unchanged from previous on 8/1/22  - CT head unremarkable  -  "CXR with mild prominence of the cardiac silhouette, nonspecific mild streaky perihilar and basilar opacities   - BLE ultrasound unremarkable, negative for DVT  - Echo was a technically difficult study, showed mildly decreased LV function with EF 50-55% and globally reduced RV function     Mental Status Exam:     Oriented to:  Grossly Oriented  General:  Awake and Alert  Appearance:  appears stated age and grooming is adequate; dressed in hospital scrubs  Behavior/Attitude:  calm, cooperative, engaged, difficult to redirect, easily distracted, and suspicious  Eye Contact:  appropriate  Psychomotor: no evidence of tics, dystonia, or tardive dyskinesia and restless no catatonia present  Speech:  talkative and spontaneous  Language: Fluent in English with appropriate syntax and vocabulary.  Mood: \"nervous\" and \"overwhelmed\"  Affect: Less labile than previous. Mainly bright, but occasionally anxious. Able to redirect self when anxiety is exacerbated.   Thought Content: Flight of ideas, requiring redirection; delusions of paranoia; concerned with staying in hospital because she is afraid of being poisoned at home  Associations:  questionable  Insight:  impaired; wants to feel better, but feels that people are \"out to get me\" at her apartment. Does not recognize paranoia.  Judgment:  limited, patient notably paranoid, suspicious, recent inability to care for self PTA though has been adherent with treatment team recommendations, engaged in groups  Impulse control: poor  Attention Span:  grossly intact  Concentration:  grossly intact  Recent and Remote Memory:  not formally assessed  Fund of Knowledge:  delayed; not formally diagnosed  Muscle Strength and Tone: normal  Gait and Station: Normal    Physical Exam: No concerns     Psychiatric Assessment     Irma Heller is a 33 year old female previously diagnosed with anxiety, depression, and opioid dependence in remission who presented to the ED by EMS and admitted " with psychosis in the context of likely first-episode psychosis. No prior psychiatric hospitalizations. Significant symptoms on admission include delusions of paranoia, visual and tactile hallucinations, and increased anxiety. The MSE on admission was pertinent for paranoia, anxiety. Biological contributions to mental health presentation include hx of opioid use disorder (though this has been stable) as well as medical diagnosis of MS, Chiari malformation which are reportedly. Psychological contributions to mental health presentation include anxiety, depression. Social factors contributing to mental health presentation include poor social support outside of group home staff. Protective factors include good medication adherence and group home staff.      Patient's definitive diagnosis is still in evolution, differential for psychosis includes primary psychotic disorder (ie, schizophrenia, schizoaffective) or other psychiatric etiology vs. drug-induced psychosis vs. medical etiology of psychosis. Feel that this is likely a primary psychiatric cause in the context of UDS positive only for prescribed medication and no recent drug use.      However, consultation with neurology to evaluate medical etiology, further neuroimaging and further history from collateral was considered to get a better understand of patient's symptom timeline and course. Can also consider the role of her interferon in her psychosis. She will likely benefit from antipsychotics this admission.     Given that she currently has psychosis, patient warrants inpatient psychiatric hospitalization to maintain her safety     Psychiatric Plan by Diagnosis      Today's changes:  - Added Depakote 250mg for mood stabilization     # Psychosis  Patient was given olanzapine as well as IM Haldol/Versed in the ED which seemed to be effective. Plan is to continue olanzapine for stabilization of acute psychosis and monitoring for improvement.     Medications:  -  Olanzapine 15 mg PO at bedtime  - Increased Olanzapine from 15mg to 20mg on 06/03  - Olanzapine 5 mg TID PRN  - Olanzapine 10 mg PRN for emergency agitation/aggression related to psychosis   - Added Depakote 250mg for mood stabilization on 06/04     # Anxiety/Depression  Patient has a history of anxiety which she states has been well managed by her home meds prior to this point. She has had an acute increase in her anxiety due to being overwhelmed with new symptoms and fear for her safety. Will plan to continue her home medication regimen in tandem with the olanzapine.      Medications:  - Buspirone 10 mg bid  - Lexapro 10 mg daily  - Hydroxyzine 50 mg TID  - Ativan 1 mg PRN     Pertinent Labs/Monitoring:   - CBC unremarkable   - UDS positive for amphetamines, benzos (both prescribed)  - HbA1c 7.0  - UPT negative  - EKG with NSR, QTc 450 on 05/16     Additional Plans:  - Patient will be treated in therapeutic milieu with appropriate individual and group therapies as described  - Keep in touch with patient's residence and CM once disposition is determined. Call CM with patient once amenable.   - Sergio Dunn ([facility] ): [phone]  - Riana Galindo ([facility] CM): [phone]     Psychiatric Hospital Course:      Irma Heller was admitted to [facility] on a 72 hour hold. Hold was discontinued shortly upon admission as pt agreed to stay voluntary. PTA buspirone, Lexapro, PRN Ativan and hydroxyzine were continued.  PTA Adderall was held to minimize any increased anxiety pending mood stabilization. New medications started at the time of admission include olanzapine 10mg at night which pt has reported as being helpful for sleep and anxiety to date.     Since her admission to the unit, Irma reported feeling safer in the new room. She stated she was able to eat the food and had no paranoia surrounding this. She had ongoing anxiety which was reduced with PRN  Ativan/hydroxyzine. Patient was able to eat lunch in the milieu with the other patients following this. She denied SI/HI and hallucinations at that time. Given the reduction in symptoms in the context of ongoing paranoia and anxiety, plan is to continue olanzapine and monitor for improvement.     Neuro was consulted regarding the patient's history of MS, Chiari malformation and interferon beta-1b use. There were some cases reported of interferon causing psychosis, though this was discussed with neuro who had low suspicion for this etiology due to being on the med for at least 2 years. They recommended we continue patient's interferon to prevent MS flares. However, interferon formulary is not available in our pharmacy so will have to use patient's supply. We will plan to reach out to patient's group home, however we do not have their name or contact information. Though patient adamantly requested us not to inform anyone she is on the unit, we feel she does not currently have capacity and there is a need for collateral information. She also has a daughter that was staying with her aunt per the ED note, and would like to check on her. Attempted to call the aunt at the listed number but it was out of service. Patient's grandmother did not answer. Planned to reassess patient and obtain group home information at a later time.    Our CTC was able to reach the patient's living facility ([facility]) and spoke with the , Sergio. She confirmed Irma resides in an independent living environment and is responsible for taking her own medications. Sergio states the patient is welcome back when ready but would like to see her first. She will be out of office from 05/23 - 05/27 and requested an update prior to that. [facility] also spoke to the patient herself who again emphasized not wanting to return there. Patient continues to have paranoia surrounding her apartment, the staff, and her family. [facility]  discussed with the patient that her CM could be a helpful resource to find discharge options if desired, however at that point the patient expressed not wanting the CM to know she was here. [facility] did reach out to [facility] and received contact info for Riana Galindo and [facility]. Going forward, will plan to obtain collateral from these sources and organize getting patient's interferon.     Patient also requested to have several of her medication dosing switched to her PTA routine. Hydroxyzine changed to scheduled in addition to PRN, Buprenorphine changed to x2 doses in the morning and x1 dose in the afternoon, and Ambien scheduled at bedtime.     As of 06/03, patient is continuing to have paranoia, AH, and significant anxiety. She is consistently using up to her 40 mg limit of combined PRN and scheduled olanzapine dose. In the context of metabolic syndrome and lack of improvement in psychosis, will consider trying Haldol going forward. Patient was resistant to switching to Haldol, but was agreeable to increase scheduled olanzapine to 20mg on 06/03. Felt she was doing better so will continue olanzapine and monitor for improvement, and we have added Depakote on 06/04 for mood stabilization as the team has noticed some symptoms of ap, notabily distractibility, pressured speech, talkativeness and mood lability.     Medical Assessment and Plan     Medical diagnoses to be addressed this admission:      #Elevated A1c  - A1c elevated to 7.0 on admission  - Will discuss with patient and provide outpatient follow-up    #MS  - Given the history of MS and Chiari malformation in the context of new psychosis.  - Plan is to obtain an MRI to rule out medical CNS cause of psychosis, but will wait for neuro.   - No acute concerns per neuro.   - Neuro ok with PTA interferon beta-1b and recommend continuing to minimize risk of MS flare. Interferon is ordered and will be delivered soon.   - Plan to hold Adderall and  sumatriptan until mood and anxiety are stabilized. Will try modafinil if patient is amenable.      #Chiari Malformation  - Neuro consulted, no acute concerns at this time   - MRI pending     #Hx of iron deficiency anemia  - On Ferosul held at time of admission    Medical course:   Patient was physically examined by the ED prior to being transferred to the unit and was found to be medically stable and appropriate for admission. Please see ED/Hospital course above for more information.     On 05/21, called [facility] to organize delivery of patient's interferon. [facility] mentioned the patient has not filled her interferon in 4 months. The initial plan was to have [facility] deliver to the hospital, however, while coordinating with behavioral inpt pharmacy, it turns out they are able to have it ordered for the patient here. Interferon now started as self-inject every other day. Still holding off on the patient's Adderall but considering changing to modafinil for MS-related fatigue.    On hospital day 7, patient developed symptoms concerning for a flare of her MS. Given the new symptoms in the context of not taking her interferon for the past several weeks, neuro were consulted. Patient also had some lower extremity edema so will consult medicine as well. Both medicine and neuro saw the patient. Medicine workup was largely unremarkable with the exception of high glucose in the blood and urine as well as streaky b/l infiltrates representing possible edema, atypical pneumonia, atelectasis. Other notable labs/imaging include normal BNP, CBC, blood cultures, CT head, and BLE ultrasounds. Neuro noted no deficits on their exam, thus recommended no additional imaging or immunosuppressive therapy. Will plan to follow up with medicine to discuss plan.    In the context of possible edema on CXR, orthopnea, and BLE edema, felt that echo was warranted. Discussed this as well as workup findings with medicine. Discussed starting metformin for  glucose and metabolic control with patient, however she states 2-3 years ago she had significant GI upset with metformin and was unable to tolerate it. Will check in with patient again and discuss a different med.     Patient did have an echo which showed slightly reduced LV function with an EF of 50-55% (though it was a technically difficult exam). Medicine was not concerned for heart failure at the moment though they did recommend an outpatient sleep study to assess for VALENTIN. They also advised against medication management of LE edema, recommended continuation of compression stockings. Patient was concerned with the findings so will plan to give her an information sheet regarding heart failure and review it with her. Otherwise, patient is medically stable at this point. Did discuss with her that we can coordinate referral for cardiology, primary care, and dentistry upon discharge.    On hospital day 12, patient reported to us she chipped her two lower central incisors while eating an apple. She endorses now having dental pain and mouth sores in that area. We did consult dental who saw the patient on day 13. They plan to file down the teeth and have her follow up in the outpatient setting. Orajel, Tylenol, and ibuprofen PRN for pain relief in the hospital. Also recommended alcohol-free antiseptic rinse or warm salt water rinse for inflammation. They will facilitate an appointment while patient is in the hospital to address dental fractures and get new dentures started.     Please see medicine, neurology, and dental consult notes for additional details.     Medications:  - Melatonin and Ambien PRN for sleep  - Colace 100 mg daily, Miralax/Maalox PRN for constipation  - Subutex 8 mg TID for opioid dependence (x2 in the morning, x1 in the afternoon)  - Tylenol, ibuprofen PRN for pain  - Betaceron 0.3 mg subcutaneous every other day     Consults:  Medicine for BLE edema, orthopnea. Neurology for history of MS, Chiari I  malformation in context of psychiatric symptoms. Dentistry for dental pain in the context of tooth fracture.      Checklist     Legal Status: Voluntary     Safety Assessment:   Behavioral Orders   Procedures    Code 1 - Restrict to Unit    Code 2    Code 2     Ok to leave unit for echo. Per unit routine.    Routine Programming     As clinically indicated    Status 15     Every 15 minutes.       Risk Assessment:  Risk for harm is moderate.  Risk factors: psychosis, hx of substance use  Protective factors: group home staff, no identified history of attempts, doesn't live alone, family support, children     SIO: none    Disposition: TBD. Disposition pending stabilization, medication optimization, & development of a safe discharge plan.     Attestations     This patient was seen and discussed with my attending physician.    Sharon Kebede, MS3  [facility] [employer]    I was present with the medical student who participated in the service and in the documentation of the note. I have verified the history and personally performed the physical exam and medical decision making. I agree with the assessment and plan of care as documented in the note and have made changes to the note as appropriate.     Ramez Dagher, MD  Psychiatry Resident    Attestation:  This patient has been seen and evaluated by me, Wilmer Cartagena MD.  I have discussed this patient with the house staff team including the resident and medical student and I agree with the findings and plan in this note.    I have reviewed today's vital signs, medications, labs and imaging. Wilmer Cartagena MD , PhD.

## 2024-06-04 NOTE — PROGRESS NOTES
Rehab Group    Start time: 1015  End time: 1215  Patient time total: 95 minutes    attended full group    #6 attended   Group Type: occupational therapy   Group Topic Covered: coping skills     Group Session Detail:  OT clinic     Patient Response/Contribution:  Cooperative with task, Attentive, and Actively engaged     Patient Detail:    Pt actively participated in occupational therapy clinic to facilitate coping skill exploration, creative expression within personally meaningful activities, and clinical observation of social, cognitive, and kinesthetic performance skills. Pt response: Pt was initially indecisive regarding task selection. Subsequently needed minimal assistance to initiate, gather materials, sequence, and adjust to workspace demands as needed. Demonstrated fair focus, planning, and attention to detail for selected structured creative expression task. Requested assistance in planning out the details for her task. Able to ask for assistance as needed, and socialized with peers and staff. Calm and cooperative.        Train & Education Service Per Session 45 + Minutes () OT Group code    Patient Active Problem List   Diagnosis    Abnormal liver function test    Acute cholangitis (H28)    Anxiety disorder    Arnold-Chiari malformation (H)    Calculus of bile duct without obstruction    Depression    Multiple sclerosis (H)    Syringomyelia (H)    Tobacco use disorder    Schizophrenia spectrum disorder with psychotic disorder type not yet determined (H)

## 2024-06-04 NOTE — PROGRESS NOTES
Rehab Group    Start time: 2000   End time: 2100  Patient time total:  60  minutes    attended partial group    #4 attended   Group Type: occupational therapy and OT Clinic   Group Topic Covered: coping skills and activity therapy       Group Session Detail:  OT Clinic     Patient Response/Contribution:  Cooperative with task, Actively engaged, and Distracted       Patient Detail:    Pt actively participated in occupational therapy clinic to facilitate coping skill exploration, creative expression within personally meaningful activities, and clinical observation of social, cognitive, and kinesthetic performance skills. Pt response: Needed moderate assistance to initiate, gather materials, sequence, and adjust to workspace demands as needed. Demonstrated limited focus, planning, and problem solving for selected wood box task.  Asking frequently for help and difficultly staying on task. Left group early.  Pt will continue to be encouraged to attend groups for further asssesssment and to address goals identified on plan of care.         No Charge    Patient Active Problem List   Diagnosis    Abnormal liver function test    Acute cholangitis (H28)    Anxiety disorder    Arnold-Chiari malformation (H)    Calculus of bile duct without obstruction    Depression    Multiple sclerosis (H)    Syringomyelia (H)    Tobacco use disorder    Schizophrenia spectrum disorder with psychotic disorder type not yet determined (H)

## 2024-06-04 NOTE — PLAN OF CARE
Team Note Due:  Thursday    Assessment/Intervention/Current Symtoms and Care Coordination:  Chart review and met with team, discussed pt progress, symptomology, and response to treatment.  Discussed the discharge plan and any potential impediments to discharge.    Met with BRITTA DONAHUE via Teams with pt. Discussed progress and recommended discharge plans. It was agreed that an IRTS is likely the best placement after hospitalization before pt would transition to a  as pt would benefit from additional time for stabilization and support before returning to a more independent living environment.     Pt okay with starting IRTS referrals to [facility], [facility], and [facility]    Sent referral to [facility] via secure email.    Discharge Plan or Goal:  Pending stabilization of symptoms and safe disposition planning.  Needs therapy, psychiatry, and PCP appts scheduled following discharge     Barriers to Discharge:  Patient requires further psychiatric stabilization due to current symptomology, medication management with changes subject to provider, coordination with outside supports, and aftercare planning.     Referral Status:  IRTS:  [facility] - sent 6/4     Legal Status:  Voluntary    Contacts:  Elisa Gomez, grandmother, [phone].    Kalie Villegas, [phone], Aunt, Emergency Contact     Sergio Dunn ([facility] ): [phone]    Riana Galindo (BRITTA DONAHUE): [phone]  [email or web address]     Upcoming Meetings and Dates/Important Information and next steps:  IRTS Referrals  Schedule therapy, PCP, and psychiatry appts  Coordinate discharge with BRITTA DONAHUE

## 2024-06-04 NOTE — PLAN OF CARE
BEH IP Unit Acuity Rating Score (UARS)  Patient is given one point for every criteria they meet.    CRITERIA SCORING   On a 72 hour hold, court hold, committed, stay of commitment, or revocation. 0   Patient LOS on BEH unit exceeds 20 days. 0  LOS: 19   Patient under guardianship, 55+, otherwise medically complex, or under age 11. 0   Suicide ideation without relief of precipitating factors. 0   Current plan for suicide. 0   Current plan for homicide. 0   Imminent risk or actual attempt to seriously harm another without relief of factors precipitating the attempt. 0   Severe dysfunction in daily living (ex: complete neglect for self care, extreme disruption in vegetative function, extreme deterioration in social interactions). 1   Recent (last 7 days) or current physical aggression in the ED or on unit. 0   Restraints or seclusion episode in past 72 hours. 0   Recent (last 7 days) or current verbal aggression, agitation, yelling, etc., while in the ED or unit. 0   Active psychosis. 1   Need for constant or near constant redirection (from leaving, from others, etc).  1   Intrusive or disruptive behaviors. 0   Patient requires 3 or more hours of individualized nursing care per 8-hour shift (i.e. for ADLs, meds, therapeutic interventions). 0   TOTAL 3

## 2024-06-05 PROCEDURE — 250N000013 HC RX MED GY IP 250 OP 250 PS 637: Performed by: STUDENT IN AN ORGANIZED HEALTH CARE EDUCATION/TRAINING PROGRAM

## 2024-06-05 PROCEDURE — 99232 SBSQ HOSP IP/OBS MODERATE 35: CPT | Performed by: PSYCHIATRY & NEUROLOGY

## 2024-06-05 PROCEDURE — 250N000013 HC RX MED GY IP 250 OP 250 PS 637

## 2024-06-05 PROCEDURE — 250N000012 HC RX MED GY IP 250 OP 636 PS 637: Mod: JZ

## 2024-06-05 PROCEDURE — 124N000002 HC R&B MH UMMC

## 2024-06-05 RX ADMIN — DOCUSATE SODIUM 100 MG: 100 CAPSULE, LIQUID FILLED ORAL at 19:55

## 2024-06-05 RX ADMIN — HYDROXYZINE HYDROCHLORIDE 50 MG: 50 TABLET, FILM COATED ORAL at 13:06

## 2024-06-05 RX ADMIN — GABAPENTIN 600 MG: 300 CAPSULE ORAL at 08:03

## 2024-06-05 RX ADMIN — OLANZAPINE 20 MG: 10 TABLET, ORALLY DISINTEGRATING ORAL at 21:00

## 2024-06-05 RX ADMIN — NICOTINE POLACRILEX 2 MG: 2 LOZENGE ORAL at 13:05

## 2024-06-05 RX ADMIN — HYDROXYZINE HYDROCHLORIDE 50 MG: 50 TABLET, FILM COATED ORAL at 08:03

## 2024-06-05 RX ADMIN — BUSPIRONE HYDROCHLORIDE 10 MG: 10 TABLET ORAL at 21:01

## 2024-06-05 RX ADMIN — GABAPENTIN 600 MG: 300 CAPSULE ORAL at 13:05

## 2024-06-05 RX ADMIN — INTERFERON BETA-1B 0.3 MG: KIT at 08:59

## 2024-06-05 RX ADMIN — BUPRENORPHINE 8 MG: 8 TABLET SUBLINGUAL at 16:11

## 2024-06-05 RX ADMIN — Medication 3 MG: at 21:04

## 2024-06-05 RX ADMIN — HYDROXYZINE HYDROCHLORIDE 50 MG: 50 TABLET, FILM COATED ORAL at 19:56

## 2024-06-05 RX ADMIN — IBUPROFEN 600 MG: 600 TABLET ORAL at 08:58

## 2024-06-05 RX ADMIN — OLANZAPINE 10 MG: 10 TABLET, FILM COATED ORAL at 12:25

## 2024-06-05 RX ADMIN — ZOLPIDEM TARTRATE 10 MG: 5 TABLET, COATED ORAL at 21:00

## 2024-06-05 RX ADMIN — BUSPIRONE HYDROCHLORIDE 10 MG: 10 TABLET ORAL at 08:03

## 2024-06-05 RX ADMIN — BUPRENORPHINE 16 MG: 8 TABLET SUBLINGUAL at 08:03

## 2024-06-05 RX ADMIN — LORAZEPAM 1 MG: 1 TABLET ORAL at 03:11

## 2024-06-05 RX ADMIN — NICOTINE POLACRILEX 2 MG: 2 LOZENGE ORAL at 18:33

## 2024-06-05 RX ADMIN — DIVALPROEX SODIUM 250 MG: 250 TABLET, FILM COATED, EXTENDED RELEASE ORAL at 21:00

## 2024-06-05 RX ADMIN — NICOTINE POLACRILEX 2 MG: 2 LOZENGE ORAL at 17:12

## 2024-06-05 RX ADMIN — ACETAMINOPHEN 650 MG: 325 TABLET, FILM COATED ORAL at 12:25

## 2024-06-05 RX ADMIN — DOCUSATE SODIUM 100 MG: 100 CAPSULE, LIQUID FILLED ORAL at 08:03

## 2024-06-05 RX ADMIN — GABAPENTIN 600 MG: 300 CAPSULE ORAL at 19:55

## 2024-06-05 RX ADMIN — ESCITALOPRAM OXALATE 10 MG: 10 TABLET ORAL at 08:03

## 2024-06-05 ASSESSMENT — ACTIVITIES OF DAILY LIVING (ADL)
ADLS_ACUITY_SCORE: 40
ORAL_HYGIENE: INDEPENDENT
DRESS: STREET CLOTHES;SCRUBS (BEHAVIORAL HEALTH);INDEPENDENT
HYGIENE/GROOMING: INDEPENDENT
ADLS_ACUITY_SCORE: 40
LAUNDRY: WITH SUPERVISION
ADLS_ACUITY_SCORE: 40
HYGIENE/GROOMING: HANDWASHING;SHOWER;INDEPENDENT
ADLS_ACUITY_SCORE: 40
DRESS: INDEPENDENT
ADLS_ACUITY_SCORE: 40
ORAL_HYGIENE: INDEPENDENT;PROMPTS
LAUNDRY: WITH SUPERVISION

## 2024-06-05 NOTE — PROGRESS NOTES
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 concerned for heart failure at the moment though they did recommend an outpatient sleep study to assess for VALENTIN. They also advised against medication management of LE edema, recommended continuation of compression stockings. Patient was concerned with the findings so will plan to give her an information sheet regarding heart failure and review it with her. Otherwise, patient is medically stable at this point. Did discuss with her that we can coordinate referral for cardiology, primary care, and dentistry upon discharge.    On hospital day 12, patient reported to us she chipped her two lower central incisors while eating an apple. She endorses now having dental pain and mouth sores in that area. We did consult dental who saw the patient on day 13. They plan to file down the teeth and have her follow up in the outpatient setting. Orajel, Tylenol, and ibuprofen PRN for pain relief in the hospital. Also recommended  alcohol-free antiseptic rinse or warm salt water rinse for inflammation. They will facilitate an appointment while patient is in the hospital to address dental fractures and get new dentures started.     Please see medicine, neurology, and dental consult notes for additional details.     Medications:  - Melatonin and Ambien PRN for sleep  - Colace 100 mg daily, Miralax/Maalox PRN for constipation  - Subutex 8 mg TID for opioid dependence (x2 in the morning, x1 in the afternoon)  - Tylenol, ibuprofen PRN for pain  - Betaceron 0.3 mg subcutaneous every other day     Consults:  Medicine for BLE edema, orthopnea. Neurology for history of MS, Chiari I malformation in context of psychiatric symptoms. Dentistry for dental pain in the context of tooth fracture.      Checklist     Legal Status: Voluntary     Safety Assessment:   Behavioral Orders   Procedures    Code 1 - Restrict to Unit    Code 2    Code 2     Ok to leave unit for echo. Per unit routine.    Routine Programming     As clinically indicated    Status 15     Every 15 minutes.       Risk Assessment:  Risk for harm is moderate.  Risk factors: psychosis, hx of substance use  Protective factors: group home staff, no identified history of attempts, doesn't live alone, family support, children     SIO: none    Disposition: TBD. Disposition pending stabilization, medication optimization, & development of a safe discharge plan.     Attestations     This patient was seen and discussed with my attending physician.    Sharon Kebede, MS3  [facility] [employer]    I was present with the medical student who participated in the service and in the documentation of the note. I have verified the history and personally performed the exam and medical decision making. I agree with the assessment and plan of care as documented in the note. Wilmer Cartagena M.D., Ph.D.

## 2024-06-05 NOTE — PLAN OF CARE
Team Note Due:  Thursday    Assessment/Intervention/Current Symtoms and Care Coordination:  Chart review and met with team, discussed pt progress, symptomology, and response to treatment.  Discussed the discharge plan and any potential impediments to discharge.    Met with pt to discuss IRTS referrals sent to [email or web address], [facility], and [facility] Pt stated she would also like a referral sent to [facility].    Received confirmation that [facility] was received.    Received email from [facility] stating secure email would not open. Resent referral via fax ([phone]). Confirmed fax was received.    Referral to [facility] sent via fax ([phone]).    Discharge Plan or Goal:  Pending stabilization of symptoms and safe disposition planning.  Needs therapy, psychiatry, and PCP appts scheduled following discharge     Barriers to Discharge:  Patient requires further psychiatric stabilization due to current symptomology, medication management with changes subject to provider, coordination with outside supports, and aftercare planning.     Referral Status:  IRTS:  [facility] - sent 6/5 via fax. Referral confirmed, being reviewed.  [facility] - sent 6/5 via fax  [facility] ([facility] preferred) - sent 6/5. Referral confirmed, being reviewed.  [facility] - sent 6/5 via fax     Legal Status:  Voluntary    Contacts:  Elisa Gomez, grandmother, [phone].    Kalie Villegas, [phone], Aunt, Emergency Contact     Sergio Dunn ([facility] residential ): [phone]    Riana Galindo ([facility]): [phone]  [email or web address]     Upcoming Meetings and Dates/Important Information and next steps:  Follow up on IRTS Referrals  Schedule therapy, PCP, and psychiatry appts  [facility]

## 2024-06-05 NOTE — PLAN OF CARE
"Problem: Adult Behavioral Health Plan of Care  Goal: Plan of Care Review  Outcome: Progressing  Flowsheets (Taken 6/4/2024 1706)  Patient Agreement with Plan of Care: agrees   Goal Outcome Evaluation:    Plan of Care Reviewed With: patient          Pt was visible in the milieu most of the shift. She was observed socializing and interacting with selected peers. Her affect was flat but pleasant upon approach. She was observed coloring and reading in the dinning room. She was able to make her needs known. Hygiene was good. She took a shower this shift. Intake adequate. She was out for dinner and snacks and ate 100%. She was very demanding and seeking out for staff all shift. She was encouraged to follow the \"Top of the hour request list.\" She did not attend community meeting at 4 pm but  to attended OT evening group partially at 8 pm. Vitals were WNL.      She was cooperative with assessment. She endorsed generalized pain 8/10 and was given prn Tylenol 650 mg which she said was helpful. She endorsed anxiety 10/10 and depression 3/10. She was given scheduled anxiety medication which she said was helpful. She denied SI/SIB/HI/AH/VH. She contracted for safety. She was medication compliant. Prn Melatonin 3 mg was given at bed time for insomnia per pt request. No other concern noted at this time. No safety concern noted at this time and no medication side effect reported or noted this shift. Will continue to monitor and will assist if need arise.          "

## 2024-06-05 NOTE — PLAN OF CARE
Individual Therapy Note    Session duration in minutes: 5    Pt progress: Pt requested to meet with writer to discuss feelings around discharge plan. Pt expressed desire to discharge as soon as possible, wants to get fresh air and figure out long term plan. Pt expressed hesitancy to go back to [facility], does not feel safe. Writer validated her feelings and encouraged her to reflect and make the best decision for her. Provided Pt with more coloring sheets per her request.         No Charge (0-15 min)

## 2024-06-05 NOTE — PLAN OF CARE
Goal Outcome Evaluation:  NOC Shift Report    Pt in bed at beginning of shift, breathing quiet and unlabored. Pt slept through shift. Pt slept 6.75 hours. Pt woke up x1 for snacks this shift.    No pt complaints or concerns at this time.     PRNs given. Ativan 1 mg for anxiety 6/10.    Will continue to monitor.

## 2024-06-05 NOTE — PLAN OF CARE
BEH IP Unit Acuity Rating Score (UARS)  Patient is given one point for every criteria they meet.    CRITERIA SCORING   On a 72 hour hold, court hold, committed, stay of commitment, or revocation. 0   Patient LOS on BEH unit exceeds 20 days. 0  LOS: 20   Patient under guardianship, 55+, otherwise medically complex, or under age 11. 0   Suicide ideation without relief of precipitating factors. 0   Current plan for suicide. 0   Current plan for homicide. 0   Imminent risk or actual attempt to seriously harm another without relief of factors precipitating the attempt. 0   Severe dysfunction in daily living (ex: complete neglect for self care, extreme disruption in vegetative function, extreme deterioration in social interactions). 1   Recent (last 7 days) or current physical aggression in the ED or on unit. 0   Restraints or seclusion episode in past 72 hours. 0   Recent (last 7 days) or current verbal aggression, agitation, yelling, etc., while in the ED or unit. 0   Active psychosis. 1   Need for constant or near constant redirection (from leaving, from others, etc).  1   Intrusive or disruptive behaviors. 0   Patient requires 3 or more hours of individualized nursing care per 8-hour shift (i.e. for ADLs, meds, therapeutic interventions). 0   TOTAL 3

## 2024-06-05 NOTE — PLAN OF CARE
Rehab Group    Start time: 1700  End time: 1745  Patient time total: 30 minutes    attended partial group    #5 attended   Group Type: occupational therapy   Group Topic Covered: healthy leisure time    Doodle Dice    Group Session Detail:    Patient Response: Pt participated in a group dice activity for leisure exploration and participation as a healthy coping skill, socialization, problem solving and sequencing.     Mood/Affect: Pleasant     Plan: Patient encouraged to maintain attendance for continued ongoing support in working towards occupational therapy goals to support overall treatment/care.        Patient Detail:    Joined for majority of group, however was in and out at times. While in group, was an active participant in the selected dice game activity. Was able to  on rules of activity and participated in each turn with intermittent cues from writer for assistance. Was in and out of group at times to get a snack and to get medications. Stated it is difficult for her at times to stay seated for too long. Thanked writer at end of group for offering activity.       No Charge    Patient Active Problem List   Diagnosis    Abnormal liver function test    Acute cholangitis (H28)    Anxiety disorder    Arnold-Chiari malformation (H)    Calculus of bile duct without obstruction    Depression    Multiple sclerosis (H)    Syringomyelia (H)    Tobacco use disorder    Schizophrenia spectrum disorder with psychotic disorder type not yet determined (H)

## 2024-06-05 NOTE — PLAN OF CARE
"Problem: Adult Behavioral Health Plan of Care  Goal: Plan of Care Review  Outcome: Progressing  Flowsheets (Taken 6/4/2024 1706)  Patient Agreement with Plan of Care: agrees   Goal Outcome Evaluation:    Plan of Care Reviewed With: patient          Pt was visible in the milieu most of the shift socializing and interacting with selected peers. Her affect was flat but pleasant upon approach. She was observed coloring beginning of the shift. She was able to make her needs known. Hygiene was good. She took a shower this shift. Intake adequate. She was out for dinner and snacks and ate 100%. She was very demanding and seeking out for staff all shift. She was encouraged to follow the \"Top of the hour request list.\" She was hyper verbal and loud on the unit needing some redirections. She had an altercation with another peer regarding who is to take a shower first. They were both redirected. She did not attend community meeting at 4 pm but attended OT evening group partially at 8 pm. Vitals were WNL.      She was cooperative with assessment. She endorsed generalized pain 8/10. She endorsed anxiety 10/10 and depression 10/10. She was given scheduled anxiety and pain medication which she said were helpful. She denied SI/SIB/HI/AH/VH. She contracted for safety. She was medication compliant. Prn Melatonin 3 mg was given at bed time for insomnia per pt request. No other concern noted at this time. No safety concern noted at this time and no medication side effect reported or noted this shift. Will continue to monitor and will assist if need arise.          "

## 2024-06-05 NOTE — PLAN OF CARE
"Goal Outcome Evaluation:    Plan of Care Reviewed With: patient Plan of Care Reviewed With: patient    Overall Patient Progress: improvingOverall Patient Progress: improving       Problem: Adult Behavioral Health Plan of Care  Goal: Plan of Care Review  Outcome: Progressing  Flowsheets  Taken 6/5/2024 1350  Plan of Care Reviewed With: patient  Overall Patient Progress: improving  Patient Agreement with Plan of Care: agrees  Taken 6/5/2024 1135  Patient Agreement with Plan of Care: agrees     Pt was up and visible in the milieu most of the shift. Intermittently pacing on the milligan with headphone on. Pt continue seeking for staff and medications frequently, despite being on \"top of the hour request.\" Pt requires a lot of redirections. No aggressive behavior noted or reported. Speech remain hyper verbal. Continue to endorse chronic generalized pain 9/10. Pt was given PRN medications. Endorsed \"mild\" depression, anxiety 5/10 with some irritability. PRN Zyprexa was utilized. Pt self administered Interferon beta-1b 0.3 injection to left thigh. Has a pt an order to get no. From her phone, Facebook to access [facility] worker no, OK to use essential oil and face cream in her room per nursing discretions. Blood pressure 138/72, pulse 92, temperature 98.6  F (37  C), temperature source Oral, resp. rate 16, height 1.651 m (5' 5\"), weight (!) 162.9 kg (359 lb 3.2 oz), SpO2 96%, not currently breastfeeding.      "

## 2024-06-05 NOTE — PLAN OF CARE
Rehab Group    Start time: 10:30  End time: 11:45  Patient time total: 35 minutes    attended partial group    #7 attended   Group Type: OT Clinic   Group Topic Covered: balanced lifestyle, coping skills, relaxation skills , emotional regulation, social skills, and healthy leisure time     Group Session Detail:  Pt actively participated in occupational therapy clinic to facilitate coping skill exploration, creative expression within personally meaningful activities, and clinical observation of social, cognitive, and kinesthetic performance skills.      Patient Response/Contribution:  Cooperative with task, Left the group on several occasions , and Actively engaged     Patient Detail:  Pt response: Jonathan-modA to initiate, gather materials, sequence, and adjust to workspace demands as needed. Patient was quite indecisive when selecting a creative task to work on. Patient appeared to have difficulty focusing attention on a singular task and kept searching and selecting multiple projects of interest. Patient required consistent cueing and redirection throughout time spent in group. Patient appeared easily distracted by options and conversation throughout group; affecting her progress on the selected project. She demonstrated fair focus, planning, and problem solving for selected creative expression task. Patient made multiple requests and demands throughout group. Able to ask for assistance as needed, and appropriate with peers.       Patient Active Problem List   Diagnosis    Abnormal liver function test    Acute cholangitis (H28)    Anxiety disorder    Arnold-Chiari malformation (H)    Calculus of bile duct without obstruction    Depression    Multiple sclerosis (H)    Syringomyelia (H)    Tobacco use disorder    Schizophrenia spectrum disorder with psychotic disorder type not yet determined (H)

## 2024-06-06 PROCEDURE — 250N000013 HC RX MED GY IP 250 OP 250 PS 637: Performed by: STUDENT IN AN ORGANIZED HEALTH CARE EDUCATION/TRAINING PROGRAM

## 2024-06-06 PROCEDURE — 124N000002 HC R&B MH UMMC

## 2024-06-06 PROCEDURE — 250N000013 HC RX MED GY IP 250 OP 250 PS 637

## 2024-06-06 PROCEDURE — 99232 SBSQ HOSP IP/OBS MODERATE 35: CPT | Mod: GC | Performed by: PSYCHIATRY & NEUROLOGY

## 2024-06-06 PROCEDURE — G0177 OPPS/PHP; TRAIN & EDUC SERV: HCPCS

## 2024-06-06 RX ORDER — DIVALPROEX SODIUM 500 MG/1
500 TABLET, EXTENDED RELEASE ORAL AT BEDTIME
Status: DISCONTINUED | OUTPATIENT
Start: 2024-06-06 | End: 2024-06-08

## 2024-06-06 RX ADMIN — ACETAMINOPHEN 650 MG: 325 TABLET, FILM COATED ORAL at 16:54

## 2024-06-06 RX ADMIN — Medication 3 MG: at 20:35

## 2024-06-06 RX ADMIN — NICOTINE POLACRILEX 2 MG: 2 LOZENGE ORAL at 20:06

## 2024-06-06 RX ADMIN — GABAPENTIN 600 MG: 300 CAPSULE ORAL at 07:53

## 2024-06-06 RX ADMIN — BUPRENORPHINE 8 MG: 8 TABLET SUBLINGUAL at 16:54

## 2024-06-06 RX ADMIN — NICOTINE POLACRILEX 2 MG: 2 LOZENGE ORAL at 09:45

## 2024-06-06 RX ADMIN — HYDROXYZINE HYDROCHLORIDE 50 MG: 50 TABLET, FILM COATED ORAL at 07:54

## 2024-06-06 RX ADMIN — NICOTINE POLACRILEX 2 MG: 2 LOZENGE ORAL at 11:42

## 2024-06-06 RX ADMIN — OLANZAPINE 10 MG: 10 TABLET, FILM COATED ORAL at 09:45

## 2024-06-06 RX ADMIN — HYDROXYZINE HYDROCHLORIDE 50 MG: 50 TABLET, FILM COATED ORAL at 13:02

## 2024-06-06 RX ADMIN — NICOTINE POLACRILEX 2 MG: 2 LOZENGE ORAL at 16:58

## 2024-06-06 RX ADMIN — OLANZAPINE 20 MG: 10 TABLET, ORALLY DISINTEGRATING ORAL at 19:17

## 2024-06-06 RX ADMIN — DOCUSATE SODIUM 100 MG: 100 CAPSULE, LIQUID FILLED ORAL at 07:54

## 2024-06-06 RX ADMIN — ESCITALOPRAM OXALATE 10 MG: 10 TABLET ORAL at 07:54

## 2024-06-06 RX ADMIN — BUSPIRONE HYDROCHLORIDE 10 MG: 10 TABLET ORAL at 07:54

## 2024-06-06 RX ADMIN — GABAPENTIN 600 MG: 300 CAPSULE ORAL at 13:02

## 2024-06-06 RX ADMIN — GABAPENTIN 600 MG: 300 CAPSULE ORAL at 19:17

## 2024-06-06 RX ADMIN — IBUPROFEN 600 MG: 600 TABLET ORAL at 09:45

## 2024-06-06 RX ADMIN — NICOTINE POLACRILEX 2 MG: 2 LOZENGE ORAL at 18:44

## 2024-06-06 RX ADMIN — DIVALPROEX SODIUM 500 MG: 500 TABLET, FILM COATED, EXTENDED RELEASE ORAL at 20:35

## 2024-06-06 RX ADMIN — BUPRENORPHINE 16 MG: 8 TABLET SUBLINGUAL at 07:54

## 2024-06-06 RX ADMIN — HYDROXYZINE HYDROCHLORIDE 50 MG: 50 TABLET, FILM COATED ORAL at 19:17

## 2024-06-06 RX ADMIN — ZOLPIDEM TARTRATE 10 MG: 5 TABLET, COATED ORAL at 19:17

## 2024-06-06 RX ADMIN — BUSPIRONE HYDROCHLORIDE 10 MG: 10 TABLET ORAL at 19:17

## 2024-06-06 RX ADMIN — LORAZEPAM 1 MG: 1 TABLET ORAL at 03:52

## 2024-06-06 RX ADMIN — DOCUSATE SODIUM 100 MG: 100 CAPSULE, LIQUID FILLED ORAL at 19:16

## 2024-06-06 ASSESSMENT — ACTIVITIES OF DAILY LIVING (ADL)
ADLS_ACUITY_SCORE: 40
DRESS: STREET CLOTHES;SCRUBS (BEHAVIORAL HEALTH);INDEPENDENT
ADLS_ACUITY_SCORE: 40
ORAL_HYGIENE: INDEPENDENT;PROMPTS
ADLS_ACUITY_SCORE: 40
ADLS_ACUITY_SCORE: 40
LAUNDRY: WITH SUPERVISION
ADLS_ACUITY_SCORE: 40
HYGIENE/GROOMING: HANDWASHING;SHOWER;INDEPENDENT
ADLS_ACUITY_SCORE: 40

## 2024-06-06 NOTE — PROVIDER NOTIFICATION
06/06/24 1217   Individualization/Patient Specific Goals   Patient Personal Strengths expressive of emotions;expressive of needs   Patient Vulnerabilities lacks insight into illness;limited support system;poor physical health   Interprofessional Rounds   Summary Discussed pt progress and discharge planning. IRTS referrals have been made.   Participants nursing;psychiatrist;CTC;other (see comments)   Behavioral Team Discussion   Participants Dr. Mitzi MD; Nena CONTI; Sarahi CARPENTER [facility]; medical resident, medical students   Progress Pt is progressing   Anticipated length of stay 20+ days   Continued Stay Criteria/Rationale Symptom stabilization, medication management, care coordination   Medical/Physical See H&P   Precautions See below   Plan Psychiatric assessment/Medication management. Therapeutic Milieu. Individual care planning and after care planning. Patient to participate in unit groups and activities. Individual and group support on unit.   Safety Plan Per unit protocol   Anticipated Discharge Disposition IRTS     PRECAUTIONS AND SAFETY    Behavioral Orders   Procedures    Code 1 - Restrict to Unit    Code 2    Code 2     Ok to leave unit for echo. Per unit routine.    Routine Programming     As clinically indicated    Status 15     Every 15 minutes.       Safety  Safety WDL: WDL  [facility], [facility]  Observed Behavior: pacing, walking, sitting  Observed Behavior (Comment): attending group  Safety Measures: environmental rounds completed, safety rounds completed, suicide assessment completed, suicide check-in completed  Diversional Activity: art work, music, television  De-Escalation Techniques: verbally redirected  Suicidality: Status 15, Minimal personal belongings in room

## 2024-06-06 NOTE — PROGRESS NOTES
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 stockings. Patient was concerned with the findings so will plan to give her an information sheet regarding heart failure and review it with her. Otherwise, patient is medically stable at this point. Did discuss with her that we can coordinate referral for cardiology, primary care, and dentistry upon discharge.    On hospital day 12, patient reported to us she chipped her two lower central incisors while eating an apple. She endorses now having dental pain and mouth sores in that area. We did consult dental who saw the patient on day 13. They plan to file down the teeth and have her follow up in the outpatient setting. Orajel, Tylenol, and ibuprofen PRN for pain relief in the hospital. Also recommended alcohol-free antiseptic rinse or warm salt water rinse for inflammation. They will facilitate an appointment while patient is in the hospital to address dental fractures and get new dentures started.     Please see medicine,  neurology, and dental consult notes for additional details.     Medications:  - Melatonin and Ambien PRN for sleep  - Colace 100 mg daily, Miralax/Maalox PRN for constipation  - Subutex 8 mg TID for opioid dependence (x2 in the morning, x1 in the afternoon)  - Tylenol, ibuprofen PRN for pain  - Betaceron 0.3 mg subcutaneous every other day     Consults:  Medicine for BLE edema, orthopnea. Neurology for history of MS, Chiari I malformation in context of psychiatric symptoms. Dentistry for dental pain in the context of tooth fracture.      Checklist     Legal Status: Voluntary     Safety Assessment:   Behavioral Orders   Procedures    Code 1 - Restrict to Unit    Code 2    Code 2     Ok to leave unit for echo. Per unit routine.    Routine Programming     As clinically indicated    Status 15     Every 15 minutes.       Risk Assessment:  Risk for harm is low.  Risk factors: psychosis, hx of substance use  Protective factors: group home staff, no identified history of attempts, doesn't live alone, family support, children     SIO: none    Disposition: TBD. Disposition pending stabilization, medication optimization, & development of a safe discharge plan.     Attestations     This patient was seen and discussed with my attending physician.    Sharon Kebede, MS3  [facility] [employer]    Attestation:  I was present with the medical student who participated in the service and in the documentation of the note. I have verified the history and personally performed the physical exam and medical decision making. I agree with the assessment and plan of care as documented in the note.    Ramez Dagher, MD  Psychiatry Resident Physician      Attestation:  This patient has been seen and evaluated by me, Wilmer Cartagena MD.  I have discussed this patient with the house staff team including the resident and medical student and I agree with the findings and plan in this note.    I have reviewed today's vital signs, medications, labs and  imaging. Wilmer Cartagena MD , PhD.

## 2024-06-06 NOTE — PLAN OF CARE
Team Note Due:  Thursday    Assessment/Intervention/Current Symtoms and Care Coordination:  Chart review and met with team, discussed pt progress, symptomology, and response to treatment.  Discussed the discharge plan and any potential impediments to discharge.    Connected with [facility] admissions to discuss follow up questions. Pt's referral remains pending.    Met with pt to provide update on referrals pending review.    Received update from [facility] wanting to interview tomorrow at 10am. Provided unit phone number for interview. Updated pt.    Discharge Plan or Goal:  Pending stabilization of symptoms and safe disposition planning.  Needs therapy, psychiatry, and PCP appts scheduled following discharge     Barriers to Discharge:  Patient requires further psychiatric stabilization due to current symptomology, medication management with changes subject to provider, coordination with outside supports, and aftercare planning.     Referral Status:  IRTS:  [facility] - sent 6/5 via fax. Interview scheduled 6/7  [facility] - sent 6/5 via fax  [facility] ([facility] preferred) - sent 6/5. Referral confirmed, being reviewed.  [facility] - sent 6/5 via fax. Referral confirmed, being reviewed.     Legal Status:  Voluntary    Contacts:  Elisa Gomez, grandmother, [phone].    Kalie Villegas, [phone], Aunt, Emergency Contact     Sergio Dunn ([facility] residential ): [phone]    Riana Galindo ([facility]): [phone]  [email or web address]     Upcoming Meetings and Dates/Important Information and next steps:  Follow up on IRTS Referrals  Schedule therapy, PCP, and psychiatry appts  Coordinate discharge with [facility] DONAH[facility]

## 2024-06-06 NOTE — PLAN OF CARE
Problem: Sleep Disturbance  Goal: Adequate Sleep/Rest  Outcome: Progressing   Goal Outcome Evaluation:    Patient appears to have slept a total of 6 hours.     Received PRN Ativan for anxiety. Denied other concerns. Sleeping upon reassessment. Safety/environment checks conducted every 15 minutes with no further concerns noted. No complaints of pain/discomfort.

## 2024-06-06 NOTE — PLAN OF CARE
Rehab Group    Start time: 1015  End time: 1200  Patient time total: 45 minutes    attended full group    #6 attended   Group Type: OT Clinic   Group Topic Covered: coping skills     Group Session Detail:    Intervention:  Pt participated in a OT Clinic group to facilitate coping skills exploration and creative expression through personally meaningful activities, and to encourage utilization of these healthy coping skills to promote overall health and wellness. Group included clinical observation of social, cognitive and kinesthetic performance skills to inform treatment and safe discharge planning.    Mood/Affect: Pleasant      Plan: Patient encouraged to maintain attendance for continued ongoing support in working towards occupational therapy goals to support overall treatment/care.        Patient Detail:    Joined at start of group, selecting a project to begin working on and requesting assistance from writer for setup of this project. At times required cues that writer needed to assist other patients with their projects prior to assisting them. Was however understanding of these times and waited for writer to come help. Was in and out of group at times, taking breaks and socialized with writer and others in the group. By the time patient left, had made minimal progress on selected project. Did cleanup materials and thanked writer for their time.        Patient Active Problem List   Diagnosis    Abnormal liver function test    Acute cholangitis (H28)    Anxiety disorder    Arnold-Chiari malformation (H)    Calculus of bile duct without obstruction    Depression    Multiple sclerosis (H)    Syringomyelia (H)    Tobacco use disorder    Schizophrenia spectrum disorder with psychotic disorder type not yet determined (H)

## 2024-06-06 NOTE — PLAN OF CARE
"Goal Outcome Evaluation:    Plan of Care Reviewed With: patient Plan of Care Reviewed With: patient    Overall Patient Progress: improvingOverall Patient Progress: improving       Problem: Adult Behavioral Health Plan of Care  Goal: Plan of Care Review  Outcome: Progressing  Flowsheets  Taken 6/6/2024 1156  Plan of Care Reviewed With: patient  Overall Patient Progress: improving  Patient Agreement with Plan of Care: agrees  Taken 6/6/2024 1100  Patient Agreement with Plan of Care: agrees     Pt was presented with \"high\" anxiety and pain. Denied all other phsych symptoms. She was up and visible in the milieu most of the shift. Intermittently pacing on the milligan and seeking for staff and medications frequently, despite being on \"top of the hour request.\" Pt requires a lot of redirections. No aggressive behavior noted or reported. Speech remain hyper verbal. Has a pt an order to get no. From her phone, Facebook to access S worker no, OK to use essential oil and face cream in her room per nursing discretions. Pt has code 3 could also attend the group activities per nursing discretions. If group home staff happen to come dropping pt phone, please encourage them to come up and say hi to pt, for pt re assurance that she will be able to return to the previous group home. Blood pressure (!) 147/80, pulse 96, temperature 98.8  F (37.1  C), temperature source Oral, resp. rate 16, height 1.651 m (5' 5\"), weight (!) 162.9 kg (359 lb 3.2 oz), SpO2 92%, not currently breastfeeding.    "

## 2024-06-06 NOTE — PLAN OF CARE
BEH IP Unit Acuity Rating Score (UARS)  Patient is given one point for every criteria they meet.    CRITERIA SCORING   On a 72 hour hold, court hold, committed, stay of commitment, or revocation. 0   Patient LOS on BEH unit exceeds 20 days. 1  LOS: 21   Patient under guardianship, 55+, otherwise medically complex, or under age 11. 0   Suicide ideation without relief of precipitating factors. 0   Current plan for suicide. 0   Current plan for homicide. 0   Imminent risk or actual attempt to seriously harm another without relief of factors precipitating the attempt. 0   Severe dysfunction in daily living (ex: complete neglect for self care, extreme disruption in vegetative function, extreme deterioration in social interactions). 1   Recent (last 7 days) or current physical aggression in the ED or on unit. 0   Restraints or seclusion episode in past 72 hours. 0   Recent (last 7 days) or current verbal aggression, agitation, yelling, etc., while in the ED or unit. 0   Active psychosis. 1   Need for constant or near constant redirection (from leaving, from others, etc).  1   Intrusive or disruptive behaviors. 0   Patient requires 3 or more hours of individualized nursing care per 8-hour shift (i.e. for ADLs, meds, therapeutic interventions). 0   TOTAL 4

## 2024-06-06 NOTE — PLAN OF CARE
Problem: Adult Behavioral Health Plan of Care  Goal: Absence of New-Onset Illness or Injury  Outcome: Progressing   Goal Outcome Evaluation:    Plan of Care Reviewed With: patient      Pt denied all psych symptoms except that she endorsed anxiety/depression. Pt was very needy, and she had to be redirected several times. Pt took a shower without problems. She was med compliant. She was very interactive with peers though intrusive at times. Pt had a good oral intake. Pt is sleeping at this time.

## 2024-06-07 PROCEDURE — 250N000013 HC RX MED GY IP 250 OP 250 PS 637

## 2024-06-07 PROCEDURE — 250N000012 HC RX MED GY IP 250 OP 636 PS 637: Mod: JZ

## 2024-06-07 PROCEDURE — 250N000013 HC RX MED GY IP 250 OP 250 PS 637: Performed by: STUDENT IN AN ORGANIZED HEALTH CARE EDUCATION/TRAINING PROGRAM

## 2024-06-07 PROCEDURE — 99232 SBSQ HOSP IP/OBS MODERATE 35: CPT | Mod: GC | Performed by: PSYCHIATRY & NEUROLOGY

## 2024-06-07 PROCEDURE — 124N000002 HC R&B MH UMMC

## 2024-06-07 RX ADMIN — DOCUSATE SODIUM 100 MG: 100 CAPSULE, LIQUID FILLED ORAL at 07:39

## 2024-06-07 RX ADMIN — LORAZEPAM 1 MG: 1 TABLET ORAL at 05:27

## 2024-06-07 RX ADMIN — HYDROXYZINE HYDROCHLORIDE 50 MG: 50 TABLET, FILM COATED ORAL at 07:38

## 2024-06-07 RX ADMIN — ZOLPIDEM TARTRATE 10 MG: 5 TABLET, COATED ORAL at 19:07

## 2024-06-07 RX ADMIN — NICOTINE POLACRILEX 2 MG: 2 LOZENGE ORAL at 16:29

## 2024-06-07 RX ADMIN — NICOTINE POLACRILEX 2 MG: 2 LOZENGE ORAL at 12:47

## 2024-06-07 RX ADMIN — ACETAMINOPHEN 650 MG: 325 TABLET, FILM COATED ORAL at 15:58

## 2024-06-07 RX ADMIN — HYDROXYZINE HYDROCHLORIDE 50 MG: 50 TABLET, FILM COATED ORAL at 19:07

## 2024-06-07 RX ADMIN — NICOTINE POLACRILEX 2 MG: 2 LOZENGE ORAL at 09:16

## 2024-06-07 RX ADMIN — GABAPENTIN 600 MG: 300 CAPSULE ORAL at 19:06

## 2024-06-07 RX ADMIN — DIVALPROEX SODIUM 500 MG: 500 TABLET, FILM COATED, EXTENDED RELEASE ORAL at 22:02

## 2024-06-07 RX ADMIN — Medication 3 MG: at 19:07

## 2024-06-07 RX ADMIN — HYDROXYZINE HYDROCHLORIDE 50 MG: 50 TABLET, FILM COATED ORAL at 14:52

## 2024-06-07 RX ADMIN — BUPRENORPHINE 8 MG: 8 TABLET SUBLINGUAL at 15:58

## 2024-06-07 RX ADMIN — BUSPIRONE HYDROCHLORIDE 10 MG: 10 TABLET ORAL at 07:38

## 2024-06-07 RX ADMIN — INTERFERON BETA-1B 0.3 MG: KIT at 08:33

## 2024-06-07 RX ADMIN — BUPRENORPHINE 16 MG: 8 TABLET SUBLINGUAL at 08:33

## 2024-06-07 RX ADMIN — IBUPROFEN 600 MG: 600 TABLET ORAL at 12:27

## 2024-06-07 RX ADMIN — ESCITALOPRAM OXALATE 10 MG: 10 TABLET ORAL at 07:43

## 2024-06-07 RX ADMIN — GABAPENTIN 600 MG: 300 CAPSULE ORAL at 07:39

## 2024-06-07 RX ADMIN — OLANZAPINE 10 MG: 10 TABLET, FILM COATED ORAL at 09:19

## 2024-06-07 RX ADMIN — DOCUSATE SODIUM 100 MG: 100 CAPSULE, LIQUID FILLED ORAL at 19:07

## 2024-06-07 RX ADMIN — OLANZAPINE 20 MG: 10 TABLET, ORALLY DISINTEGRATING ORAL at 19:07

## 2024-06-07 RX ADMIN — GABAPENTIN 600 MG: 300 CAPSULE ORAL at 14:52

## 2024-06-07 RX ADMIN — LORAZEPAM 1 MG: 1 TABLET ORAL at 17:20

## 2024-06-07 RX ADMIN — BUSPIRONE HYDROCHLORIDE 10 MG: 10 TABLET ORAL at 19:07

## 2024-06-07 ASSESSMENT — ACTIVITIES OF DAILY LIVING (ADL)
ADLS_ACUITY_SCORE: 40
LAUNDRY: WITH SUPERVISION
ADLS_ACUITY_SCORE: 40
ADLS_ACUITY_SCORE: 40
ORAL_HYGIENE: INDEPENDENT
ADLS_ACUITY_SCORE: 40
HYGIENE/GROOMING: INDEPENDENT
LAUNDRY: WITH SUPERVISION
DRESS: INDEPENDENT
DRESS: INDEPENDENT
ADLS_ACUITY_SCORE: 40
ORAL_HYGIENE: INDEPENDENT
ADLS_ACUITY_SCORE: 40
HYGIENE/GROOMING: INDEPENDENT
ADLS_ACUITY_SCORE: 40

## 2024-06-07 NOTE — PROGRESS NOTES
"  ----------------------------------------------------------------------------------------------------------  [facility]  Psychiatry Progress Note  Hospital Day #22     Interim History:     The patient's care was discussed with the treatment team and chart notes were reviewed.    Vitals: /81   Pulse 97   Temp 98.9  F (37.2  C) (Oral)   Resp 16   Ht 1.651 m (5' 5\")   Wt (!) 162.9 kg (359 lb 3.2 oz)   SpO2 92%   BMI 59.77 kg/m    Sleep: 5.5 hours (06/07/24 0600)  Scheduled medications: Took all scheduled medications as prescribed  PRN medications:     Last 24H PRN:     acetaminophen (TYLENOL) tablet 650 mg, 650 mg at 06/06/24 1654    ibuprofen (ADVIL/MOTRIN) tablet 600 mg, 600 mg at 06/06/24 0945    LORazepam (ATIVAN) tablet 1 mg, 1 mg at 06/07/24 0527    melatonin tablet 3 mg, 3 mg at 06/06/24 2035    nicotine (COMMIT) lozenge 2 mg, 2 mg at 06/06/24 2006    OLANZapine (zyPREXA) tablet 10 mg, 10 mg at 06/06/24 0945 **OR** OLANZapine (zyPREXA) injection 10 mg      Staff Report/Interval Events  In summary, during the day, Pt was presented with \"high\" anxiety and pain. Denied all other psych symptoms. Intermittently pacing on the milligan and seeking for staff and medications frequently, despite being on \"top of the hour request.\" Pt requires a lot of redirections. No aggressive behavior noted or reported. Speech remain hyper verbal. Overnight, patient woke up early this morning with multiple requests. The patient had 5.5 total hours in the evening.     Please see staff notes for more details.     Subjective:     Patient Interview:  The team interviewed Irma in her room. She says she feels 'pissed off' because they put \"Timur\" as her medical ID, her grandmother who lives in [place]. She says she felt they did it maliciously and suspects they went through her phone. She brightens up when talking about another patient, and says she feels as if she has known her for " "a long time. She says she likes her medication at the moment and would like them to stay the same. She says she does not have racing thoughts at the moment. She then says that [employer] would let her know the result of the interview this evening. She asks for access to her phone to check her bank funds and to make sure no one has messed with them.    ROS:   Dental pain, but no acute concerns otherwise.      Objective:     Vitals:  /81   Pulse 97   Temp 98.9  F (37.2  C) (Oral)   Resp 16   Ht 1.651 m (5' 5\")   Wt (!) 162.9 kg (359 lb 3.2 oz)   SpO2 92%   BMI 59.77 kg/m      Allergies:  Allergies   Allergen Reactions    Glatiramer Hives    Penicillins Anaphylaxis and Hives    Shellfish Allergy Anaphylaxis and Hives    Copaxone [Glatiramer Acetate] Swelling       Current Medications:  Scheduled:  Current Facility-Administered Medications   Medication Dose Route Frequency Provider Last Rate Last Admin    acetaminophen (TYLENOL) tablet 650 mg  650 mg Oral Q4H PRN Juju Salgado MD   650 mg at 06/06/24 1654    alum & mag hydroxide-simethicone (MAALOX) suspension 30 mL  30 mL Oral Q4H PRN Juju Salgado MD   30 mL at 05/30/24 1049    benzocaine (ORAJEL MAXIMUM STRENGTH) 20 % gel   Mouth/Throat 4x Daily PRN Shanti Watres MD   Given at 05/31/24 1602    benzocaine-menthol (CHLORASEPTIC) 6-10 MG lozenge 1 lozenge  1 lozenge Buccal Q1H PRN Madison Bolivar MD   1 lozenge at 06/01/24 1835    buprenorphine (SUBUTEX) sublingual tablet 16 mg  16 mg Sublingual Daily Shanti Waters MD   16 mg at 06/06/24 0754    buprenorphine (SUBUTEX) sublingual tablet 8 mg  8 mg Sublingual At Bedtime Dagher, Ramez, MD   8 mg at 06/06/24 1654    busPIRone (BUSPAR) tablet 10 mg  10 mg Oral BID Juju Salgado MD   10 mg at 06/07/24 0738    divalproex sodium extended-release (DEPAKOTE ER) 24 hr tablet 500 mg  500 mg Oral At Bedtime Dagher, Ramez, MD   500 mg at 06/06/24 2035    docusate sodium (COLACE) " capsule 100 mg  100 mg Oral BID Dagher, Ramez, MD   100 mg at 06/07/24 0739    docusate sodium (COLACE) capsule 100 mg  100 mg Oral Daily PRN Juju Salgado MD        escitalopram (LEXAPRO) tablet 10 mg  10 mg Oral Daily Juju Salgado MD   10 mg at 06/07/24 0743    gabapentin (NEURONTIN) capsule 600 mg  600 mg Oral TID Juju Salgado MD   600 mg at 06/07/24 0739    hydrOXYzine HCl (ATARAX) tablet 50 mg  50 mg Oral TID Shanti Waters MD   50 mg at 06/07/24 0738    ibuprofen (ADVIL/MOTRIN) tablet 600 mg  600 mg Oral Q8H PRN Shanti Waters MD   600 mg at 06/06/24 0945    interferon beta-1b (BETASERON) injection 0.3 mg  0.3 mg Subcutaneous Every Other Day Shanti Waters MD   0.3 mg at 06/05/24 0859    lidocaine (LMX4) cream   Topical Q1H PRN Lalitha Serrano PA-C        lidocaine 1 % 0.1-1 mL  0.1-1 mL Other Q1H PRN Lalitha Serrano PA-C        loperamide (IMODIUM) capsule 2 mg  2 mg Oral 4x Daily PRN Ludmila Lozano MD   2 mg at 05/19/24 2013    LORazepam (ATIVAN) tablet 1 mg  1 mg Oral BID PRN Shanti Waters MD   1 mg at 06/07/24 0527    melatonin tablet 3 mg  3 mg Oral At Bedtime PRN Ludmila Lozano MD   3 mg at 06/06/24 2035    naloxone (NARCAN) injection 0.2 mg  0.2 mg Intravenous Q2 Min PRN Juju Salgado MD        Or    naloxone (NARCAN) injection 0.4 mg  0.4 mg Intravenous Q2 Min PRN Juju Salgado MD        Or    naloxone (NARCAN) injection 0.2 mg  0.2 mg Intramuscular Q2 Min PRN Juju Salgado MD        Or    naloxone (NARCAN) injection 0.4 mg  0.4 mg Intramuscular Q2 Min PRN Juju Salgado MD        nicotine (COMMIT) lozenge 2 mg  2 mg Buccal Q2H PRN Jaren Zaman MD   2 mg at 06/06/24 2006    OLANZapine (zyPREXA) tablet 10 mg  10 mg Oral BID PRN Shanti Waters MD   10 mg at 06/06/24 0945    Or    OLANZapine (zyPREXA) injection 10 mg  10 mg Intramuscular TID PRN Shanti Waters MD        OLANZapine (zyPREXA) tablet 5 mg  5 mg Oral TID PRN  Shanti Waters MD   5 mg at 06/04/24 0900    OLANZapine zydis (zyPREXA) ODT tab 20 mg  20 mg Oral At Bedtime Loren Genao MD   20 mg at 06/06/24 1917    polyethylene glycol (MIRALAX) Packet 17 g  17 g Oral Daily PRN Juju Salgado MD   17 g at 05/30/24 1125    simethicone (MYLICON) chewable tablet 80 mg  80 mg Oral Q6H PRN Ludmila Lozano MD   80 mg at 05/31/24 0919    sodium chloride (PF) 0.9% PF flush 3 mL  3 mL Intracatheter q1 min prn Lalitha Serrano PA-C        [Held by provider] SUMAtriptan (IMITREX) tablet 50 mg  50 mg Oral at onset of headache Juju Salgado MD        zolpidem (AMBIEN) tablet 10 mg  10 mg Oral At Bedtime Shanti Waters MD   10 mg at 06/06/24 1917       PRN:  Current Facility-Administered Medications   Medication Dose Route Frequency Provider Last Rate Last Admin    acetaminophen (TYLENOL) tablet 650 mg  650 mg Oral Q4H PRN Juju Salgado MD   650 mg at 06/06/24 1654    alum & mag hydroxide-simethicone (MAALOX) suspension 30 mL  30 mL Oral Q4H PRN Juju Salgado MD   30 mL at 05/30/24 1049    benzocaine (ORAJEL MAXIMUM STRENGTH) 20 % gel   Mouth/Throat 4x Daily PRN Shanti Waters MD   Given at 05/31/24 1602    benzocaine-menthol (CHLORASEPTIC) 6-10 MG lozenge 1 lozenge  1 lozenge Buccal Q1H PRN Madison Bolivar MD   1 lozenge at 06/01/24 1835    buprenorphine (SUBUTEX) sublingual tablet 16 mg  16 mg Sublingual Daily Shanti aWters MD   16 mg at 06/06/24 0754    buprenorphine (SUBUTEX) sublingual tablet 8 mg  8 mg Sublingual At Bedtime Dagher, Ramez, MD   8 mg at 06/06/24 1654    busPIRone (BUSPAR) tablet 10 mg  10 mg Oral BID Juju Salgado MD   10 mg at 06/07/24 0738    divalproex sodium extended-release (DEPAKOTE ER) 24 hr tablet 500 mg  500 mg Oral At Bedtime Dagher, Ramez, MD   500 mg at 06/06/24 2035    docusate sodium (COLACE) capsule 100 mg  100 mg Oral BID Dagher, Ramez, MD   100 mg at 06/07/24 0739    docusate sodium (COLACE) capsule 100  mg  100 mg Oral Daily PRN Juju Salgado MD        escitalopram (LEXAPRO) tablet 10 mg  10 mg Oral Daily Juju Salgado MD   10 mg at 06/07/24 0743    gabapentin (NEURONTIN) capsule 600 mg  600 mg Oral TID Juju Salgado MD   600 mg at 06/07/24 0739    hydrOXYzine HCl (ATARAX) tablet 50 mg  50 mg Oral TID Shanti Waters MD   50 mg at 06/07/24 0738    ibuprofen (ADVIL/MOTRIN) tablet 600 mg  600 mg Oral Q8H PRN Shanti Waters MD   600 mg at 06/06/24 0945    interferon beta-1b (BETASERON) injection 0.3 mg  0.3 mg Subcutaneous Every Other Day Shanti Waters MD   0.3 mg at 06/05/24 0859    lidocaine (LMX4) cream   Topical Q1H PRN Lalitha Serrano PA-C        lidocaine 1 % 0.1-1 mL  0.1-1 mL Other Q1H PRN Lalitha Serrano PA-C        loperamide (IMODIUM) capsule 2 mg  2 mg Oral 4x Daily PRN Ludmila Lozano MD   2 mg at 05/19/24 2013    LORazepam (ATIVAN) tablet 1 mg  1 mg Oral BID PRN Shanti Waters MD   1 mg at 06/07/24 0527    melatonin tablet 3 mg  3 mg Oral At Bedtime PRN Ludmila Lozano MD   3 mg at 06/06/24 2035    naloxone (NARCAN) injection 0.2 mg  0.2 mg Intravenous Q2 Min PRN Juju Salgado MD        Or    naloxone (NARCAN) injection 0.4 mg  0.4 mg Intravenous Q2 Min PRN Juju Salgado MD        Or    naloxone (NARCAN) injection 0.2 mg  0.2 mg Intramuscular Q2 Min PRN Juju Salgado MD        Or    naloxone (NARCAN) injection 0.4 mg  0.4 mg Intramuscular Q2 Min PRN Juju Salgado MD        nicotine (COMMIT) lozenge 2 mg  2 mg Buccal Q2H PRN Jaren Zaman MD   2 mg at 06/06/24 2006    OLANZapine (zyPREXA) tablet 10 mg  10 mg Oral BID PRN Shanti Waters MD   10 mg at 06/06/24 0945    Or    OLANZapine (zyPREXA) injection 10 mg  10 mg Intramuscular TID PRN Shanti Waters MD        OLANZapine (zyPREXA) tablet 5 mg  5 mg Oral TID PRN Shanti Waters MD   5 mg at 06/04/24 0900    OLANZapine zydis (zyPREXA) ODT tab 20 mg  20 mg Oral At Bedtime  Loren Genao MD   20 mg at 06/06/24 1917    polyethylene glycol (MIRALAX) Packet 17 g  17 g Oral Daily PRN Juju Salgado MD   17 g at 05/30/24 1125    simethicone (MYLICON) chewable tablet 80 mg  80 mg Oral Q6H PRN Ludmila Lozano MD   80 mg at 05/31/24 0919    sodium chloride (PF) 0.9% PF flush 3 mL  3 mL Intracatheter q1 min prn Lalitha Serrano PA-C        [Held by provider] SUMAtriptan (IMITREX) tablet 50 mg  50 mg Oral at onset of headache Juju Salgado MD        zolpidem (AMBIEN) tablet 10 mg  10 mg Oral At Bedtime Shanti Waters MD   10 mg at 06/06/24 1917       Labs and Imaging:  New results:   No results found for this or any previous visit (from the past 24 hour(s)).    Data this admission:  - CBC unremarkable, WBC 13 on initial, 9.0 on repeat  - BMP unremarkable except for glucose 141 (05/17), repeat at 258 (05/24)  - HbA1c 7.0  - Lipids WNL except for HDL at 31  - BNP WNL  - Blood cultures negative  - TSH normal  - Vit D normal  - Vit B12 normal  - Folate normal    - UA negative for infection, glucose 100  - UDS positive for amphetamines, benzos (both prescribed)  - UPT negative    - EKG with NSR, QTc 450 on 05/16    - MRI (5/18/24) showed stable MS plaques unchanged from previous on 8/1/22  - CT head unremarkable  - CXR with mild prominence of the cardiac silhouette, nonspecific mild streaky perihilar and basilar opacities   - BLE ultrasound unremarkable, negative for DVT  - Echo was a technically difficult study, showed mildly decreased LV function with EF 50-55% and globally reduced RV function     Mental Status Exam:   Oriented to:  Grossly Oriented  General:  Awake and Alert  Appearance:  appears stated age and grooming is adequate; dressed in hospital scrubs  Behavior/Attitude:  calm, cooperative, engaged, easily distracted, and suspicious  Eye Contact:  appropriate  Psychomotor: no evidence of tics, dystonia, or tardive dyskinesia and restless no catatonia present  Speech:   "talkative and spontaneous  Language: Fluent in English with appropriate syntax and vocabulary.  Mood: \"pissed off\", but \"good\"  Affect: Less labile than previous days. Mainly bright, but occasionally anxious. Able to redirect self when anxiety is exacerbated.   Thought Content: Flight of ideas, requiring redirection; delusions of paranoia;  Associations:  questionable  Insight:  limited; does not recognize psychosis symptoms.   Judgment:  limited, patient notably paranoid, suspicious, recent inability to care for self PTA though has been adherent with treatment team recommendations, engaged in groups  Impulse control: poor  Attention Span:  grossly intact  Concentration:  grossly intact  Recent and Remote Memory:  not formally assessed  Fund of Knowledge:  delayed; not formally diagnosed  Muscle Strength and Tone: normal  Gait and Station: Normal    Physical Exam: No concerns     Psychiatric Assessment     Irma Heller is a 33 year old female previously diagnosed with anxiety, depression, and opioid dependence in remission who presented to the ED by EMS and admitted with psychosis in the context of likely first-episode psychosis. No prior psychiatric hospitalizations. Significant symptoms on admission include delusions of paranoia, visual and tactile hallucinations, and increased anxiety. The MSE on admission was pertinent for paranoia, anxiety. Biological contributions to mental health presentation include hx of opioid use disorder (though this has been stable) as well as medical diagnosis of MS, Chiari malformation which are reportedly. Psychological contributions to mental health presentation include anxiety, depression. Social factors contributing to mental health presentation include poor social support outside of group home staff. Protective factors include good medication adherence and group home staff.      Patient's definitive diagnosis is still in evolution, differential for psychosis includes " primary psychotic disorder (ie, schizophrenia, schizoaffective) or other psychiatric etiology vs. drug-induced psychosis vs. medical etiology of psychosis. Feel that this is likely a primary psychiatric cause in the context of UDS positive only for prescribed medication and no recent drug use.      However, consultation with neurology to evaluate medical etiology, further neuroimaging and further history from collateral was considered to get a better understand of patient's symptom timeline and course. Can also consider the role of her interferon in her psychosis. She will likely benefit from antipsychotics this admission.     Given that she currently has psychosis, patient warrants inpatient psychiatric hospitalization to maintain her safety     Psychiatric Plan by Diagnosis      Today's changes:  - Continue with current treatment plan    # Psychosis  Patient was given olanzapine as well as IM Haldol/Versed in the ED which seemed to be effective. Plan is to continue olanzapine for stabilization of acute psychosis and monitoring for improvement.     Medications:  - Olanzapine 15 mg PO at bedtime  - Increased Olanzapine from 15mg to 20mg on 06/03  - Olanzapine 5 mg TID PRN  - Olanzapine 10 mg PRN for emergency agitation/aggression related to psychosis   - Increased Depakote from 250 mg to 500 mg for mood stabilization on 06/06     # Anxiety/Depression  Patient has a history of anxiety which she states has been well managed by her home meds prior to this point. She has had an acute increase in her anxiety due to being overwhelmed with new symptoms and fear for her safety. Will plan to continue her home medication regimen in tandem with the olanzapine.      Medications:  - Buspirone 10 mg bid  - Lexapro 10 mg daily  - Hydroxyzine 50 mg TID  - Ativan 1 mg PRN     Pertinent Labs/Monitoring:   - CBC unremarkable   - UDS positive for amphetamines, benzos (both prescribed)  - HbA1c 7.0  - UPT negative  - EKG with NSR, QTc  450 on 05/16     Additional Plans:  - Patient will be treated in therapeutic milieu with appropriate individual and group therapies as described  - Keep in touch with patient's residence and CM once disposition is determined. Call CM with patient once amenable.   - Sergio Dunn ([facility] ): [phone]  - Riana Galindo ([facility]): [phone]     Psychiatric Hospital Course:      Irma Heller was admitted to [facility] on a 72 hour hold. Hold was discontinued shortly upon admission as pt agreed to stay voluntary. PTA buspirone, Lexapro, PRN Ativan and hydroxyzine were continued.  PTA Adderall was held to minimize any increased anxiety pending mood stabilization. New medications started at the time of admission include olanzapine 10mg at night which pt has reported as being helpful for sleep and anxiety to date.     Since her admission to the unit, Irma reported feeling safer in the new room. She stated she was able to eat the food and had no paranoia surrounding this. She had ongoing anxiety which was reduced with PRN Ativan/hydroxyzine. Patient was able to eat lunch in the milieu with the other patients following this. She denied SI/HI and hallucinations at that time. Given the reduction in symptoms in the context of ongoing paranoia and anxiety, plan is to continue olanzapine and monitor for improvement.     Neuro was consulted regarding the patient's history of MS, Chiari malformation and interferon beta-1b use. There were some cases reported of interferon causing psychosis, though this was discussed with neuro who had low suspicion for this etiology due to being on the med for at least 2 years. They recommended we continue patient's interferon to prevent MS flares. However, interferon formulary is not available in our pharmacy so will have to use patient's supply. We will plan to reach out to patient's group home, however we do not have their name or contact  information. Though patient adamantly requested us not to inform anyone she is on the unit, we feel she does not currently have capacity and there is a need for collateral information. She also has a daughter that was staying with her aunt per the ED note, and would like to check on her. Attempted to call the aunt at the listed number but it was out of service. Patient's grandmother did not answer. Planned to reassess patient and obtain group home information at a later time.    Our CTC was able to reach the [facility] living facility [facility] and spoke with the , Sergio. She confirmed Irma resides in an independent living environment and is responsible for taking her own medications. Sergio states the patient is welcome back when ready but would like to see her first. She will be out of office from 05/23 - 05/27 and requested an update prior to that. [facility] also spoke to the patient herself who again emphasized not wanting to return there. Patient continues to have paranoia surrounding her apartment, the staff, and her family. [facility] discussed with the patient that her CM could be a helpful resource to find discharge options if desired, however at that point the patient expressed not wanting the CM to know she was here. [facility] did reach out to [facility] and received contact info for Riana Galindo and [facility]. Going forward, will plan to obtain collateral from these sources and organize getting patient's interferon.     Patient also requested to have several of her medication dosing switched to her PTA routine. Hydroxyzine changed to scheduled in addition to PRN, Buprenorphine changed to x2 doses in the morning and x1 dose in the afternoon, and Ambien scheduled at bedtime.     As of 06/03, patient is continuing to have paranoia, AH, and significant anxiety. She is consistently using up to her 40 mg limit of combined PRN and scheduled olanzapine dose. In the context of  metabolic syndrome and lack of improvement in psychosis, will consider trying Haldol going forward. Patient was resistant to switching to Haldol, but was agreeable to increase scheduled olanzapine to 20mg on 06/03. Felt she was doing better so will continue olanzapine and monitor for improvement, and we have added Depakote on 06/04 for mood stabilization as the team has noticed some symptoms of ap, notabily distractibility, pressured speech, talkativeness and mood lability. Depakote was increased to 500mg on 06/06.     Medical Assessment and Plan     Medical diagnoses to be addressed this admission:      #Elevated A1c  - A1c elevated to 7.0 on admission  - Will discuss with patient and provide outpatient follow-up    #MS  - Given the history of MS and Chiari malformation in the context of new psychosis.  - Plan is to obtain an MRI to rule out medical CNS cause of psychosis, but will wait for neuro.   - No acute concerns per neuro.   - Neuro ok with PTA interferon beta-1b and recommend continuing to minimize risk of MS flare. Interferon is ordered and will be delivered soon.   - Plan to hold Adderall and sumatriptan until mood and anxiety are stabilized. Will try modafinil if patient is amenable.      #Chiari Malformation  - Neuro consulted, no acute concerns at this time   - MRI pending     #Hx of iron deficiency anemia  - On Ferosul held at time of admission    Medical course:   Patient was physically examined by the ED prior to being transferred to the unit and was found to be medically stable and appropriate for admission. Please see ED/Hospital course above for more information.     On 05/21, called [facility] to organize delivery of patient's interferon. [facility] mentioned the patient has not filled her interferon in 4 months. The initial plan was to have [facility] deliver to the hospital, however, while coordinating with behavioral inpt pharmacy, it turns out they are able to have it ordered for the patient here.  Interferon now started as self-inject every other day. Still holding off on the patient's Adderall but considering changing to modafinil for MS-related fatigue.    On hospital day 7, patient developed symptoms concerning for a flare of her MS. Given the new symptoms in the context of not taking her interferon for the past several weeks, neuro were consulted. Patient also had some lower extremity edema so will consult medicine as well. Both medicine and neuro saw the patient. Medicine workup was largely unremarkable with the exception of high glucose in the blood and urine as well as streaky b/l infiltrates representing possible edema, atypical pneumonia, atelectasis. Other notable labs/imaging include normal BNP, CBC, blood cultures, CT head, and BLE ultrasounds. Neuro noted no deficits on their exam, thus recommended no additional imaging or immunosuppressive therapy. Will plan to follow up with medicine to discuss plan.    In the context of possible edema on CXR, orthopnea, and BLE edema, felt that echo was warranted. Discussed this as well as workup findings with medicine. Discussed starting metformin for glucose and metabolic control with patient, however she states 2-3 years ago she had significant GI upset with metformin and was unable to tolerate it. Will check in with patient again and discuss a different med.     Patient did have an echo which showed slightly reduced LV function with an EF of 50-55% (though it was a technically difficult exam). Medicine was not concerned for heart failure at the moment though they did recommend an outpatient sleep study to assess for VALENTIN. They also advised against medication management of LE edema, recommended continuation of compression stockings. Patient was concerned with the findings so will plan to give her an information sheet regarding heart failure and review it with her. Otherwise, patient is medically stable at this point. Did discuss with her that we can  coordinate referral for cardiology, primary care, and dentistry upon discharge.    On hospital day 12, patient reported to us she chipped her two lower central incisors while eating an apple. She endorses now having dental pain and mouth sores in that area. We did consult dental who saw the patient on day 13. They plan to file down the teeth and have her follow up in the outpatient setting. Orajel, Tylenol, and ibuprofen PRN for pain relief in the hospital. Also recommended alcohol-free antiseptic rinse or warm salt water rinse for inflammation. They will facilitate an appointment while patient is in the hospital to address dental fractures and get new dentures started.     Please see medicine, neurology, and dental consult notes for additional details.     Medications:  - Melatonin and Ambien PRN for sleep  - Colace 100 mg daily, Miralax/Maalox PRN for constipation  - Subutex 8 mg TID for opioid dependence (x2 in the morning, x1 in the afternoon)  - Tylenol, ibuprofen PRN for pain  - Betaceron 0.3 mg subcutaneous every other day     Consults:  Medicine for BLE edema, orthopnea. Neurology for history of MS, Chiari I malformation in context of psychiatric symptoms. Dentistry for dental pain in the context of tooth fracture.      Checklist     Legal Status: Voluntary     Safety Assessment:   Behavioral Orders   Procedures    Code 1 - Restrict to Unit    Code 2     Ok to leave unit for echo. Per unit routine.    Code 3     For group activities, if staffing allows it, per nursing discretion    Routine Programming     As clinically indicated    Status 15     Every 15 minutes.       Risk Assessment:  Risk for harm is low.  Risk factors: psychosis, hx of substance use  Protective factors: group home staff, no identified history of attempts, doesn't live alone, family support, children     SIO: none    Disposition: TBD. Disposition pending stabilization, medication optimization, & development of a safe discharge plan.      Attestations     This patient was seen and discussed with my attending physician.    Sharon Kebede, MS3  [facility] [employer]    Attestation:  I was present with the medical student who participated in the service and in the documentation of the note. I have verified the history and personally performed the physical exam and medical decision making. I agree with the assessment and plan of care as documented in the note.    Ramez Dagher, MD  Psychiatry Resident Physician    Attestation:  This patient has been seen and evaluated by me, Wilmer Cartagena MD.  I have discussed this patient with the house staff team including the resident and medical student and I agree with the findings and plan in this note.    I have reviewed today's vital signs, medications, labs and imaging. Wilmer Cartagena MD , PhD.

## 2024-06-07 NOTE — PLAN OF CARE
Pt has been visible in the milieu, is social with peers, and attends OT groups. She continues to have frequent requests and needs redirection regarding top of the hour requests. She endorses anxiety reated 6/10 and depression rated 4/10, denies SI/SIB and AH/VH. Pt is med compliant, received PRN zyprexa and ibuprofen. Pt had an IRTs interview which she stated went well. Staff from her group home dropped off her phone and wallet, she attempted to get numbers but was unable to access phone. Pt took shower this shift, intake is adequate.    Problem: Psychotic Signs/Symptoms  Goal: Improved Mood Symptoms (Psychotic Signs/Symptoms)  Outcome: Not Progressing   Goal Outcome Evaluation:    Plan of Care Reviewed With: patient

## 2024-06-07 NOTE — PLAN OF CARE
Team Note Due:  Thursday    Assessment/Intervention/Current Symtoms and Care Coordination:  Chart review and met with team, discussed pt progress, symptomology, and response to treatment.  Discussed the discharge plan and any potential impediments to discharge.    Pt completed IRTS interview with Charly Spear. Pt stated she felt it went really well and is hopeful she'll be able to go to their [place] [facility] next week. Pt was told their intake team would contact writer today regarding availability and possible admission date.    Received update from [facility] intake stating pt's referral was sent for nursing review. Will have more of an update next week.    Discharge Plan or Goal:  Pending stabilization of symptoms and safe disposition planning.  Needs therapy, psychiatry, and PCP appts scheduled following discharge     Barriers to Discharge:  Patient requires further psychiatric stabilization due to current symptomology, medication management with changes subject to provider, coordination with outside supports, and aftercare planning.     Referral Status:  IRTS:  [facility] - sent 6/5 via fax. Interview scheduled 6/7  [facility] - sent 6/5 via fax  [facility] ([facility] preferred) - sent 6/5. Referral confirmed, being reviewed.  [facility] - sent 6/5 via fax. Referral confirmed, being reviewed.     Legal Status:  Voluntary    Contacts:  Elisa Gomez, grandmother, [phone].    Kalie Villegas, [phone], Aunt, Emergency Contact     Sergio Dunn ([facility] residential ): [phone]    Riana Galindo ([facility] ): [phone]  [email or web address]     Upcoming Meetings and Dates/Important Information and next steps:  Follow up on IRTS Referrals  Schedule therapy, PCP, and psychiatry appts  [facility]

## 2024-06-07 NOTE — PLAN OF CARE
BEH IP Unit Acuity Rating Score (UARS)  Patient is given one point for every criteria they meet.    CRITERIA SCORING   On a 72 hour hold, court hold, committed, stay of commitment, or revocation. 0   Patient LOS on BEH unit exceeds 20 days. 1  LOS: 22   Patient under guardianship, 55+, otherwise medically complex, or under age 11. 0   Suicide ideation without relief of precipitating factors. 0   Current plan for suicide. 0   Current plan for homicide. 0   Imminent risk or actual attempt to seriously harm another without relief of factors precipitating the attempt. 0   Severe dysfunction in daily living (ex: complete neglect for self care, extreme disruption in vegetative function, extreme deterioration in social interactions). 1   Recent (last 7 days) or current physical aggression in the ED or on unit. 0   Restraints or seclusion episode in past 72 hours. 0   Recent (last 7 days) or current verbal aggression, agitation, yelling, etc., while in the ED or unit. 0   Active psychosis. 1   Need for constant or near constant redirection (from leaving, from others, etc).  1   Intrusive or disruptive behaviors. 0   Patient requires 3 or more hours of individualized nursing care per 8-hour shift (i.e. for ADLs, meds, therapeutic interventions). 0   TOTAL 4

## 2024-06-07 NOTE — PLAN OF CARE
Pt was visible in the milieu. Pt continues to seek staff frequently and needed redirection to stick to the top of the hour request. Presents flat affect. Pt was getting teary talking about how she misses her daughter. Pt's feelings were validated. Pt is expecting her phone to be dropped off by family so that she can get the number to call her daughter. Endorsed anxiety 7/10 and depression 4/10. Pt took her medications. Denied suicidal thoughts and hallucinations. Pt had a shower this evening. Ate supper adequately.     Goal Outcome Evaluation:  Problem: Anxiety  Goal: Anxiety Reduction or Resolution  Outcome: Progressing

## 2024-06-07 NOTE — PLAN OF CARE
06/07/24 1500   General Information   Art Directive other (see comments)     Goal Outcome Evaluation:    Rehab Group    Start time: 1115  End time: 1215  Patient time total: 60 minutes    attended full group    #4 attended   Group Type: art   Group Topic Covered: emotional regulation       Group Session Detail:  Pt attended Art Therapy open studio group. Pt chose a painting project and needs another AT group to finish.     Patient Response/Contribution:  cooperative with task       Patient Detail:  Pt was an engaged participant, focused on task for the full duration of group. Pts mood was calm, bright affect.        Group Therapy (61082)    Patient Active Problem List   Diagnosis    Abnormal liver function test    Acute cholangitis (H28)    Anxiety disorder    Arnold-Chiari malformation (H)    Calculus of bile duct without obstruction    Depression    Multiple sclerosis (H)    Syringomyelia (H)    Tobacco use disorder    Schizophrenia spectrum disorder with psychotic disorder type not yet determined (H)

## 2024-06-07 NOTE — PLAN OF CARE
Problem: Sleep Disturbance  Goal: Adequate Sleep/Rest  Outcome: Progressing   Patient appears sleeping most of the shift. No complaints of pain and discomfort. Patient continues on q15 minutes safety checks. Patient woke up early this morning with multiple request. Will continue to monitor the patient and provide therapeutic intervention as needed. Will continue with current plan of care. Notify MD with any concerns. The patient had 5.5 total hours of sleep this shift.

## 2024-06-08 PROCEDURE — 250N000013 HC RX MED GY IP 250 OP 250 PS 637

## 2024-06-08 PROCEDURE — 250N000013 HC RX MED GY IP 250 OP 250 PS 637: Performed by: STUDENT IN AN ORGANIZED HEALTH CARE EDUCATION/TRAINING PROGRAM

## 2024-06-08 PROCEDURE — 250N000013 HC RX MED GY IP 250 OP 250 PS 637: Performed by: PSYCHIATRY & NEUROLOGY

## 2024-06-08 PROCEDURE — 124N000002 HC R&B MH UMMC

## 2024-06-08 RX ORDER — DIVALPROEX SODIUM 500 MG/1
500 TABLET, EXTENDED RELEASE ORAL AT BEDTIME
Status: DISCONTINUED | OUTPATIENT
Start: 2024-06-08 | End: 2024-06-11 | Stop reason: HOSPADM

## 2024-06-08 RX ADMIN — Medication 3 MG: at 19:30

## 2024-06-08 RX ADMIN — BUPRENORPHINE 8 MG: 8 TABLET SUBLINGUAL at 16:23

## 2024-06-08 RX ADMIN — GABAPENTIN 600 MG: 300 CAPSULE ORAL at 07:33

## 2024-06-08 RX ADMIN — GABAPENTIN 600 MG: 300 CAPSULE ORAL at 19:17

## 2024-06-08 RX ADMIN — BUPRENORPHINE 16 MG: 8 TABLET SUBLINGUAL at 07:57

## 2024-06-08 RX ADMIN — HYDROXYZINE HYDROCHLORIDE 50 MG: 50 TABLET, FILM COATED ORAL at 19:17

## 2024-06-08 RX ADMIN — BUSPIRONE HYDROCHLORIDE 10 MG: 10 TABLET ORAL at 07:33

## 2024-06-08 RX ADMIN — DIVALPROEX SODIUM 500 MG: 500 TABLET, FILM COATED, EXTENDED RELEASE ORAL at 19:16

## 2024-06-08 RX ADMIN — NICOTINE POLACRILEX 2 MG: 2 LOZENGE ORAL at 07:35

## 2024-06-08 RX ADMIN — ZOLPIDEM TARTRATE 10 MG: 5 TABLET, COATED ORAL at 19:24

## 2024-06-08 RX ADMIN — HYDROXYZINE HYDROCHLORIDE 50 MG: 50 TABLET, FILM COATED ORAL at 13:01

## 2024-06-08 RX ADMIN — HYDROXYZINE HYDROCHLORIDE 50 MG: 50 TABLET, FILM COATED ORAL at 06:42

## 2024-06-08 RX ADMIN — GABAPENTIN 600 MG: 300 CAPSULE ORAL at 13:01

## 2024-06-08 RX ADMIN — NICOTINE POLACRILEX 2 MG: 2 LOZENGE ORAL at 13:01

## 2024-06-08 RX ADMIN — DOCUSATE SODIUM 100 MG: 100 CAPSULE, LIQUID FILLED ORAL at 19:16

## 2024-06-08 RX ADMIN — OLANZAPINE 5 MG: 5 TABLET, FILM COATED ORAL at 02:41

## 2024-06-08 RX ADMIN — LORAZEPAM 1 MG: 1 TABLET ORAL at 17:22

## 2024-06-08 RX ADMIN — NICOTINE POLACRILEX 2 MG: 2 LOZENGE ORAL at 18:41

## 2024-06-08 RX ADMIN — ESCITALOPRAM OXALATE 10 MG: 10 TABLET ORAL at 07:33

## 2024-06-08 RX ADMIN — BUSPIRONE HYDROCHLORIDE 10 MG: 10 TABLET ORAL at 19:15

## 2024-06-08 RX ADMIN — OLANZAPINE 10 MG: 10 TABLET, FILM COATED ORAL at 09:22

## 2024-06-08 RX ADMIN — OLANZAPINE 20 MG: 10 TABLET, ORALLY DISINTEGRATING ORAL at 19:18

## 2024-06-08 ASSESSMENT — ACTIVITIES OF DAILY LIVING (ADL)
DRESS: SCRUBS (BEHAVIORAL HEALTH)
ADLS_ACUITY_SCORE: 40
DRESS: INDEPENDENT
ADLS_ACUITY_SCORE: 40
HYGIENE/GROOMING: INDEPENDENT
ADLS_ACUITY_SCORE: 40
ORAL_HYGIENE: INDEPENDENT
LAUNDRY: WITH SUPERVISION
ADLS_ACUITY_SCORE: 40
HYGIENE/GROOMING: INDEPENDENT
ADLS_ACUITY_SCORE: 40
ADLS_ACUITY_SCORE: 40
ORAL_HYGIENE: INDEPENDENT
LAUNDRY: WITH SUPERVISION

## 2024-06-08 NOTE — PLAN OF CARE
Pt has been visible in the milieu, is social with peers. She continues to have frequent requests, needs redirection regarding top of the hour policy. She endorses anxiety rated 8/10 and depression 4/10, denies SI/SIB and AH/VH. Pt is med compliant, received PRN zyprexa for agitation. Pt took shower this shift, intake is adequate. No other concerns at this time.    Problem: Psychotic Signs/Symptoms  Goal: Improved Mood Symptoms (Psychotic Signs/Symptoms)  Outcome: Progressing   Goal Outcome Evaluation:    Plan of Care Reviewed With: patient

## 2024-06-08 NOTE — PLAN OF CARE
"Problem: Adult Behavioral Health Plan of Care  Goal: Plan of Care Review  Outcome: Progressing  Flowsheets (Taken 6/7/2024 1643)  Patient Agreement with Plan of Care: agrees   Goal Outcome Evaluation:    Plan of Care Reviewed With: patient            Pt was visible in the milieu most of the shift. She was observed socializing and interacting with selected peers. Her affect was flat but pleasant upon approach. She was in and out of her room most of the shift. She continued to be very demanding and seeking out for staff all shift. She was able to make her needs known. Hygiene was good and intake was adequate. She was out for dinner and snacks and ate 100%. She is still using the \"Top of the hour request list,\" with a few reminders. Her vitals were WNL. She was cooperative with assessment. She endorsed generalized pain 8/10 and was given prn Tylenol 650 mg which she said was helpful. She endorsed anxiety 5/10 and depression 2/10. She was given scheduled anxiety medication which she said was helpful. She denied SI/SIB/HI/AH/VH. She contracted for safety. She was medication compliant. Prn Melatonin 3 mg was given at bed time for insomnia per pt request. No other concern noted at this time. No safety concern noted at this time and no medication side effect reported or noted this shift. Will continue to monitor and will assist if need arise.          "

## 2024-06-08 NOTE — PLAN OF CARE
Problem: Sleep Disturbance  Goal: Adequate Sleep/Rest  Outcome: Progressing     Problem: Anxiety  Goal: Anxiety Reduction or Resolution  Outcome: Not Progressing  Patient appears sleeping most of the shift. . Patient continues on q15 minutes safety checks, no behavioral issues noted, needy but comply with the top of the hour request. Patient complained of anxiety had Zyprexa and scheduled Hydroxyzine. She was teary and misses her daughter. Will continue to monitor the patient and provide therapeutic intervention as needed. Will continue with current plan of care. Notify MD with any concerns. The patient had 7 total hours of sleep this shift.

## 2024-06-09 PROCEDURE — 250N000013 HC RX MED GY IP 250 OP 250 PS 637

## 2024-06-09 PROCEDURE — 250N000013 HC RX MED GY IP 250 OP 250 PS 637: Performed by: STUDENT IN AN ORGANIZED HEALTH CARE EDUCATION/TRAINING PROGRAM

## 2024-06-09 PROCEDURE — 250N000013 HC RX MED GY IP 250 OP 250 PS 637: Performed by: PSYCHIATRY & NEUROLOGY

## 2024-06-09 PROCEDURE — 124N000002 HC R&B MH UMMC

## 2024-06-09 PROCEDURE — 250N000012 HC RX MED GY IP 250 OP 636 PS 637: Mod: JZ

## 2024-06-09 RX ADMIN — HYDROXYZINE HYDROCHLORIDE 50 MG: 50 TABLET, FILM COATED ORAL at 13:56

## 2024-06-09 RX ADMIN — BUSPIRONE HYDROCHLORIDE 10 MG: 10 TABLET ORAL at 19:08

## 2024-06-09 RX ADMIN — HYDROXYZINE HYDROCHLORIDE 50 MG: 50 TABLET, FILM COATED ORAL at 07:35

## 2024-06-09 RX ADMIN — IBUPROFEN 600 MG: 600 TABLET ORAL at 06:42

## 2024-06-09 RX ADMIN — INTERFERON BETA-1B 0.3 MG: KIT at 09:48

## 2024-06-09 RX ADMIN — LORAZEPAM 1 MG: 1 TABLET ORAL at 05:36

## 2024-06-09 RX ADMIN — OLANZAPINE 10 MG: 10 TABLET, FILM COATED ORAL at 09:40

## 2024-06-09 RX ADMIN — BUSPIRONE HYDROCHLORIDE 10 MG: 10 TABLET ORAL at 07:35

## 2024-06-09 RX ADMIN — HYDROXYZINE HYDROCHLORIDE 50 MG: 50 TABLET, FILM COATED ORAL at 19:11

## 2024-06-09 RX ADMIN — NICOTINE POLACRILEX 2 MG: 2 LOZENGE ORAL at 11:41

## 2024-06-09 RX ADMIN — OLANZAPINE 20 MG: 10 TABLET, ORALLY DISINTEGRATING ORAL at 19:11

## 2024-06-09 RX ADMIN — GABAPENTIN 600 MG: 300 CAPSULE ORAL at 19:10

## 2024-06-09 RX ADMIN — GABAPENTIN 600 MG: 300 CAPSULE ORAL at 13:56

## 2024-06-09 RX ADMIN — DIVALPROEX SODIUM 500 MG: 500 TABLET, FILM COATED, EXTENDED RELEASE ORAL at 19:09

## 2024-06-09 RX ADMIN — BUPRENORPHINE 8 MG: 8 TABLET SUBLINGUAL at 17:30

## 2024-06-09 RX ADMIN — Medication 1 LOZENGE: at 09:40

## 2024-06-09 RX ADMIN — ESCITALOPRAM OXALATE 10 MG: 10 TABLET ORAL at 07:35

## 2024-06-09 RX ADMIN — ZOLPIDEM TARTRATE 10 MG: 5 TABLET, COATED ORAL at 19:12

## 2024-06-09 RX ADMIN — LORAZEPAM 1 MG: 1 TABLET ORAL at 12:19

## 2024-06-09 RX ADMIN — GABAPENTIN 600 MG: 300 CAPSULE ORAL at 07:35

## 2024-06-09 RX ADMIN — BUPRENORPHINE 16 MG: 8 TABLET SUBLINGUAL at 07:35

## 2024-06-09 RX ADMIN — DOCUSATE SODIUM 100 MG: 100 CAPSULE, LIQUID FILLED ORAL at 19:09

## 2024-06-09 RX ADMIN — NICOTINE POLACRILEX 2 MG: 2 LOZENGE ORAL at 19:13

## 2024-06-09 RX ADMIN — NICOTINE POLACRILEX 2 MG: 2 LOZENGE ORAL at 17:52

## 2024-06-09 RX ADMIN — Medication 3 MG: at 19:13

## 2024-06-09 ASSESSMENT — ACTIVITIES OF DAILY LIVING (ADL)
ADLS_ACUITY_SCORE: 40
ADLS_ACUITY_SCORE: 40
HYGIENE/GROOMING: INDEPENDENT
ADLS_ACUITY_SCORE: 40
DRESS: INDEPENDENT
ADLS_ACUITY_SCORE: 40
ADLS_ACUITY_SCORE: 40
ORAL_HYGIENE: INDEPENDENT
ADLS_ACUITY_SCORE: 40
HYGIENE/GROOMING: INDEPENDENT
ADLS_ACUITY_SCORE: 40
DRESS: SCRUBS (BEHAVIORAL HEALTH);STREET CLOTHES
ADLS_ACUITY_SCORE: 40
ORAL_HYGIENE: INDEPENDENT
ADLS_ACUITY_SCORE: 40
ADLS_ACUITY_SCORE: 40
LAUNDRY: WITH SUPERVISION
ADLS_ACUITY_SCORE: 40
LAUNDRY: WITH SUPERVISION

## 2024-06-09 NOTE — PLAN OF CARE
Pt has been visible in the milieu the entirety of the shift, continues to make frequent requests. Pt needs some redirection regarding top of the hour request policy but is accepting. She rates anxiety high and depression moderate, denies other mental health symptoms. Pt is med compliant, PRNs received: zyprexa, nicotine lozenge, chloraseptic lozenge, ativan. Pt declined scheduled colace. She took a shower today, intake is adequate. Pt assisted with accessing phone and calling [employer] customer service; she states that staff from her previous living situation changed her passcode.     Problem: Psychotic Signs/Symptoms  Goal: Improved Mood Symptoms (Psychotic Signs/Symptoms)  Outcome: Progressing   Goal Outcome Evaluation:

## 2024-06-09 NOTE — PLAN OF CARE
Problem: Psychotic Signs/Symptoms  Goal: Improved Behavioral Control (Psychotic Signs/Symptoms)  Outcome: Progressing     Problem: Anxiety  Goal: Anxiety Reduction or Resolution  Outcome: Progressing   Goal Outcome Evaluation:    Pt was visible in the milieu watching TV and socializing with peers and staff.  Pt  continues to seek staff with multiple request.. Pt complained of generalized pain, rated pain at 7. Edorse anxiety 8/10, depression 5/10, Denies SI/SIB/HI/AVH. Intake and hydration adequate. Pt is compliant with medication and contracted for safety. PRN Lorazepam. Melatonin and nicorette administered.

## 2024-06-09 NOTE — PLAN OF CARE
Problem: Psychotic Signs/Symptoms  Goal: Improved Behavioral Control (Psychotic Signs/Symptoms)  Outcome: Progressing     Problem: Anxiety  Goal: Anxiety Reduction or Resolution  Outcome: Progressing     Problem: Depression  Goal: Improved Mood  Outcome: Progressing   Goal Outcome Evaluation:    Pt was visible in the milieu playing cards with select peers.  She paced the hallway  seeking staff and making frequent demands. Verbally redirected. Speech hyper verbal and rambling. Mood  is anxious.  Pt complained of generalized pain. Rated pain at 2/10, anxiety 8/10, depression 5/10. Denies SI/HI/SIB/AVH.  Intake is adequate. Pt is compliant with medication. Pt wanted to use her phone  to  call her daughter but  writer was unable to change the  passcode. Food and fluid intake adequate PRN Nicotine  Lozenge administered.  No safety concerns.

## 2024-06-09 NOTE — PLAN OF CARE
Problem: Sleep Disturbance  Goal: Adequate Sleep/Rest  Outcome: Progressing   Patient appears sleeping most of the shift. Patient continues on q15 minutes safety checks. Will continue to monitor the patient and provide therapeutic intervention as needed. Will continue with current plan of care. Notify MD with any concerns. The patient had 6.5 total hours of sleep this shift.

## 2024-06-10 PROCEDURE — 250N000013 HC RX MED GY IP 250 OP 250 PS 637

## 2024-06-10 PROCEDURE — 90832 PSYTX W PT 30 MINUTES: CPT

## 2024-06-10 PROCEDURE — 250N000013 HC RX MED GY IP 250 OP 250 PS 637: Performed by: STUDENT IN AN ORGANIZED HEALTH CARE EDUCATION/TRAINING PROGRAM

## 2024-06-10 PROCEDURE — H2032 ACTIVITY THERAPY, PER 15 MIN: HCPCS

## 2024-06-10 PROCEDURE — 250N000013 HC RX MED GY IP 250 OP 250 PS 637: Performed by: PSYCHIATRY & NEUROLOGY

## 2024-06-10 PROCEDURE — 99232 SBSQ HOSP IP/OBS MODERATE 35: CPT | Mod: GC | Performed by: PSYCHIATRY & NEUROLOGY

## 2024-06-10 PROCEDURE — 90853 GROUP PSYCHOTHERAPY: CPT

## 2024-06-10 PROCEDURE — 124N000002 HC R&B MH UMMC

## 2024-06-10 RX ORDER — OLANZAPINE 20 MG/1
20 TABLET, ORALLY DISINTEGRATING ORAL AT BEDTIME
Qty: 30 TABLET | Refills: 0 | Status: SHIPPED | OUTPATIENT
Start: 2024-06-10

## 2024-06-10 RX ORDER — POLYETHYLENE GLYCOL 3350 17 G
2 POWDER IN PACKET (EA) ORAL
Qty: 20 LOZENGE | Refills: 0 | Status: SHIPPED | OUTPATIENT
Start: 2024-06-10

## 2024-06-10 RX ORDER — GABAPENTIN 300 MG/1
600 CAPSULE ORAL 3 TIMES DAILY
Qty: 180 CAPSULE | Refills: 0 | Status: SHIPPED | OUTPATIENT
Start: 2024-06-10

## 2024-06-10 RX ORDER — OLANZAPINE 10 MG/1
10 TABLET ORAL 2 TIMES DAILY PRN
Qty: 15 TABLET | Refills: 0 | Status: SHIPPED | OUTPATIENT
Start: 2024-06-10

## 2024-06-10 RX ORDER — DIVALPROEX SODIUM 500 MG/1
500 TABLET, EXTENDED RELEASE ORAL AT BEDTIME
Qty: 30 TABLET | Refills: 0 | Status: SHIPPED | OUTPATIENT
Start: 2024-06-10

## 2024-06-10 RX ORDER — NALOXONE HYDROCHLORIDE 0.4 MG/ML
0.4 INJECTION, SOLUTION INTRAMUSCULAR; INTRAVENOUS; SUBCUTANEOUS ONCE
Qty: 1 ML | Refills: 0 | Status: SHIPPED | OUTPATIENT
Start: 2024-06-10 | End: 2024-06-10

## 2024-06-10 RX ORDER — IBUPROFEN 600 MG/1
600 TABLET, FILM COATED ORAL EVERY 8 HOURS PRN
Qty: 30 TABLET | Refills: 0 | Status: SHIPPED | OUTPATIENT
Start: 2024-06-10

## 2024-06-10 RX ORDER — DOCUSATE SODIUM 100 MG/1
100 CAPSULE, LIQUID FILLED ORAL EVERY EVENING
Status: DISCONTINUED | OUTPATIENT
Start: 2024-06-10 | End: 2024-06-11 | Stop reason: HOSPADM

## 2024-06-10 RX ORDER — BUSPIRONE HYDROCHLORIDE 10 MG/1
10 TABLET ORAL 2 TIMES DAILY
Qty: 60 TABLET | Refills: 0 | Status: SHIPPED | OUTPATIENT
Start: 2024-06-10

## 2024-06-10 RX ORDER — ZOLPIDEM TARTRATE 10 MG/1
10 TABLET ORAL AT BEDTIME
Qty: 30 TABLET | Refills: 0 | Status: SHIPPED | OUTPATIENT
Start: 2024-06-10

## 2024-06-10 RX ORDER — DOCUSATE SODIUM 100 MG/1
100 CAPSULE, LIQUID FILLED ORAL DAILY PRN
Qty: 30 CAPSULE | Refills: 0 | Status: SHIPPED | OUTPATIENT
Start: 2024-06-10

## 2024-06-10 RX ORDER — ESCITALOPRAM OXALATE 10 MG/1
10 TABLET ORAL DAILY
Qty: 30 TABLET | Refills: 0 | Status: SHIPPED | OUTPATIENT
Start: 2024-06-10

## 2024-06-10 RX ORDER — HYDROXYZINE HYDROCHLORIDE 50 MG/1
50 TABLET, FILM COATED ORAL 3 TIMES DAILY
Qty: 90 TABLET | Refills: 0 | Status: SHIPPED | OUTPATIENT
Start: 2024-06-10

## 2024-06-10 RX ORDER — LORAZEPAM 1 MG/1
1 TABLET ORAL 2 TIMES DAILY PRN
Qty: 15 TABLET | Refills: 0 | Status: SHIPPED | OUTPATIENT
Start: 2024-06-10

## 2024-06-10 RX ORDER — BUPRENORPHINE 8 MG/1
8 TABLET SUBLINGUAL AT BEDTIME
Qty: 30 TABLET | Refills: 0 | Status: SHIPPED | OUTPATIENT
Start: 2024-06-10

## 2024-06-10 RX ORDER — BUPRENORPHINE 8 MG/1
16 TABLET SUBLINGUAL DAILY
Qty: 60 TABLET | Refills: 0 | Status: SHIPPED | OUTPATIENT
Start: 2024-06-11

## 2024-06-10 RX ORDER — NALOXONE HYDROCHLORIDE 0.4 MG/ML
0.2 INJECTION, SOLUTION INTRAMUSCULAR; INTRAVENOUS; SUBCUTANEOUS ONCE
Qty: 0.5 ML | Refills: 0 | Status: SHIPPED | OUTPATIENT
Start: 2024-06-10 | End: 2024-06-10

## 2024-06-10 RX ADMIN — BUSPIRONE HYDROCHLORIDE 10 MG: 10 TABLET ORAL at 19:33

## 2024-06-10 RX ADMIN — DIVALPROEX SODIUM 500 MG: 500 TABLET, FILM COATED, EXTENDED RELEASE ORAL at 19:33

## 2024-06-10 RX ADMIN — GABAPENTIN 600 MG: 300 CAPSULE ORAL at 14:35

## 2024-06-10 RX ADMIN — NICOTINE POLACRILEX 2 MG: 2 LOZENGE ORAL at 19:38

## 2024-06-10 RX ADMIN — OLANZAPINE 10 MG: 10 TABLET, FILM COATED ORAL at 11:43

## 2024-06-10 RX ADMIN — NICOTINE POLACRILEX 2 MG: 2 LOZENGE ORAL at 08:27

## 2024-06-10 RX ADMIN — Medication 3 MG: at 19:34

## 2024-06-10 RX ADMIN — LORAZEPAM 1 MG: 1 TABLET ORAL at 00:08

## 2024-06-10 RX ADMIN — HYDROXYZINE HYDROCHLORIDE 50 MG: 50 TABLET, FILM COATED ORAL at 11:45

## 2024-06-10 RX ADMIN — ZOLPIDEM TARTRATE 10 MG: 5 TABLET, COATED ORAL at 19:33

## 2024-06-10 RX ADMIN — HYDROXYZINE HYDROCHLORIDE 50 MG: 50 TABLET, FILM COATED ORAL at 19:33

## 2024-06-10 RX ADMIN — ESCITALOPRAM OXALATE 10 MG: 10 TABLET ORAL at 07:54

## 2024-06-10 RX ADMIN — BUPRENORPHINE 16 MG: 8 TABLET SUBLINGUAL at 08:26

## 2024-06-10 RX ADMIN — IBUPROFEN 600 MG: 600 TABLET ORAL at 12:30

## 2024-06-10 RX ADMIN — GABAPENTIN 600 MG: 300 CAPSULE ORAL at 19:32

## 2024-06-10 RX ADMIN — NICOTINE POLACRILEX 2 MG: 2 LOZENGE ORAL at 11:43

## 2024-06-10 RX ADMIN — BUSPIRONE HYDROCHLORIDE 10 MG: 10 TABLET ORAL at 07:54

## 2024-06-10 RX ADMIN — DOCUSATE SODIUM 100 MG: 100 CAPSULE, LIQUID FILLED ORAL at 19:33

## 2024-06-10 RX ADMIN — NICOTINE POLACRILEX 2 MG: 2 LOZENGE ORAL at 16:53

## 2024-06-10 RX ADMIN — HYDROXYZINE HYDROCHLORIDE 50 MG: 50 TABLET, FILM COATED ORAL at 14:35

## 2024-06-10 RX ADMIN — BUPRENORPHINE 8 MG: 8 TABLET SUBLINGUAL at 16:19

## 2024-06-10 RX ADMIN — OLANZAPINE 20 MG: 10 TABLET, ORALLY DISINTEGRATING ORAL at 19:33

## 2024-06-10 RX ADMIN — NICOTINE POLACRILEX 2 MG: 2 LOZENGE ORAL at 18:40

## 2024-06-10 RX ADMIN — GABAPENTIN 600 MG: 300 CAPSULE ORAL at 07:54

## 2024-06-10 ASSESSMENT — ACTIVITIES OF DAILY LIVING (ADL)
ADLS_ACUITY_SCORE: 40
DRESS: INDEPENDENT;SCRUBS (BEHAVIORAL HEALTH);STREET CLOTHES
ADLS_ACUITY_SCORE: 40
HYGIENE/GROOMING: INDEPENDENT
ADLS_ACUITY_SCORE: 40
ORAL_HYGIENE: INDEPENDENT
ADLS_ACUITY_SCORE: 40
LAUNDRY: WITH SUPERVISION
ADLS_ACUITY_SCORE: 40

## 2024-06-10 NOTE — PLAN OF CARE
BEH IP Unit Acuity Rating Score (UARS)  Patient is given one point for every criteria they meet.    CRITERIA SCORING   On a 72 hour hold, court hold, committed, stay of commitment, or revocation. 0   Patient LOS on BEH unit exceeds 20 days. 1  LOS: 25   Patient under guardianship, 55+, otherwise medically complex, or under age 11. 0   Suicide ideation without relief of precipitating factors. 0   Current plan for suicide. 0   Current plan for homicide. 0   Imminent risk or actual attempt to seriously harm another without relief of factors precipitating the attempt. 0   Severe dysfunction in daily living (ex: complete neglect for self care, extreme disruption in vegetative function, extreme deterioration in social interactions). 1   Recent (last 7 days) or current physical aggression in the ED or on unit. 0   Restraints or seclusion episode in past 72 hours. 0   Recent (last 7 days) or current verbal aggression, agitation, yelling, etc., while in the ED or unit. 0   Active psychosis. 1   Need for constant or near constant redirection (from leaving, from others, etc).  1   Intrusive or disruptive behaviors. 0   Patient requires 3 or more hours of individualized nursing care per 8-hour shift (i.e. for ADLs, meds, therapeutic interventions). 0   TOTAL 4        Follow-up with PCP in 1-2 days.

## 2024-06-10 NOTE — PLAN OF CARE
"Pt denies SI, denies hallucinations, denies depression.  Reports small amount of anxiety related to discharging tomorrow.  Pt continues to have a lot of requests.  Pt is on the hour requests but asks about her requests and other things in between the hour.  Pt has been attending groups,  social with peers.  Pt excited about discharging tomorrow.    Problem: Adult Behavioral Health Plan of Care  Goal: Plan of Care Review  Outcome: Not Progressing  Goal: Patient-Specific Goal (Individualization)  Description: You can add care plan individualizations to a care plan. Examples of Individualization might be:  \"Parent requests to be called daily at 9am for status\", \"I have a hard time hearing out of my right ear\", or \"Do not touch me to wake me up as it startles  me\".  Outcome: Not Progressing  Goal: Adheres to Safety Considerations for Self and Others  Outcome: Not Progressing  Intervention: Develop and Maintain Individualized Safety Plan  Recent Flowsheet Documentation  Taken 6/10/2024 1205 by Kika Dawkins RN  Safety Measures:   safety rounds completed   environmental rounds completed  Goal: Absence of New-Onset Illness or Injury  Outcome: Not Progressing  Intervention: Identify and Manage Fall Risk  Recent Flowsheet Documentation  Taken 6/10/2024 1205 by Kika Dawkins RN  Safety Measures:   safety rounds completed   environmental rounds completed  Goal: Optimized Coping Skills in Response to Life Stressors  Outcome: Not Progressing  Goal: Develops/Participates in Therapeutic [place] to Support Successful Transition  Outcome: Not Progressing   Goal Outcome Evaluation:                        "

## 2024-06-10 NOTE — PLAN OF CARE
"Individual Therapy Note      Date of Service: Lorenza 10, 2024    Patient: Irma goes by \"Irma,\" uses she/her pronouns    Individuals Present: Kenyatta Cheema AASHISH    Session start: 1110  Session end: 1130  Session duration in minutes: 20      Modality Used: Person Centered, Motivational Interviewing, Brief Therapy, and Solution Focused    Goals: Engage in safety planning, process feelings around discharge    Patient Description of current symptoms: Excited, ready to discharge     Mental Status Exam:   Attitude: cooperative  Eye Contact: good  Mood: anxious, better, and good  Affect: appropriate and in normal range, intensity is blunted, and labile  Speech: clear, coherent and pressured speech  Psychomotor Behavior: no evidence of tardive dyskinesia, dystonia, or tics  Thought Process:  logical and linear  Associations: no loose associations  Thought Content: no evidence of suicidal ideation or homicidal ideation  Insight: fair  Judgement: fair  Attention Span and Concentration: fair    Pt progress: Focus of session was to assess safety and engage in safety planning. Pt was cooperative, insightful and found safety planning helpful for preparing for discharge. Dicussed warning signs that a crisis may be developing which includes self-isolating, sleeping too much, feeling people are out to get her. Reported coping skills include: grounding, coloring, positive affirmations. Pt expressed readiness for discharge, looking forward to going to IRTS and having her teeth fixed. Pt expressed appreciation for 1:1 sessions. Pt was slightly less labile, more pressured speech than in past sessions, appears anxious but optimistic about discharge.    Treatment Objective(s) Addressed:   The focus of this session was on processing feelings related to discharge and safety planning     Progress Towards Goals and Assessment of Patient:   Patient is making progress towards treatment goals as evidenced by confidence in " discharge, improved mood and insight.       Therapeutic Intervention(s):   Provided active listening, unconditional positive regard, and validation.   Engaged in safety planning identifying coping skills, warning signs, health support and resources and Provided positive reinforcement for progress towards goals, gains in knowledge, and application of skills previously taught    Plan/next step: Pt likely discharging tomorrow, will remain available for 1:1 sessions as requested.    26440 - Psychotherapy (with patient) - 30 (16-37*) min    Patient Active Problem List   Diagnosis    Abnormal liver function test    Acute cholangitis (H28)    Anxiety disorder    Arnold-Chiari malformation (H)    Calculus of bile duct without obstruction    Depression    Multiple sclerosis (H)    Syringomyelia (H)    Tobacco use disorder    Schizophrenia spectrum disorder with psychotic disorder type not yet determined (H)

## 2024-06-10 NOTE — PLAN OF CARE
Rehab Group    Start time: 2000  End time: 2115  Patient time total: 20 minutes    came in and out of group session    #5 attended   Group Type: occupational therapy   Group Topic Covered: coping skills, healthy leisure time, and relaxation    Group Session Detail: OT: Education on a healthy support system and creative hands-on endeavor (card making) to increase recognition of support system, concentration, focus, attention to task/detail, decision making, problem solving, frustration tolerance, task follow through, coping with stress, healthy leisure engagement, creative expression, and social engagement    Patient Response/Contribution: Cooperative with task, Left the group on several occasions , Safe use of materials/group supplies, Actively engaged, and Other Semi-hyperverbal    Patient Detail: Pt arrived late to group and immediately sat among peers and engaged in reciprocal social interactions with peer and therapist. Pt at times was semi-hyperverbal when conversing with peer about her experience with RN staff on the unit and medications. Pt worked in a quick and semi-neat manner to complete a small portion of the project. Pt demonstrated increased learned helplessness as she immediately asked a peer to write her daughter's name on the envelope, without trying to complete the task herself. Pt stated she was tired and needed to leave group. Pt left group early and did not return. Pt was observed to sit in the lounge for approximately 30+ minutes after she left group.      No Charge    Patient Active Problem List   Diagnosis    Abnormal liver function test    Acute cholangitis (H28)    Anxiety disorder    Arnold-Chiari malformation (H)    Calculus of bile duct without obstruction    Depression    Multiple sclerosis (H)    Syringomyelia (H)    Tobacco use disorder    Schizophrenia spectrum disorder with psychotic disorder type not yet determined (H)          Updated pt on plan of care to be admitted. Pt states that due to work he is unable to stay overnight and be admitted, ER MD notified.

## 2024-06-10 NOTE — PROGRESS NOTES
"  ----------------------------------------------------------------------------------------------------------  [facility]  Psychiatry Progress Note  Hospital Day #25     Interim History:     The patient's care was discussed with the treatment team and chart notes were reviewed.    Vitals: /80 (Patient Position: Sitting, Cuff Size: Adult Regular)   Pulse 106   Temp 98.3  F (36.8  C)   Resp 16   Ht 1.651 m (5' 5\")   Wt (!) 168.4 kg (371 lb 3.2 oz)   SpO2 91%   BMI 61.77 kg/m    Sleep: 5.5 hours (06/10/24 0646)  Scheduled medications: Took all scheduled medications as prescribed  PRN medications:     Last 24H PRN:     benzocaine-menthol (CHLORASEPTIC) 6-10 MG lozenge 1 lozenge, 1 lozenge at 06/09/24 0940    LORazepam (ATIVAN) tablet 1 mg, 1 mg at 06/10/24 0008    melatonin tablet 3 mg, 3 mg at 06/09/24 1913    nicotine (COMMIT) lozenge 2 mg, 2 mg at 06/09/24 1913    OLANZapine (zyPREXA) tablet 10 mg, 10 mg at 06/09/24 0940 **OR** OLANZapine (zyPREXA) injection 10 mg      Staff Report/Interval Events  Over the weekend, Irma was observed socializing and interacting with selected peers. She continued to be very demanding and seeking out for staff. She was med compliant.     On Sunday (6/9), Pt was assisted with accessing phone and calling [employer] customer service; staff from her previous living situation changed her passcode, however, she wrote it down incorrectly and was locked out from multiple attempts. Pt desires to call daughter, but is unable to access her number d/t passcode. Pt was encouraged to call [facility] again and to write down the passcode correctly.     Please see staff notes for more details.     Subjective:     Patient Interview:  The team interviewed Irma in the conference room. Pt stated she would like help recording the names of her new and previous psychiatrists, and was informed this information will be provided. She recalled that his " "name was Dr. Farmer. Over the weekend, pt states that she was \"okay\" but \"a little anxious\" because there were no groups. She states she is feeling better today. Pt states she is excited to discharge to [facility], but she would like to visit her old place to get her belongings, including clothes and hygiene products. Pt states she set up her IHS worker to set up transportation from the hospital to her old residence. Pt states she will need a new phone as her old one does not work. When asked about her old phone, she states that the staff members at her old residence went through her phone and set up a medical ID on the device, and she therefore does not want to use it anymore, as \"somebody has been through my phone\" and violated her privacy. Pt is informed of the plan for discharge tomorrow (6/11), and states she would like to be discharged by 2pm so she can do her laundry. Pt expressed some worry over having her valproic acid level checked, stating \"I don't want my DNA looked at,\" and was encouraged by interviewer that only the medication level would be tested. Pt requested her docusate be moved to night time, and this change was affirmed by interviewer.    ROS:   Dental pain, but no acute concerns otherwise.     Objective:     Vitals:  /80 (Patient Position: Sitting, Cuff Size: Adult Regular)   Pulse 106   Temp 98.3  F (36.8  C)   Resp 16   Ht 1.651 m (5' 5\")   Wt (!) 168.4 kg (371 lb 3.2 oz)   SpO2 91%   BMI 61.77 kg/m      Allergies:  Allergies   Allergen Reactions    Glatiramer Hives    Penicillins Anaphylaxis and Hives    Shellfish Allergy Anaphylaxis and Hives    Copaxone [Glatiramer Acetate] Swelling       Current Medications:  Scheduled:  Current Facility-Administered Medications   Medication Dose Route Frequency Provider Last Rate Last Admin    acetaminophen (TYLENOL) tablet 650 mg  650 mg Oral Q4H PRN Juju Salgado MD   650 mg at 06/07/24 1558    alum & mag hydroxide-simethicone (MAALOX) " suspension 30 mL  30 mL Oral Q4H PRN Juju Salgado MD   30 mL at 05/30/24 1049    benzocaine (ORAJEL MAXIMUM STRENGTH) 20 % gel   Mouth/Throat 4x Daily PRN Shanti Waters MD   Given at 05/31/24 1602    benzocaine-menthol (CHLORASEPTIC) 6-10 MG lozenge 1 lozenge  1 lozenge Buccal Q1H PRN Madison Bolivar MD   1 lozenge at 06/09/24 0940    buprenorphine (SUBUTEX) sublingual tablet 16 mg  16 mg Sublingual Daily Shanti Waters MD   16 mg at 06/09/24 0735    buprenorphine (SUBUTEX) sublingual tablet 8 mg  8 mg Sublingual At Bedtime Dagher, Ramez, MD   8 mg at 06/09/24 1730    busPIRone (BUSPAR) tablet 10 mg  10 mg Oral BID Juju Salgado MD   10 mg at 06/10/24 0754    divalproex sodium extended-release (DEPAKOTE ER) 24 hr tablet 500 mg  500 mg Oral At Bedtime Wilmer Cartagena MD   500 mg at 06/09/24 1909    docusate sodium (COLACE) capsule 100 mg  100 mg Oral BID Dagher, Ramez, MD   100 mg at 06/09/24 1909    docusate sodium (COLACE) capsule 100 mg  100 mg Oral Daily PRN Juju Salgado MD        escitalopram (LEXAPRO) tablet 10 mg  10 mg Oral Daily Juju Salgado MD   10 mg at 06/10/24 0754    gabapentin (NEURONTIN) capsule 600 mg  600 mg Oral TID Juju Salgado MD   600 mg at 06/10/24 0754    hydrOXYzine HCl (ATARAX) tablet 50 mg  50 mg Oral TID Shanti Waters MD   50 mg at 06/09/24 1911    ibuprofen (ADVIL/MOTRIN) tablet 600 mg  600 mg Oral Q8H PRN Shanti Waters MD   600 mg at 06/09/24 0642    interferon beta-1b (BETASERON) injection 0.3 mg  0.3 mg Subcutaneous Every Other Day Shanti Waters MD   0.3 mg at 06/09/24 0948    lidocaine (LMX4) cream   Topical Q1H PRN Lalitha Serrano PA-C        lidocaine 1 % 0.1-1 mL  0.1-1 mL Other Q1H PRN Lalitha Serrano PA-C        loperamide (IMODIUM) capsule 2 mg  2 mg Oral 4x Daily PRN Ludmila Lozano MD   2 mg at 05/19/24 2013    LORazepam (ATIVAN) tablet 1 mg  1 mg Oral BID PRN Shanti Waters MD   1 mg at 06/10/24 0008     melatonin tablet 3 mg  3 mg Oral At Bedtime PRN Ludmila Lozano MD   3 mg at 06/09/24 1913    naloxone (NARCAN) injection 0.2 mg  0.2 mg Intravenous Q2 Min PRN Juju Salgado MD        Or    naloxone (NARCAN) injection 0.4 mg  0.4 mg Intravenous Q2 Min PRN Juju Salgado MD        Or    naloxone (NARCAN) injection 0.2 mg  0.2 mg Intramuscular Q2 Min PRN Juju Salgado MD        Or    naloxone (NARCAN) injection 0.4 mg  0.4 mg Intramuscular Q2 Min PRN Juju Salgado MD        nicotine (COMMIT) lozenge 2 mg  2 mg Buccal Q2H PRN Jaren Zaman MD   2 mg at 06/09/24 1913    OLANZapine (zyPREXA) tablet 10 mg  10 mg Oral BID PRN Shanti Waters MD   10 mg at 06/09/24 0940    Or    OLANZapine (zyPREXA) injection 10 mg  10 mg Intramuscular TID PRN Shanti Waters MD        OLANZapine (zyPREXA) tablet 5 mg  5 mg Oral TID PRN Shanti Waters MD   5 mg at 06/08/24 0241    OLANZapine zydis (zyPREXA) ODT tab 20 mg  20 mg Oral At Bedtime Loren Genao MD   20 mg at 06/09/24 1911    polyethylene glycol (MIRALAX) Packet 17 g  17 g Oral Daily PRN Juju Salgado MD   17 g at 05/30/24 1125    simethicone (MYLICON) chewable tablet 80 mg  80 mg Oral Q6H PRN Ludmila Lozano MD   80 mg at 05/31/24 0919    sodium chloride (PF) 0.9% PF flush 3 mL  3 mL Intracatheter q1 min prn Lalitha Serrano PA-C        [Held by provider] SUMAtriptan (IMITREX) tablet 50 mg  50 mg Oral at onset of headache Juju Salgado MD        zolpidem (AMBIEN) tablet 10 mg  10 mg Oral At Bedtime Shanti Waters MD   10 mg at 06/09/24 1912       PRN:  Current Facility-Administered Medications   Medication Dose Route Frequency Provider Last Rate Last Admin    acetaminophen (TYLENOL) tablet 650 mg  650 mg Oral Q4H PRN Juju Salgado MD   650 mg at 06/07/24 1558    alum & mag hydroxide-simethicone (MAALOX) suspension 30 mL  30 mL Oral Q4H PRN Juju Salgado MD   30 mL at 05/30/24 1049    benzocaine (ORAJEL MAXIMUM  STRENGTH) 20 % gel   Mouth/Throat 4x Daily PRN Shanti Waters MD   Given at 05/31/24 1602    benzocaine-menthol (CHLORASEPTIC) 6-10 MG lozenge 1 lozenge  1 lozenge Buccal Q1H PRN Madison Bolivar MD   1 lozenge at 06/09/24 0940    buprenorphine (SUBUTEX) sublingual tablet 16 mg  16 mg Sublingual Daily Shanti Waters MD   16 mg at 06/09/24 0735    buprenorphine (SUBUTEX) sublingual tablet 8 mg  8 mg Sublingual At Bedtime Dagher, Ramez, MD   8 mg at 06/09/24 1730    busPIRone (BUSPAR) tablet 10 mg  10 mg Oral BID Juju Salgado MD   10 mg at 06/10/24 0754    divalproex sodium extended-release (DEPAKOTE ER) 24 hr tablet 500 mg  500 mg Oral At Bedtime Wilmer Cartagena MD   500 mg at 06/09/24 1909    docusate sodium (COLACE) capsule 100 mg  100 mg Oral BID Dagher, Ramez, MD   100 mg at 06/09/24 1909    docusate sodium (COLACE) capsule 100 mg  100 mg Oral Daily PRN Juju Salgado MD        escitalopram (LEXAPRO) tablet 10 mg  10 mg Oral Daily Juju Salgado MD   10 mg at 06/10/24 0754    gabapentin (NEURONTIN) capsule 600 mg  600 mg Oral TID Juju Salgado MD   600 mg at 06/10/24 0754    hydrOXYzine HCl (ATARAX) tablet 50 mg  50 mg Oral TID Shanti Waters MD   50 mg at 06/09/24 1911    ibuprofen (ADVIL/MOTRIN) tablet 600 mg  600 mg Oral Q8H PRN Shanti Waters MD   600 mg at 06/09/24 0642    interferon beta-1b (BETASERON) injection 0.3 mg  0.3 mg Subcutaneous Every Other Day Shanti Waters MD   0.3 mg at 06/09/24 0948    lidocaine (LMX4) cream   Topical Q1H PRN Lalitha Serrano PA-C        lidocaine 1 % 0.1-1 mL  0.1-1 mL Other Q1H PRN Lalitha Serrano PA-C        loperamide (IMODIUM) capsule 2 mg  2 mg Oral 4x Daily PRN Ludmila Lozano MD   2 mg at 05/19/24 2013    LORazepam (ATIVAN) tablet 1 mg  1 mg Oral BID PRN Shanti Waters MD   1 mg at 06/10/24 0008    melatonin tablet 3 mg  3 mg Oral At Bedtime PRN Ludmila Lozano MD   3 mg at 06/09/24 1913    naloxone  (NARCAN) injection 0.2 mg  0.2 mg Intravenous Q2 Min PRN Juju Salgado MD        Or    naloxone (NARCAN) injection 0.4 mg  0.4 mg Intravenous Q2 Min PRN Juju Salgado MD        Or    naloxone (NARCAN) injection 0.2 mg  0.2 mg Intramuscular Q2 Min PRN Juju Salgado MD        Or    naloxone (NARCAN) injection 0.4 mg  0.4 mg Intramuscular Q2 Min PRN Juju Salgado MD        nicotine (COMMIT) lozenge 2 mg  2 mg Buccal Q2H PRN Jaren Zaman MD   2 mg at 06/09/24 1913    OLANZapine (zyPREXA) tablet 10 mg  10 mg Oral BID PRN Shanti Waters MD   10 mg at 06/09/24 0940    Or    OLANZapine (zyPREXA) injection 10 mg  10 mg Intramuscular TID PRN Shanti Waters MD        OLANZapine (zyPREXA) tablet 5 mg  5 mg Oral TID PRN Shanti Waters MD   5 mg at 06/08/24 0241    OLANZapine zydis (zyPREXA) ODT tab 20 mg  20 mg Oral At Bedtime Loren Genao MD   20 mg at 06/09/24 1911    polyethylene glycol (MIRALAX) Packet 17 g  17 g Oral Daily PRN Juju Salgado MD   17 g at 05/30/24 1125    simethicone (MYLICON) chewable tablet 80 mg  80 mg Oral Q6H PRN Ludmila Lozano MD   80 mg at 05/31/24 0919    sodium chloride (PF) 0.9% PF flush 3 mL  3 mL Intracatheter q1 min prn Lalitha Serrano PA-C        [Held by provider] SUMAtriptan (IMITREX) tablet 50 mg  50 mg Oral at onset of headache Juju Salgado MD        zolpidem (AMBIEN) tablet 10 mg  10 mg Oral At Bedtime Shanti Waters MD   10 mg at 06/09/24 1912       Labs and Imaging:  New results:   No results found for this or any previous visit (from the past 24 hour(s)).    Data this admission:  - CBC unremarkable, WBC 13 on initial, 9.0 on repeat  - BMP unremarkable except for glucose 141 (05/17), repeat at 258 (05/24)  - HbA1c 7.0  - Lipids WNL except for HDL at 31  - BNP WNL  - Blood cultures negative  - TSH normal  - Vit D normal  - Vit B12 normal  - Folate normal    - UA negative for infection, glucose 100  - UDS positive for amphetamines,  "benzos (both prescribed)  - UPT negative    - EKG with NSR, QTc 450 on 05/16    - MRI (5/18/24) showed stable MS plaques unchanged from previous on 8/1/22  - CT head unremarkable  - CXR with mild prominence of the cardiac silhouette, nonspecific mild streaky perihilar and basilar opacities   - BLE ultrasound unremarkable, negative for DVT  - Echo was a technically difficult study, showed mildly decreased LV function with EF 50-55% and globally reduced RV function     Mental Status Exam:   Oriented to:  Grossly Oriented   General:  Awake and Alert  Appearance:  appears stated age and grooming is adequate; dressed in hospital scrubs  Behavior/Attitude:  calm, cooperative, engaged, easily distracted, and suspicious  Eye Contact:  appropriate  Psychomotor: no evidence of tics, dystonia, or tardive dyskinesia and restless no catatonia present  Speech:  talkative and spontaneous  Language: Fluent in English with appropriate syntax and vocabulary.  Mood: \"okay\", but \"a little anxious\"  Affect: Less labile than previous days. Mainly bright, but occasionally anxious. Able to redirect self when anxiety is exacerbated.   Thought Content: Flight of ideas, requiring redirection; delusions of paranoia; concerned that her DNA would be \"looked at\" when depakote level is taken  Associations:  questionable  Insight:  limited; does not recognize psychosis symptoms.   Judgment:  limited, patient notably paranoid, suspicious, recent inability to care for self PTA though has been adherent with treatment team recommendations, engaged in groups  Impulse control: poor  Attention Span:  grossly intact  Concentration:  grossly intact  Recent and Remote Memory:  not formally assessed  Fund of Knowledge:  delayed; not formally diagnosed  Muscle Strength and Tone: normal  Gait and Station: Normal    Physical Exam: No concerns     Psychiatric Assessment     Irma Heller is a 33 year old female previously diagnosed with anxiety, " depression, and opioid dependence in remission who presented to the ED by EMS and admitted with psychosis in the context of likely first-episode psychosis. No prior psychiatric hospitalizations. Significant symptoms on admission include delusions of paranoia, visual and tactile hallucinations, and increased anxiety. The MSE on admission was pertinent for paranoia, anxiety. Biological contributions to mental health presentation include hx of opioid use disorder (though this has been stable) as well as medical diagnosis of MS, Chiari malformation which are reportedly. Psychological contributions to mental health presentation include anxiety, depression. Social factors contributing to mental health presentation include poor social support outside of group home staff. Protective factors include good medication adherence and group home staff.      Patient's definitive diagnosis is still in evolution, differential for psychosis includes primary psychotic disorder (ie, schizophrenia, schizoaffective) or other psychiatric etiology vs. drug-induced psychosis vs. medical etiology of psychosis. Feel that this is likely a primary psychiatric cause in the context of UDS positive only for prescribed medication and no recent drug use.      However, consultation with neurology to evaluate medical etiology, further neuroimaging and further history from collateral was considered to get a better understand of patient's symptom timeline and course. Can also consider the role of her interferon in her psychosis. She will likely benefit from antipsychotics this admission.     Given that she currently has psychosis, patient warrants inpatient psychiatric hospitalization to maintain her safety     Psychiatric Plan by Diagnosis      Today's changes:  - Continue with current treatment plan   - Discharged medications ordered     # Psychosis  Patient was given olanzapine as well as IM Haldol/Versed in the ED which seemed to be effective. Plan  is to continue olanzapine for stabilization of acute psychosis and monitoring for improvement.     Medications:  - Olanzapine 15 mg PO at bedtime  - Increased Olanzapine from 15mg to 20mg on 06/03  - Olanzapine 5 mg TID PRN  - Olanzapine 10 mg PRN for emergency agitation/aggression related to psychosis   - Increased Depakote from 250 mg to 500 mg for mood stabilization on 06/06     # Anxiety/Depression  Patient has a history of anxiety which she states has been well managed by her home meds prior to this point. She has had an acute increase in her anxiety due to being overwhelmed with new symptoms and fear for her safety. Will plan to continue her home medication regimen in tandem with the olanzapine.      Medications:  - Buspirone 10 mg bid  - Lexapro 10 mg daily  - Hydroxyzine 50 mg TID  - Ativan 1 mg PRN     Pertinent Labs/Monitoring:   - CBC unremarkable   - UDS positive for amphetamines, benzos (both prescribed)  - HbA1c 7.0  - UPT negative  - EKG with NSR, QTc 450 on 05/16     Additional Plans:  - Patient will be treated in therapeutic milieu with appropriate individual and group therapies as described  - Keep in touch with patient's residence and CM once disposition is determined. Call CM with patient once amenable.   - Sergio Dunn ([facility] ): [phone]  - Riana Galindo ([facility] CM): [phone]     Psychiatric Hospital Course:      Irma Heller was admitted to [facility] on a 72 hour hold. Hold was discontinued shortly upon admission as pt agreed to stay voluntary. PTA buspirone, Lexapro, PRN Ativan and hydroxyzine were continued.  PTA Adderall was held to minimize any increased anxiety pending mood stabilization. New medications started at the time of admission include olanzapine 10mg at night which pt has reported as being helpful for sleep and anxiety to date.     Since her admission to the unit, Irma reported feeling safer in the new room. She stated  she was able to eat the food and had no paranoia surrounding this. She had ongoing anxiety which was reduced with PRN Ativan/hydroxyzine. Patient was able to eat lunch in the milieu with the other patients following this. She denied SI/HI and hallucinations at that time. Given the reduction in symptoms in the context of ongoing paranoia and anxiety, plan is to continue olanzapine and monitor for improvement.     Neuro was consulted regarding the patient's history of MS, Chiari malformation and interferon beta-1b use. There were some cases reported of interferon causing psychosis, though this was discussed with neuro who had low suspicion for this etiology due to being on the med for at least 2 years. They recommended we continue patient's interferon to prevent MS flares. However, interferon formulary is not available in our pharmacy so will have to use patient's supply. We will plan to reach out to patient's group home, however we do not have their name or contact information. Though patient adamantly requested us not to inform anyone she is on the unit, we feel she does not currently have capacity and there is a need for collateral information. She also has a daughter that was staying with her aunt per the ED note, and would like to check on her. Attempted to call the aunt at the listed number but it was out of service. Patient's grandmother did not answer. Planned to reassess patient and obtain group home information at a later time.    Our CTC was able to reach the patient's living facility ([facility]) and spoke with the , Sergio. She confirmed Irma resides in an independent living environment and is responsible for taking her own medications. Sergio states the patient is welcome back when ready but would like to see her first. She will be out of office from 05/23 - 05/27 and requested an update prior to that. CTC also spoke to the patient herself who again emphasized not  wanting to return there. Patient continues to have paranoia surrounding her apartment, the staff, and her family. [facility] discussed with the patient that her CM could be a helpful resource to find discharge options if desired, however at that point the patient expressed not wanting the CM to know she was here. [facility] did reach out to [facility] and received contact info for Riana Galindo and [facility]. Going forward, will plan to obtain collateral from these sources and organize getting patient's interferon.     Patient also requested to have several of her medication dosing switched to her PTA routine. Hydroxyzine changed to scheduled in addition to PRN, Buprenorphine changed to x2 doses in the morning and x1 dose in the afternoon, and Ambien scheduled at bedtime.     As of 06/03, patient is continuing to have paranoia, AH, and significant anxiety. She is consistently using up to her 40 mg limit of combined PRN and scheduled olanzapine dose. In the context of metabolic syndrome and lack of improvement in psychosis, will consider trying Haldol going forward. Patient was resistant to switching to Haldol, but was agreeable to increase scheduled olanzapine to 20mg on 06/03. Felt she was doing better so will continue olanzapine and monitor for improvement, and we have added Depakote on 06/04 for mood stabilization as the team has noticed some symptoms of ap, notabily distractibility, pressured speech, talkativeness and mood lability. Depakote was increased to 500mg on 06/06. Checking VPA level on 6/11.     Today, patient requested to discharge to [facility] tomorrow 6/10 so she can wash her clothes, get her belongings and get a new phone prior to starting [facility] the following day. She is not at imminent danger to self or others, therefore, patient will be discharged tomorrow.      Medical Assessment and Plan     Medical diagnoses to be addressed this admission:      #Elevated A1c  - A1c elevated to 7.0 on  admission  - Will discuss with patient and provide outpatient follow-up    #MS  - Given the history of MS and Chiari malformation in the context of new psychosis.  - Plan is to obtain an MRI to rule out medical CNS cause of psychosis, but will wait for neuro.   - No acute concerns per neuro.   - Neuro ok with PTA interferon beta-1b and recommend continuing to minimize risk of MS flare. Interferon is ordered and will be delivered soon.   - Plan to hold Adderall and sumatriptan until mood and anxiety are stabilized. Will try modafinil if patient is amenable.      #Chiari Malformation  - Neuro consulted, no acute concerns at this time   - MRI pending     #Hx of iron deficiency anemia  - On Ferosul held at time of admission    Medical course:   Patient was physically examined by the ED prior to being transferred to the unit and was found to be medically stable and appropriate for admission. Please see ED/Hospital course above for more information.     On 05/21, called [facility] to organize delivery of patient's interferon. [facility] mentioned the patient has not filled her interferon in 4 months. The initial plan was to have [facility] deliver to the hospital, however, while coordinating with behavioral inpt pharmacy, it turns out they are able to have it ordered for the patient here. Interferon now started as self-inject every other day. Still holding off on the patient's Adderall but considering changing to modafinil for MS-related fatigue.    On hospital day 7, patient developed symptoms concerning for a flare of her MS. Given the new symptoms in the context of not taking her interferon for the past several weeks, neuro were consulted. Patient also had some lower extremity edema so will consult medicine as well. Both medicine and neuro saw the patient. Medicine workup was largely unremarkable with the exception of high glucose in the blood and urine as well as streaky b/l infiltrates representing possible edema, atypical  pneumonia, atelectasis. Other notable labs/imaging include normal BNP, CBC, blood cultures, CT head, and BLE ultrasounds. Neuro noted no deficits on their exam, thus recommended no additional imaging or immunosuppressive therapy. Will plan to follow up with medicine to discuss plan.    In the context of possible edema on CXR, orthopnea, and BLE edema, felt that echo was warranted. Discussed this as well as workup findings with medicine. Discussed starting metformin for glucose and metabolic control with patient, however she states 2-3 years ago she had significant GI upset with metformin and was unable to tolerate it. Will check in with patient again and discuss a different med.     Patient did have an echo which showed slightly reduced LV function with an EF of 50-55% (though it was a technically difficult exam). Medicine was not concerned for heart failure at the moment though they did recommend an outpatient sleep study to assess for VALENTIN. They also advised against medication management of LE edema, recommended continuation of compression stockings. Patient was concerned with the findings so will plan to give her an information sheet regarding heart failure and review it with her. Otherwise, patient is medically stable at this point. Did discuss with her that we can coordinate referral for cardiology, primary care, and dentistry upon discharge.    On hospital day 12, patient reported to us she chipped her two lower central incisors while eating an apple. She endorses now having dental pain and mouth sores in that area. We did consult dental who saw the patient on day 13. They plan to file down the teeth and have her follow up in the outpatient setting. Orajel, Tylenol, and ibuprofen PRN for pain relief in the hospital. Also recommended alcohol-free antiseptic rinse or warm salt water rinse for inflammation. They will facilitate an appointment while patient is in the hospital to address dental fractures and get new  dentures started.     Please see medicine, neurology, and dental consult notes for additional details.     Medications:  - Melatonin and Ambien PRN for sleep  - Colace 100 mg daily, Miralax/Maalox PRN for constipation  - Subutex 8 mg TID for opioid dependence (x2 in the morning, x1 in the afternoon)  - Tylenol, ibuprofen PRN for pain  - Betaceron 0.3 mg subcutaneous every other day     Consults:  Medicine for BLE edema, orthopnea. Neurology for history of MS, Chiari I malformation in context of psychiatric symptoms. Dentistry for dental pain in the context of tooth fracture.      Checklist     Legal Status: Voluntary     Safety Assessment:   Behavioral Orders   Procedures    Code 1 - Restrict to Unit    Code 2     Ok to leave unit for echo. Per unit routine.    Code 3     For group activities, if staffing allows it, per nursing discretion    Routine Programming     As clinically indicated    Status 15     Every 15 minutes.       Risk Assessment:  Risk for harm is low.  Risk factors: psychosis, hx of substance use  Protective factors: group home staff, no identified history of attempts, doesn't live alone, family support, children     SIO: none    Disposition:[facility] to [facility] then [facility] the following day.  Medications are ordered.      Attestations     This patient was seen and discussed with my attending physician.    Sharon Kebeed, MS3  [employer]    I was present with the medical student who contributed to the documentation as above. I agree with the assessment and plan documented in today's note. Some edits were made by me as necessary. Patient was examined for mental status by myself and was seen and discussed with attending Dr. Cartagena.    Cha Monte MD  PGY-2 Psychiatry Resident   [facility]       Attestation:  This patient has been seen and evaluated by me, Wilmer Cartagena MD.  I have discussed this patient with the house staff team including the resident and medical student and I  agree with the findings and plan in this note.    I have reviewed today's vital signs, medications, labs and imaging. Wilmer Cartagena MD , PhD.

## 2024-06-10 NOTE — PLAN OF CARE
Team Note Due:  Thursday    Assessment/Intervention/Current Symtoms and Care Coordination:  Chart review and met with team, discussed pt progress, symptomology, and response to treatment.  Discussed the discharge plan and any potential impediments to discharge.    Received update from [facility] on availability for pt's admission this week. Writer requested earliest admit - scheduled for 6/12 at 10am.     Pt requested to discharge tomorrow to [facility] so she can gather some belongings before her admission to I[facility]S on 6/12. Updated [employer] and [facility] CM.    Scheduled OP appts. AVS updated.    Discharge Plan or Goal:  IRTS     Barriers to Discharge:  Discharge planned for 6/11     Referral Status:  IRTS:  [facility] - sent 6/5 via fax. Interview completed 6/7. Admit scheduled 6/12.  [facility] - sent 6/5  [facility] ([facility] preferred) - sent 6/5  [facility] - sent 6/5 via fax.      Legal Status:  Voluntary    Contacts:  Elisa Gomez, grandmother, [phone].    Kalie Diazuels, [phone], Aunt, Emergency Contact     Sergio Dunn ([facility] residential ): [phone]    Riana Galindo ([facility] CM): [phone]  [email or web address]     Upcoming Meetings and Dates/Important Information and next steps:  Discharge 6/11

## 2024-06-10 NOTE — PLAN OF CARE
Problem: Sleep Disturbance  Goal: Adequate Sleep/Rest  Outcome: Progressing  Patient endorsed anxiety rate 8/10 -Ativan given, effective, slept after. No complaints of pain and discomfort. Patient continues on q15 minutes safety check. Will continue to monitor the patient and provide therapeutic intervention as needed. Will continue with current plan of care. Notify MD with any concerns. The patient had 5.5 total hours of sleep this shift.

## 2024-06-10 NOTE — PROGRESS NOTES
"Rehab Group     Start time: 1015  End time: 1500  Patient time total: 15 minutes     #6 attended   Group Type: music   Group Topic Covered: activity therapy, balanced lifestyle, cognitive activities, cognitive therapy, coping skills, emotional regulation, healthy leisure time, and mindfulness   Group Session Detail: Affirmations   Patient Response/Contribution: elevated/anxious   Patient Detail: Pt entered group, and was very enthusiastic about the intervention of fill-in-the-blank \"Affirmations\" today.  Pt completed hers and read for the group, with group responsive reading, then asked to make copies for future use, which she did.  Was in and out of group, appearing to have an elevated mood.  Expressed appreciation for time in session.          Activity Therapy Per 15 minutes () Other Rehab therapies  Patient Active Problem List   Diagnosis    Abnormal liver function test    Acute cholangitis (H28)    Anxiety disorder    Arnold-Chiari malformation (H)    Calculus of bile duct without obstruction    Depression    Multiple sclerosis (H)    Syringomyelia (H)    Tobacco use disorder    Schizophrenia spectrum disorder with psychotic disorder type not yet determined (H)       "

## 2024-06-11 VITALS
HEART RATE: 103 BPM | TEMPERATURE: 98 F | WEIGHT: 293 LBS | SYSTOLIC BLOOD PRESSURE: 146 MMHG | RESPIRATION RATE: 16 BRPM | DIASTOLIC BLOOD PRESSURE: 92 MMHG | HEIGHT: 65 IN | BODY MASS INDEX: 48.82 KG/M2 | OXYGEN SATURATION: 91 %

## 2024-06-11 LAB — VALPROATE SERPL-MCNC: 27.3 UG/ML

## 2024-06-11 PROCEDURE — 250N000013 HC RX MED GY IP 250 OP 250 PS 637

## 2024-06-11 PROCEDURE — 99238 HOSP IP/OBS DSCHRG MGMT 30/<: CPT | Performed by: PSYCHIATRY & NEUROLOGY

## 2024-06-11 PROCEDURE — 80164 ASSAY DIPROPYLACETIC ACD TOT: CPT

## 2024-06-11 PROCEDURE — 250N000012 HC RX MED GY IP 250 OP 636 PS 637: Mod: JZ

## 2024-06-11 PROCEDURE — 250N000013 HC RX MED GY IP 250 OP 250 PS 637: Performed by: STUDENT IN AN ORGANIZED HEALTH CARE EDUCATION/TRAINING PROGRAM

## 2024-06-11 PROCEDURE — 36415 COLL VENOUS BLD VENIPUNCTURE: CPT

## 2024-06-11 RX ADMIN — NICOTINE POLACRILEX 2 MG: 2 LOZENGE ORAL at 09:30

## 2024-06-11 RX ADMIN — IBUPROFEN 600 MG: 600 TABLET ORAL at 07:28

## 2024-06-11 RX ADMIN — NICOTINE POLACRILEX 2 MG: 2 LOZENGE ORAL at 10:26

## 2024-06-11 RX ADMIN — NICOTINE POLACRILEX 2 MG: 2 LOZENGE ORAL at 07:28

## 2024-06-11 RX ADMIN — INTERFERON BETA-1B 0.3 MG: KIT at 09:00

## 2024-06-11 RX ADMIN — GABAPENTIN 600 MG: 300 CAPSULE ORAL at 07:55

## 2024-06-11 RX ADMIN — BUSPIRONE HYDROCHLORIDE 10 MG: 10 TABLET ORAL at 07:55

## 2024-06-11 RX ADMIN — ESCITALOPRAM OXALATE 10 MG: 10 TABLET ORAL at 07:56

## 2024-06-11 RX ADMIN — HYDROXYZINE HYDROCHLORIDE 50 MG: 50 TABLET, FILM COATED ORAL at 07:56

## 2024-06-11 RX ADMIN — LORAZEPAM 1 MG: 1 TABLET ORAL at 03:11

## 2024-06-11 RX ADMIN — BUPRENORPHINE 16 MG: 8 TABLET SUBLINGUAL at 07:55

## 2024-06-11 ASSESSMENT — ACTIVITIES OF DAILY LIVING (ADL)
ADLS_ACUITY_SCORE: 40
ADLS_ACUITY_SCORE: 40
HYGIENE/GROOMING: INDEPENDENT
ORAL_HYGIENE: INDEPENDENT
ADLS_ACUITY_SCORE: 40
DRESS: INDEPENDENT;SCRUBS (BEHAVIORAL HEALTH)
ADLS_ACUITY_SCORE: 40

## 2024-06-11 NOTE — DISCHARGE SUMMARY
"                                                                                                                 ----------------------------------------------------------------------------------------------------------  [facility]   Psychiatric Discharge Summary      Irma Heller MRN# [id number]   Age: 33 year old YOB: 1990     Date of Admission:  5/16/2024  Date of Discharge:  6/11/2024  Admitting Physician:  Wilmer Cartagena MD  Discharge Physician:  Wilmer Cartagena MD    This document serves as a transfer of care to Irma Heller's outpatient providers.     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     Patient interview:  The care team interviewed Irma together. She is visibly anxious, especially with the amount of people in the room. She shares this with the team as well. She states she is trying to \"live in the moment\" and is distressed when recalling initial symptoms. She reports that symptoms started about two weeks ago when she noticed problems with her phone. She states it went into SOS mode and continued to have issues even after getting it fixed. She then began noticing bugs (listening devices) around her apartment and feared people were listening. She states she would  the bugs and that they would cause pain and burn her hand. Irma also felt as though she was being poisoned in her apartment. She chose to leave there because \"if I stayed one more night, I would've been dead.\" She currently refuses to return there and doesn't want them to know she is here. Following that time, she was found outside a gas station and " "BIBA (see ED notes above). While in the ED, she worried that her room was being filled with gas and didn't feel safe there.      On the unit, Irma states she feels safe in her current room. She states she is overwhelmed and this is a new experience for her. She has never had similar symptoms or hospitalizations in the past. Over the last two weeks, she has still had an appetite but sleep was more difficult. When asked what she feels is going on, she reports \"I haven't had a chance to make anything of it yet.\" She does state she wants our help and would prefer to be a voluntary patient as opposed to undergoing the commitment process. She currently denies active hallucinations or SI/HI.     Patient does have a history of anxiety, depression, and stable opioid dependence in remission but has never had psychosis. She states her home medications are typically effective for her. Discussed holding her Adderall (for MS-associated fatigue) until mood is stable. Olanzapine was effective for her anxiety here. Her support system is minimal but had a good relationship with group home staff prior to her symptoms starting. Most of her family lives far away.     Addendum:  Two hours after the initial interview, Irma states her anxiety is improved after PRN Ativan/hydroxyzine. She was able to leave he room and eat lunch in the milieu.      See H&P by Wilmer Cartagena MD on 5/16/2024 for additional details.      Diagnoses:   Primary Psychiatric Diagnosis  Psychosis nec    Secondary Psychiatric Diagnoses  Anxiety/Depression    Psychiatric Assessment:   Irma Heller is a 33 year old female previously diagnosed with anxiety, depression, and opioid dependence in remission who presented to the ED by EMS and admitted with psychosis in the context of likely first-episode psychosis. No prior psychiatric hospitalizations. Significant symptoms on admission include delusions of paranoia, visual and tactile hallucinations, and " increased anxiety. The MSE on admission was pertinent for paranoia, anxiety. Biological contributions to mental health presentation include hx of opioid use disorder (though this has been stable) as well as medical diagnosis of MS, Chiari malformation. Psychological contributions to mental health presentation include anxiety, depression. Social factors contributing to mental health presentation include poor social support outside of group home staff. Protective factors include good medication adherence and group home staff.      Neurology was consulted to rule out the potential contributions of an acute MS flare and Chiari I malformation. Per neurology, there is no evidence of acute MS exacerbation d/t unremarkable exam and mild symptoms of past MS flares. Additionally, her MRI brain (from 5/18/2024) did not show any progression of MS and showed grossly stable postsurgical changes of Chiari I decompression. Recommended continuing PTA interferon beta-1b. It was not felt that immunosuppressive therapy or another MRI was indicated at this time.      Patient's definitive diagnosis is still in evolution, differential for psychosis in the context of no prior psychotic episodes includes schizophrenia or other psychiatric etiology vs. drug-induced psychosis vs. medical etiology of psychosis. Feel that this is likely a primary psychiatric cause in the context of UDS positive only for prescribed medication and no recent drug use. Patient is also at about the typical age range for primary psychotic disorder onset in females. However, consultation with neurology to evaluate medical etiology, further neuroimaging and further history from collateral would be indicated to get a better understand of patient's symptom timeline and course. She  clearly benefited from antipsychotics this admission.     Given that she recently had psychosis, patient warranted inpatient psychiatric hospitalization to maintain her safety.         Psychiatric Hospital Course:     Irma Heller was admitted to [facility] on a 72 hour hold. Hold was discontinued shortly upon admission as pt agreed to stay voluntary. The patient was placed under status 15 (15 minute checks) to ensure patient safety. PTA buspirone, Lexapro, PRN Ativan and hydroxyzine were continued.  PTA Adderall was held to minimize any increased anxiety pending mood stabilization. New medications started at the time of admission include olanzapine 20mg at night as well as depakote 500mg at night, both of which pt has reported as being helpful for sleep and anxiety to date.       Over the course of this hospitalization, Irma reported feeling safe. She was able to eat the food and had no paranoia surrounding this. Patient was able to socialize in the milieu with the other patients following this. She had ongoing anxiety which was reduced with PRN Ativan/hydroxyzine/olanzapine. She did not require chemical/physical restraints during admission. She was cooperative with cares and had good group attendance. She denied SI/HI during her stay. Additionally, her symptoms of psychosis, including visual/tactile hallucinations and anxiety improved.      The risks, benefits, alternatives, and side effects were discussed and understood by the patient.    Irma was released to  home, with the plan of transitioning to an IRTS on 6/12/2024. At the time of discharge she was determined to not be a danger to herself or others.     Risk Assessment:      Today Irma eHller denies suicidal ideation/homicidal ideation. Patient has notable risk factors for self-harm, including single status, anxiety, psychosis, and hx of substance abuse. However, risk is mitigated by commitment to family, Denominational beliefs, sobriety, absence of past attempts, ability to volunteer a safety plan, and history of seeking help when needed. Therefore, based on all available evidence including the factors cited above, she  "does not appear to be at imminent risk for self-harm, does not meet criteria for a 72-hr hold, and therefore remains appropriate for ongoing outpatient level of care. Additional steps taken to minimize risk include: medication optimization, close psychiatric follow up and provision of crisis resources. Voluntary referral for IRTS was offered, she accepted this offer.     Psychiatric Examination:     Mental Status Exam:  Oriented to:  Grossly Oriented  General:  Awake and Alert  Appearance:  appears stated age, Grooming is adequate, and Dressed in scrubs  Behavior/Attitude:  Calm, Cooperative, Engaged, and Suspicious  Eye Contact: Appropriate  Psychomotor: Normal and No evidence of tics, dystonia, or tardive dyskinesia  no catatonia present  Speech:  appropriate volume/tone, talkative, and spontaneous  Language: Fluent in English with appropriate syntax and vocabulary.  Mood:  \"a little anxious\"  Affect: Less labile than previous days. Mainly bright, but occasionally anxious. Able to redirect self when anxiety is exacerbated.   Thought Process:  linear and goal directed  Thought Content:   No SI/HI/AH/VH; delusions of paranoia  Associations:  questionable  Insight:  limited due to difficulty recognizing psychosis symptoms  Judgment:  limited due to continuing delusions of paranoia and recent inability to care for self PTA though has been adherent with treatment team   Impulse control: impaired  Attention Span:  grossly intact  Concentration:  grossly intact  Recent and Remote Memory:  not formally assessed  Fund of Knowledge: delayed, not formally assessed  Muscle Strength and Tone: unremarkable   Gait and Station: unremarkable     Medical Hospital Course:   Irma Heller was medically cleared by the ED prior to admission to the unit.    Medical Diagnoses addressed:  #MS  - Given the history of MS and Chiari malformation in the context of new psychosis, neuro was consulted. Per neurology, there is no evidence " of acute MS exacerbation. Her exam was unremarkable, her symptoms of past MS flares are mild, and her MRI brain from 5 days ago did not show any progression of MS.   - Continue PTA interferon beta-1b .   - Plan to hold Adderall and sumatriptan until mood and anxiety are stabilized.      #Chiari Malformation  - Neuro consulted as above    #Poor dentition/dental pain  - Dental was consulted and pt was advised to establish outpatient dental home upon discharge    #Hx of iron deficiency anemia  -On Ferosul held at time of admission .     Consults:    - Neurology for history of MS, Chiari I malformation in context of psychiatric symptoms   - Dental for poor dentition and pain    Labs were notable for the following:  - CBC unremarkable, WBC 13 on initial, 9.0 on repeat  - BMP unremarkable except for glucose 141 (05/17), repeat at 258 (05/24)  - HbA1c 7.0  - Lipids WNL except for HDL at 31  - BNP WNL  - Blood cultures negative  - TSH normal  - Vit D normal  - Vit B12 normal  - Folate normal     - UA negative for infection, glucose 100  - UDS positive for amphetamines, benzos (both prescribed)  - UPT negative     - EKG with NSR, QTc 450 on 05/16     - MRI (5/18/24) showed stable MS plaques unchanged from previous on 8/1/22  - CT head unremarkable  - CXR with mild prominence of the cardiac silhouette, nonspecific mild streaky perihilar and basilar opacities   - BLE ultrasound unremarkable, negative for DVT  - Echo was a technically difficult study, showed mildly decreased LV function with EF 50-55% and globally reduced RV function     Discharge Medications:     Current Discharge Medication List        START taking these medications    Details   divalproex sodium extended-release (DEPAKOTE ER) 500 MG 24 hr tablet Take 1 tablet (500 mg) by mouth at bedtime  Qty: 30 tablet, Refills: 0    Associated Diagnoses: Schizoaffective disorder, bipolar type (H)      naloxone (NARCAN) 0.4 MG/ML injection Inject 1 mL (0.4 mg) into the  muscle once for 1 dose  Qty: 1 mL, Refills: 0    Associated Diagnoses: Opioid use disorder      naloxone (NARCAN) 4 MG/0.1ML nasal spray Spray 1 spray (4 mg) into one nostril alternating nostrils as needed for opioid reversal (only to be used for opiod overdose) every 2-3 minutes until assistance arrives  Qty: 0.2 mL, Refills: 0    Associated Diagnoses: Opioid use disorder      nicotine (COMMIT) 2 MG lozenge Place 1 lozenge (2 mg) inside cheek every 2 hours as needed for nicotine withdrawal symptoms  Qty: 20 lozenge, Refills: 0    Associated Diagnoses: Encounter for tobacco use cessation counseling      OLANZapine (ZYPREXA) 10 MG tablet Take 1 tablet (10 mg) by mouth 2 times daily as needed  Qty: 15 tablet, Refills: 0    Associated Diagnoses: Schizoaffective disorder, bipolar type (H)      OLANZapine zydis (ZYPREXA) 20 MG ODT Take 1 tablet (20 mg) by mouth at bedtime  Qty: 30 tablet, Refills: 0    Associated Diagnoses: Schizoaffective disorder, bipolar type (H)           CONTINUE these medications which have CHANGED    Details   !! buprenorphine (SUBUTEX) 8 MG SUBL sublingual tablet Place 2 tablets (16 mg) under the tongue daily  Qty: 60 tablet, Refills: 0    Associated Diagnoses: Opioid use disorder      !! buprenorphine (SUBUTEX) 8 MG SUBL sublingual tablet Place 1 tablet (8 mg) under the tongue at bedtime  Qty: 30 tablet, Refills: 0    Associated Diagnoses: Opioid use disorder      busPIRone (BUSPAR) 10 MG tablet Take 1 tablet (10 mg) by mouth 2 times daily  Qty: 60 tablet, Refills: 0    Associated Diagnoses: Anxiety disorder, unspecified type      docusate sodium (COLACE) 100 MG capsule Take 1 capsule (100 mg) by mouth daily as needed for constipation  Qty: 30 capsule, Refills: 0    Comments: Hold for loose stools  Associated Diagnoses: Constipation, unspecified constipation type      escitalopram (LEXAPRO) 10 MG tablet Take 1 tablet (10 mg) by mouth daily  Qty: 30 tablet, Refills: 0    Associated Diagnoses:  Depression, unspecified depression type      gabapentin (NEURONTIN) 300 MG capsule Take 2 capsules (600 mg) by mouth 3 times daily  Qty: 180 capsule, Refills: 0    Associated Diagnoses: Anxiety disorder, unspecified type      hydrOXYzine HCl (ATARAX) 50 MG tablet Take 1 tablet (50 mg) by mouth 3 times daily  Qty: 90 tablet, Refills: 0    Associated Diagnoses: Anxiety disorder, unspecified type      ibuprofen (ADVIL/MOTRIN) 600 MG tablet Take 1 tablet (600 mg) by mouth every 8 hours as needed for mild pain  Qty: 30 tablet, Refills: 0    Associated Diagnoses: Other chronic pain      LORazepam (ATIVAN) 1 MG tablet Take 1 tablet (1 mg) by mouth 2 times daily as needed for anxiety  Qty: 15 tablet, Refills: 0    Associated Diagnoses: Anxiety disorder, unspecified type      zolpidem (AMBIEN) 10 MG tablet Take 1 tablet (10 mg) by mouth at bedtime  Qty: 30 tablet, Refills: 0    Associated Diagnoses: Insomnia, unspecified type       !! - Potential duplicate medications found. Please discuss with provider.        CONTINUE these medications which have NOT CHANGED    Details   amphetamine-dextroamphetamine (ADDERALL) 20 MG tablet Take 40 mg by mouth daily at 2 pm      FEROSUL 325 (65 Fe) MG tablet Take 325 mg by mouth daily      interferon beta-1b (BETASERON) 0.3 MG injection Inject 300 mcg (0.3 mg) Subcutaneous every other day Inject 0.3 mg subcutaneous every other day  Qty: 14 kit, Refills: 11    Associated Diagnoses: Multiple sclerosis (H)      melatonin 5 MG tablet Take 5 mg by mouth at bedtime      Multiple Vitamin (TAB-A-AMBROSE) TABS Take 1 tablet by mouth daily      SUMAtriptan (IMITREX) 50 MG tablet Take 1 tablet (50 mg) by mouth at onset of headache for migraine May repeat in 2 hours. Max 2 tablets/24 hours.  Qty: 9 tablet, Refills: 5    Associated Diagnoses: Migraine without aura and without status migrainosus, not intractable           STOP taking these medications       amphetamine-dextroamphetamine (ADDERALL XR) 25 MG  24 hr capsule Comments:   Reason for Stopping:                Discharge Plan:   Medications as above    Psychiatric Appointments:   Psychiatry Follow-up: 2025 at 4:00 PM - PHONE APPT     Psychiatrist: Dr. Mohan Duarte MD with [facility]   Phone Number: [phone]        Referrals:   Subutex Follow-up: 2024 at 11:20 AM - IN PERSON  Provider: Laly Tatum PA-C  Location: [place] [address]  To reschedule or cancel this appointment, please call [phone]     Medical follow up:   Primary Care Follow-up: 2024 at 11:10 AM - IN PERSON    Provider: MARK Ferrer     Location: [facility] - [address] [place] [address]     Phone Number: [phone]       Dental Follow-up:  RECOMMENDATION:  For current urgent needs, call [facility] to schedule an appointment for limited exam, dental radiographs and definite treatment.  [facility]  [facility] [place] - [address] [place] [address], [id number] [phone]  Also recommend to establish a dental home with either [facility] dental school or community dentist of her choice.           Attestations:   Sharon Kebede, MS3  [facility] Student      Attestation:   The patient has been seen and evaluated by me,  Wilmer Cartagena MD. I have examined the patient today and reviewed the discharge plan with the resident and medical student. I agree with the final assessment and plan, as noted in the discharge summary. I have reviewed today's vital signs, medications, labs and imaging.  Total time discharge plannin minutes  Wilmer Cartagena MD ,Ph.D.  \

## 2024-06-11 NOTE — PLAN OF CARE
Problem: Sleep Disturbance  Goal: Adequate Sleep/Rest  Outcome: Progressing   Goal Outcome Evaluation:    Patient appears to have slept a total of 6 hours. Safety/environment checks conducted every 15 minutes with no concerns noted. No complaints of pain/discomfort.

## 2024-06-11 NOTE — PLAN OF CARE
"Pt denies SI, denies depression. Pt repots some anxiety related to discharge tomorrow.  Pt phone has been locked but she may have found the correct password.  Pts phone is locked d/t trying the wrong password abut by the am the phone will not be locked and she will try to use the password she found this edu.  Pt showered and has been packing her belongings, in preparation of the her discharge tomorrow. Pt continues to to top of the hour requests and continues to have many requests.  Pt social with select peers.  Pt did laundry tonight.    Problem: Adult Behavioral Health Plan of Care  Goal: Plan of Care Review  6/10/2024 2058 by Kika Dawkins RN  Outcome: Not Progressing  6/10/2024 1246 by Kika Dawkins RN  Outcome: Not Progressing  Goal: Patient-Specific Goal (Individualization)  Description: You can add care plan individualizations to a care plan. Examples of Individualization might be:  \"Parent requests to be called daily at 9am for status\", \"I have a hard time hearing out of my right ear\", or \"Do not touch me to wake me up as it startles  me\".  6/10/2024 2058 by Kika Dawkins RN  Outcome: Not Progressing  6/10/2024 1246 by Kika Dawkins RN  Outcome: Not Progressing  Goal: Adheres to Safety Considerations for Self and Others  6/10/2024 2058 by Kika Dawkins RN  Outcome: Not Progressing  6/10/2024 1246 by Kika Dawkins RN  Outcome: Not Progressing  Intervention: Develop and Maintain Individualized Safety Plan  Recent Flowsheet Documentation  Taken 6/10/2024 1205 by Kika Dawkins RN  Safety Measures:   safety rounds completed   environmental rounds completed  Goal: Absence of New-Onset Illness or Injury  6/10/2024 2058 by Kika Dawkins RN  Outcome: Not Progressing  6/10/2024 1246 by Kika Dawkins RN  Outcome: Not Progressing  Intervention: Identify and Manage Fall Risk  Recent Flowsheet Documentation  Taken 6/10/2024 1205 by Kika Dawkins, RN  Safety Measures:   safety rounds completed   environmental " rounds completed  Goal: Optimized Coping Skills in Response to Life Stressors  6/10/2024 2058 by Kika Dawkins RN  Outcome: Not Progressing  6/10/2024 1246 by Kika Dawkins RN  Outcome: Not Progressing  Goal: Develops/Participates in Therapeutic [place] to Support Successful Transition  6/10/2024 2058 by Kika Dawkins RN  Outcome: Not Progressing  6/10/2024 1246 by Kika Dawkins RN  Outcome: Not Progressing   Goal Outcome Evaluation:

## 2024-06-11 NOTE — PLAN OF CARE
Number of patients attending the group:  3  Group Length:  1 Hours    Group Therapy     Summary of Group / Topics Discussed:      The  goal of group psychotherapy is to promote insight to positive choice and change. Group processing was within a supportive and safe environment. Patients will process emotions using verbal group and expressive psychotherapy interventions.        Assessment: This patient participated in a group session where three constructs were reviewed with attention paid to individual questions: clarifications on mental health diagnoses (symptoms, early warning signs, strategies to engage support systems in preventing recidivism). Reviewed occuring disorders in the context of mental health diagnoses as well. Patients shared their experiences and helpful tips. Feedback was facilitated by way of bringing evidence-based clarity, identifying misconceptions and providing motivation change. Demonstrated pertinent skills, for example, ways to monitor sleep, and other possible warning signs such as psychosis/delusions and ways to report them to care providers to prevent exacerbation.      Patient Response- This patient participated in the group. Asked questions,. Received feedback and provided feedback to other group participants.

## 2024-06-11 NOTE — PLAN OF CARE
"Pt discharged as per order.  Pt denied hallucinations.  Reviewed discharge with patient.  Pt pleasant and cooperative.  Pt discharged with all belongings, discharge paperwork, and ordered discharge medications.  Pt discharged via medical ride to home.    Problem: Adult Behavioral Health Plan of Care  Goal: Plan of Care Review  6/11/2024 1057 by Kika Dawkins RN  Outcome: Adequate for Care Transition  6/10/2024 2058 by Kika Dawkins RN  Outcome: Not Progressing  Goal: Patient-Specific Goal (Individualization)  Description: You can add care plan individualizations to a care plan. Examples of Individualization might be:  \"Parent requests to be called daily at 9am for status\", \"I have a hard time hearing out of my right ear\", or \"Do not touch me to wake me up as it startles  me\".  6/11/2024 1057 by Kika Dawkins RN  Outcome: Adequate for Care Transition  6/10/2024 2058 by Kika Dwakins RN  Outcome: Not Progressing  Goal: Adheres to Safety Considerations for Self and Others  6/11/2024 1057 by Kika Dakwins RN  Outcome: Adequate for Care Transition  6/10/2024 2058 by Kika Dawkins RN  Outcome: Not Progressing  Intervention: Develop and Maintain Individualized Safety Plan  Recent Flowsheet Documentation  Taken 6/11/2024 1040 by Kika Dawkins RN  Safety Measures:   safety rounds completed   environmental rounds completed  Goal: Absence of New-Onset Illness or Injury  6/11/2024 1057 by Kika Dawkins RN  Outcome: Adequate for Care Transition  6/10/2024 2058 by Kika Dawkins RN  Outcome: Not Progressing  Intervention: Identify and Manage Fall Risk  Recent Flowsheet Documentation  Taken 6/11/2024 1040 by Kika Dawkins RN  Safety Measures:   safety rounds completed   environmental rounds completed  Goal: Optimized Coping Skills in Response to Life Stressors  6/11/2024 1057 by Kika Dawkins RN  Outcome: Adequate for Care Transition  6/10/2024 2058 by Kika Dawkins RN  Outcome: Not Progressing  Goal: " Develops/Participates in Therapeutic [place] to Support Successful Transition  6/11/2024 1057 by Kika Dawkins RN  Outcome: Adequate for Care Transition  6/10/2024 2058 by Kika Dawkins RN  Outcome: Not Progressing     Problem: Sleep Disturbance  Goal: Adequate Sleep/Rest  6/11/2024 1057 by Kika Dawkins RN  Outcome: Adequate for Care Transition  6/10/2024 2058 by Kika Dawkins RN  Outcome: Not Progressing     Problem: Psychotic Signs/Symptoms  Goal: Improved Behavioral Control (Psychotic Signs/Symptoms)  6/11/2024 1057 by Kika Dawkins RN  Outcome: Adequate for Care Transition  6/10/2024 2058 by Kika Dawkins RN  Outcome: Not Progressing  Goal: Optimal Cognitive Function (Psychotic Signs/Symptoms)  6/11/2024 1057 by Kika Dawkins RN  Outcome: Adequate for Care Transition  6/10/2024 2058 by Kika Dawkins RN  Outcome: Not Progressing  Goal: Increased Participation and Engagement (Psychotic Signs/Symptoms)  6/11/2024 1057 by Kika Dawkins RN  Outcome: Adequate for Care Transition  6/10/2024 2058 by Kika Dawkins RN  Outcome: Not Progressing  Goal: Improved Mood Symptoms (Psychotic Signs/Symptoms)  6/11/2024 1057 by Kika Dawkins RN  Outcome: Adequate for Care Transition  6/10/2024 2058 by Kika Dawkins RN  Outcome: Not Progressing  Goal: Improved Psychomotor Symptoms (Psychotic Signs/Symptoms)  6/11/2024 1057 by Kika Dawkins RN  Outcome: Adequate for Care Transition  6/10/2024 2058 by Kika Dawkins RN  Outcome: Not Progressing  Goal: Decreased Sensory Symptoms (Psychotic Signs/Symptoms)  6/11/2024 1057 by Kika Dawkins RN  Outcome: Adequate for Care Transition  6/10/2024 2058 by Kika Dawkins RN  Outcome: Not Progressing  Goal: Improved Sleep (Psychotic Signs/Symptoms)  6/11/2024 1057 by Kika Dawkins RN  Outcome: Adequate for Care Transition  6/10/2024 2058 by Kika Dawkins RN  Outcome: Not Progressing  Goal: Enhanced Social, Occupational or Functional Skills (Psychotic Signs/Symptoms)  6/11/2024  1057 by Juancho, Kika, RN  Outcome: Adequate for Care Transition  6/10/2024 2058 by Kika Dawkins RN  Outcome: Not Progressing     Problem: Depression  Goal: Improved Mood  6/11/2024 1057 by Kika Dawkins RN  Outcome: Adequate for Care Transition  6/10/2024 2058 by Kika Dawkins RN  Outcome: Not Progressing     Problem: Anxiety  Goal: Anxiety Reduction or Resolution  6/11/2024 1057 by Kika Dawkins RN  Outcome: Adequate for Care Transition  6/10/2024 2058 by Kika Dawkins RN  Outcome: Not Progressing   Goal Outcome Evaluation:

## 2024-06-11 NOTE — PLAN OF CARE
Team Note Due:  Thursday    Assessment/Intervention/Current Symtoms and Care Coordination:  Chart review and met with team, discussed pt progress, symptomology, and response to treatment.  Discussed the discharge plan and any potential impediments to discharge.    Scheduled medical ride for 10:30am.    Met with pt to update on discharge time and reminded her to notify [facility] if there are any changes with her planned admission tomorrow.    Discharge Plan or Goal:  Discharging to [facility] 6/11 with admission to [facility] on 6/12     Barriers to Discharge:  None. Pt discharging today.     Referral Status:  IRTS:  [facility] - sent 6/5 via fax. Interview completed 6/7. Admit scheduled 6/12.  [facility] - sent 6/5  [facility] ([facility] preferred) - sent 6/5  [facility] - sent 6/5 via fax.      Legal Status:  Voluntary    Contacts:  Elsia Gomez, grandmother, [phone].    Kalie Villegas, [phone], Aunt, Emergency Contact     Sergio Dunn ([facility] residential ): [phone]    Riana Galindo ([facility]): [phone]  [email or web address]     Upcoming Meetings and Dates/Important Information and next steps:  Discharge

## 2024-06-11 NOTE — PLAN OF CARE
BEH IP Unit Acuity Rating Score (UARS)  Patient is given one point for every criteria they meet.    CRITERIA SCORING   On a 72 hour hold, court hold, committed, stay of commitment, or revocation. 0   Patient LOS on BEH unit exceeds 20 days. 1  LOS: 26   Patient under guardianship, 55+, otherwise medically complex, or under age 11. 0   Suicide ideation without relief of precipitating factors. 0   Current plan for suicide. 0   Current plan for homicide. 0   Imminent risk or actual attempt to seriously harm another without relief of factors precipitating the attempt. 0   Severe dysfunction in daily living (ex: complete neglect for self care, extreme disruption in vegetative function, extreme deterioration in social interactions). 1   Recent (last 7 days) or current physical aggression in the ED or on unit. 0   Restraints or seclusion episode in past 72 hours. 0   Recent (last 7 days) or current verbal aggression, agitation, yelling, etc., while in the ED or unit. 0   Active psychosis. 1   Need for constant or near constant redirection (from leaving, from others, etc).  1   Intrusive or disruptive behaviors. 0   Patient requires 3 or more hours of individualized nursing care per 8-hour shift (i.e. for ADLs, meds, therapeutic interventions). 0   TOTAL 4

## 2024-06-30 ENCOUNTER — HEALTH MAINTENANCE LETTER (OUTPATIENT)
Age: 34
End: 2024-06-30

## 2024-08-12 ENCOUNTER — ANCILLARY ORDERS (OUTPATIENT)
Dept: MRI IMAGING | Facility: CLINIC | Age: 34
End: 2024-08-12

## 2024-08-12 DIAGNOSIS — I42.9 CARDIOMYOPATHY, UNSPECIFIED TYPE (H): Primary | ICD-10-CM

## 2024-09-17 ENCOUNTER — TELEPHONE (OUTPATIENT)
Dept: NEUROLOGY | Facility: CLINIC | Age: 34
End: 2024-09-17
Payer: COMMERCIAL

## 2024-09-17 DIAGNOSIS — G35 MULTIPLE SCLEROSIS (H): ICD-10-CM

## 2024-09-17 NOTE — TELEPHONE ENCOUNTER
[facility]-please schedule follow up with Dr. Chahal on 09/19 or 09/20 in [place]. Please warm transfer patient's call to clinic, held slot.

## 2024-09-17 NOTE — TELEPHONE ENCOUNTER
Routing refill request to provider for review/approval because:  Drug not on the [facility] refill protocol   Last OV 8/28/23  No follow up scheduled.     Routing to scheduling as well to assist pt in scheduling follow up.   Shahla BLACKWOOD RN, BSN  [facility] Neurology

## 2024-09-19 RX ORDER — INTERFERON BETA-1B 0.3 MG
KIT SUBCUTANEOUS
Qty: 14 KIT | Refills: 11 | OUTPATIENT
Start: 2024-09-19

## 2024-09-19 NOTE — TELEPHONE ENCOUNTER
Dr. Chahal stated that pt needs to be seen before he can send refills, since it has been over a year since seen, and pt did not complete recommended testing.     Shahla BLACKWOOD RN, BSN  [facility] Neurology

## 2024-09-20 NOTE — TELEPHONE ENCOUNTER
Left voice message to call clinic to schedule an appt with Dr. Chahal on 09/23 or 09/25 in [place]. Please warm transfer patient's call to clinic, held slot.

## 2024-10-03 ENCOUNTER — TELEPHONE (OUTPATIENT)
Dept: NEUROLOGY | Facility: CLINIC | Age: 34
End: 2024-10-03
Payer: COMMERCIAL

## 2024-10-03 NOTE — TELEPHONE ENCOUNTER
PA Initiation    Medication: BETASERON 0.3 MG SC KIT  Insurance Company: Les - Phone [phone] Fax [phone]  Pharmacy Filling the Rx: [facility] - [place], [place] - [address]  Filling Pharmacy Phone:    Filling Pharmacy Fax:    Start Date: 10/3/2024          Thank you,    Jackie Willis h-T  Specialty Pharmacy Clinic Liaison - CardiologyNeurologyMultiple [facility] [address]  [address] [place] [address]  Ph: [phone] Fax: [phone]  [email or web address]

## 2024-10-04 NOTE — TELEPHONE ENCOUNTER
Prior Authorization Approval    Medication: BETASERON 0.3 MG SC KIT  Authorization Effective Date: 10/4/2024  Authorization Expiration Date: 10/4/2025  Approved Dose/Quantity: 28 days  Reference #:     Insurance Company: Les - Phone [phone] Fax [phone]  Expected CoPay: $    CoPay Card Available:      Financial Assistance Needed:   Which Pharmacy is filling the prescription: [facility] - [place], [place] - [address] [place] [address]  Pharmacy Notified: Yes  Patient Notified: Yes        Thank you,    Jackie Willis [facility]  Specialty Pharmacy Clinic Liaison - CardiologyNeurologyMultiple Sclerosis  [facility] [address]  [address] [place] [address]  Ph: [phone] Fax: [phone]  [email or web address]

## 2024-11-04 DIAGNOSIS — G35 MULTIPLE SCLEROSIS (H): ICD-10-CM

## 2024-11-05 RX ORDER — INTERFERON BETA-1B 0.3 MG
KIT SUBCUTANEOUS
Qty: 14 KIT | Refills: 11 | OUTPATIENT
Start: 2024-11-05

## 2024-11-05 NOTE — TELEPHONE ENCOUNTER
Reviewed chart.      Provider refused request for refill yesterday stating pt is no longer under provider care.     Care team has reached out to pt several times to make appt.  Pt has not been reached and no appt has been made. LOV 8/28/23.     Will deny this request due to provider denying request yesterday.     GALLITO Gale, BSN  [facility] Neurology

## 2024-11-08 ENCOUNTER — TELEPHONE (OUTPATIENT)
Dept: NEUROLOGY | Facility: CLINIC | Age: 34
End: 2024-11-08
Payer: COMMERCIAL

## 2024-11-08 DIAGNOSIS — G35 MULTIPLE SCLEROSIS (H): ICD-10-CM

## 2024-11-08 NOTE — TELEPHONE ENCOUNTER
[facility] Call Center    Phone Message    May a detailed message be left on voicemail: yes     Reason for Call: Medication Refill Request    Has the patient contacted the pharmacy for the refill? Yes   Name of medication being requested: BETASERON 0.3 MG injection     Provider who prescribed the medication: Dr. Chahal Alberto     Pharmacy[facility] [address] [facility]    Date medication is needed: ASAP    Pt has scheduled appointment on 02/19    Action Taken: Other: Neurology     Travel Screening: Not Applicable     Date of Service:

## 2024-11-08 NOTE — TELEPHONE ENCOUNTER
Routing refill request to provider for review/approval because:  Drug not on the [facility] refill protocol     LOV 8/28/23.  NOV: 2/19/25 (next available)    Shahla RN, BSN  [facility]

## 2024-11-11 DIAGNOSIS — G35 MULTIPLE SCLEROSIS (H): ICD-10-CM

## 2024-11-11 RX ORDER — INTERFERON BETA-1B 0.3 MG
KIT SUBCUTANEOUS
Qty: 14 KIT | Refills: 3 | Status: SHIPPED | OUTPATIENT
Start: 2024-11-11

## 2024-11-11 NOTE — TELEPHONE ENCOUNTER
Per Provider message:    I need to know if she is on the medication now (before I refill it).  If she is not, I would rather see her before prescribing it again.  The visit could be scheduled for sooner date in such case.   Thanks,   Alberto Chahal MD.     Actions:    Spoke with patient and at this time patient declares that she is still on medication and last dose was this past Wednesday and she really needs the medication refilled. Informed patient that we will be in touch after speaking with provider. At this time routing message over to provider and Baylee KAPOOR to see if patient can be seen sooner per provider request. See message above.    Tiffanie Lloyd MA  [facility]

## 2024-11-11 NOTE — TELEPHONE ENCOUNTER
Staff message:  If she can come on the 13th we will see her. She was not filling the med for awhile so he said he will refill it only if she did not stop and its wanting to restart in that case he wants to see her first.     Spoke with patient and scheduled 10 am on 11/13/24. Hold removed.   Patient will let us know if her group home cannot bring her, informed her there was a short refill sent in on medication but she needs to let us know if there is any issue with transportation/etc.     Tiffanie Lloyd MA  [facility]rology Clinic

## 2024-11-11 NOTE — TELEPHONE ENCOUNTER
[facility] Call Center    Phone Message    May a detailed message be left on voicemail: yes     Reason for Call: Medication Refill Request    Has the patient contacted the pharmacy for the refill? Yes   Name of medication being requested:   interferon beta-1b (BETASERON) 0.3 MG injection     Provider who prescribed the medication: Dr Chahal  Pharmacy: [facility]   Date medication is needed: 11/11/2024   Patient calling again to request refill be sent to pharmacy as soon as possible, has follow up scheduled for 02/19/2025    Action Taken: Message routed to:  Other: [facility]    Travel Screening: Not Applicable

## 2024-11-11 NOTE — TELEPHONE ENCOUNTER
Refill Request    Pending Prescriptions:                       Disp   Refills    BETASERON 0.3 MG injection [Pharmacy Med *14 kit 11           Sig: INJECT 0.3MG UNDER THE SKIN EVERY OTHER DAY      Last Office Visit: 08/28/23    Next Office Visit: 02/19/25    Prescribing Provider: Dr. Tirso     Last Medication Refill Date: 11/08/24    Prior Auth Date (if applicable): NA    Chart Notes: 11/04/24 - 11/05/24  Please see encounter dated 11/05/24  - at this time per RN     Provider refused request for refill yesterday stating pt is no longer under provider care.      Care team has reached out to pt several times to make appt.  Pt has not been reached and no appt has been made. LOV 8/28/23.      Will deny this request due to provider denying request yesterday.     Shahla RN, BSN  [facility] Neurology    Action(s): patient is on the schedule now, routing to provider as FYI.

## 2024-11-11 NOTE — TELEPHONE ENCOUNTER
Medication being taken care of in another encounter.    Tiffanie Lloyd MA  [facility]

## 2024-12-10 DIAGNOSIS — G43.009 MIGRAINE WITHOUT AURA AND WITHOUT STATUS MIGRAINOSUS, NOT INTRACTABLE: ICD-10-CM

## 2024-12-10 RX ORDER — SUMATRIPTAN 50 MG/1
TABLET, FILM COATED ORAL
Qty: 9 TABLET | Refills: 5 | OUTPATIENT
Start: 2024-12-10

## 2024-12-10 NOTE — TELEPHONE ENCOUNTER
Provider refused request on 12/6/24.  Needs to be seen first.     Will deny this request.     GALLITO Gale, BSN  [facility]

## 2025-01-09 ENCOUNTER — TELEPHONE (OUTPATIENT)
Dept: NEUROLOGY | Facility: CLINIC | Age: 35
End: 2025-01-09
Payer: COMMERCIAL

## 2025-01-09 DIAGNOSIS — G35 MULTIPLE SCLEROSIS (H): ICD-10-CM

## 2025-01-09 RX ORDER — INTERFERON BETA-1B 0.3 MG
0.3 KIT SUBCUTANEOUS EVERY OTHER DAY
Qty: 14 KIT | Refills: 1 | Status: CANCELLED | OUTPATIENT
Start: 2025-01-09

## 2025-01-09 NOTE — TELEPHONE ENCOUNTER
Patient was initially called for rescheduling of appointment due to provider rounding on 3-21-25.  Patient has agreed to the appointment change to 3-10-25, provided provider will refill medication in February.    Patient would like a call back to confirm medication refill will be approved.

## 2025-01-09 NOTE — TELEPHONE ENCOUNTER
Called pt and verified that she will need one more refill of the Betaseron before appt on 3/10/25 (had to reschedule due to provider being out)    See dispense report below:  Dispensed Days Supply Quantity Provider Pharmacy    BETASERON    INJ 0.3MG 12/30/2024 28 14 Units Alberto Chahal MD [facility]   BETASERON    INJ 0.3MG 12/03/2024 28 14 Units Alberto Chahal MD [facility]   BETASERON    INJ 0.3MG 11/11/2024 28 14 Units Alberto Chahal MD [facility]     Pt will need one additional refill than what is already on file.     Please review and advise on if updated script can be sent in to last until appt 3/10.     GALLITO Gale, BSN  [facility]

## 2025-03-18 ENCOUNTER — TELEPHONE (OUTPATIENT)
Dept: NEUROLOGY | Facility: CLINIC | Age: 35
End: 2025-03-18
Payer: COMMERCIAL

## 2025-03-18 DIAGNOSIS — G35 MULTIPLE SCLEROSIS (H): ICD-10-CM

## 2025-03-18 RX ORDER — INTERFERON BETA-1B 0.3 MG
0.25 KIT SUBCUTANEOUS EVERY OTHER DAY
Qty: 14 KIT | Refills: 0 | OUTPATIENT
Start: 2025-03-18

## 2025-03-18 RX ORDER — INTERFERON BETA-1B 0.3 MG
0.25 KIT SUBCUTANEOUS EVERY OTHER DAY
Qty: 14 KIT | Refills: 0 | Status: SHIPPED | OUTPATIENT
Start: 2025-03-18

## 2025-03-18 NOTE — TELEPHONE ENCOUNTER
"Last Written Prescription Date: 2/21/25  Last Fill Quantity: 14,  # refills: 0   Last office visit: 8/28/2023   Future Office Visit: Pt no showed 3/10/25 appt. Has had several cancelled appts.     Called patient to get her scheduled.     Pt thought she had appt 3/26/25.  She said she got a call from scheduling and they moved her appt from 3/21/25 to 3/26/25.  Writer let her know she has a different appt scheduled 3/26/25.    Pt is adamant that the person that called told her 3/26 for appt with Dr. Chahal, and she wants the \"no show\" to be taken off of the 3/10/25 appt.  She said she has voicemail's confirming the date.     Writer apologized for the confusion, and added pt on to Dr. Chahal next available 4/16/25 and added appt to the wait list.      Pt said she has a lot going on with her MS right now and needs to be seen sooner.  Let her know that we have her on wait list.     Will route to Dr. Chahal to send in 30 day refill.     Shahla, RN, BSN  [facility] Neurology              "

## 2025-03-26 ENCOUNTER — HOSPITAL ENCOUNTER (OUTPATIENT)
Dept: MRI IMAGING | Facility: HOSPITAL | Age: 35
Discharge: HOME | End: 2025-03-26
Attending: INTERNAL MEDICINE
Payer: COMMERCIAL

## 2025-03-26 ENCOUNTER — ANCILLARY ORDERS (OUTPATIENT)
Dept: MRI IMAGING | Facility: CLINIC | Age: 35
End: 2025-03-26

## 2025-03-26 DIAGNOSIS — I42.9 CARDIOMYOPATHY, UNSPECIFIED TYPE (H): Primary | ICD-10-CM

## 2025-03-26 DIAGNOSIS — I42.9 CARDIOMYOPATHY, UNSPECIFIED TYPE (H): ICD-10-CM

## 2025-03-26 PROCEDURE — 255N000002 HC RX 255 OP 636

## 2025-03-26 PROCEDURE — 75561 CARDIAC MRI FOR MORPH W/DYE: CPT | Mod: 26 | Performed by: GENERAL ACUTE CARE HOSPITAL

## 2025-03-26 PROCEDURE — A9585 GADOBUTROL INJECTION: HCPCS

## 2025-03-26 PROCEDURE — 999N000122 MR MYOCARDIUM  OVERREAD

## 2025-03-26 PROCEDURE — 75561 CARDIAC MRI FOR MORPH W/DYE: CPT

## 2025-03-26 RX ORDER — GADOBUTROL 604.72 MG/ML
26 INJECTION INTRAVENOUS ONCE
Status: COMPLETED | OUTPATIENT
Start: 2025-03-26 | End: 2025-03-26

## 2025-03-26 RX ADMIN — GADOBUTROL 26 ML: 604.72 INJECTION INTRAVENOUS at 09:51

## 2025-04-23 DIAGNOSIS — G35 MULTIPLE SCLEROSIS (H): ICD-10-CM

## 2025-04-24 RX ORDER — INTERFERON BETA-1B 0.3 MG
0.25 KIT SUBCUTANEOUS EVERY OTHER DAY
Qty: 14 KIT | Refills: 0 | OUTPATIENT
Start: 2025-04-24

## 2025-04-24 NOTE — TELEPHONE ENCOUNTER
Reviewed chart.  Pt saw a neurologist, Dr. Frey through [facility] 4/14/25, and has follow up schedule with them.     Please deny refill request.     Shahla RN, BSN  [facility] Neurology

## (undated) DEVICE — ENDO BITE BLOCK 60 MAXI LF 00712804

## (undated) DEVICE — RAD RX ISOVUE 300 (50ML) 61% IOPAMIDOL CHARGE PER ML

## (undated) DEVICE — ESU GROUND PAD ADULT W/CORD E7507

## (undated) DEVICE — ORGANIZER MIO MEDICAL DEVICE 00711750

## (undated) DEVICE — WIRE GUIDE 0.035"X450CM JAGWIRE HP STR TIP M00556580

## (undated) DEVICE — BALLOON EXTRACTION 3-LUMEN 15X190MM 2.8MM TL B-V233P-A

## (undated) RX ORDER — DEXAMETHASONE SODIUM PHOSPHATE 4 MG/ML
INJECTION, SOLUTION INTRA-ARTICULAR; INTRALESIONAL; INTRAMUSCULAR; INTRAVENOUS; SOFT TISSUE
Status: DISPENSED
Start: 2022-01-25

## (undated) RX ORDER — FENTANYL CITRATE 50 UG/ML
INJECTION, SOLUTION INTRAMUSCULAR; INTRAVENOUS
Status: DISPENSED
Start: 2022-01-25

## (undated) RX ORDER — ONDANSETRON 2 MG/ML
INJECTION INTRAMUSCULAR; INTRAVENOUS
Status: DISPENSED
Start: 2022-01-25

## (undated) RX ORDER — PROPOFOL 10 MG/ML
INJECTION, EMULSION INTRAVENOUS
Status: DISPENSED
Start: 2022-01-25

## (undated) RX ORDER — BUPIVACAINE HYDROCHLORIDE AND EPINEPHRINE 5; 5 MG/ML; UG/ML
INJECTION, SOLUTION EPIDURAL; INTRACAUDAL; PERINEURAL
Status: DISPENSED
Start: 2022-03-16

## (undated) RX ORDER — FENTANYL CITRATE 50 UG/ML
INJECTION, SOLUTION INTRAMUSCULAR; INTRAVENOUS
Status: DISPENSED
Start: 2022-03-16

## (undated) RX ORDER — PROPOFOL 10 MG/ML
INJECTION, EMULSION INTRAVENOUS
Status: DISPENSED
Start: 2022-03-16

## (undated) RX ORDER — LIDOCAINE HYDROCHLORIDE 20 MG/ML
INJECTION, SOLUTION EPIDURAL; INFILTRATION; INTRACAUDAL; PERINEURAL
Status: DISPENSED
Start: 2022-01-25